# Patient Record
Sex: FEMALE | Race: WHITE | Employment: UNEMPLOYED | URBAN - NONMETROPOLITAN AREA
[De-identification: names, ages, dates, MRNs, and addresses within clinical notes are randomized per-mention and may not be internally consistent; named-entity substitution may affect disease eponyms.]

---

## 2017-01-03 ENCOUNTER — TELEPHONE (OUTPATIENT)
Dept: SURGERY | Age: 42
End: 2017-01-03

## 2017-01-04 ENCOUNTER — HOSPITAL ENCOUNTER (OUTPATIENT)
Dept: GENERAL RADIOLOGY | Age: 42
Discharge: HOME | End: 2017-01-04
Payer: COMMERCIAL

## 2017-01-04 ENCOUNTER — OFFICE VISIT (OUTPATIENT)
Dept: UROLOGY | Age: 42
End: 2017-01-04
Payer: COMMERCIAL

## 2017-01-04 VITALS
WEIGHT: 181 LBS | HEART RATE: 80 BPM | SYSTOLIC BLOOD PRESSURE: 132 MMHG | HEIGHT: 60 IN | TEMPERATURE: 98.3 F | BODY MASS INDEX: 35.53 KG/M2 | OXYGEN SATURATION: 97 % | DIASTOLIC BLOOD PRESSURE: 78 MMHG

## 2017-01-04 DIAGNOSIS — N20.0 RENAL STONE: ICD-10-CM

## 2017-01-04 DIAGNOSIS — N20.0 RENAL STONE: Primary | ICD-10-CM

## 2017-01-04 DIAGNOSIS — N20.1 LEFT URETERAL STONE: ICD-10-CM

## 2017-01-04 LAB
APPEARANCE FLUID: NORMAL
BILIRUBIN, POC: NORMAL
BLOOD URINE, POC: NORMAL
CLARITY, POC: CLEAR
COLOR, POC: YELLOW
GLUCOSE URINE, POC: NORMAL
KETONES, POC: NORMAL
LEUKOCYTE EST, POC: NORMAL
NITRITE, POC: NORMAL
PH, POC: 5.5
PROTEIN, POC: NORMAL
SPECIFIC GRAVITY, POC: 1.02
UROBILINOGEN, POC: 0.2

## 2017-01-04 PROCEDURE — 74000 XR ABDOMEN LIMITED (KUB): CPT

## 2017-01-04 PROCEDURE — 99213 OFFICE O/P EST LOW 20 MIN: CPT | Performed by: PHYSICIAN ASSISTANT

## 2017-01-04 PROCEDURE — 81002 URINALYSIS NONAUTO W/O SCOPE: CPT | Performed by: PHYSICIAN ASSISTANT

## 2017-01-04 ASSESSMENT — ENCOUNTER SYMPTOMS
HEMOPTYSIS: 0
COUGH: 0
ORTHOPNEA: 0
EYE REDNESS: 0
BLOOD IN STOOL: 0
EYE DISCHARGE: 0

## 2017-01-11 ENCOUNTER — APPOINTMENT (OUTPATIENT)
Dept: GENERAL RADIOLOGY | Age: 42
End: 2017-01-11
Payer: COMMERCIAL

## 2017-01-11 ENCOUNTER — HOSPITAL ENCOUNTER (EMERGENCY)
Age: 42
Discharge: HOME | End: 2017-01-11
Payer: COMMERCIAL

## 2017-01-11 VITALS
TEMPERATURE: 98 F | RESPIRATION RATE: 18 BRPM | HEIGHT: 60 IN | SYSTOLIC BLOOD PRESSURE: 125 MMHG | DIASTOLIC BLOOD PRESSURE: 84 MMHG | BODY MASS INDEX: 35.73 KG/M2 | OXYGEN SATURATION: 98 % | WEIGHT: 182 LBS | HEART RATE: 75 BPM

## 2017-01-11 DIAGNOSIS — R07.9 CHEST PAIN, UNSPECIFIED TYPE: Primary | ICD-10-CM

## 2017-01-11 LAB
ALBUMIN SERPL-MCNC: 4.1 G/DL (ref 3.5–5.2)
ALP BLD-CCNC: 92 U/L (ref 35–104)
ALT SERPL-CCNC: 55 U/L (ref 5–33)
ANION GAP SERPL CALCULATED.3IONS-SCNC: 13 MMOL/L (ref 7–19)
AST SERPL-CCNC: 41 U/L (ref 5–32)
BACTERIA: ABNORMAL /HPF
BASOPHILS ABSOLUTE: 0 K/UL (ref 0–0.2)
BASOPHILS RELATIVE PERCENT: 0.2 % (ref 0–1)
BILIRUB SERPL-MCNC: <0.2 MG/DL (ref 0.2–1.2)
BILIRUBIN URINE: NEGATIVE
BLOOD, URINE: ABNORMAL
BUN BLDV-MCNC: 7 MG/DL (ref 6–20)
CALCIUM SERPL-MCNC: 9 MG/DL (ref 8.6–10)
CHLORIDE BLD-SCNC: 98 MMOL/L (ref 98–111)
CLARITY: ABNORMAL
CO2: 28 MMOL/L (ref 22–29)
COLOR: YELLOW
CREAT SERPL-MCNC: 0.7 MG/DL (ref 0.5–0.9)
CRYSTALS, UA: ABNORMAL /HPF
EOSINOPHILS ABSOLUTE: 0.1 K/UL (ref 0–0.6)
EOSINOPHILS RELATIVE PERCENT: 1.2 % (ref 0–5)
EPITHELIAL CELLS, UA: ABNORMAL /HPF
GFR NON-AFRICAN AMERICAN: >60
GLOBULIN: 3.7 G/DL
GLUCOSE BLD-MCNC: 92 MG/DL (ref 74–109)
GLUCOSE URINE: NEGATIVE MG/DL
HCT VFR BLD CALC: 41.3 % (ref 37–47)
HEMOGLOBIN: 13.6 G/DL (ref 12–16)
KETONES, URINE: NEGATIVE MG/DL
LEUKOCYTE ESTERASE, URINE: NEGATIVE
LYMPHOCYTES ABSOLUTE: 3.4 K/UL (ref 1.1–4.5)
LYMPHOCYTES RELATIVE PERCENT: 28.5 % (ref 20–40)
MCH RBC QN AUTO: 30.6 PG (ref 27–31)
MCHC RBC AUTO-ENTMCNC: 32.9 G/DL (ref 33–37)
MCV RBC AUTO: 93 FL (ref 81–99)
MONOCYTES ABSOLUTE: 0.6 K/UL (ref 0–0.9)
MONOCYTES RELATIVE PERCENT: 4.7 % (ref 0–10)
NEUTROPHILS ABSOLUTE: 7.9 K/UL (ref 1.5–7.5)
NEUTROPHILS RELATIVE PERCENT: 65.4 % (ref 50–65)
NITRITE, URINE: NEGATIVE
PDW BLD-RTO: 13.3 % (ref 11.5–14.5)
PERFORMED ON: NORMAL
PERFORMED ON: NORMAL
PH UA: 5.5
PLATELET # BLD: 365 K/UL (ref 130–400)
PMV BLD AUTO: 10.3 FL (ref 7.4–10.4)
POC TROPONIN I: 0 NG/ML (ref 0–0.08)
POC TROPONIN I: 0 NG/ML (ref 0–0.08)
POTASSIUM SERPL-SCNC: 3.8 MMOL/L (ref 3.5–5)
PROTEIN UA: NEGATIVE MG/DL
RBC # BLD: 4.44 M/UL (ref 4.2–5.4)
SODIUM BLD-SCNC: 139 MMOL/L (ref 136–145)
SPECIFIC GRAVITY UA: 1.01
TOTAL PROTEIN: 7.8 G/DL (ref 6.6–8.7)
UROBILINOGEN, URINE: 0.2 E.U./DL
WBC # BLD: 12 K/UL (ref 4.8–10.8)

## 2017-01-11 PROCEDURE — 99285 EMERGENCY DEPT VISIT HI MDM: CPT

## 2017-01-11 PROCEDURE — 81001 URINALYSIS AUTO W/SCOPE: CPT

## 2017-01-11 PROCEDURE — 93005 ELECTROCARDIOGRAM TRACING: CPT

## 2017-01-11 PROCEDURE — 6370000000 HC RX 637 (ALT 250 FOR IP): Performed by: PHYSICIAN ASSISTANT

## 2017-01-11 PROCEDURE — 84484 ASSAY OF TROPONIN QUANT: CPT

## 2017-01-11 PROCEDURE — 80053 COMPREHEN METABOLIC PANEL: CPT

## 2017-01-11 PROCEDURE — 99283 EMERGENCY DEPT VISIT LOW MDM: CPT | Performed by: PHYSICIAN ASSISTANT

## 2017-01-11 PROCEDURE — 2580000003 HC RX 258: Performed by: PHYSICIAN ASSISTANT

## 2017-01-11 PROCEDURE — 85025 COMPLETE CBC W/AUTO DIFF WBC: CPT

## 2017-01-11 PROCEDURE — 71020 XR CHEST STANDARD TWO VW: CPT

## 2017-01-11 PROCEDURE — 36415 COLL VENOUS BLD VENIPUNCTURE: CPT

## 2017-01-11 RX ORDER — 0.9 % SODIUM CHLORIDE 0.9 %
1000 INTRAVENOUS SOLUTION INTRAVENOUS ONCE
Status: COMPLETED | OUTPATIENT
Start: 2017-01-11 | End: 2017-01-11

## 2017-01-11 RX ORDER — NITROGLYCERIN 0.4 MG/1
0.4 TABLET SUBLINGUAL ONCE
Status: COMPLETED | OUTPATIENT
Start: 2017-01-11 | End: 2017-01-11

## 2017-01-11 RX ORDER — ASPIRIN 325 MG
325 TABLET ORAL ONCE
Status: COMPLETED | OUTPATIENT
Start: 2017-01-11 | End: 2017-01-11

## 2017-01-11 RX ORDER — ASPIRIN 81 MG/1
81 TABLET, CHEWABLE ORAL DAILY
Qty: 30 TABLET | Refills: 0 | Status: SHIPPED | OUTPATIENT
Start: 2017-01-11 | End: 2022-03-08 | Stop reason: ALTCHOICE

## 2017-01-11 RX ADMIN — ASPIRIN 325 MG ORAL TABLET 325 MG: 325 PILL ORAL at 17:34

## 2017-01-11 RX ADMIN — NITROGLYCERIN 0.4 MG: 0.4 TABLET SUBLINGUAL at 18:05

## 2017-01-11 RX ADMIN — SODIUM CHLORIDE 1000 ML: 9 INJECTION, SOLUTION INTRAVENOUS at 17:34

## 2017-01-11 ASSESSMENT — ENCOUNTER SYMPTOMS
CHEST TIGHTNESS: 0
SHORTNESS OF BREATH: 0
ABDOMINAL DISTENTION: 0
COUGH: 0
RHINORRHEA: 0
SINUS PRESSURE: 0
CONSTIPATION: 0
APNEA: 0
WHEEZING: 0
VOMITING: 0
EYE PAIN: 0
ABDOMINAL PAIN: 0
NAUSEA: 0
DIARRHEA: 0
SORE THROAT: 0

## 2017-01-11 ASSESSMENT — PAIN SCALES - GENERAL: PAINLEVEL_OUTOF10: 8

## 2017-01-12 ENCOUNTER — OFFICE VISIT (OUTPATIENT)
Dept: SURGERY | Age: 42
End: 2017-01-12
Payer: COMMERCIAL

## 2017-01-12 VITALS — DIASTOLIC BLOOD PRESSURE: 70 MMHG | SYSTOLIC BLOOD PRESSURE: 110 MMHG | HEART RATE: 76 BPM

## 2017-01-12 DIAGNOSIS — Z98.890 STATUS POST LEFT BREAST BIOPSY: Primary | ICD-10-CM

## 2017-01-12 PROCEDURE — 99213 OFFICE O/P EST LOW 20 MIN: CPT | Performed by: SURGERY

## 2017-01-13 ENCOUNTER — OFFICE VISIT (OUTPATIENT)
Dept: PRIMARY CARE CLINIC | Age: 42
End: 2017-01-13
Payer: COMMERCIAL

## 2017-01-13 VITALS
TEMPERATURE: 98.1 F | WEIGHT: 180 LBS | HEART RATE: 78 BPM | HEIGHT: 60 IN | RESPIRATION RATE: 20 BRPM | SYSTOLIC BLOOD PRESSURE: 126 MMHG | BODY MASS INDEX: 35.34 KG/M2 | DIASTOLIC BLOOD PRESSURE: 78 MMHG | OXYGEN SATURATION: 98 %

## 2017-01-13 DIAGNOSIS — R07.89 OTHER CHEST PAIN: Primary | ICD-10-CM

## 2017-01-13 DIAGNOSIS — E66.09 NON MORBID OBESITY DUE TO EXCESS CALORIES: ICD-10-CM

## 2017-01-13 DIAGNOSIS — M94.0 COSTOCHONDRITIS: ICD-10-CM

## 2017-01-13 PROCEDURE — 99213 OFFICE O/P EST LOW 20 MIN: CPT | Performed by: FAMILY MEDICINE

## 2017-01-13 RX ORDER — METHYLPREDNISOLONE 4 MG/1
TABLET ORAL
Qty: 1 KIT | Refills: 0 | Status: SHIPPED | OUTPATIENT
Start: 2017-01-13 | End: 2017-01-19

## 2017-01-13 ASSESSMENT — ENCOUNTER SYMPTOMS
PHOTOPHOBIA: 0
NAUSEA: 0
COUGH: 0
DIARRHEA: 0
SORE THROAT: 0
VOMITING: 0
WHEEZING: 0
ABDOMINAL PAIN: 0
SHORTNESS OF BREATH: 0
COLOR CHANGE: 0
CONSTIPATION: 0
RHINORRHEA: 0
TROUBLE SWALLOWING: 0
CHEST TIGHTNESS: 0
EYE PAIN: 0

## 2017-01-17 LAB
EKG P AXIS: 40 DEGREES
EKG P AXIS: 9 DEGREES
EKG P-R INTERVAL: 114 MS
EKG P-R INTERVAL: 124 MS
EKG Q-T INTERVAL: 362 MS
EKG Q-T INTERVAL: 396 MS
EKG QRS DURATION: 82 MS
EKG QRS DURATION: 82 MS
EKG QTC CALCULATION (BAZETT): 397 MS
EKG QTC CALCULATION (BAZETT): 422 MS
EKG T AXIS: 27 DEGREES
EKG T AXIS: 29 DEGREES

## 2017-01-27 ENCOUNTER — OFFICE VISIT (OUTPATIENT)
Dept: PRIMARY CARE CLINIC | Age: 42
End: 2017-01-27
Payer: COMMERCIAL

## 2017-01-27 VITALS
BODY MASS INDEX: 35.15 KG/M2 | RESPIRATION RATE: 20 BRPM | TEMPERATURE: 98 F | WEIGHT: 180 LBS | OXYGEN SATURATION: 98 % | HEART RATE: 77 BPM | DIASTOLIC BLOOD PRESSURE: 88 MMHG | SYSTOLIC BLOOD PRESSURE: 110 MMHG

## 2017-01-27 DIAGNOSIS — M94.0 ACUTE COSTOCHONDRITIS: ICD-10-CM

## 2017-01-27 DIAGNOSIS — F41.1 GAD (GENERALIZED ANXIETY DISORDER): Primary | ICD-10-CM

## 2017-01-27 PROCEDURE — 99213 OFFICE O/P EST LOW 20 MIN: CPT | Performed by: FAMILY MEDICINE

## 2017-01-27 RX ORDER — PROPRANOLOL HYDROCHLORIDE 40 MG/1
TABLET ORAL
Qty: 90 TABLET | Refills: 0 | Status: SHIPPED | OUTPATIENT
Start: 2017-01-27 | End: 2017-02-21 | Stop reason: SDUPTHER

## 2017-01-27 RX ORDER — LANOLIN ALCOHOL/MO/W.PET/CERES
3 CREAM (GRAM) TOPICAL DAILY
Qty: 30 TABLET | Refills: 0 | Status: SHIPPED | OUTPATIENT
Start: 2017-01-27 | End: 2018-08-14

## 2017-01-27 ASSESSMENT — ENCOUNTER SYMPTOMS
SHORTNESS OF BREATH: 0
COUGH: 0
PHOTOPHOBIA: 0
SORE THROAT: 0
WHEEZING: 0
COLOR CHANGE: 0
EYE PAIN: 0
RHINORRHEA: 0
VOMITING: 0
DIARRHEA: 0
CONSTIPATION: 0
ABDOMINAL PAIN: 0
TROUBLE SWALLOWING: 0
CHEST TIGHTNESS: 0
NAUSEA: 0

## 2017-02-10 ENCOUNTER — TELEPHONE (OUTPATIENT)
Dept: SURGERY | Age: 42
End: 2017-02-10

## 2017-03-03 ENCOUNTER — OFFICE VISIT (OUTPATIENT)
Dept: PRIMARY CARE CLINIC | Age: 42
End: 2017-03-03
Payer: COMMERCIAL

## 2017-03-03 VITALS
RESPIRATION RATE: 18 BRPM | WEIGHT: 180 LBS | HEART RATE: 88 BPM | TEMPERATURE: 97 F | SYSTOLIC BLOOD PRESSURE: 110 MMHG | DIASTOLIC BLOOD PRESSURE: 62 MMHG | BODY MASS INDEX: 35.34 KG/M2 | OXYGEN SATURATION: 98 % | HEIGHT: 60 IN

## 2017-03-03 DIAGNOSIS — I10 ESSENTIAL HYPERTENSION: ICD-10-CM

## 2017-03-03 DIAGNOSIS — F41.1 GAD (GENERALIZED ANXIETY DISORDER): Primary | ICD-10-CM

## 2017-03-03 DIAGNOSIS — F51.04 PSYCHOPHYSIOLOGIC INSOMNIA: ICD-10-CM

## 2017-03-03 PROCEDURE — 99213 OFFICE O/P EST LOW 20 MIN: CPT | Performed by: FAMILY MEDICINE

## 2017-03-03 RX ORDER — AMITRIPTYLINE HYDROCHLORIDE 25 MG/1
TABLET, FILM COATED ORAL
COMMUNITY
Start: 2017-02-24 | End: 2018-11-12

## 2017-03-03 RX ORDER — PROPRANOLOL HYDROCHLORIDE 40 MG/1
40 TABLET ORAL 3 TIMES DAILY
Qty: 270 TABLET | Refills: 0 | Status: SHIPPED | OUTPATIENT
Start: 2017-03-03 | End: 2017-06-27 | Stop reason: SDUPTHER

## 2017-03-14 ENCOUNTER — OFFICE VISIT (OUTPATIENT)
Dept: PRIMARY CARE CLINIC | Age: 42
End: 2017-03-14
Payer: COMMERCIAL

## 2017-03-14 ENCOUNTER — HOSPITAL ENCOUNTER (OUTPATIENT)
Dept: GENERAL RADIOLOGY | Age: 42
Discharge: HOME | End: 2017-03-14
Payer: COMMERCIAL

## 2017-03-14 VITALS
HEIGHT: 60 IN | TEMPERATURE: 99.2 F | DIASTOLIC BLOOD PRESSURE: 84 MMHG | SYSTOLIC BLOOD PRESSURE: 134 MMHG | WEIGHT: 182 LBS | OXYGEN SATURATION: 96 % | HEART RATE: 67 BPM | BODY MASS INDEX: 35.73 KG/M2 | RESPIRATION RATE: 20 BRPM

## 2017-03-14 DIAGNOSIS — M54.5 LOW BACK PAIN, UNSPECIFIED BACK PAIN LATERALITY, UNSPECIFIED CHRONICITY, WITH SCIATICA PRESENCE UNSPECIFIED: ICD-10-CM

## 2017-03-14 DIAGNOSIS — M54.12 CERVICAL RADICULOPATHY: ICD-10-CM

## 2017-03-14 DIAGNOSIS — I10 ESSENTIAL HYPERTENSION: ICD-10-CM

## 2017-03-14 DIAGNOSIS — M54.12 CERVICAL RADICULOPATHY: Primary | ICD-10-CM

## 2017-03-14 PROCEDURE — 72040 X-RAY EXAM NECK SPINE 2-3 VW: CPT

## 2017-03-14 PROCEDURE — 99214 OFFICE O/P EST MOD 30 MIN: CPT | Performed by: FAMILY MEDICINE

## 2017-03-14 PROCEDURE — 96372 THER/PROPH/DIAG INJ SC/IM: CPT | Performed by: FAMILY MEDICINE

## 2017-03-14 RX ORDER — KETOROLAC TROMETHAMINE 30 MG/ML
30 INJECTION, SOLUTION INTRAMUSCULAR; INTRAVENOUS ONCE
Status: COMPLETED | OUTPATIENT
Start: 2017-03-14 | End: 2017-03-14

## 2017-03-14 RX ORDER — PREDNISONE 20 MG/1
60 TABLET ORAL DAILY
Qty: 21 TABLET | Refills: 0 | Status: SHIPPED | OUTPATIENT
Start: 2017-03-14 | End: 2017-03-21

## 2017-03-14 RX ADMIN — KETOROLAC TROMETHAMINE 30 MG: 30 INJECTION, SOLUTION INTRAMUSCULAR; INTRAVENOUS at 16:07

## 2017-03-14 ASSESSMENT — ENCOUNTER SYMPTOMS
BACK PAIN: 1
NAUSEA: 0
RHINORRHEA: 0
CONSTIPATION: 0
EYE PAIN: 0
ABDOMINAL PAIN: 0
TROUBLE SWALLOWING: 0
WHEEZING: 0
DIARRHEA: 0
COLOR CHANGE: 0
CHEST TIGHTNESS: 0
SORE THROAT: 0
PHOTOPHOBIA: 0
COUGH: 0
VOMITING: 0
SHORTNESS OF BREATH: 0

## 2017-03-28 ENCOUNTER — HOSPITAL ENCOUNTER (OUTPATIENT)
Dept: PHYSICAL THERAPY | Age: 42
Setting detail: THERAPIES SERIES
Discharge: HOME | End: 2017-03-28
Payer: COMMERCIAL

## 2017-03-28 PROCEDURE — 97162 PT EVAL MOD COMPLEX 30 MIN: CPT

## 2017-03-28 ASSESSMENT — PAIN SCALES - GENERAL: PAINLEVEL_OUTOF10: 6

## 2017-03-28 ASSESSMENT — PAIN DESCRIPTION - LOCATION: LOCATION: NECK;ARM;SHOULDER

## 2017-03-28 ASSESSMENT — PAIN DESCRIPTION - DESCRIPTORS: DESCRIPTORS: SHARP;TINGLING;BURNING

## 2017-03-28 ASSESSMENT — PAIN DESCRIPTION - FREQUENCY: FREQUENCY: INTERMITTENT

## 2017-03-28 ASSESSMENT — PAIN DESCRIPTION - ORIENTATION: ORIENTATION: RIGHT;LEFT

## 2017-03-28 ASSESSMENT — PAIN DESCRIPTION - PAIN TYPE: TYPE: CHRONIC PAIN

## 2017-03-28 ASSESSMENT — PAIN DESCRIPTION - ONSET: ONSET: ON-GOING

## 2017-04-10 ENCOUNTER — HOSPITAL ENCOUNTER (OUTPATIENT)
Dept: PHYSICAL THERAPY | Age: 42
Setting detail: THERAPIES SERIES
Discharge: HOME | End: 2017-04-10
Payer: COMMERCIAL

## 2017-04-10 PROCEDURE — 97110 THERAPEUTIC EXERCISES: CPT

## 2017-04-10 PROCEDURE — 97012 MECHANICAL TRACTION THERAPY: CPT

## 2017-04-10 ASSESSMENT — PAIN DESCRIPTION - LOCATION: LOCATION: NECK;ARM;SHOULDER

## 2017-04-10 ASSESSMENT — PAIN DESCRIPTION - PAIN TYPE: TYPE: CHRONIC PAIN

## 2017-04-10 ASSESSMENT — PAIN DESCRIPTION - ONSET: ONSET: ON-GOING

## 2017-04-10 ASSESSMENT — PAIN DESCRIPTION - ORIENTATION: ORIENTATION: RIGHT;LEFT

## 2017-04-10 ASSESSMENT — PAIN SCALES - GENERAL: PAINLEVEL_OUTOF10: 7

## 2017-04-13 ENCOUNTER — HOSPITAL ENCOUNTER (OUTPATIENT)
Dept: PHYSICAL THERAPY | Age: 42
Setting detail: THERAPIES SERIES
Discharge: HOME | End: 2017-04-13
Payer: COMMERCIAL

## 2017-04-13 PROCEDURE — 97110 THERAPEUTIC EXERCISES: CPT

## 2017-04-13 PROCEDURE — 97012 MECHANICAL TRACTION THERAPY: CPT

## 2017-04-13 ASSESSMENT — PAIN DESCRIPTION - PAIN TYPE: TYPE: CHRONIC PAIN

## 2017-04-13 ASSESSMENT — PAIN DESCRIPTION - LOCATION: LOCATION: ARM;NECK;SHOULDER

## 2017-04-13 ASSESSMENT — PAIN DESCRIPTION - ORIENTATION: ORIENTATION: RIGHT;LEFT

## 2017-04-13 ASSESSMENT — PAIN SCALES - GENERAL: PAINLEVEL_OUTOF10: 4

## 2017-04-18 ENCOUNTER — OFFICE VISIT (OUTPATIENT)
Dept: PRIMARY CARE CLINIC | Age: 42
End: 2017-04-18
Payer: COMMERCIAL

## 2017-04-18 VITALS
HEART RATE: 96 BPM | HEIGHT: 60 IN | DIASTOLIC BLOOD PRESSURE: 78 MMHG | TEMPERATURE: 97.8 F | RESPIRATION RATE: 18 BRPM | BODY MASS INDEX: 34.75 KG/M2 | SYSTOLIC BLOOD PRESSURE: 102 MMHG | WEIGHT: 177 LBS | OXYGEN SATURATION: 95 %

## 2017-04-18 DIAGNOSIS — M54.12 CERVICAL RADICULOPATHY: ICD-10-CM

## 2017-04-18 DIAGNOSIS — M54.16 LUMBAR RADICULOPATHY: ICD-10-CM

## 2017-04-18 PROCEDURE — 99213 OFFICE O/P EST LOW 20 MIN: CPT | Performed by: FAMILY MEDICINE

## 2017-04-18 RX ORDER — TRAMADOL HYDROCHLORIDE 50 MG/1
50 TABLET ORAL 2 TIMES DAILY PRN
Qty: 45 TABLET | Refills: 0 | Status: SHIPPED | OUTPATIENT
Start: 2017-04-18 | End: 2017-05-18

## 2017-04-18 RX ORDER — TIZANIDINE 4 MG/1
4 TABLET ORAL EVERY 8 HOURS PRN
Qty: 60 TABLET | Refills: 3 | Status: SHIPPED | OUTPATIENT
Start: 2017-04-18 | End: 2017-07-24 | Stop reason: SDUPTHER

## 2017-04-19 ASSESSMENT — ENCOUNTER SYMPTOMS
NAUSEA: 0
COUGH: 0
WHEEZING: 0
DIARRHEA: 0
CONSTIPATION: 0
VOMITING: 0
BACK PAIN: 1
ABDOMINAL PAIN: 0
SHORTNESS OF BREATH: 0
CHEST TIGHTNESS: 0

## 2017-04-20 ENCOUNTER — HOSPITAL ENCOUNTER (OUTPATIENT)
Dept: PHYSICAL THERAPY | Age: 42
Setting detail: THERAPIES SERIES
Discharge: HOME | End: 2017-04-20
Payer: COMMERCIAL

## 2017-04-20 PROCEDURE — 97110 THERAPEUTIC EXERCISES: CPT

## 2017-04-20 PROCEDURE — 97035 APP MDLTY 1+ULTRASOUND EA 15: CPT

## 2017-04-20 ASSESSMENT — PAIN DESCRIPTION - LOCATION: LOCATION: NECK;SHOULDER

## 2017-04-20 ASSESSMENT — PAIN DESCRIPTION - PAIN TYPE: TYPE: CHRONIC PAIN

## 2017-04-20 ASSESSMENT — PAIN SCALES - GENERAL: PAINLEVEL_OUTOF10: 3

## 2017-04-24 ENCOUNTER — HOSPITAL ENCOUNTER (OUTPATIENT)
Dept: PHYSICAL THERAPY | Age: 42
Setting detail: THERAPIES SERIES
Discharge: HOME | End: 2017-04-24
Payer: COMMERCIAL

## 2017-04-24 PROCEDURE — 97110 THERAPEUTIC EXERCISES: CPT

## 2017-04-24 PROCEDURE — 97012 MECHANICAL TRACTION THERAPY: CPT

## 2017-04-24 ASSESSMENT — PAIN DESCRIPTION - ORIENTATION: ORIENTATION: RIGHT;LEFT

## 2017-04-24 ASSESSMENT — PAIN SCALES - GENERAL: PAINLEVEL_OUTOF10: 2

## 2017-04-24 ASSESSMENT — PAIN DESCRIPTION - LOCATION: LOCATION: NECK;SHOULDER

## 2017-04-27 ENCOUNTER — HOSPITAL ENCOUNTER (OUTPATIENT)
Dept: MRI IMAGING | Age: 42
Discharge: HOME | End: 2017-04-27
Payer: COMMERCIAL

## 2017-04-27 DIAGNOSIS — M54.12 CERVICAL RADICULOPATHY: ICD-10-CM

## 2017-04-27 DIAGNOSIS — M54.16 LUMBAR RADICULOPATHY: ICD-10-CM

## 2017-04-27 PROCEDURE — 72141 MRI NECK SPINE W/O DYE: CPT

## 2017-04-28 ENCOUNTER — TELEPHONE (OUTPATIENT)
Dept: PRIMARY CARE CLINIC | Age: 42
End: 2017-04-28

## 2017-05-02 ENCOUNTER — HOSPITAL ENCOUNTER (OUTPATIENT)
Dept: PHYSICAL THERAPY | Age: 42
Setting detail: THERAPIES SERIES
Discharge: HOME | End: 2017-05-02
Payer: COMMERCIAL

## 2017-05-02 PROCEDURE — 97012 MECHANICAL TRACTION THERAPY: CPT

## 2017-05-02 PROCEDURE — 97110 THERAPEUTIC EXERCISES: CPT

## 2017-05-02 ASSESSMENT — PAIN DESCRIPTION - PAIN TYPE: TYPE: CHRONIC PAIN

## 2017-05-02 ASSESSMENT — PAIN DESCRIPTION - LOCATION: LOCATION: NECK

## 2017-05-02 ASSESSMENT — PAIN SCALES - GENERAL: PAINLEVEL_OUTOF10: 4

## 2017-05-02 ASSESSMENT — PAIN DESCRIPTION - FREQUENCY: FREQUENCY: CONTINUOUS

## 2017-05-02 ASSESSMENT — PAIN DESCRIPTION - ORIENTATION: ORIENTATION: LEFT;RIGHT

## 2017-05-02 ASSESSMENT — PAIN DESCRIPTION - DESCRIPTORS: DESCRIPTORS: ACHING;DULL

## 2017-05-03 ENCOUNTER — OFFICE VISIT (OUTPATIENT)
Dept: PRIMARY CARE CLINIC | Age: 42
End: 2017-05-03
Payer: COMMERCIAL

## 2017-05-03 VITALS
HEART RATE: 73 BPM | TEMPERATURE: 97.8 F | SYSTOLIC BLOOD PRESSURE: 120 MMHG | OXYGEN SATURATION: 99 % | HEIGHT: 60 IN | RESPIRATION RATE: 20 BRPM | WEIGHT: 176 LBS | DIASTOLIC BLOOD PRESSURE: 60 MMHG | BODY MASS INDEX: 34.55 KG/M2

## 2017-05-03 DIAGNOSIS — I10 ESSENTIAL HYPERTENSION: ICD-10-CM

## 2017-05-03 DIAGNOSIS — R42 DIZZINESS: ICD-10-CM

## 2017-05-03 DIAGNOSIS — E03.9 HYPOTHYROIDISM, UNSPECIFIED TYPE: Primary | ICD-10-CM

## 2017-05-03 PROCEDURE — 99214 OFFICE O/P EST MOD 30 MIN: CPT | Performed by: FAMILY MEDICINE

## 2017-05-03 ASSESSMENT — ENCOUNTER SYMPTOMS
BACK PAIN: 1
SHORTNESS OF BREATH: 0
COUGH: 0

## 2017-05-10 ENCOUNTER — HOSPITAL ENCOUNTER (OUTPATIENT)
Dept: PHYSICAL THERAPY | Age: 42
Setting detail: THERAPIES SERIES
Discharge: HOME | End: 2017-05-10
Payer: COMMERCIAL

## 2017-05-10 PROCEDURE — 97012 MECHANICAL TRACTION THERAPY: CPT

## 2017-05-10 PROCEDURE — 97110 THERAPEUTIC EXERCISES: CPT

## 2017-05-12 ENCOUNTER — OFFICE VISIT (OUTPATIENT)
Dept: PSYCHOLOGY | Age: 42
End: 2017-05-12

## 2017-05-12 DIAGNOSIS — F41.9 ANXIETY: Primary | ICD-10-CM

## 2017-05-12 DIAGNOSIS — I10 ESSENTIAL HYPERTENSION: ICD-10-CM

## 2017-05-12 DIAGNOSIS — E03.9 HYPOTHYROIDISM, UNSPECIFIED TYPE: ICD-10-CM

## 2017-05-12 LAB
ALBUMIN SERPL-MCNC: 4 G/DL (ref 3.5–5.2)
ALP BLD-CCNC: 76 U/L (ref 35–104)
ALT SERPL-CCNC: 23 U/L (ref 5–33)
ANION GAP SERPL CALCULATED.3IONS-SCNC: 10 MMOL/L (ref 7–19)
AST SERPL-CCNC: 18 U/L (ref 5–32)
BILIRUB SERPL-MCNC: <0.2 MG/DL (ref 0.2–1.2)
BUN BLDV-MCNC: 6 MG/DL (ref 6–20)
CALCIUM SERPL-MCNC: 9.1 MG/DL (ref 8.6–10)
CHLORIDE BLD-SCNC: 101 MMOL/L (ref 98–111)
CHOLESTEROL, TOTAL: 279 MG/DL (ref 160–199)
CO2: 27 MMOL/L (ref 22–29)
CREAT SERPL-MCNC: 0.6 MG/DL (ref 0.5–0.9)
GFR NON-AFRICAN AMERICAN: >60
GLOBULIN: 3.1 G/DL
GLUCOSE BLD-MCNC: 92 MG/DL (ref 74–109)
HDLC SERPL-MCNC: 37 MG/DL (ref 65–121)
LDL CHOLESTEROL CALCULATED: 180 MG/DL
POTASSIUM SERPL-SCNC: 4.2 MMOL/L (ref 3.5–5)
SODIUM BLD-SCNC: 138 MMOL/L (ref 136–145)
T4 FREE: 1.2 NG/ML (ref 0.9–1.7)
TOTAL PROTEIN: 7.1 G/DL (ref 6.6–8.7)
TRIGL SERPL-MCNC: 309 MG/DL (ref 150–199)
TSH SERPL DL<=0.05 MIU/L-ACNC: 1.22 UIU/ML (ref 0.27–4.2)

## 2017-05-12 PROCEDURE — 90791 PSYCH DIAGNOSTIC EVALUATION: CPT | Performed by: PSYCHOLOGIST

## 2017-05-15 ENCOUNTER — TELEPHONE (OUTPATIENT)
Dept: PRIMARY CARE CLINIC | Age: 42
End: 2017-05-15

## 2017-05-15 DIAGNOSIS — E78.00 ELEVATED CHOLESTEROL: Primary | ICD-10-CM

## 2017-05-15 RX ORDER — ROSUVASTATIN CALCIUM 10 MG/1
10 TABLET, COATED ORAL NIGHTLY
Qty: 30 TABLET | Refills: 3 | Status: SHIPPED | OUTPATIENT
Start: 2017-05-15 | End: 2017-08-28 | Stop reason: SDUPTHER

## 2017-05-16 ENCOUNTER — TELEPHONE (OUTPATIENT)
Dept: PRIMARY CARE CLINIC | Age: 42
End: 2017-05-16

## 2017-06-07 ENCOUNTER — OFFICE VISIT (OUTPATIENT)
Dept: PRIMARY CARE CLINIC | Age: 42
End: 2017-06-07
Payer: COMMERCIAL

## 2017-06-07 VITALS
BODY MASS INDEX: 35.14 KG/M2 | TEMPERATURE: 98 F | RESPIRATION RATE: 16 BRPM | DIASTOLIC BLOOD PRESSURE: 78 MMHG | HEART RATE: 88 BPM | HEIGHT: 60 IN | SYSTOLIC BLOOD PRESSURE: 110 MMHG | WEIGHT: 179 LBS

## 2017-06-07 DIAGNOSIS — I10 ESSENTIAL HYPERTENSION: ICD-10-CM

## 2017-06-07 DIAGNOSIS — G89.29 CHRONIC NECK PAIN: ICD-10-CM

## 2017-06-07 DIAGNOSIS — M54.12 CERVICAL RADICULOPATHY: Primary | ICD-10-CM

## 2017-06-07 DIAGNOSIS — G56.01 CARPAL TUNNEL SYNDROME, RIGHT: ICD-10-CM

## 2017-06-07 DIAGNOSIS — M54.2 CHRONIC NECK PAIN: ICD-10-CM

## 2017-06-07 PROCEDURE — 99214 OFFICE O/P EST MOD 30 MIN: CPT | Performed by: FAMILY MEDICINE

## 2017-06-09 ASSESSMENT — ENCOUNTER SYMPTOMS
CHEST TIGHTNESS: 0
RHINORRHEA: 0
DIARRHEA: 0
COUGH: 0
TROUBLE SWALLOWING: 0
WHEEZING: 0
SHORTNESS OF BREATH: 0
SORE THROAT: 0
NAUSEA: 0
COLOR CHANGE: 0
PHOTOPHOBIA: 0
BACK PAIN: 1
ABDOMINAL PAIN: 0
CONSTIPATION: 0
VOMITING: 0
EYE PAIN: 0

## 2017-06-12 ENCOUNTER — OFFICE VISIT (OUTPATIENT)
Dept: PSYCHOLOGY | Age: 42
End: 2017-06-12
Payer: COMMERCIAL

## 2017-06-12 DIAGNOSIS — F41.9 ANXIETY: Primary | ICD-10-CM

## 2017-06-12 PROCEDURE — 90837 PSYTX W PT 60 MINUTES: CPT | Performed by: PSYCHOLOGIST

## 2017-06-21 ENCOUNTER — OFFICE VISIT (OUTPATIENT)
Dept: SURGERY | Age: 42
End: 2017-06-21
Payer: COMMERCIAL

## 2017-06-21 ENCOUNTER — HOSPITAL ENCOUNTER (OUTPATIENT)
Dept: WOMENS IMAGING | Age: 42
Discharge: HOME | End: 2017-06-21
Payer: COMMERCIAL

## 2017-06-21 VITALS — DIASTOLIC BLOOD PRESSURE: 72 MMHG | SYSTOLIC BLOOD PRESSURE: 110 MMHG | HEART RATE: 76 BPM

## 2017-06-21 DIAGNOSIS — Z98.890 STATUS POST LEFT BREAST BIOPSY: ICD-10-CM

## 2017-06-21 DIAGNOSIS — Z98.890 STATUS POST LEFT BREAST BIOPSY: Primary | ICD-10-CM

## 2017-06-21 PROCEDURE — 99213 OFFICE O/P EST LOW 20 MIN: CPT | Performed by: SURGERY

## 2017-06-21 PROCEDURE — G0279 TOMOSYNTHESIS, MAMMO: HCPCS

## 2017-06-26 ENCOUNTER — OFFICE VISIT (OUTPATIENT)
Dept: PSYCHOLOGY | Age: 42
End: 2017-06-26
Payer: COMMERCIAL

## 2017-06-26 DIAGNOSIS — F41.9 ANXIETY: Primary | ICD-10-CM

## 2017-06-26 PROCEDURE — 90837 PSYTX W PT 60 MINUTES: CPT | Performed by: PSYCHOLOGIST

## 2017-06-28 RX ORDER — PROPRANOLOL HYDROCHLORIDE 40 MG/1
TABLET ORAL
Qty: 270 TABLET | Refills: 0 | Status: SHIPPED | OUTPATIENT
Start: 2017-06-28 | End: 2017-09-23 | Stop reason: SDUPTHER

## 2017-07-21 DIAGNOSIS — Z12.31 ENCOUNTER FOR SCREENING MAMMOGRAM FOR BREAST CANCER: Primary | ICD-10-CM

## 2017-07-24 DIAGNOSIS — M54.16 LUMBAR RADICULOPATHY: ICD-10-CM

## 2017-07-24 DIAGNOSIS — M54.12 CERVICAL RADICULOPATHY: ICD-10-CM

## 2017-07-24 RX ORDER — TIZANIDINE 4 MG/1
TABLET ORAL
Qty: 60 TABLET | Refills: 3 | Status: SHIPPED | OUTPATIENT
Start: 2017-07-24 | End: 2017-11-19 | Stop reason: SDUPTHER

## 2017-08-04 ENCOUNTER — OFFICE VISIT (OUTPATIENT)
Dept: PSYCHOLOGY | Age: 42
End: 2017-08-04
Payer: COMMERCIAL

## 2017-08-04 DIAGNOSIS — F41.9 ANXIETY: Primary | ICD-10-CM

## 2017-08-04 PROCEDURE — 90837 PSYTX W PT 60 MINUTES: CPT | Performed by: PSYCHOLOGIST

## 2017-08-10 ENCOUNTER — HOSPITAL ENCOUNTER (EMERGENCY)
Age: 42
Discharge: HOME | End: 2017-08-10
Payer: COMMERCIAL

## 2017-08-10 VITALS
SYSTOLIC BLOOD PRESSURE: 126 MMHG | OXYGEN SATURATION: 94 % | DIASTOLIC BLOOD PRESSURE: 83 MMHG | RESPIRATION RATE: 20 BRPM | HEIGHT: 60 IN | BODY MASS INDEX: 34.95 KG/M2 | WEIGHT: 178 LBS | HEART RATE: 82 BPM | TEMPERATURE: 97.8 F
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
## 2017-08-10 DIAGNOSIS — S41.111A LACERATION OF UPPER LIMB, RIGHT, INITIAL ENCOUNTER: ICD-10-CM

## 2017-08-10 PROCEDURE — 2500000003 HC RX 250 WO HCPCS: Performed by: PHYSICIAN ASSISTANT

## 2017-08-10 PROCEDURE — 99283 EMERGENCY DEPT VISIT LOW MDM: CPT

## 2017-08-10 PROCEDURE — 12002 RPR S/N/AX/GEN/TRNK2.6-7.5CM: CPT | Performed by: PHYSICIAN ASSISTANT

## 2017-08-10 PROCEDURE — 12002 RPR S/N/AX/GEN/TRNK2.6-7.5CM: CPT

## 2017-08-10 RX ORDER — LIDOCAINE HYDROCHLORIDE 10 MG/ML
20 INJECTION, SOLUTION INFILTRATION; PERINEURAL ONCE
Status: COMPLETED | OUTPATIENT
Start: 2017-08-10 | End: 2017-08-10

## 2017-08-10 RX ORDER — AMOXICILLIN AND CLAVULANATE POTASSIUM 875; 125 MG/1; MG/1
1 TABLET, FILM COATED ORAL 2 TIMES DAILY
Qty: 20 TABLET | Refills: 0 | Status: SHIPPED | OUTPATIENT
Start: 2017-08-10 | End: 2017-08-20

## 2017-08-10 RX ADMIN — LIDOCAINE HYDROCHLORIDE 20 ML: 10 INJECTION, SOLUTION INFILTRATION; PERINEURAL at 10:47

## 2017-08-10 ASSESSMENT — ENCOUNTER SYMPTOMS
VOMITING: 0
DIARRHEA: 0
NAUSEA: 0
ABDOMINAL PAIN: 0
CONSTIPATION: 0
COUGH: 0
BACK PAIN: 0
WHEEZING: 0
SHORTNESS OF BREATH: 0

## 2017-08-10 ASSESSMENT — PAIN DESCRIPTION - PAIN TYPE: TYPE: ACUTE PAIN

## 2017-08-10 ASSESSMENT — PAIN SCALES - GENERAL: PAINLEVEL_OUTOF10: 9

## 2017-08-10 ASSESSMENT — PAIN DESCRIPTION - LOCATION: LOCATION: ARM

## 2017-08-11 ENCOUNTER — HOSPITAL ENCOUNTER (EMERGENCY)
Age: 42
Discharge: HOME | End: 2017-08-11
Payer: COMMERCIAL

## 2017-08-11 VITALS
SYSTOLIC BLOOD PRESSURE: 122 MMHG | BODY MASS INDEX: 35.34 KG/M2 | DIASTOLIC BLOOD PRESSURE: 64 MMHG | OXYGEN SATURATION: 98 % | HEIGHT: 60 IN | TEMPERATURE: 98.1 F | HEART RATE: 70 BPM | RESPIRATION RATE: 18 BRPM | WEIGHT: 180 LBS
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
## 2017-08-11 DIAGNOSIS — Z51.89 ENCOUNTER FOR WOUND CARE: Primary | ICD-10-CM

## 2017-08-11 PROCEDURE — 99282 EMERGENCY DEPT VISIT SF MDM: CPT

## 2017-08-11 PROCEDURE — 99024 POSTOP FOLLOW-UP VISIT: CPT | Performed by: PHYSICIAN ASSISTANT

## 2017-08-12 ASSESSMENT — ENCOUNTER SYMPTOMS
NAUSEA: 0
COUGH: 0
CONSTIPATION: 0
ABDOMINAL PAIN: 0
BACK PAIN: 0
WHEEZING: 0
SHORTNESS OF BREATH: 0
DIARRHEA: 0
VOMITING: 0

## 2017-08-25 ENCOUNTER — OFFICE VISIT (OUTPATIENT)
Dept: PSYCHOLOGY | Age: 42
End: 2017-08-25
Payer: COMMERCIAL

## 2017-08-25 DIAGNOSIS — F41.9 ANXIETY: Primary | ICD-10-CM

## 2017-08-25 PROCEDURE — 90837 PSYTX W PT 60 MINUTES: CPT | Performed by: PSYCHOLOGIST

## 2017-09-05 ENCOUNTER — TELEPHONE (OUTPATIENT)
Dept: PRIMARY CARE CLINIC | Age: 42
End: 2017-09-05

## 2017-09-05 RX ORDER — ATORVASTATIN CALCIUM 20 MG/1
20 TABLET, FILM COATED ORAL DAILY
Qty: 30 TABLET | Refills: 3 | Status: SHIPPED | OUTPATIENT
Start: 2017-09-05 | End: 2018-01-03 | Stop reason: SDUPTHER

## 2017-09-07 ENCOUNTER — OFFICE VISIT (OUTPATIENT)
Dept: PRIMARY CARE CLINIC | Age: 42
End: 2017-09-07
Payer: COMMERCIAL

## 2017-09-07 VITALS
HEIGHT: 60 IN | BODY MASS INDEX: 35.26 KG/M2 | SYSTOLIC BLOOD PRESSURE: 124 MMHG | OXYGEN SATURATION: 98 % | HEART RATE: 76 BPM | TEMPERATURE: 97.9 F | WEIGHT: 179.6 LBS | DIASTOLIC BLOOD PRESSURE: 82 MMHG

## 2017-09-07 DIAGNOSIS — M54.12 CERVICAL RADICULOPATHY: ICD-10-CM

## 2017-09-07 DIAGNOSIS — G56.03 BILATERAL CARPAL TUNNEL SYNDROME: Primary | ICD-10-CM

## 2017-09-07 PROCEDURE — 99213 OFFICE O/P EST LOW 20 MIN: CPT | Performed by: FAMILY MEDICINE

## 2017-09-07 ASSESSMENT — ENCOUNTER SYMPTOMS
COUGH: 0
SHORTNESS OF BREATH: 0
COLOR CHANGE: 0

## 2017-09-19 ENCOUNTER — TELEPHONE (OUTPATIENT)
Dept: SURGERY | Age: 42
End: 2017-09-19

## 2017-09-25 RX ORDER — PROPRANOLOL HYDROCHLORIDE 40 MG/1
TABLET ORAL
Qty: 270 TABLET | Refills: 0 | Status: SHIPPED | OUTPATIENT
Start: 2017-09-25 | End: 2017-12-13 | Stop reason: SDUPTHER

## 2017-09-29 ENCOUNTER — OFFICE VISIT (OUTPATIENT)
Dept: PSYCHOLOGY | Age: 42
End: 2017-09-29

## 2017-09-29 DIAGNOSIS — F41.9 ANXIETY: Primary | ICD-10-CM

## 2017-09-29 PROCEDURE — 90837 PSYTX W PT 60 MINUTES: CPT | Performed by: PSYCHOLOGIST

## 2017-10-13 ENCOUNTER — OFFICE VISIT (OUTPATIENT)
Dept: PSYCHOLOGY | Age: 42
End: 2017-10-13

## 2017-10-13 DIAGNOSIS — F41.9 ANXIETY: Primary | ICD-10-CM

## 2017-10-13 PROCEDURE — 90837 PSYTX W PT 60 MINUTES: CPT | Performed by: PSYCHOLOGIST

## 2017-10-13 NOTE — PROGRESS NOTES
Behavioral Health Consultation  Abigail Sotelo Psy.D.  10/13/2017  8:04 AM      Time spent with Patient: 55 minutes  This is patient's seventh  [facility] appointment. Reason for Consult:  anxiety and stress  Referring Provider: No referring provider defined for this encounter. Feedback given to PCP. S:  Patient presents for appointment and reports continued symptoms of anxiety related to upcoming disability determination date. Patient reports that she has been actively using coping strategies. We discuss specific plan to help manage her anxiety the day of, including having support people who are aware of her coping skills, practicing breathing before hand, using medication as prescribed and needed, thinking positive thoughts. Patient reports that she continues to experience anxiety when she thinks about going to public places or when she is in public places (store, etc). Patient also shares recent situation that happened with her dog; she reports her dog bit her  and he had to have stitches, the same dog reportedly bit her as well several weeks ago and she had to get stitches. Patient made decision to send dog to live with her parents and reports this has increased some of her depressive symptoms, including feeling lonely, sleeping more often, thinking sad thoughts. Patient was willing to engage in discussion regarding strategies to help with this including walking and spending time with her aunt.       O:  MSE:    Appearance    cooperative  Appetite normal  Sleep disturbance Yes  Fatigue Yes  Loss of pleasure Yes  Impulsive behavior No  Speech    normal rate and normal volume  Mood    Depressed  Affect    depressed affect  Thought Content    intact  Thought Process    linear  Associations    logical connections  Insight    Fair  Judgment    Intact  Orientation    oriented to person, place, time, and general circumstances  Memory    recent and remote memory intact  Attention/Concentration tobacco.  ETOH:   reports that she drinks alcohol. Family History:   Family History   Problem Relation Age of Onset    High Blood Pressure Mother     Other Mother      early alzheimers    High Blood Pressure Father     Diabetes Paternal Grandfather     Breast Cancer Paternal Aunt     Colon Cancer Neg Hx     Colon Polyps Neg Hx          A:  Patient does report anxiety in social settings and also about upcoming disability date but she has been using coping strategies and was able to discuss specific plan to help. Patient has been working with me specifically on this issue; she will continue with [place] [id number] for ongoing mental health treatment. We are in agreement that we will set up one additional appointment after her disability date (Oct 26). Diagnosis:     Anxiety disorder Not Otherwise Specified      Diagnosis Date    Anxiety     Asthma     born with asthmatic bronchitis    Degenerative disk disease     Depression     mood disorders    Hemorrhoid 11/2015    Hypoglycemia     Hypothyroid     Left ureteral stone 5/25/2016    Renal stone 5/25/2016     Problems with primary support group and Problems related to the social environment      Plan:  Pt interventions:  Trained in strategies for increasing balanced thinking, Discussed and set plan for behavioral activation, Trained in relaxation strategies, Provided education, Discussed self-care (sleep, nutrition, rewarding activities, social support, exercise), Supportive techniques, Identified maladaptive thoughts and Problem-solving re: upcoming disability date and related anxiety      Pt Behavioral Change Plan:

## 2017-11-01 RX ORDER — LEVOTHYROXINE SODIUM 0.05 MG/1
50 TABLET ORAL DAILY
Qty: 30 TABLET | Refills: 11 | Status: SHIPPED | OUTPATIENT
Start: 2017-11-01 | End: 2017-11-02 | Stop reason: SDUPTHER

## 2017-11-02 RX ORDER — LEVOTHYROXINE SODIUM 0.05 MG/1
50 TABLET ORAL DAILY
Qty: 30 TABLET | Refills: 11 | Status: SHIPPED | OUTPATIENT
Start: 2017-11-02 | End: 2018-12-02 | Stop reason: SDUPTHER

## 2017-11-07 ENCOUNTER — OFFICE VISIT (OUTPATIENT)
Dept: PRIMARY CARE CLINIC | Age: 42
End: 2017-11-07
Payer: COMMERCIAL

## 2017-11-07 VITALS
TEMPERATURE: 98.3 F | HEART RATE: 68 BPM | WEIGHT: 179 LBS | OXYGEN SATURATION: 95 % | SYSTOLIC BLOOD PRESSURE: 122 MMHG | HEIGHT: 60 IN | DIASTOLIC BLOOD PRESSURE: 80 MMHG | BODY MASS INDEX: 35.14 KG/M2

## 2017-11-07 DIAGNOSIS — E78.5 HYPERLIPIDEMIA, UNSPECIFIED HYPERLIPIDEMIA TYPE: ICD-10-CM

## 2017-11-07 DIAGNOSIS — E03.9 HYPOTHYROIDISM, UNSPECIFIED TYPE: ICD-10-CM

## 2017-11-07 DIAGNOSIS — I10 ESSENTIAL HYPERTENSION: Primary | ICD-10-CM

## 2017-11-07 DIAGNOSIS — G43.009 MIGRAINE WITHOUT AURA AND WITHOUT STATUS MIGRAINOSUS, NOT INTRACTABLE: ICD-10-CM

## 2017-11-07 PROCEDURE — 99214 OFFICE O/P EST MOD 30 MIN: CPT | Performed by: FAMILY MEDICINE

## 2017-11-07 RX ORDER — KETOROLAC TROMETHAMINE 30 MG/ML
30 INJECTION, SOLUTION INTRAMUSCULAR; INTRAVENOUS ONCE
Status: COMPLETED | OUTPATIENT
Start: 2017-11-07 | End: 2017-11-07

## 2017-11-07 RX ADMIN — KETOROLAC TROMETHAMINE 30 MG: 30 INJECTION, SOLUTION INTRAMUSCULAR; INTRAVENOUS at 07:27

## 2017-11-07 ASSESSMENT — ENCOUNTER SYMPTOMS
SHORTNESS OF BREATH: 0
COUGH: 0

## 2017-11-07 NOTE — PROGRESS NOTES
After obtaining consent, and per orders of Dr. Vandana Brown, injection of Toradol 30 given in Left upper quad. gluteus by Hanna Monique. Patient instructed to remain in clinic for 20 minutes afterwards, and to report any adverse reaction to me immediately.

## 2017-11-07 NOTE — PATIENT INSTRUCTIONS
Patient Education        Migraine Headache: Care Instructions  Your Care Instructions  Migraines are painful, throbbing headaches that often start on one side of the head. They may cause nausea and vomiting and make you sensitive to light, sound, or smell. Without treatment, migraines can last from 4 hours to a few days. Medicines can help prevent migraines or stop them after they have started. Your doctor can help you find which ones work best for you. Follow-up care is a key part of your treatment and safety. Be sure to make and go to all appointments, and call your doctor if you are having problems. It's also a good idea to know your test results and keep a list of the medicines you take. How can you care for yourself at home? · Do not drive if you have taken a prescription pain medicine. · Rest in a quiet, dark room until your headache is gone. Close your eyes, and try to relax or go to sleep. Don't watch TV or read. · Put a cold, moist cloth or cold pack on the painful area for 10 to 20 minutes at a time. Put a thin cloth between the cold pack and your skin. · Use a warm, moist towel or a heating pad set on low to relax tight shoulder and neck muscles. · Have someone gently massage your neck and shoulders. · Take your medicines exactly as prescribed. Call your doctor if you think you are having a problem with your medicine. You will get more details on the specific medicines your doctor prescribes. · Be careful not to take pain medicine more often than the instructions allow. You could get worse or more frequent headaches when the medicine wears off. To prevent migraines  · Keep a headache diary so you can figure out what triggers your headaches. Avoiding triggers may help you prevent headaches. Record when each headache began, how long it lasted, and what the pain was like.  (Was it throbbing, aching, stabbing, or dull?) Write down any other symptoms you had with the headache, such as nausea, flashing lights or dark spots, or sensitivity to bright light or loud noise. Note if the headache occurred near your period. List anything that might have triggered the headache. Triggers may include certain foods (chocolate, cheese, wine) or odors, smoke, bright light, stress, or lack of sleep. · If your doctor has prescribed medicine for your migraines, take it as directed. You may have medicine that you take only when you get a migraine and medicine that you take all the time to help prevent migraines. ¨ If your doctor has prescribed medicine for when you get a headache, take it at the first sign of a migraine, unless your doctor has given you other instructions. ¨ If your doctor has prescribed medicine to prevent migraines, take it exactly as prescribed. Call your doctor if you think you are having a problem with your medicine. · Find healthy ways to deal with stress. Migraines are most common during or right after stressful times. Take time to relax before and after you do something that has caused a migraine in the past.  · Try to keep your muscles relaxed by keeping good posture. Check your jaw, face, neck, and shoulder muscles for tension. Try to relax them. When you sit at a desk, change positions often. And make sure to stretch for 30 seconds each hour. · Get plenty of sleep and exercise. · Eat meals on a regular schedule. Avoid foods and drinks that often trigger migraines. These include chocolate, alcohol (especially red wine and port), aspartame, monosodium glutamate (MSG), and some additives found in foods (such as hot dogs, boles, cold cuts, aged cheeses, and pickled foods). · Limit caffeine. Don't drink too much coffee, tea, or soda. But don't quit caffeine suddenly. That can also give you migraines. · Do not smoke or allow others to smoke around you. If you need help quitting, talk to your doctor about stop-smoking programs and medicines.  These can increase your chances of quitting for good.  · If you are taking birth control pills or hormone therapy, talk to your doctor about whether they are triggering your migraines. When should you call for help? Call 911 anytime you think you may need emergency care. For example, call if:  · You have signs of a stroke. These may include:  ¨ Sudden numbness, paralysis, or weakness in your face, arm, or leg, especially on only one side of your body. ¨ Sudden vision changes. ¨ Sudden trouble speaking. ¨ Sudden confusion or trouble understanding simple statements. ¨ Sudden problems with walking or balance. ¨ A sudden, severe headache that is different from past headaches. Call your doctor now or seek immediate medical care if:  · You have new or worse nausea and vomiting. · You have a new or higher fever. · Your headache gets much worse. Watch closely for changes in your health, and be sure to contact your doctor if:  · You are not getting better after 2 days (48 hours). Where can you learn more? Go to https://[employer].Peek[employer]. org and sign in to your [employer] account. Enter K175 in the ContactUs.com box to learn more about \"Migraine Headache: Care Instructions. \"     If you do not have an account, please click on the \"Sign Up Now\" link. Current as of: October 14, 2016  Content Version: 11.3  © 7555-2909 [employer]. Care instructions adapted under license by [facility] ([facility]). If you have questions about a medical condition or this instruction, always ask your healthcare professional. [place] [address] any warranty or liability for your use of this information.

## 2017-11-07 NOTE — PROGRESS NOTES
Km Bravo is a 40 y.o. female    HPI   Km Bravo results to the office for hypertension, hyperlipidemia, hypothyroidism, and acute migraine headache. Patient has a long-standing history of migraine headaches. Patient states that bright lights and loud sounds have been bothering her head. Patient states she feels like it's pulsating and unilateral. Patient complains of nausea. Patient denies any fever or chills. Patient states this feels like her typical migraines. Hypothyroidism: Recent symptoms: none. She denies fatigue and weight gain. Patient is  taking her medication consistently on an empty stomach. Currently on Synthroid 50 mcg po Daily. No results found for: [facility]  Lab Results   Component Value Date    TSH 1.22 05/12/2017    TSH 2.26 03/02/2016    TSH 5.05 (H) 09/22/2015     Treatment Adherence:   Medication compliance:  compliant all of the time  Diet compliance:  compliant most of the time  Weight trend: stable  Current exercise: no regular exercise  Barriers: none    Hypertension:  Home blood pressure monitoring: No. Patient denies chest pain and shortness of breath. Antihypertensive medication side effects: no medication side effects noted. Use of agents associated with hypertension: none. Sodium (mmol/L)   Date Value   05/12/2017 138    BUN (mg/dL)   Date Value   05/12/2017 6    Glucose (mg/dL)   Date Value   05/12/2017 92      Potassium (mmol/L)   Date Value   05/12/2017 4.2    CREATININE (mg/dL)   Date Value   05/12/2017 0.6         Hyperlipidemia:  No new myalgias or GI upset on atorvastatin (Lipitor). Lab Results   Component Value Date    CHOL 279 (H) 05/12/2017    TRIG 309 (H) 05/12/2017    HDL 37 (L) 05/12/2017    LDLCALC 180 05/12/2017    LDLDIRECT 121 09/22/2015     Lab Results   Component Value Date    ALT 23 05/12/2017    AST 18 05/12/2017          Review of Systems   Constitutional: Negative for chills and fever.    Respiratory: internal sphincterotomy    HYSTEROSCOPY  11/4/15    Dr. Veronica Bolivar POLYPECTOMY  11/4/15    Dr. Anni Davenport  14 years ago       Social History     Social History    Marital status:      Spouse name: N/A    Number of children: N/A    Years of education: N/A     Social History Main Topics    Smoking status: Former Smoker     Packs/day: 0.25     Years: 27.00     Quit date: 4/23/2009    Smokeless tobacco: Former User      Comment: Not employed    Alcohol use 0.0 oz/week      Comment: socially    Drug use: No    Sexual activity: Yes     Other Topics Concern    None     Social History Narrative    None       Physical Exam   Constitutional: She is oriented to person, place, and time. She appears well-developed and well-nourished. No distress. HENT:   Head: Normocephalic and atraumatic. Mouth/Throat: Oropharynx is clear and moist. No oropharyngeal exudate. Eyes: Conjunctivae are normal. No scleral icterus. Neck: Normal range of motion. Neck supple. No thyromegaly present. Cardiovascular: Normal rate, regular rhythm and normal heart sounds. Exam reveals no friction rub. No murmur heard. Pulmonary/Chest: Effort normal and breath sounds normal. No respiratory distress. She has no wheezes. Abdominal: Soft. She exhibits no distension and no mass. There is no tenderness. There is no rebound and no guarding. Lymphadenopathy:     She has no cervical adenopathy. Neurological: She is alert and oriented to person, place, and time. Skin: Skin is warm and dry. No rash noted. She is not diaphoretic. Psychiatric: She has a normal mood and affect. Her behavior is normal.   Nursing note and vitals reviewed. Assessment    ICD-10-CM ICD-9-CM    1. Essential hypertension I10 401.9 The current medical regimen is effective;  continue present plan and medications.  Comprehensive Metabolic Panel      Lipid panel               2. Migraine without aura and without status migrainosus, not neck and shoulders. · Take your medicines exactly as prescribed. Call your doctor if you think you are having a problem with your medicine. You will get more details on the specific medicines your doctor prescribes. · Be careful not to take pain medicine more often than the instructions allow. You could get worse or more frequent headaches when the medicine wears off. To prevent migraines  · Keep a headache diary so you can figure out what triggers your headaches. Avoiding triggers may help you prevent headaches. Record when each headache began, how long it lasted, and what the pain was like. (Was it throbbing, aching, stabbing, or dull?) Write down any other symptoms you had with the headache, such as nausea, flashing lights or dark spots, or sensitivity to bright light or loud noise. Note if the headache occurred near your period. List anything that might have triggered the headache. Triggers may include certain foods (chocolate, cheese, wine) or odors, smoke, bright light, stress, or lack of sleep. · If your doctor has prescribed medicine for your migraines, take it as directed. You may have medicine that you take only when you get a migraine and medicine that you take all the time to help prevent migraines. ¨ If your doctor has prescribed medicine for when you get a headache, take it at the first sign of a migraine, unless your doctor has given you other instructions. ¨ If your doctor has prescribed medicine to prevent migraines, take it exactly as prescribed. Call your doctor if you think you are having a problem with your medicine. · Find healthy ways to deal with stress. Migraines are most common during or right after stressful times. Take time to relax before and after you do something that has caused a migraine in the past.  · Try to keep your muscles relaxed by keeping good posture. Check your jaw, face, neck, and shoulder muscles for tension. Try to relax them.  When you sit at a desk, change \"Sign Up Now\" link. Current as of: October 14, 2016  Content Version: 11.3  © 1151-3845 [employer]. Care instructions adapted under license by [facility] ([facility]). If you have questions about a medical condition or this instruction, always ask your healthcare professional. [address] any warranty or liability for your use of this information.

## 2017-11-16 ENCOUNTER — OFFICE VISIT (OUTPATIENT)
Dept: PRIMARY CARE CLINIC | Age: 42
End: 2017-11-16

## 2017-11-16 VITALS
TEMPERATURE: 97.4 F | OXYGEN SATURATION: 97 % | BODY MASS INDEX: 34.36 KG/M2 | SYSTOLIC BLOOD PRESSURE: 128 MMHG | DIASTOLIC BLOOD PRESSURE: 84 MMHG | HEART RATE: 95 BPM | HEIGHT: 60 IN | WEIGHT: 175 LBS

## 2017-11-16 DIAGNOSIS — J04.0 LARYNGITIS: Primary | ICD-10-CM

## 2017-11-16 DIAGNOSIS — J02.9 PHARYNGITIS, UNSPECIFIED ETIOLOGY: ICD-10-CM

## 2017-11-16 DIAGNOSIS — J40 BRONCHITIS: ICD-10-CM

## 2017-11-16 PROCEDURE — 96372 THER/PROPH/DIAG INJ SC/IM: CPT | Performed by: NURSE PRACTITIONER

## 2017-11-16 PROCEDURE — 99214 OFFICE O/P EST MOD 30 MIN: CPT | Performed by: NURSE PRACTITIONER

## 2017-11-16 RX ORDER — METHYLPREDNISOLONE 4 MG/1
TABLET ORAL
Qty: 1 KIT | Refills: 0 | Status: SHIPPED | OUTPATIENT
Start: 2017-11-16 | End: 2017-11-22

## 2017-11-16 RX ORDER — AMOXICILLIN AND CLAVULANATE POTASSIUM 875; 125 MG/1; MG/1
1 TABLET, FILM COATED ORAL 2 TIMES DAILY
Qty: 20 TABLET | Refills: 0 | Status: SHIPPED | OUTPATIENT
Start: 2017-11-16 | End: 2017-11-26

## 2017-11-16 RX ORDER — ALBUTEROL SULFATE 90 UG/1
2 AEROSOL, METERED RESPIRATORY (INHALATION) EVERY 6 HOURS PRN
Qty: 1 INHALER | Refills: 3 | Status: SHIPPED | OUTPATIENT
Start: 2017-11-16 | End: 2019-02-21

## 2017-11-16 RX ORDER — METHYLPREDNISOLONE ACETATE 80 MG/ML
80 INJECTION, SUSPENSION INTRA-ARTICULAR; INTRALESIONAL; INTRAMUSCULAR; SOFT TISSUE ONCE
Status: COMPLETED | OUTPATIENT
Start: 2017-11-16 | End: 2017-11-16

## 2017-11-16 RX ADMIN — METHYLPREDNISOLONE ACETATE 80 MG: 80 INJECTION, SUSPENSION INTRA-ARTICULAR; INTRALESIONAL; INTRAMUSCULAR; SOFT TISSUE at 08:40

## 2017-11-16 ASSESSMENT — ENCOUNTER SYMPTOMS
COUGH: 1
GASTROINTESTINAL NEGATIVE: 1
SINUS PRESSURE: 1
SORE THROAT: 1
WHEEZING: 1
EYES NEGATIVE: 1

## 2017-11-16 NOTE — PROGRESS NOTES
After obtaining consent, and per orders of JUSTIN Torres, injection of Depo-medrol 80mg given in Right upper quad. gluteus by Benuel Halsted. Patient instructed to remain in clinic for 20 minutes afterwards, and to report any adverse reaction to me immediately.
sulfate HFA (PROAIR HFA) 108 (90 Base) MCG/ACT inhaler Inhale 2 puffs into the lungs every 6 hours as needed for Wheezing 1 Inhaler 3    levothyroxine (LEVOTHROID) 50 MCG tablet Take 1 tablet by mouth daily 30 tablet 11    propranolol (INDERAL) 40 MG tablet TAKE 1 TABLET BY MOUTH 3 TIMES DAILY 270 tablet 0    atorvastatin (LIPITOR) 20 MG tablet Take 1 tablet by mouth daily 30 tablet 3    tiZANidine (ZANAFLEX) 4 MG tablet TAKE 1 TABLET BY MOUTH EVERY 8 HOURS AS NEEDED (MUSCLE SPASMS) 60 tablet 3    Elastic Bandages & Supports (WRIST SPLINT) MISC Right wrist   Dx:  Carpal tunnel syndrome 1 each 0    amitriptyline (ELAVIL) 25 MG tablet       melatonin (RA MELATONIN) 3 MG TABS tablet Take 1 tablet by mouth daily 30 tablet 0    aspirin (ASPIRIN CHILDRENS) 81 MG chewable tablet Take 1 tablet by mouth daily 30 tablet 0    venlafaxine (EFFEXOR XR) 150 MG extended release capsule TAKE 1 CAPSULE BY ORAL ROUTE PER DAILY 30 capsule 5    fluticasone (FLONASE) 50 MCG/ACT nasal spray 2 sprays by Nasal route daily 1 Bottle 11     No current facility-administered medications for this visit. Allergies   Allergen Reactions    Codeine Nausea And Vomiting, Swelling and Rash       Family History   Problem Relation Age of Onset    High Blood Pressure Mother     Other Mother      early alzheimers    High Blood Pressure Father     Diabetes Paternal Grandfather     Breast Cancer Paternal Aunt     Colon Cancer Neg Hx     Colon Polyps Neg Hx          Subjective:      Review of Systems   Constitutional: Negative. HENT: Positive for congestion, ear pain, sinus pressure and sore throat. Hoarse   Eyes: Negative. Respiratory: Positive for cough and wheezing. Cardiovascular: Negative. Gastrointestinal: Negative. Endocrine: Negative. Genitourinary: Negative. Musculoskeletal: Negative. Skin: Negative. Neurological: Negative. Hematological: Negative. Psychiatric/Behavioral: Negative.

## 2017-11-19 DIAGNOSIS — M54.12 CERVICAL RADICULOPATHY: ICD-10-CM

## 2017-11-19 DIAGNOSIS — M54.16 LUMBAR RADICULOPATHY: ICD-10-CM

## 2017-11-20 RX ORDER — TIZANIDINE 4 MG/1
TABLET ORAL
Qty: 60 TABLET | Refills: 3 | Status: SHIPPED | OUTPATIENT
Start: 2017-11-20 | End: 2018-03-23 | Stop reason: SDUPTHER

## 2017-11-21 DIAGNOSIS — E03.9 HYPOTHYROIDISM, UNSPECIFIED TYPE: ICD-10-CM

## 2017-11-21 DIAGNOSIS — E78.5 HYPERLIPIDEMIA, UNSPECIFIED HYPERLIPIDEMIA TYPE: ICD-10-CM

## 2017-11-21 DIAGNOSIS — I10 ESSENTIAL HYPERTENSION: ICD-10-CM

## 2017-11-21 LAB
ALBUMIN SERPL-MCNC: 3.7 G/DL (ref 3.5–5.2)
ALP BLD-CCNC: 87 U/L (ref 35–104)
ALT SERPL-CCNC: 22 U/L (ref 5–33)
ANION GAP SERPL CALCULATED.3IONS-SCNC: 14 MMOL/L (ref 7–19)
AST SERPL-CCNC: 14 U/L (ref 5–32)
BILIRUB SERPL-MCNC: <0.2 MG/DL (ref 0.2–1.2)
BUN BLDV-MCNC: 12 MG/DL (ref 6–20)
CALCIUM SERPL-MCNC: 9 MG/DL (ref 8.6–10)
CHLORIDE BLD-SCNC: 103 MMOL/L (ref 98–111)
CHOLESTEROL, TOTAL: 160 MG/DL (ref 160–199)
CO2: 28 MMOL/L (ref 22–29)
CREAT SERPL-MCNC: 0.7 MG/DL (ref 0.5–0.9)
GFR NON-AFRICAN AMERICAN: >60
GLUCOSE BLD-MCNC: 113 MG/DL (ref 74–109)
HDLC SERPL-MCNC: 40 MG/DL (ref 65–121)
LDL CHOLESTEROL CALCULATED: 87 MG/DL
POTASSIUM SERPL-SCNC: 3.7 MMOL/L (ref 3.5–5)
SODIUM BLD-SCNC: 145 MMOL/L (ref 136–145)
T4 FREE: 1.3 NG/DL (ref 0.9–1.7)
TOTAL PROTEIN: 7.2 G/DL (ref 6.6–8.7)
TRIGL SERPL-MCNC: 167 MG/DL (ref 0–149)
TSH SERPL DL<=0.05 MIU/L-ACNC: 0.95 UIU/ML (ref 0.27–4.2)

## 2017-11-22 ENCOUNTER — TELEPHONE (OUTPATIENT)
Dept: PRIMARY CARE CLINIC | Age: 42
End: 2017-11-22

## 2017-11-22 NOTE — TELEPHONE ENCOUNTER
----- Message from JUSTIN Tovar sent at 11/22/2017  8:12 AM CST -----  Please notify patient of normal results. Lab work is overall stable continue current medications follow-up as scheduled  Comprehensive Metabolic Panel   Order: [id number]   Status:  Final result   Visible to patient:  Yes (Sheldon) Dx:  Essential hypertension; Hyperlipidemi. .. Notes Recorded by Sergey Dawson LPN on 70/34/2693 at 8:26 AM CST  Patient notified of normal labs,stable and continue current medications,follow up as scheduled  ------    Notes Recorded by JUSTIN Tovar on 11/22/2017 at 8:12 AM CST  Please notify patient of normal results. Lab work is overall stable continue current medications follow-up as scheduled     Ref Range & Units 1d ago  (11/21/17) 6mo ago  (5/12/17) 10mo ago  (1/11/17) 1yr ago  (5/17/16) 2yr ago  (11/16/15) 2yr ago  (11/5/15) 2yr ago  (11/5/15)    Sodium 136 - 145 mmol/L 145  138  139  139  141   140     Potassium 3.5 - 5.0 mmol/L 3.7  4.2  3.8  4.1  3.6   3.7     Chloride 98 - 111 mmol/L 103  101  98  100  101   101     CO2 22 - 29 mmol/L 28  27  28  24  29   28     Anion Gap 7 - 19 mmol/L 14  10  13  15  11   11     Glucose 74 - 109 mg/dL 113   92  92  109  97R   92R     BUN 6 - 20 mg/dL 12  6  7  11  6R   9R     CREATININE 0.5 - 0.9 mg/dL 0.7  0.6  0.7  0.6  0.6R, CM  0.8R  0.7R, CM     GFR Non- >60 >60  >60CM  >60CM  >60CM       Comments: This calculation may be inaccurate for patients under the age of 25 years. For ages 25 and older, a GFR >60 mL/min/1.73m2 (not corrected for weight) is   valid for stable renal function.      Calcium 8.6 - 10.0 mg/dL 9.0  9.1  9.0  9.4  8.9R   9.0R     Total Protein 6.6 - 8.7 g/dL 7.2  7.1  7.8  7.6    7.3R     Alb 3.5 - 5.2 g/dL 3.7  4.0  4.1  3.9    4.0R     Total Bilirubin 0.2 - 1.2 mg/dL <0.2  <0.2   <0.2   <0.2         Alkaline Phosphatase 35 - 104 U/L 87  76  92  81    74     ALT 5 - 33 U/L 22  23  55   23    26     AST 5 - 32 U/L 14  18 11/21/17 10:02 Last Resulted: 11/21/17 11:26              CM=Additional comments  R=Reference range differs from displayed range              TSH without Reflex   Order: [id number]   Status:  Final result   Visible to patient:  Yes (Sheldon) Dx:  Hypothyroidism, unspecified type   Notes Recorded by Bonita Plascencia LPN on 32/43/4964 at 8:26 AM CST  Patient notified of normal labs,stable and continue current medications,follow up as scheduled  ------    Notes Recorded by JUSTIN Baldwin on 11/22/2017 at 8:12 AM CST  Please notify patient of normal results. Lab work is overall stable continue current medications follow-up as scheduled     Ref Range & Units 1d ago 6mo ago 1yr ago 2yr ago    TSH 0.270 - 4.200 uIU/mL 0.948  1. 22R  2.26R  5.05R     Resulting Agency  [facility]      Specimen Collected: 11/21/17 10:02 Last Resulted: 11/21/17 11:26              R=Reference range differs from displayed range              T4, Free   Order: [id number]   Status:  Final result   Visible to patient:  Yes (Sheldon) Dx:  Hypothyroidism, unspecified type   Notes Recorded by Bonita Plascencia LPN on 05/60/6246 at 8:26 AM CST  Patient notified of normal labs,stable and continue current medications,follow up as scheduled  ------    Notes Recorded by JUSTIN Baldwin on 11/22/2017 at 8:12 AM CST  Please notify patient of normal results.   Lab work is overall stable continue current medications follow-up as scheduled     Ref Range & Units 1d ago 6mo ago 1yr ago 2yr ago    T4 Free 0.9 - 1.7 ng/dL 1.3  1.2R  1.3R  1.0R

## 2017-12-05 ENCOUNTER — OFFICE VISIT (OUTPATIENT)
Dept: PRIMARY CARE CLINIC | Age: 42
End: 2017-12-05
Payer: COMMERCIAL

## 2017-12-05 VITALS
HEIGHT: 60 IN | WEIGHT: 172 LBS | SYSTOLIC BLOOD PRESSURE: 120 MMHG | BODY MASS INDEX: 33.77 KG/M2 | TEMPERATURE: 98 F | DIASTOLIC BLOOD PRESSURE: 60 MMHG | OXYGEN SATURATION: 98 % | HEART RATE: 72 BPM

## 2017-12-05 DIAGNOSIS — H66.90 ACUTE OTITIS MEDIA, UNSPECIFIED OTITIS MEDIA TYPE: Primary | ICD-10-CM

## 2017-12-05 DIAGNOSIS — R42 VERTIGO: ICD-10-CM

## 2017-12-05 DIAGNOSIS — J20.9 ACUTE BRONCHITIS, UNSPECIFIED ORGANISM: ICD-10-CM

## 2017-12-05 PROCEDURE — 99213 OFFICE O/P EST LOW 20 MIN: CPT | Performed by: NURSE PRACTITIONER

## 2017-12-05 RX ORDER — LEVOFLOXACIN 500 MG/1
500 TABLET, FILM COATED ORAL DAILY
Qty: 10 TABLET | Refills: 0 | Status: SHIPPED | OUTPATIENT
Start: 2017-12-05 | End: 2017-12-15

## 2017-12-05 RX ORDER — MECLIZINE HYDROCHLORIDE 25 MG/1
25 TABLET ORAL 3 TIMES DAILY PRN
Qty: 30 TABLET | Refills: 0 | Status: SHIPPED | OUTPATIENT
Start: 2017-12-05 | End: 2017-12-15

## 2017-12-05 ASSESSMENT — ENCOUNTER SYMPTOMS
VOICE CHANGE: 0
NAUSEA: 0
COUGH: 1
VOMITING: 0
TROUBLE SWALLOWING: 0
SINUS PAIN: 1
EYE REDNESS: 0
CONSTIPATION: 0
DIARRHEA: 0
SORE THROAT: 0
ABDOMINAL PAIN: 0
RHINORRHEA: 0
WHEEZING: 0
CHEST TIGHTNESS: 0
BLOOD IN STOOL: 0
SHORTNESS OF BREATH: 0

## 2017-12-05 NOTE — PROGRESS NOTES
[facility]  [address] [facility]  [address] [place] [address]  Dept: [phone]  Dept Fax: [phone]  Loc: [phone]    Winsome Atwood is a 40 y.o. female who presents today for her medical conditions/complaints as noted below. Winsome Atwood is c/o of Dizziness (I am having spells of severe diziness and unable to respond to my surroundings. Yesterday I became so dizzy that I could see my  in front of me talking but could not hear him nor could I respond to him. He lifted my arm but it just was limp. I have had all my medications this morning around 8 am)      Chief Complaint   Patient presents with    Dizziness     I am having spells of severe diziness and unable to respond to my surroundings. Yesterday I became so dizzy that I could see my  in front of me talking but could not hear him nor could I respond to him. He lifted my arm but it just was limp. I have had all my medications this morning around 8 am       HPI:       HPI  Pt states that she was treated for a URI 2 weeks ago with augmentin. She states that she developed worsening vertigo on Sunday. She states that the vertigo is on and off and movement makes it worse. She also c/o ringing in her ears.      Past Medical History:   Diagnosis Date    Anxiety     Asthma     born with asthmatic bronchitis    Degenerative disk disease     Depression     mood disorders    Hemorrhoid 11/2015    Hypoglycemia     Hypothyroid     Left ureteral stone 5/25/2016    Renal stone 5/25/2016        Past Surgical History:   Procedure Laterality Date    CHOLECYSTECTOMY  1995    COLONOSCOPY  04/29/2015    Dr. Clarnce Alpers  11/4/15    Dr. Barby Maciel  11/16/15    [address]  11/16/15    Hemorrhoidectomy & closed lateral internal sphincterotomy    HYSTEROSCOPY  11/4/15    Dr. Mone Fan  11/4/15    Dr. Luz Baker  14 years ago Social History   Substance Use Topics    Smoking status: Former Smoker     Packs/day: 0.25     Years: 27.00     Quit date: 4/23/2009    Smokeless tobacco: Former User      Comment: Not employed    Alcohol use 0.0 oz/week      Comment: socially        Current Outpatient Prescriptions   Medication Sig Dispense Refill    levofloxacin (LEVAQUIN) 500 MG tablet Take 1 tablet by mouth daily for 10 days 10 tablet 0    meclizine (ANTIVERT) 25 MG tablet Take 1 tablet by mouth 3 times daily as needed for Dizziness 30 tablet 0    tiZANidine (ZANAFLEX) 4 MG tablet TAKE 1 TABLET BY MOUTH EVERY 8 HOURS AS NEEDED (MUSCLE SPASMS) 60 tablet 3    albuterol sulfate HFA (PROAIR HFA) 108 (90 Base) MCG/ACT inhaler Inhale 2 puffs into the lungs every 6 hours as needed for Wheezing 1 Inhaler 3    levothyroxine (LEVOTHROID) 50 MCG tablet Take 1 tablet by mouth daily 30 tablet 11    propranolol (INDERAL) 40 MG tablet TAKE 1 TABLET BY MOUTH 3 TIMES DAILY 270 tablet 0    atorvastatin (LIPITOR) 20 MG tablet Take 1 tablet by mouth daily 30 tablet 3    amitriptyline (ELAVIL) 25 MG tablet       melatonin (RA MELATONIN) 3 MG TABS tablet Take 1 tablet by mouth daily 30 tablet 0    aspirin (ASPIRIN CHILDRENS) 81 MG chewable tablet Take 1 tablet by mouth daily 30 tablet 0    venlafaxine (EFFEXOR XR) 150 MG extended release capsule TAKE 1 CAPSULE BY ORAL ROUTE PER DAILY 30 capsule 5    fluticasone (FLONASE) 50 MCG/ACT nasal spray 2 sprays by Nasal route daily 1 Bottle 11     No current facility-administered medications for this visit. Allergies   Allergen Reactions    Codeine Nausea And Vomiting, Swelling and Rash         Subjective:      Review of Systems   Constitutional: Negative for activity change, appetite change, fatigue, fever and unexpected weight change. HENT: Positive for sinus pain and tinnitus. Negative for congestion, ear pain, nosebleeds, rhinorrhea, sore throat, trouble swallowing and voice change.     Eyes: PM

## 2017-12-13 RX ORDER — PROPRANOLOL HYDROCHLORIDE 40 MG/1
TABLET ORAL
Qty: 270 TABLET | Refills: 3 | Status: SHIPPED | OUTPATIENT
Start: 2017-12-13 | End: 2018-05-07 | Stop reason: SDUPTHER

## 2017-12-14 ENCOUNTER — HOSPITAL ENCOUNTER (EMERGENCY)
Age: 42
Discharge: LEFT BEFORE DISCHARGE | End: 2017-12-14
Attending: EMERGENCY MEDICINE
Payer: COMMERCIAL

## 2017-12-14 VITALS
RESPIRATION RATE: 16 BRPM | DIASTOLIC BLOOD PRESSURE: 78 MMHG | HEIGHT: 60 IN | OXYGEN SATURATION: 98 % | HEART RATE: 75 BPM | SYSTOLIC BLOOD PRESSURE: 115 MMHG | BODY MASS INDEX: 33.77 KG/M2 | WEIGHT: 172 LBS | TEMPERATURE: 98.7 F

## 2017-12-14 PROCEDURE — 4500000002 HC ER NO CHARGE

## 2017-12-14 ASSESSMENT — PAIN DESCRIPTION - DESCRIPTORS: DESCRIPTORS: TIGHTNESS

## 2017-12-14 ASSESSMENT — PAIN SCALES - GENERAL: PAINLEVEL_OUTOF10: 8

## 2017-12-14 ASSESSMENT — PAIN DESCRIPTION - PAIN TYPE: TYPE: ACUTE PAIN

## 2017-12-14 ASSESSMENT — PAIN DESCRIPTION - LOCATION: LOCATION: CHEST

## 2017-12-15 NOTE — ED TRIAGE NOTES
Pt has been seen by Eleonora porter, and has been diagnosed with bronchitis and given antibiotics twice

## 2017-12-18 ENCOUNTER — OFFICE VISIT (OUTPATIENT)
Dept: PRIMARY CARE CLINIC | Age: 42
End: 2017-12-18
Payer: COMMERCIAL

## 2017-12-18 ENCOUNTER — HOSPITAL ENCOUNTER (OUTPATIENT)
Dept: GENERAL RADIOLOGY | Age: 42
Discharge: HOME | End: 2017-12-18
Payer: COMMERCIAL

## 2017-12-18 VITALS
TEMPERATURE: 98 F | HEART RATE: 77 BPM | SYSTOLIC BLOOD PRESSURE: 120 MMHG | DIASTOLIC BLOOD PRESSURE: 78 MMHG | WEIGHT: 179.6 LBS | BODY MASS INDEX: 35.26 KG/M2 | HEIGHT: 60 IN | OXYGEN SATURATION: 97 %

## 2017-12-18 DIAGNOSIS — I10 ESSENTIAL HYPERTENSION: ICD-10-CM

## 2017-12-18 DIAGNOSIS — J40 BRONCHITIS: ICD-10-CM

## 2017-12-18 DIAGNOSIS — R05.9 COUGH: ICD-10-CM

## 2017-12-18 DIAGNOSIS — J40 BRONCHITIS: Primary | ICD-10-CM

## 2017-12-18 LAB
ANION GAP SERPL CALCULATED.3IONS-SCNC: 13 MMOL/L (ref 7–19)
BUN BLDV-MCNC: 5 MG/DL (ref 6–20)
CALCIUM SERPL-MCNC: 8.8 MG/DL (ref 8.6–10)
CHLORIDE BLD-SCNC: 102 MMOL/L (ref 98–111)
CO2: 29 MMOL/L (ref 22–29)
CREAT SERPL-MCNC: 0.6 MG/DL (ref 0.5–0.9)
GFR NON-AFRICAN AMERICAN: >60
GLUCOSE BLD-MCNC: 81 MG/DL (ref 74–109)
HCT VFR BLD CALC: 40.2 % (ref 37–47)
HEMOGLOBIN: 12.8 G/DL (ref 12–16)
MCH RBC QN AUTO: 30.3 PG (ref 27–31)
MCHC RBC AUTO-ENTMCNC: 31.8 G/DL (ref 33–37)
MCV RBC AUTO: 95.3 FL (ref 81–99)
PDW BLD-RTO: 13.1 % (ref 11.5–14.5)
PLATELET # BLD: 424 K/UL (ref 130–400)
PMV BLD AUTO: 10.3 FL (ref 9.4–12.3)
POTASSIUM SERPL-SCNC: 3.9 MMOL/L (ref 3.5–5)
RBC # BLD: 4.22 M/UL (ref 4.2–5.4)
SODIUM BLD-SCNC: 144 MMOL/L (ref 136–145)
WBC # BLD: 9 K/UL (ref 4.8–10.8)

## 2017-12-18 PROCEDURE — 71020 XR CHEST STANDARD TWO VW: CPT

## 2017-12-18 PROCEDURE — 99213 OFFICE O/P EST LOW 20 MIN: CPT | Performed by: FAMILY MEDICINE

## 2017-12-18 NOTE — PROGRESS NOTES
Naina Giordano is a 40 y.o. female    Chief Complaint   Patient presents with    Other     bronchitis. HPI  Naina Giordano presents to the office for follow up cough and wheezing. Patient states this has been going on for the past two months. Patient has been treated with augmentin and levaquin without improvement. Patient has also been on steroid bursts in the past. Patient is currently using rescue inhaler in albuterol. Patient has history of smoking but none recently. Review of Systems   Constitutional: Negative for chills and fever. Respiratory: Positive for cough and wheezing. Negative for shortness of breath. Cardiovascular: Negative for chest pain and leg swelling. Prior to Admission medications    Medication Sig Start Date End Date Taking?  Authorizing Provider   albuterol-ipratropium (COMBIVENT RESPIMAT)  MCG/ACT AERS inhaler Inhale 1 puff into the lungs every 4 hours as needed for Wheezing 12/18/17  Yes Susi Dela Cruz MD   propranolol (INDERAL) 40 MG tablet TAKE 1 TABLET BY MOUTH 3 TIMES DAILY 12/13/17  Yes Susi Dela Cruz MD   tiZANidine (ZANAFLEX) 4 MG tablet TAKE 1 TABLET BY MOUTH EVERY 8 HOURS AS NEEDED (MUSCLE SPASMS) 11/20/17  Yes Leonarda Nicole MD   albuterol sulfate HFA (PROAIR HFA) 108 (90 Base) MCG/ACT inhaler Inhale 2 puffs into the lungs every 6 hours as needed for Wheezing 11/16/17  Yes JUSTIN Zambrano   levothyroxine (LEVOTHROID) 50 MCG tablet Take 1 tablet by mouth daily 11/2/17  Yes Susi Dela Cruz MD   atorvastatin (LIPITOR) 20 MG tablet Take 1 tablet by mouth daily 9/5/17  Yes Susi Dela Cruz MD   amitriptyline (ELAVIL) 25 MG tablet  2/24/17  Yes Historical Provider, MD   melatonin (RA MELATONIN) 3 MG TABS tablet Take 1 tablet by mouth daily 1/27/17  Yes Leonarda Nicole MD   aspirin (ASPIRIN CHILDRENS) 81 MG chewable tablet Take 1 tablet by mouth daily 1/11/17  Yes ESTHELA Michael   venlafaxine (EFFEXOR XR) 150 MG extended oropharyngeal exudate. Eyes: Conjunctivae are normal. No scleral icterus. Neck: Normal range of motion. Neck supple. Cardiovascular: Normal rate, regular rhythm and normal heart sounds. Exam reveals no friction rub. No murmur heard. Pulmonary/Chest: Effort normal. No respiratory distress. She has wheezes. She has no rales. Lymphadenopathy:     She has no cervical adenopathy. Neurological: She is alert and oriented to person, place, and time. Skin: Skin is warm and dry. No rash noted. She is not diaphoretic. Psychiatric: She has a normal mood and affect. Her behavior is normal.   Nursing note and vitals reviewed. Assessment    ICD-10-CM ICD-9-CM    1. Bronchitis J40 490 albuterol-ipratropium (COMBIVENT RESPIMAT)  MCG/ACT AERS inhaler every 4 hours. XR CHEST STANDARD (2 VW) for further evaluation due to persistent symptoms. WIll consider repeat prednisone taper and doxycycline 100 mg po BID. 2. Cough R05 786.2 albuterol-ipratropium (COMBIVENT RESPIMAT)  MCG/ACT AERS inhaler      XR CHEST STANDARD (2 VW)   3. Essential hypertension I10 401.9 The current medical regimen is effective;  continue present plan and medications. Plan      Orders Placed This Encounter   Medications    albuterol-ipratropium (COMBIVENT RESPIMAT)  MCG/ACT AERS inhaler     Sig: Inhale 1 puff into the lungs every 4 hours as needed for Wheezing     Dispense:  1 Inhaler     Refill:  3       Orders Placed This Encounter   Procedures    XR CHEST STANDARD (2 VW)    CBC    Basic Metabolic Panel        No Follow-up on file. There are no Patient Instructions on file for this visit.

## 2017-12-19 ENCOUNTER — TELEPHONE (OUTPATIENT)
Dept: PRIMARY CARE CLINIC | Age: 42
End: 2017-12-19

## 2017-12-19 DIAGNOSIS — J40 BRONCHITIS: Primary | ICD-10-CM

## 2017-12-19 RX ORDER — PREDNISONE 10 MG/1
TABLET ORAL
Qty: 42 TABLET | Refills: 0 | Status: SHIPPED | OUTPATIENT
Start: 2017-12-19 | End: 2018-02-05

## 2017-12-19 RX ORDER — DOXYCYCLINE 100 MG/1
100 CAPSULE ORAL 2 TIMES DAILY
Qty: 20 CAPSULE | Refills: 0 | Status: SHIPPED | OUTPATIENT
Start: 2017-12-19 | End: 2018-05-07

## 2017-12-19 ASSESSMENT — ENCOUNTER SYMPTOMS
WHEEZING: 1
COUGH: 1
SHORTNESS OF BREATH: 0

## 2017-12-19 NOTE — TELEPHONE ENCOUNTER
[facility] at the [facility] called and left a message giving the patient their lab results.

## 2017-12-27 ENCOUNTER — HOSPITAL ENCOUNTER (OUTPATIENT)
Dept: WOMENS IMAGING | Age: 42
Discharge: HOME | End: 2017-12-27
Payer: COMMERCIAL

## 2017-12-27 DIAGNOSIS — Z12.31 ENCOUNTER FOR SCREENING MAMMOGRAM FOR BREAST CANCER: ICD-10-CM

## 2017-12-27 PROCEDURE — 77063 BREAST TOMOSYNTHESIS BI: CPT

## 2017-12-28 ENCOUNTER — HOSPITAL ENCOUNTER (EMERGENCY)
Age: 42
Discharge: HOME | End: 2017-12-28
Payer: COMMERCIAL

## 2017-12-28 VITALS
DIASTOLIC BLOOD PRESSURE: 68 MMHG | OXYGEN SATURATION: 94 % | SYSTOLIC BLOOD PRESSURE: 128 MMHG | RESPIRATION RATE: 18 BRPM | TEMPERATURE: 97.6 F | HEART RATE: 66 BPM | WEIGHT: 178 LBS | BODY MASS INDEX: 34.95 KG/M2 | HEIGHT: 60 IN

## 2017-12-28 DIAGNOSIS — G89.29 ACUTE EXACERBATION OF CHRONIC LOW BACK PAIN: Primary | ICD-10-CM

## 2017-12-28 DIAGNOSIS — M54.50 ACUTE EXACERBATION OF CHRONIC LOW BACK PAIN: Primary | ICD-10-CM

## 2017-12-28 PROCEDURE — 99282 EMERGENCY DEPT VISIT SF MDM: CPT

## 2017-12-28 PROCEDURE — 6360000002 HC RX W HCPCS: Performed by: PHYSICIAN ASSISTANT

## 2017-12-28 PROCEDURE — 96372 THER/PROPH/DIAG INJ SC/IM: CPT

## 2017-12-28 PROCEDURE — 99282 EMERGENCY DEPT VISIT SF MDM: CPT | Performed by: PHYSICIAN ASSISTANT

## 2017-12-28 RX ORDER — ORPHENADRINE CITRATE 30 MG/ML
60 INJECTION INTRAMUSCULAR; INTRAVENOUS ONCE
Status: COMPLETED | OUTPATIENT
Start: 2017-12-28 | End: 2017-12-28

## 2017-12-28 RX ORDER — MORPHINE SULFATE 10 MG/ML
2 INJECTION, SOLUTION INTRAMUSCULAR; INTRAVENOUS ONCE
Status: COMPLETED | OUTPATIENT
Start: 2017-12-28 | End: 2017-12-28

## 2017-12-28 RX ORDER — METHYLPREDNISOLONE SODIUM SUCCINATE 125 MG/2ML
125 INJECTION, POWDER, LYOPHILIZED, FOR SOLUTION INTRAMUSCULAR; INTRAVENOUS ONCE
Status: COMPLETED | OUTPATIENT
Start: 2017-12-28 | End: 2017-12-28

## 2017-12-28 RX ORDER — NAPROXEN 500 MG/1
500 TABLET ORAL 2 TIMES DAILY
Qty: 20 TABLET | Refills: 0 | Status: SHIPPED | OUTPATIENT
Start: 2017-12-28 | End: 2018-06-27 | Stop reason: SDUPTHER

## 2017-12-28 RX ADMIN — MORPHINE SULFATE 2 MG: 10 INJECTION INTRAVENOUS at 10:09

## 2017-12-28 RX ADMIN — METHYLPREDNISOLONE SODIUM SUCCINATE 125 MG: 125 INJECTION, POWDER, FOR SOLUTION INTRAMUSCULAR; INTRAVENOUS at 10:10

## 2017-12-28 RX ADMIN — ORPHENADRINE CITRATE 60 MG: 30 INJECTION INTRAMUSCULAR; INTRAVENOUS at 10:09

## 2017-12-28 ASSESSMENT — PAIN DESCRIPTION - PAIN TYPE
TYPE: ACUTE PAIN;CHRONIC PAIN
TYPE: CHRONIC PAIN

## 2017-12-28 ASSESSMENT — PAIN DESCRIPTION - LOCATION
LOCATION: BACK
LOCATION: BACK

## 2017-12-28 ASSESSMENT — PAIN DESCRIPTION - ORIENTATION
ORIENTATION: LOWER
ORIENTATION: LOWER

## 2017-12-28 ASSESSMENT — PAIN SCALES - GENERAL
PAINLEVEL_OUTOF10: 6
PAINLEVEL_OUTOF10: 10
PAINLEVEL_OUTOF10: 10

## 2017-12-28 NOTE — ED PROVIDER NOTES
12/28/2017    Chief Complaint:   Chief Complaint   Patient presents with    Back Pain     co of low back pain with radiation down both legs for going on 2 days       HPI: Maribel Nieves is a 40 y.o. female who presents to the emergency department  complaining of back pain. This pain has been present for two days. Onset was Over the Christmas holiday when she was lifting her grandchildren. Pain does  radiate down bilateral leg. The patient denies any bowel and/or bladder dysfunction, including incontinence and retention. The patient denies saddle anesthesia. This pain has not woken the patient at night. The patient denies tingling, numbness, and weakness. There have been no distal neurologic symptoms. Denies fever/chills. Denies recent trauma.          PMH:   Past Medical History:   Diagnosis Date    Anxiety     Asthma     born with asthmatic bronchitis    Degenerative disk disease     Depression     mood disorders    Hemorrhoid 11/2015    Hypoglycemia     Hypothyroid     Left ureteral stone 5/25/2016    Renal stone 5/25/2016       PSH:   Past Surgical History:   Procedure Laterality Date    CHOLECYSTECTOMY  1995    COLONOSCOPY  04/29/2015    Dr. Geovanny Hooks  11/4/15    Dr. Luzma Villatoro  11/16/15    [address]  11/16/15    Hemorrhoidectomy & closed lateral internal sphincterotomy    HYSTEROSCOPY  11/4/15    Dr. Benedict Ruth POLYPECTOMY  11/4/15    Dr. Grande Serve  14 years ago       Meds:   Discharge Medication List as of 12/28/2017 10:15 AM      CONTINUE these medications which have NOT CHANGED    Details   doxycycline monohydrate (MONODOX) 100 MG capsule Take 1 capsule by mouth 2 times daily, Disp-20 capsule, R-0Normal      predniSONE (DELTASONE) 10 MG tablet Take 6 for 2 days, 5 for 2 days, 4 for 2 days, 3 for 2 days, 2 for 2 days, 1 for 2 days then stop., Disp-42 tablet, R-0Normal      albuterol-ipratropium (COMBIVENT or recent trauma. There are no abnormal neurologic findings. Given pain medication. She is a history of bulging disc in her lumbar spine. Denies any recent falls. Patient's back pain is most likely musculoskeletal in nature, and there is minimal concern for spinal fracture, infection, malignancy, epidural abscess, cauda equina syndrome, or other serious spinal cord pathology, as the patient does not have significant risk factors as listed in the HPI. I will advise the patient to follow up with their primary care team for continued pain management as needed. Laboratory Studies: Labs Reviewed - No data to display     Vitals:    12/28/17 0945   BP: 128/68   Pulse: 66   Resp: 18   Temp: 97.6 °F (36.4 °C)   TempSrc: Oral   SpO2: 94%   Weight: 178 lb (80.7 kg)   Height: 5' (1.524 m)           Final Diagnostic Impression:    1. Acute exacerbation of chronic low back pain          ED Disposition/Plan:  DISPOSITION Decision To Discharge 12/28/2017 10:50:54 AM      Pt was told that if symptoms worsen or new symptoms develop they are to return to the emergency department. Patient to follow up. Patient  was educated on diagnosis and treatment plan. All of patient's questions were answered, and  the patient understands the discharge plan. I do not feel the patient has a life-threatening condition at this time. Patient is to be discharged. Woodrow Johnson       PATIENT REFERRED TO:  Susi Galvan MD  [address] [place] [address] Dr Jon Dempsey [address] [facility] [address] [facility]    Schedule an appointment as soon as possible for a visit       Gaby Mcfarland DO  [address]  [place] [phone]    Schedule an appointment as soon as possible for a visit         DISCHARGE MEDICATIONS:  Discharge Medication List as of 12/28/2017 10:15 AM      START taking these medications    Details   naproxen (NAPROSYN) 500 MG tablet Take 1 tablet by mouth 2 times daily, Disp-20 tablet, R-0Print                 ESTHELA Piña [address] [place], [place]  12/28/17 1704

## 2017-12-28 NOTE — ED NOTES
PT c/o lower back pain. States no injury, has a hx of a bulging disc and sciatica.       Talat Beck RN  12/28/17 1124

## 2018-01-03 RX ORDER — ATORVASTATIN CALCIUM 20 MG/1
20 TABLET, FILM COATED ORAL DAILY
Qty: 30 TABLET | Refills: 3 | Status: SHIPPED | OUTPATIENT
Start: 2018-01-03 | End: 2018-04-25 | Stop reason: SDUPTHER

## 2018-01-04 ENCOUNTER — OFFICE VISIT (OUTPATIENT)
Dept: SURGERY | Age: 43
End: 2018-01-04
Payer: MEDICAID

## 2018-01-04 VITALS
WEIGHT: 189 LBS | BODY MASS INDEX: 37.11 KG/M2 | SYSTOLIC BLOOD PRESSURE: 110 MMHG | HEART RATE: 80 BPM | DIASTOLIC BLOOD PRESSURE: 70 MMHG | HEIGHT: 60 IN

## 2018-01-04 DIAGNOSIS — Z12.39 SCREENING BREAST EXAMINATION: Primary | ICD-10-CM

## 2018-01-04 PROCEDURE — 99212 OFFICE O/P EST SF 10 MIN: CPT | Performed by: PHYSICIAN ASSISTANT

## 2018-01-04 NOTE — PROGRESS NOTES
HPI:  Yasmin Katz is in for follow-up breast check. She had a benign biopsy a year ago. She has not noticed any changes in her breasts. SCREENING BILATERAL DIGITAL MAMMOGRAM 12/27/2017 7:43 AM   CLINICAL HISTORY: Screening.  Status post benign left breast biopsy   COMPARISON STUDIES: 6/21/2017 and 12/9/2016 and 12/29/2016. FINDINGS:    Digital CC and MLO views of bilateral breasts were obtained. There are scattered fibroglandular densities (approximately 25-50%   glandular tissue). Localization clip identified in the left breast   previous benign biopsy. No suspicious masses or calcifications are identified. There are no   developing densities or areas of architectural distortion. This study was interpreted with CAD. 3-D mammographic imaging,   tomosynthesis, performed. IMPRESSION AND RECOMMENDATION:    No mammographic evidence of malignancy. Recommendation is for the   patient to return for routine mammography in one year or sooner, if   clinically indicated. BIRADS Category 2 - Benign findings         BREAST EXAM:  On examination, she has fibrocystic changes throughout both breasts, no dominant masses, no skin or nipple changes and no axillary adenopathy. I see nothing suspicious for breast cancer. ASSESSMENT:  Normal breast exam and mammogram    PLAN:  I will plan to see her back in 1 year for physical exam and bilateral mammograms. She will contact me if anything significant changes.

## 2018-01-12 ENCOUNTER — TELEPHONE (OUTPATIENT)
Dept: UROLOGY | Age: 43
End: 2018-01-12

## 2018-01-12 NOTE — TELEPHONE ENCOUNTER
Patient has a follow-up appointment this afternoon with a KUB. This is for a yearly appointment. Due to the worsening in clinic whether and staph shortages would like patient to reschedule for next week as we will be closing clinic early. I called patient's number there was no answer but I left a message and explained the situation and asked her to call back in our May number.

## 2018-01-26 ENCOUNTER — HOSPITAL ENCOUNTER (OUTPATIENT)
Dept: GENERAL RADIOLOGY | Age: 43
Discharge: HOME | End: 2018-01-26
Payer: MEDICAID

## 2018-01-26 ENCOUNTER — OFFICE VISIT (OUTPATIENT)
Dept: UROLOGY | Age: 43
End: 2018-01-26
Payer: MEDICAID

## 2018-01-26 VITALS
HEART RATE: 90 BPM | DIASTOLIC BLOOD PRESSURE: 76 MMHG | TEMPERATURE: 97.2 F | WEIGHT: 184 LBS | HEIGHT: 60 IN | SYSTOLIC BLOOD PRESSURE: 132 MMHG | BODY MASS INDEX: 36.12 KG/M2

## 2018-01-26 DIAGNOSIS — N20.1 LEFT URETERAL STONE: Primary | ICD-10-CM

## 2018-01-26 DIAGNOSIS — N32.81 URGENCY-FREQUENCY SYNDROME: ICD-10-CM

## 2018-01-26 DIAGNOSIS — N20.0 RENAL STONE: ICD-10-CM

## 2018-01-26 LAB
APPEARANCE FLUID: NORMAL
BILIRUBIN, POC: NORMAL
BLOOD URINE, POC: NORMAL
CLARITY, POC: CLEAR
COLOR, POC: YELLOW
GLUCOSE URINE, POC: YELLOW
KETONES, POC: NORMAL
LEUKOCYTE EST, POC: NORMAL
NITRITE, POC: NORMAL
PH, POC: 5.5
PROTEIN, POC: NORMAL
SPECIFIC GRAVITY, POC: 1.02
UROBILINOGEN, POC: 0.2

## 2018-01-26 PROCEDURE — 1036F TOBACCO NON-USER: CPT | Performed by: PHYSICIAN ASSISTANT

## 2018-01-26 PROCEDURE — 81003 URINALYSIS AUTO W/O SCOPE: CPT | Performed by: PHYSICIAN ASSISTANT

## 2018-01-26 PROCEDURE — 51798 US URINE CAPACITY MEASURE: CPT | Performed by: PHYSICIAN ASSISTANT

## 2018-01-26 PROCEDURE — G8427 DOCREV CUR MEDS BY ELIG CLIN: HCPCS | Performed by: PHYSICIAN ASSISTANT

## 2018-01-26 PROCEDURE — G8484 FLU IMMUNIZE NO ADMIN: HCPCS | Performed by: PHYSICIAN ASSISTANT

## 2018-01-26 PROCEDURE — 99214 OFFICE O/P EST MOD 30 MIN: CPT | Performed by: PHYSICIAN ASSISTANT

## 2018-01-26 PROCEDURE — G8417 CALC BMI ABV UP PARAM F/U: HCPCS | Performed by: PHYSICIAN ASSISTANT

## 2018-01-26 PROCEDURE — 74018 RADEX ABDOMEN 1 VIEW: CPT

## 2018-01-26 RX ORDER — OXYBUTYNIN CHLORIDE 10 MG/1
10 TABLET, EXTENDED RELEASE ORAL DAILY
Qty: 30 TABLET | Refills: 2 | Status: SHIPPED | OUTPATIENT
Start: 2018-01-26 | End: 2018-04-19 | Stop reason: SDUPTHER

## 2018-01-26 ASSESSMENT — ENCOUNTER SYMPTOMS
EYE DISCHARGE: 0
WHEEZING: 0
NAUSEA: 0
EYE REDNESS: 0
HEARTBURN: 0
SHORTNESS OF BREATH: 0

## 2018-01-26 NOTE — PROGRESS NOTES
nerve deficit. She exhibits normal muscle tone. Coordination normal.   Skin: She is not diaphoretic. No pallor. DATA:  U/A:    Lab Results   Component Value Date    NITRITE neg 01/26/2018    COLORU yellow 01/26/2018    COLORU YELLOW 01/11/2017    PROTEINU neg 01/26/2018    PROTEINU Negative 01/11/2017    PHUR 5.5 01/26/2018    PHUR 5.5 01/11/2017    WBCUA 20 05/17/2016    RBCUA 9 05/17/2016    MUCUS 1+ 11/17/2013    BACTERIA trace 01/11/2017    CLARITYU clear 01/26/2018    CLARITYU CLOUDY 01/11/2017    SPECGRAV 1.020 01/26/2018    SPECGRAV 1.012 01/11/2017    LEUKOCYTESUR neg 01/26/2018    LEUKOCYTESUR Negative 01/11/2017    UROBILINOGEN 0.2 01/11/2017    BILIRUBINUR neg 01/26/2018    BLOODU neg 01/26/2018    BLOODU TRACE 01/11/2017    GLUCOSEU yellow 01/26/2018    GLUCOSEU Negative 01/11/2017           1. Left ureteral stone  She passed this spontaneously no stone episodes since KUB negative. - POCT Urinalysis no Micro  - FL MEASUREMENT,POST-VOID RESIDUAL VOLUME BY US,NON-IMAGING    2. Urgency-frequency syndrome  Overpassed urinalysis is got worse she has never treated this her postvoid residual was 22 mL and her urine is clear. Patient will try oxybutynin 10 mg and follow-up in 6 weeks. - FL MEASUREMENT,POST-VOID RESIDUAL VOLUME BY US,NON-IMAGING      Orders Placed This Encounter   Procedures    POCT Urinalysis no Micro    FL MEASUREMENT,POST-VOID RESIDUAL VOLUME BY US,NON-IMAGING     22 ml     Orders Placed This Encounter   Medications    oxybutynin (DITROPAN XL) 10 MG extended release tablet     Sig: Take 1 tablet by mouth daily     Dispense:  30 tablet     Refill:  2       Plan:  Patient will follow up in 6 weeks.

## 2018-02-05 ENCOUNTER — OFFICE VISIT (OUTPATIENT)
Dept: OBGYN | Age: 43
End: 2018-02-05
Payer: MEDICAID

## 2018-02-05 VITALS
BODY MASS INDEX: 35.73 KG/M2 | SYSTOLIC BLOOD PRESSURE: 100 MMHG | DIASTOLIC BLOOD PRESSURE: 58 MMHG | WEIGHT: 182 LBS | HEIGHT: 60 IN

## 2018-02-05 DIAGNOSIS — Z01.419 WELL WOMAN EXAM WITH ROUTINE GYNECOLOGICAL EXAM: Primary | ICD-10-CM

## 2018-02-05 DIAGNOSIS — Z12.4 SCREENING FOR CERVICAL CANCER: ICD-10-CM

## 2018-02-05 PROCEDURE — 99396 PREV VISIT EST AGE 40-64: CPT | Performed by: OBSTETRICS & GYNECOLOGY

## 2018-02-05 ASSESSMENT — ENCOUNTER SYMPTOMS
COUGH: 0
SINUS PRESSURE: 0
SHORTNESS OF BREATH: 0
EYE PAIN: 0
TROUBLE SWALLOWING: 0
WHEEZING: 0
SORE THROAT: 0
BACK PAIN: 1
NAUSEA: 0
ABDOMINAL PAIN: 0
CONSTIPATION: 0
DIARRHEA: 0
VOMITING: 0

## 2018-02-21 ENCOUNTER — OFFICE VISIT (OUTPATIENT)
Dept: PRIMARY CARE CLINIC | Age: 43
End: 2018-02-21
Payer: MEDICAID

## 2018-02-21 VITALS
HEIGHT: 60 IN | HEART RATE: 60 BPM | SYSTOLIC BLOOD PRESSURE: 119 MMHG | TEMPERATURE: 97.7 F | WEIGHT: 177.6 LBS | RESPIRATION RATE: 17 BRPM | DIASTOLIC BLOOD PRESSURE: 68 MMHG | OXYGEN SATURATION: 99 % | BODY MASS INDEX: 34.87 KG/M2

## 2018-02-21 DIAGNOSIS — R12 HEARTBURN: Primary | ICD-10-CM

## 2018-02-21 DIAGNOSIS — H92.01 RIGHT EAR PAIN: ICD-10-CM

## 2018-02-21 PROCEDURE — 1036F TOBACCO NON-USER: CPT | Performed by: FAMILY MEDICINE

## 2018-02-21 PROCEDURE — G8427 DOCREV CUR MEDS BY ELIG CLIN: HCPCS | Performed by: FAMILY MEDICINE

## 2018-02-21 PROCEDURE — 99213 OFFICE O/P EST LOW 20 MIN: CPT | Performed by: FAMILY MEDICINE

## 2018-02-21 PROCEDURE — G8484 FLU IMMUNIZE NO ADMIN: HCPCS | Performed by: FAMILY MEDICINE

## 2018-02-21 PROCEDURE — G8417 CALC BMI ABV UP PARAM F/U: HCPCS | Performed by: FAMILY MEDICINE

## 2018-02-21 RX ORDER — OMEPRAZOLE 40 MG/1
40 CAPSULE, DELAYED RELEASE ORAL DAILY
Qty: 30 CAPSULE | Refills: 3 | Status: SHIPPED | OUTPATIENT
Start: 2018-02-21 | End: 2018-06-14 | Stop reason: SDUPTHER

## 2018-02-21 ASSESSMENT — ENCOUNTER SYMPTOMS
SHORTNESS OF BREATH: 0
COUGH: 0

## 2018-02-21 NOTE — PROGRESS NOTES
every 6 hours as needed for Wheezing 11/16/17  Yes Felicia Councilman, APRN   levothyroxine (LEVOTHROID) 50 MCG tablet Take 1 tablet by mouth daily 11/2/17  Yes Jana Dominguez MD   amitriptyline (ELAVIL) 25 MG tablet  2/24/17  Yes Historical Provider, MD   melatonin (RA MELATONIN) 3 MG TABS tablet Take 1 tablet by mouth daily 1/27/17  Yes Keyona Tipton MD   aspirin (ASPIRIN CHILDRENS) 81 MG chewable tablet Take 1 tablet by mouth daily 1/11/17  Yes ESTHELA Michael   venlafaxine (EFFEXOR XR) 150 MG extended release capsule TAKE 1 CAPSULE BY ORAL ROUTE PER DAILY 11/3/16  Yes Jana Dominguez MD   fluticasone [facility]) 50 MCG/ACT nasal spray 2 sprays by Nasal route daily 7/19/16  Yes Jana Dominguez MD       Past Medical History:   Diagnosis Date    Anxiety     Asthma     born with asthmatic bronchitis    Degenerative disk disease     Depression     mood disorders    Hemorrhoid 11/2015    Hyperlipidemia     Hypoglycemia     Hypothyroid     Left ureteral stone 5/25/2016    OAB (overactive bladder)     Renal stone 5/25/2016       Past Surgical History:   Procedure Laterality Date   [address]    COLONOSCOPY  04/29/2015    Dr. Quintero Patella  11/4/15    Dr. Stephan Whitaker  11/16/15    [address]  11/16/15    Hemorrhoidectomy & closed lateral internal sphincterotomy    HYSTEROSCOPY  11/4/15    Dr. Alves Lie POLYPECTOMY  11/4/15    Dr. Erik Azar  14 years ago       Social History     Social History    Marital status:      Spouse name: N/A    Number of children: N/A    Years of education: N/A     Social History Main Topics    Smoking status: Former Smoker     Packs/day: 0.25     Years: 27.00     Quit date: 4/23/2009    Smokeless tobacco: Former User      Comment: Not employed    Alcohol use 0.0 oz/week      Comment: rare    Drug use: No    Sexual activity: Yes     Other Topics Concern    None     Social History Narrative    None       Physical Exam   Constitutional: She is oriented to person, place, and time. She appears well-developed and well-nourished. No distress. HENT:   Head: Normocephalic and atraumatic. Right Ear: Hearing, tympanic membrane, external ear and ear canal normal.   Left Ear: Hearing, tympanic membrane, external ear and ear canal normal.   Mouth/Throat: Oropharynx is clear and moist. No oropharyngeal exudate. Eyes: Conjunctivae are normal. No scleral icterus. Neck: Normal range of motion. Neck supple. Cardiovascular: Normal rate, regular rhythm and normal heart sounds. Exam reveals no friction rub. No murmur heard. Pulmonary/Chest: Effort normal and breath sounds normal. No respiratory distress. She has no wheezes. She has no rales. Abdominal: There is no tenderness. There is no rebound and no guarding. Lymphadenopathy:     She has no cervical adenopathy. Neurological: She is alert and oriented to person, place, and time. Skin: Skin is warm and dry. No rash noted. She is not diaphoretic. Psychiatric: She has a normal mood and affect. Her behavior is normal.   Nursing note and vitals reviewed. Assessment    ICD-10-CM ICD-9-CM    1. Heartburn R12 787.1 omeprazole (PRILOSEC) 40 MG delayed release capsule 1 po daily   2. Right ear pain H92.01 388.70 No evidence of infection. Will monitor. Plan      Orders Placed This Encounter   Medications    omeprazole (PRILOSEC) 40 MG delayed release capsule     Sig: Take 1 capsule by mouth daily     Dispense:  30 capsule     Refill:  3       No orders of the defined types were placed in this encounter. Return if symptoms worsen or fail to improve. Patient Instructions       Patient Education        Indigestion (Dyspepsia or Heartburn): Care Instructions  Your Care Instructions  Sometimes it can be hard to pinpoint the cause of indigestion.  (It is also called dyspepsia or heartburn.) Most cases of an upset

## 2018-03-23 DIAGNOSIS — M54.16 LUMBAR RADICULOPATHY: ICD-10-CM

## 2018-03-23 DIAGNOSIS — M54.12 CERVICAL RADICULOPATHY: ICD-10-CM

## 2018-03-24 RX ORDER — TIZANIDINE 4 MG/1
TABLET ORAL
Qty: 60 TABLET | Refills: 3 | Status: SHIPPED | OUTPATIENT
Start: 2018-03-24 | End: 2018-06-11 | Stop reason: SDUPTHER

## 2018-04-25 RX ORDER — ATORVASTATIN CALCIUM 20 MG/1
20 TABLET, FILM COATED ORAL DAILY
Qty: 30 TABLET | Refills: 11 | Status: SHIPPED | OUTPATIENT
Start: 2018-04-25 | End: 2019-04-07 | Stop reason: SDUPTHER

## 2018-05-07 ENCOUNTER — OFFICE VISIT (OUTPATIENT)
Dept: PRIMARY CARE CLINIC | Age: 43
End: 2018-05-07
Payer: MEDICAID

## 2018-05-07 VITALS
HEART RATE: 80 BPM | TEMPERATURE: 98.3 F | SYSTOLIC BLOOD PRESSURE: 122 MMHG | BODY MASS INDEX: 34.83 KG/M2 | OXYGEN SATURATION: 98 % | HEIGHT: 60 IN | WEIGHT: 177.4 LBS | DIASTOLIC BLOOD PRESSURE: 70 MMHG

## 2018-05-07 DIAGNOSIS — E78.5 HYPERLIPIDEMIA, UNSPECIFIED HYPERLIPIDEMIA TYPE: ICD-10-CM

## 2018-05-07 DIAGNOSIS — M54.12 CERVICAL RADICULOPATHY: ICD-10-CM

## 2018-05-07 DIAGNOSIS — M54.16 LUMBAR RADICULOPATHY: ICD-10-CM

## 2018-05-07 DIAGNOSIS — I10 ESSENTIAL HYPERTENSION: Primary | ICD-10-CM

## 2018-05-07 DIAGNOSIS — E03.9 HYPOTHYROIDISM, UNSPECIFIED TYPE: ICD-10-CM

## 2018-05-07 PROCEDURE — 99214 OFFICE O/P EST MOD 30 MIN: CPT | Performed by: FAMILY MEDICINE

## 2018-05-07 PROCEDURE — G8427 DOCREV CUR MEDS BY ELIG CLIN: HCPCS | Performed by: FAMILY MEDICINE

## 2018-05-07 PROCEDURE — 1036F TOBACCO NON-USER: CPT | Performed by: FAMILY MEDICINE

## 2018-05-07 PROCEDURE — G8417 CALC BMI ABV UP PARAM F/U: HCPCS | Performed by: FAMILY MEDICINE

## 2018-05-07 RX ORDER — FLUTICASONE PROPIONATE 50 MCG
2 SPRAY, SUSPENSION (ML) NASAL DAILY
Qty: 1 BOTTLE | Refills: 11 | Status: SHIPPED | OUTPATIENT
Start: 2018-05-07 | End: 2019-04-22 | Stop reason: SDUPTHER

## 2018-05-07 RX ORDER — PROPRANOLOL HYDROCHLORIDE 40 MG/1
TABLET ORAL
Qty: 270 TABLET | Refills: 3 | Status: SHIPPED | OUTPATIENT
Start: 2018-05-07 | End: 2019-04-16 | Stop reason: SDUPTHER

## 2018-05-07 ASSESSMENT — ENCOUNTER SYMPTOMS
COLOR CHANGE: 0
COUGH: 0
SHORTNESS OF BREATH: 0

## 2018-05-07 ASSESSMENT — PATIENT HEALTH QUESTIONNAIRE - PHQ9
1. LITTLE INTEREST OR PLEASURE IN DOING THINGS: 1
SUM OF ALL RESPONSES TO PHQ9 QUESTIONS 1 & 2: 2
2. FEELING DOWN, DEPRESSED OR HOPELESS: 1
SUM OF ALL RESPONSES TO PHQ QUESTIONS 1-9: 2

## 2018-06-11 ENCOUNTER — OFFICE VISIT (OUTPATIENT)
Dept: PRIMARY CARE CLINIC | Age: 43
End: 2018-06-11
Payer: MEDICAID

## 2018-06-11 VITALS
SYSTOLIC BLOOD PRESSURE: 110 MMHG | DIASTOLIC BLOOD PRESSURE: 80 MMHG | HEART RATE: 70 BPM | RESPIRATION RATE: 18 BRPM | TEMPERATURE: 98 F | OXYGEN SATURATION: 98 % | HEIGHT: 60 IN | WEIGHT: 177 LBS | BODY MASS INDEX: 34.75 KG/M2

## 2018-06-11 DIAGNOSIS — R59.1 LYMPHADENOPATHY: Primary | ICD-10-CM

## 2018-06-11 DIAGNOSIS — M54.16 LUMBAR RADICULOPATHY: ICD-10-CM

## 2018-06-11 DIAGNOSIS — M54.12 CERVICAL RADICULOPATHY: ICD-10-CM

## 2018-06-11 PROCEDURE — 99213 OFFICE O/P EST LOW 20 MIN: CPT | Performed by: FAMILY MEDICINE

## 2018-06-11 PROCEDURE — G8417 CALC BMI ABV UP PARAM F/U: HCPCS | Performed by: FAMILY MEDICINE

## 2018-06-11 PROCEDURE — G8427 DOCREV CUR MEDS BY ELIG CLIN: HCPCS | Performed by: FAMILY MEDICINE

## 2018-06-11 PROCEDURE — 1036F TOBACCO NON-USER: CPT | Performed by: FAMILY MEDICINE

## 2018-06-11 RX ORDER — IBUPROFEN 600 MG/1
600 TABLET ORAL 3 TIMES DAILY
Qty: 21 TABLET | Refills: 0 | Status: SHIPPED | OUTPATIENT
Start: 2018-06-11 | End: 2018-08-14 | Stop reason: ALTCHOICE

## 2018-06-11 RX ORDER — TIZANIDINE 4 MG/1
TABLET ORAL
Qty: 60 TABLET | Refills: 3 | Status: SHIPPED | OUTPATIENT
Start: 2018-06-11 | End: 2018-09-09 | Stop reason: SDUPTHER

## 2018-06-11 RX ORDER — AMOXICILLIN AND CLAVULANATE POTASSIUM 875; 125 MG/1; MG/1
1 TABLET, FILM COATED ORAL 2 TIMES DAILY
Qty: 20 TABLET | Refills: 0 | Status: SHIPPED | OUTPATIENT
Start: 2018-06-11 | End: 2018-06-21

## 2018-06-11 ASSESSMENT — ENCOUNTER SYMPTOMS
WHEEZING: 0
COUGH: 0
ABDOMINAL PAIN: 0
VOMITING: 0
CHEST TIGHTNESS: 0
DIARRHEA: 0
CONSTIPATION: 0
SHORTNESS OF BREATH: 0
NAUSEA: 0

## 2018-06-14 DIAGNOSIS — R12 HEARTBURN: ICD-10-CM

## 2018-06-14 RX ORDER — OMEPRAZOLE 40 MG/1
40 CAPSULE, DELAYED RELEASE ORAL DAILY
Qty: 30 CAPSULE | Refills: 3 | Status: SHIPPED | OUTPATIENT
Start: 2018-06-14 | End: 2018-10-08 | Stop reason: SDUPTHER

## 2018-06-27 ENCOUNTER — OFFICE VISIT (OUTPATIENT)
Dept: PRIMARY CARE CLINIC | Age: 43
End: 2018-06-27
Payer: MEDICAID

## 2018-06-27 VITALS
DIASTOLIC BLOOD PRESSURE: 72 MMHG | OXYGEN SATURATION: 97 % | SYSTOLIC BLOOD PRESSURE: 110 MMHG | WEIGHT: 179 LBS | HEART RATE: 70 BPM | TEMPERATURE: 98 F | BODY MASS INDEX: 35.14 KG/M2 | HEIGHT: 60 IN | RESPIRATION RATE: 18 BRPM

## 2018-06-27 DIAGNOSIS — E78.5 HYPERLIPIDEMIA, UNSPECIFIED HYPERLIPIDEMIA TYPE: ICD-10-CM

## 2018-06-27 DIAGNOSIS — E03.9 HYPOTHYROIDISM, UNSPECIFIED TYPE: ICD-10-CM

## 2018-06-27 DIAGNOSIS — L08.9 INFECTED SEBACEOUS CYST: Primary | ICD-10-CM

## 2018-06-27 DIAGNOSIS — L72.3 INFECTED SEBACEOUS CYST: Primary | ICD-10-CM

## 2018-06-27 LAB
ALBUMIN SERPL-MCNC: 3.7 G/DL (ref 3.5–5.2)
ALP BLD-CCNC: 83 U/L (ref 35–104)
ALT SERPL-CCNC: 24 U/L (ref 5–33)
ANION GAP SERPL CALCULATED.3IONS-SCNC: 12 MMOL/L (ref 7–19)
AST SERPL-CCNC: 22 U/L (ref 5–32)
BILIRUB SERPL-MCNC: <0.2 MG/DL (ref 0.2–1.2)
BUN BLDV-MCNC: 10 MG/DL (ref 6–20)
CALCIUM SERPL-MCNC: 8.9 MG/DL (ref 8.6–10)
CHLORIDE BLD-SCNC: 103 MMOL/L (ref 98–111)
CO2: 26 MMOL/L (ref 22–29)
CREAT SERPL-MCNC: 0.6 MG/DL (ref 0.5–0.9)
GFR NON-AFRICAN AMERICAN: >60
GLUCOSE BLD-MCNC: 101 MG/DL (ref 74–109)
POTASSIUM SERPL-SCNC: 4.3 MMOL/L (ref 3.5–5)
SODIUM BLD-SCNC: 141 MMOL/L (ref 136–145)
T4 FREE: 1.2 NG/DL (ref 0.9–1.7)
TOTAL PROTEIN: 6.9 G/DL (ref 6.6–8.7)
TSH SERPL DL<=0.05 MIU/L-ACNC: 1.78 UIU/ML (ref 0.27–4.2)

## 2018-06-27 PROCEDURE — 1036F TOBACCO NON-USER: CPT | Performed by: FAMILY MEDICINE

## 2018-06-27 PROCEDURE — G8427 DOCREV CUR MEDS BY ELIG CLIN: HCPCS | Performed by: FAMILY MEDICINE

## 2018-06-27 PROCEDURE — G8417 CALC BMI ABV UP PARAM F/U: HCPCS | Performed by: FAMILY MEDICINE

## 2018-06-27 PROCEDURE — 99213 OFFICE O/P EST LOW 20 MIN: CPT | Performed by: FAMILY MEDICINE

## 2018-06-27 RX ORDER — NAPROXEN 500 MG/1
500 TABLET ORAL 2 TIMES DAILY
Qty: 20 TABLET | Refills: 0 | Status: SHIPPED | OUTPATIENT
Start: 2018-06-27 | End: 2018-08-14 | Stop reason: SDUPTHER

## 2018-06-27 ASSESSMENT — ENCOUNTER SYMPTOMS: COLOR CHANGE: 0

## 2018-07-03 ENCOUNTER — TELEPHONE (OUTPATIENT)
Dept: PRIMARY CARE CLINIC | Age: 43
End: 2018-07-03

## 2018-07-03 DIAGNOSIS — L72.3 SEBACEOUS CYST: Primary | ICD-10-CM

## 2018-08-14 ENCOUNTER — OFFICE VISIT (OUTPATIENT)
Dept: PRIMARY CARE CLINIC | Age: 43
End: 2018-08-14
Payer: MEDICAID

## 2018-08-14 VITALS
WEIGHT: 178 LBS | DIASTOLIC BLOOD PRESSURE: 80 MMHG | OXYGEN SATURATION: 98 % | SYSTOLIC BLOOD PRESSURE: 122 MMHG | HEART RATE: 68 BPM | BODY MASS INDEX: 34.95 KG/M2 | TEMPERATURE: 97.9 F | HEIGHT: 60 IN

## 2018-08-14 DIAGNOSIS — B35.1 ONYCHOMYCOSIS: ICD-10-CM

## 2018-08-14 DIAGNOSIS — E03.9 HYPOTHYROIDISM, UNSPECIFIED TYPE: ICD-10-CM

## 2018-08-14 DIAGNOSIS — D22.9 CHANGE IN MOLE: Primary | ICD-10-CM

## 2018-08-14 DIAGNOSIS — L60.8 PINCER NAIL DEFORMITY: ICD-10-CM

## 2018-08-14 DIAGNOSIS — E78.5 HYPERLIPIDEMIA, UNSPECIFIED HYPERLIPIDEMIA TYPE: ICD-10-CM

## 2018-08-14 LAB
ALBUMIN SERPL-MCNC: 3.7 G/DL (ref 3.5–5.2)
ALP BLD-CCNC: 89 U/L (ref 35–104)
ALT SERPL-CCNC: 29 U/L (ref 5–33)
ANION GAP SERPL CALCULATED.3IONS-SCNC: 14 MMOL/L (ref 7–19)
AST SERPL-CCNC: 19 U/L (ref 5–32)
BILIRUB SERPL-MCNC: 0.4 MG/DL (ref 0.2–1.2)
BUN BLDV-MCNC: 7 MG/DL (ref 6–20)
CALCIUM SERPL-MCNC: 9.1 MG/DL (ref 8.6–10)
CHLORIDE BLD-SCNC: 103 MMOL/L (ref 98–111)
CO2: 25 MMOL/L (ref 22–29)
CREAT SERPL-MCNC: 0.6 MG/DL (ref 0.5–0.9)
GFR NON-AFRICAN AMERICAN: >60
GLUCOSE BLD-MCNC: 86 MG/DL (ref 74–109)
POTASSIUM SERPL-SCNC: 4 MMOL/L (ref 3.5–5)
SODIUM BLD-SCNC: 142 MMOL/L (ref 136–145)
TOTAL PROTEIN: 7.1 G/DL (ref 6.6–8.7)

## 2018-08-14 PROCEDURE — G8427 DOCREV CUR MEDS BY ELIG CLIN: HCPCS | Performed by: FAMILY MEDICINE

## 2018-08-14 PROCEDURE — 99214 OFFICE O/P EST MOD 30 MIN: CPT | Performed by: FAMILY MEDICINE

## 2018-08-14 PROCEDURE — 1036F TOBACCO NON-USER: CPT | Performed by: FAMILY MEDICINE

## 2018-08-14 PROCEDURE — G8417 CALC BMI ABV UP PARAM F/U: HCPCS | Performed by: FAMILY MEDICINE

## 2018-08-14 RX ORDER — IBUPROFEN 600 MG/1
600 TABLET ORAL 3 TIMES DAILY
Qty: 21 TABLET | Refills: 0 | Status: CANCELLED | OUTPATIENT
Start: 2018-08-14 | End: 2018-08-21

## 2018-08-14 RX ORDER — TERBINAFINE HYDROCHLORIDE 250 MG/1
250 TABLET ORAL DAILY
Qty: 30 TABLET | Refills: 1 | Status: SHIPPED | OUTPATIENT
Start: 2018-08-14 | End: 2018-10-08 | Stop reason: SDUPTHER

## 2018-08-14 RX ORDER — NAPROXEN 500 MG/1
500 TABLET ORAL 2 TIMES DAILY WITH MEALS
Qty: 60 TABLET | Refills: 1 | Status: SHIPPED | OUTPATIENT
Start: 2018-08-14 | End: 2018-10-08 | Stop reason: SDUPTHER

## 2018-08-14 ASSESSMENT — ENCOUNTER SYMPTOMS
COUGH: 0
SHORTNESS OF BREATH: 0
COLOR CHANGE: 1

## 2018-09-09 DIAGNOSIS — M54.12 CERVICAL RADICULOPATHY: ICD-10-CM

## 2018-09-09 DIAGNOSIS — M54.16 LUMBAR RADICULOPATHY: ICD-10-CM

## 2018-09-12 RX ORDER — TIZANIDINE 4 MG/1
TABLET ORAL
Qty: 60 TABLET | Refills: 3 | Status: SHIPPED | OUTPATIENT
Start: 2018-09-12 | End: 2018-12-08 | Stop reason: SDUPTHER

## 2018-10-08 DIAGNOSIS — B35.1 ONYCHOMYCOSIS: ICD-10-CM

## 2018-10-08 RX ORDER — NAPROXEN 500 MG/1
TABLET ORAL
Qty: 60 TABLET | Refills: 1 | Status: SHIPPED | OUTPATIENT
Start: 2018-10-08 | End: 2018-12-07 | Stop reason: SDUPTHER

## 2018-10-08 RX ORDER — TERBINAFINE HYDROCHLORIDE 250 MG/1
TABLET ORAL
Qty: 30 TABLET | Refills: 1 | Status: SHIPPED | OUTPATIENT
Start: 2018-10-08 | End: 2018-12-07 | Stop reason: SDUPTHER

## 2018-11-12 ENCOUNTER — OFFICE VISIT (OUTPATIENT)
Dept: PRIMARY CARE CLINIC | Age: 43
End: 2018-11-12
Payer: MEDICAID

## 2018-11-12 VITALS
WEIGHT: 177.6 LBS | OXYGEN SATURATION: 98 % | HEIGHT: 60 IN | DIASTOLIC BLOOD PRESSURE: 70 MMHG | HEART RATE: 76 BPM | TEMPERATURE: 98.4 F | SYSTOLIC BLOOD PRESSURE: 122 MMHG | BODY MASS INDEX: 34.87 KG/M2

## 2018-11-12 DIAGNOSIS — E03.9 HYPOTHYROIDISM, UNSPECIFIED TYPE: ICD-10-CM

## 2018-11-12 DIAGNOSIS — J30.2 SEASONAL ALLERGIC RHINITIS, UNSPECIFIED TRIGGER: ICD-10-CM

## 2018-11-12 DIAGNOSIS — I10 ESSENTIAL HYPERTENSION: Primary | ICD-10-CM

## 2018-11-12 DIAGNOSIS — M54.16 LUMBAR RADICULOPATHY: ICD-10-CM

## 2018-11-12 LAB
T4 FREE: 1.4 NG/DL (ref 0.9–1.7)
TSH SERPL DL<=0.05 MIU/L-ACNC: 1.06 UIU/ML (ref 0.27–4.2)

## 2018-11-12 PROCEDURE — G8484 FLU IMMUNIZE NO ADMIN: HCPCS | Performed by: FAMILY MEDICINE

## 2018-11-12 PROCEDURE — 1036F TOBACCO NON-USER: CPT | Performed by: FAMILY MEDICINE

## 2018-11-12 PROCEDURE — G8427 DOCREV CUR MEDS BY ELIG CLIN: HCPCS | Performed by: FAMILY MEDICINE

## 2018-11-12 PROCEDURE — 99214 OFFICE O/P EST MOD 30 MIN: CPT | Performed by: FAMILY MEDICINE

## 2018-11-12 PROCEDURE — G8417 CALC BMI ABV UP PARAM F/U: HCPCS | Performed by: FAMILY MEDICINE

## 2018-11-12 RX ORDER — CETIRIZINE HYDROCHLORIDE 10 MG/1
10 TABLET ORAL DAILY
Qty: 30 TABLET | Refills: 5 | Status: SHIPPED | OUTPATIENT
Start: 2018-11-12 | End: 2018-12-31 | Stop reason: CLARIF

## 2018-11-12 RX ORDER — PREDNISONE 10 MG/1
TABLET ORAL
Qty: 42 TABLET | Refills: 0 | Status: SHIPPED | OUTPATIENT
Start: 2018-11-12 | End: 2019-02-21

## 2018-11-14 ENCOUNTER — TELEPHONE (OUTPATIENT)
Dept: PRIMARY CARE CLINIC | Age: 43
End: 2018-11-14

## 2018-11-14 ENCOUNTER — TELEPHONE (OUTPATIENT)
Dept: SURGERY | Age: 43
End: 2018-11-14

## 2018-11-14 NOTE — TELEPHONE ENCOUNTER
This Replaced by [facility] called and gave the patients their lab results. Patient stated understanding.

## 2018-11-25 ASSESSMENT — ENCOUNTER SYMPTOMS
COUGH: 0
BACK PAIN: 1
BOWEL INCONTINENCE: 0
SHORTNESS OF BREATH: 0

## 2018-12-08 DIAGNOSIS — M54.12 CERVICAL RADICULOPATHY: ICD-10-CM

## 2018-12-08 DIAGNOSIS — M54.16 LUMBAR RADICULOPATHY: ICD-10-CM

## 2018-12-09 RX ORDER — TIZANIDINE 4 MG/1
TABLET ORAL
Qty: 60 TABLET | Refills: 3 | Status: SHIPPED | OUTPATIENT
Start: 2018-12-09 | End: 2019-02-20 | Stop reason: SDUPTHER

## 2018-12-11 ENCOUNTER — TELEPHONE (OUTPATIENT)
Dept: PRIMARY CARE CLINIC | Age: 43
End: 2018-12-11

## 2018-12-12 NOTE — TELEPHONE ENCOUNTER
She is supposed to take for 12 weeks only, and Dr. Marsha Del Real said it will take up to a year for her toenails to get better.

## 2018-12-28 ENCOUNTER — HOSPITAL ENCOUNTER (OUTPATIENT)
Dept: WOMENS IMAGING | Age: 43
Discharge: HOME | End: 2018-12-28
Payer: MEDICAID

## 2018-12-28 DIAGNOSIS — Z12.39 SCREENING BREAST EXAMINATION: ICD-10-CM

## 2018-12-28 PROCEDURE — 77063 BREAST TOMOSYNTHESIS BI: CPT

## 2018-12-31 ENCOUNTER — OFFICE VISIT (OUTPATIENT)
Dept: PRIMARY CARE CLINIC | Age: 43
End: 2018-12-31
Payer: MEDICAID

## 2018-12-31 VITALS
HEART RATE: 82 BPM | TEMPERATURE: 98.1 F | WEIGHT: 180.2 LBS | BODY MASS INDEX: 35.38 KG/M2 | DIASTOLIC BLOOD PRESSURE: 80 MMHG | OXYGEN SATURATION: 98 % | HEIGHT: 60 IN | SYSTOLIC BLOOD PRESSURE: 122 MMHG

## 2018-12-31 DIAGNOSIS — F41.9 ANXIETY: ICD-10-CM

## 2018-12-31 DIAGNOSIS — I10 ESSENTIAL HYPERTENSION: Primary | ICD-10-CM

## 2018-12-31 DIAGNOSIS — J30.89 NON-SEASONAL ALLERGIC RHINITIS, UNSPECIFIED TRIGGER: ICD-10-CM

## 2018-12-31 DIAGNOSIS — M54.16 LUMBAR RADICULOPATHY: ICD-10-CM

## 2018-12-31 DIAGNOSIS — J01.00 ACUTE NON-RECURRENT MAXILLARY SINUSITIS: ICD-10-CM

## 2018-12-31 PROCEDURE — 1036F TOBACCO NON-USER: CPT | Performed by: FAMILY MEDICINE

## 2018-12-31 PROCEDURE — G8417 CALC BMI ABV UP PARAM F/U: HCPCS | Performed by: FAMILY MEDICINE

## 2018-12-31 PROCEDURE — G8484 FLU IMMUNIZE NO ADMIN: HCPCS | Performed by: FAMILY MEDICINE

## 2018-12-31 PROCEDURE — G8427 DOCREV CUR MEDS BY ELIG CLIN: HCPCS | Performed by: FAMILY MEDICINE

## 2018-12-31 PROCEDURE — 99213 OFFICE O/P EST LOW 20 MIN: CPT | Performed by: FAMILY MEDICINE

## 2018-12-31 RX ORDER — LORATADINE 10 MG/1
10 TABLET ORAL DAILY
Qty: 30 TABLET | Refills: 5 | Status: SHIPPED | OUTPATIENT
Start: 2018-12-31 | End: 2019-07-02 | Stop reason: SDUPTHER

## 2018-12-31 RX ORDER — AMOXICILLIN AND CLAVULANATE POTASSIUM 875; 125 MG/1; MG/1
1 TABLET, FILM COATED ORAL 2 TIMES DAILY
Qty: 20 TABLET | Refills: 0 | Status: SHIPPED | OUTPATIENT
Start: 2018-12-31 | End: 2019-01-10

## 2018-12-31 NOTE — PROGRESS NOTES
 Sexual activity: Yes     Other Topics Concern    None     Social History Narrative    None     /80   Pulse 82   Temp 98.1 °F (36.7 °C)   Ht 5' (1.524 m)   Wt 180 lb 3.2 oz (81.7 kg)   SpO2 98%   BMI 35.19 kg/m²     Physical Exam   Constitutional: She is oriented to person, place, and time. She appears well-developed and well-nourished. No distress. HENT:   Head: Normocephalic and atraumatic. Nose: Right sinus exhibits maxillary sinus tenderness. Right sinus exhibits no frontal sinus tenderness. Left sinus exhibits maxillary sinus tenderness. Left sinus exhibits no frontal sinus tenderness. Mouth/Throat: Oropharynx is clear and moist. No oropharyngeal exudate. Eyes: Conjunctivae are normal. No scleral icterus. Neck: Normal range of motion. Neck supple. No thyromegaly present. Cardiovascular: Normal rate, regular rhythm and normal heart sounds. Exam reveals no friction rub. No murmur heard. Pulmonary/Chest: Effort normal and breath sounds normal. No respiratory distress. She has no wheezes. Abdominal: Soft. She exhibits no distension and no mass. There is no tenderness. There is no rebound and no guarding. Lymphadenopathy:     She has no cervical adenopathy. Neurological: She is alert and oriented to person, place, and time. Skin: Skin is warm and dry. No rash noted. She is not diaphoretic. Psychiatric: She has a normal mood and affect. Nursing note and vitals reviewed. Assessment    ICD-10-CM    1. Essential hypertension I10 The current medical regimen is effective;  continue present plan and medications. 2. Anxiety F41.9 Well controlled. 3. Non-seasonal allergic rhinitis, unspecified trigger J30.89 loratadine (CLARITIN) 10 MG tablet 1 po daily   4. Lumbar radiculopathy M54.16 Improving. 5. Acute non-recurrent maxillary sinusitis J01.00 amoxicillin-clavulanate (AUGMENTIN) 875-125 MG per tablet 1 po BID x 10 days.             Plan      Orders Placed This

## 2019-01-03 RX ORDER — OMEPRAZOLE 40 MG/1
40 CAPSULE, DELAYED RELEASE ORAL DAILY
COMMUNITY

## 2019-01-03 RX ORDER — ATORVASTATIN CALCIUM 20 MG/1
20 TABLET, FILM COATED ORAL DAILY
COMMUNITY

## 2019-01-03 RX ORDER — AMITRIPTYLINE HYDROCHLORIDE 25 MG/1
25 TABLET, FILM COATED ORAL NIGHTLY
COMMUNITY

## 2019-01-03 RX ORDER — OXYBUTYNIN CHLORIDE 10 MG/1
10 TABLET, EXTENDED RELEASE ORAL DAILY
COMMUNITY

## 2019-01-03 RX ORDER — LEVOTHYROXINE SODIUM 0.05 MG/1
50 TABLET ORAL DAILY
COMMUNITY

## 2019-01-03 RX ORDER — NAPROXEN 500 MG/1
500 TABLET ORAL 2 TIMES DAILY WITH MEALS
COMMUNITY

## 2019-01-03 RX ORDER — PROPRANOLOL HYDROCHLORIDE 40 MG/1
40 TABLET ORAL 3 TIMES DAILY
COMMUNITY

## 2019-01-03 RX ORDER — ALBUTEROL SULFATE 90 UG/1
2 AEROSOL, METERED RESPIRATORY (INHALATION) EVERY 4 HOURS PRN
COMMUNITY

## 2019-01-03 RX ORDER — BUSPIRONE HYDROCHLORIDE 5 MG/1
5 TABLET ORAL 3 TIMES DAILY
COMMUNITY

## 2019-01-03 RX ORDER — TIZANIDINE 4 MG/1
4 TABLET ORAL NIGHTLY PRN
COMMUNITY

## 2019-01-03 NOTE — PROGRESS NOTES
[facility] - PODIATRY    Today's Date: 01/07/19    Patient Name: Mi Capps  MRN: [id number]  CSN: [id number]  PCP: Riaz Cunningham MD  Referring Provider: Riaz Cunningham*    SUBJECTIVE     Chief Complaint   Patient presents with   • Establish Care     Patient is here to establish care. Patient c/o painful great right toenail. Described as a shooting pain into the foot and ankle. Pain scale: 5/10. PCP: Dr. Riaz Cunningham last visit 12/31/2018     HPI: Mi Capps, a 43 y.o.female, comes to clinic as a(n) new patient complaining of ingrown toenail. Patient has h/o anxiety, HTN, psychophysiologic insomnia, hemorrhoids. Patient is non-diabetic. Admits pain at 5/10 level and described as aching, throbbing and sharp. States that for the past several weeks her right great toenail has been ingrown and painful. Denies significant redness or drainage. Denies pus. States that it is most painful when wearing shoes or pressed. Says she has had the problem for many years and would like to have the problem taken care of permanently. Relates previous treatment(s) including self cutting of nail. Denies any constitutional symptoms. No other pedal complaints at this time.    Past Medical History:   Diagnosis Date   • Anxiety    • Hemorrhoids    • Hypertension    • Non morbid obesity due to excess calories    • Psychophysiologic insomnia      History reviewed. No pertinent surgical history.  History reviewed. No pertinent family history.  Social History     Socioeconomic History   • Marital status: Unknown     Spouse name: Not on file   • Number of children: Not on file   • Years of education: Not on file   • Highest education level: Not on file   Social Needs   • Financial resource strain: Not on file   • Food insecurity - worry: Not on file   • Food insecurity - inability: Not on file   • Transportation needs - medical: Not on file   • Transportation needs - non-medical: Not on file    Occupational History   • Not on file   Tobacco Use   • Smoking status: Former Smoker   • Smokeless tobacco: Never Used   Substance and Sexual Activity   • Alcohol use: Defer   • Drug use: Defer   • Sexual activity: Defer   Other Topics Concern   • Not on file   Social History Narrative   • Not on file     Allergies   Allergen Reactions   • Codeine Nausea And Vomiting, Rash and Swelling     Current Outpatient Medications   Medication Sig Dispense Refill   • ASPIRIN 81 PO Take  by mouth.     • busPIRone (BUSPAR) 5 MG tablet Take 5 mg by mouth 3 (Three) Times a Day.     • ipratropium-albuterol (COMBIVENT RESPIMAT)  MCG/ACT inhaler Inhale 1 puff 4 (Four) Times a Day As Needed for Wheezing.     • levothyroxine (SYNTHROID, LEVOTHROID) 50 MCG tablet Take 50 mcg by mouth Daily.     • naproxen (NAPROSYN) 500 MG tablet Take 500 mg by mouth 2 (Two) Times a Day With Meals.     • omeprazole (priLOSEC) 40 MG capsule Take 40 mg by mouth Daily.     • propranolol (INDERAL) 40 MG tablet Take 40 mg by mouth 3 (Three) Times a Day.     • tiZANidine (ZANAFLEX) 4 MG tablet Take 4 mg by mouth At Night As Needed for Muscle Spasms.     • albuterol sulfate  (90 Base) MCG/ACT inhaler Inhale 2 puffs Every 4 (Four) Hours As Needed for Wheezing.     • amitriptyline (ELAVIL) 25 MG tablet Take 25 mg by mouth Every Night.     • atorvastatin (LIPITOR) 20 MG tablet Take 20 mg by mouth Daily.     • oxybutynin XL (DITROPAN-XL) 10 MG 24 hr tablet Take 10 mg by mouth Daily.       No current facility-administered medications for this visit.      Review of Systems   Constitutional: Negative for chills and fever.   HENT: Negative for congestion.    Respiratory: Negative for shortness of breath.    Cardiovascular: Negative for chest pain and leg swelling.   Gastrointestinal: Negative for constipation, diarrhea, nausea and vomiting.   Musculoskeletal:        Foot pain   Skin:        IGTN   Neurological: Negative for numbness.       OBJECTIVE      Vitals:    01/07/19 1135   BP: 132/70   Pulse: 59   SpO2: 97%       PHYSICAL EXAM  GEN:   Accompanied by none.     General Exam  Appearance: appears stated age and healthy   Orientation: alert and oriented to person, place, and time   Affect: appropriate   Gait: unimpaired   Assistance: independent   Shoe Gear: casual shoes      Foot/Ankle Exam  Inspection and Palpation  Ecchymosis - Right: none Left: none  Tenderness - Right: (Hallux nail medial border) Left: none   Swelling - Right: none   Left: none    Arch - Right: normal Left: normal  Hammertoes - Right: absent Left: absent  Claw Toes - Right: absent Left: absent  Mallet Toes - Right: absent Left: absent  Hallux Valgus - Right: no Left: no  Hallux Limitus - Right: no Left: no  Skin - Right: skin intact  Left: skin intact   Nails -  Right: ingrown toenail (hallux medial border) Left: normal     Vascular  Dorsalis Pedis - Right: 2+ Left: 2+  Posterior Tibial - Right: 2+ Left: 2+  Skin Temperature -  Right: warm  Left: warm    CFT - Right: 3 Left: 3  Pedal Hair Growth - Right: present Left: present  Varicosities -  Right: no varicosities  Left: no varicosities      Neurologic  Saphenous Nerve Sensation  - Right: normal Left: normal  Tibial Nerve Sensation - Right: normal Left: normal  Superficial Peroneal Nerve Sensation - Right: normal Left: normal  Deep Peroneal Nerve Sensation - Right: normal Left: normal  Sural Nerve Sensation - Right: normal Left: normal  Protective Sensation using [place]andra Monofilament - Right: 10 Left: 10    Muscle Strength  Dorsiflexion - Right: 5 Left: 5  Plantar Flexion - Right: 5 Left: 5  Eversion - Right: 5 Left: 5  Inversion - Right: 5 Left: 5  Great Toe Extension - Right: 5 Left: 5  Great Toe Flexion - Right: 5 Left: 5    Range of Motion  Normal right ankle ROM  Normal left ankle ROM            RADIOLOGY/NUCLEAR:  No results found.    LABORATORY/CULTURE RESULTS:      PATHOLOGY RESULTS:       ASSESSMENT/PLAN     Mi claritza  seen today for establish care.    Diagnoses and all orders for this visit:    Ingrown toenail  -     Nail Removal    Foot pain, right      Comprehensive lower extremity examination and evaluation was performed.  Discussed findings and treatment plan including risks, benefits, and treatment options with patient in detail. Patient agreed with treatment plan.  Given options of slant back or PNA. Opts for PNA today.  After written consent obtained, total/partial nail avulsion(s) performed as documented in procedure note. Post-procedure instructions given.    An After Visit Summary was printed and given to the patient at discharge, including (if requested) any available informative/educational handouts regarding diagnosis, treatment, or medications. All questions were answered to patient/family satisfaction. Should symptoms fail to improve or worsen they agree to call or return to clinic or to go to the Emergency Department. Discussed the importance of following up with any needed screening tests/labs/specialist appointments and any requested follow-up recommended by me today. Importance of maintaining follow-up discussed and patient accepts that missed appointments can delay diagnosis and potentially lead to worsening of conditions.  Return in about 2 weeks (around 1/21/2019)., or sooner if acute issues arise.      Nail Removal  Date/Time: 1/7/2019 12:19 PM  Performed by: Brady Mgcregor DPM  Authorized by: Brady Mcgregor DPM   Location: right foot  Location details: right big toe  Anesthesia: digital block    Anesthesia:  Local Anesthetic: bupivacaine 0.5% without epinephrine  Anesthetic total: 5 mL    Sedation:  Patient sedated: no    Preparation: skin prepped with Betadine  Amount removed: partial  Side: medial  Wedge excision of skin of nail fold: no  Nail bed sutured: no  Nail matrix removed: partial  Removed nail replaced and anchored: no  Dressing: antibiotic ointment and dressing applied  Patient  tolerance: Patient tolerated the procedure well with no immediate complications  Comments: Flushed with alcohol. Applied silvadene.          This document has been electronically signed by Brady Mcgregor DPM on January 7, 2019 11:47 AM

## 2019-01-04 ENCOUNTER — OFFICE VISIT (OUTPATIENT)
Dept: SURGERY | Age: 44
End: 2019-01-04
Payer: MEDICAID

## 2019-01-04 ENCOUNTER — TELEPHONE (OUTPATIENT)
Dept: PODIATRY | Facility: CLINIC | Age: 44
End: 2019-01-04

## 2019-01-04 VITALS
WEIGHT: 179 LBS | BODY MASS INDEX: 35.14 KG/M2 | HEIGHT: 60 IN | SYSTOLIC BLOOD PRESSURE: 110 MMHG | DIASTOLIC BLOOD PRESSURE: 70 MMHG | HEART RATE: 70 BPM

## 2019-01-04 DIAGNOSIS — Z12.39 SCREENING BREAST EXAMINATION: Primary | ICD-10-CM

## 2019-01-04 PROCEDURE — 1036F TOBACCO NON-USER: CPT | Performed by: PHYSICIAN ASSISTANT

## 2019-01-04 PROCEDURE — G8427 DOCREV CUR MEDS BY ELIG CLIN: HCPCS | Performed by: PHYSICIAN ASSISTANT

## 2019-01-04 PROCEDURE — G8484 FLU IMMUNIZE NO ADMIN: HCPCS | Performed by: PHYSICIAN ASSISTANT

## 2019-01-04 PROCEDURE — 99212 OFFICE O/P EST SF 10 MIN: CPT | Performed by: PHYSICIAN ASSISTANT

## 2019-01-04 PROCEDURE — G8417 CALC BMI ABV UP PARAM F/U: HCPCS | Performed by: PHYSICIAN ASSISTANT

## 2019-01-04 NOTE — TELEPHONE ENCOUNTER
Called and reminded patient of appointment with Dr. Cisco Mcgregor on January 7, 2019 at 11:30 am. Patient gave verbal confirmation that she would be here for her appointment.

## 2019-01-07 ENCOUNTER — TELEPHONE (OUTPATIENT)
Dept: PODIATRY | Facility: CLINIC | Age: 44
End: 2019-01-07

## 2019-01-07 ENCOUNTER — OFFICE VISIT (OUTPATIENT)
Dept: PODIATRY | Facility: CLINIC | Age: 44
End: 2019-01-07

## 2019-01-07 VITALS
WEIGHT: 179 LBS | BODY MASS INDEX: 35.14 KG/M2 | HEIGHT: 60 IN | DIASTOLIC BLOOD PRESSURE: 70 MMHG | OXYGEN SATURATION: 97 % | SYSTOLIC BLOOD PRESSURE: 132 MMHG | HEART RATE: 59 BPM

## 2019-01-07 DIAGNOSIS — L60.0 INGROWN TOENAIL: Primary | ICD-10-CM

## 2019-01-07 DIAGNOSIS — M79.671 FOOT PAIN, RIGHT: ICD-10-CM

## 2019-01-07 PROCEDURE — 99203 OFFICE O/P NEW LOW 30 MIN: CPT | Performed by: PODIATRIST

## 2019-01-07 PROCEDURE — 11750 EXCISION NAIL&NAIL MATRIX: CPT | Performed by: PODIATRIST

## 2019-01-07 RX ORDER — BUPIVACAINE HYDROCHLORIDE 5 MG/ML
5 INJECTION, SOLUTION PERINEURAL ONCE
Status: COMPLETED | OUTPATIENT
Start: 2019-01-07 | End: 2019-01-07

## 2019-01-07 RX ADMIN — BUPIVACAINE HYDROCHLORIDE 5 ML: 5 INJECTION, SOLUTION PERINEURAL at 14:07

## 2019-01-07 NOTE — PATIENT INSTRUCTIONS
Fingernail or Toenail Removal, Adult, Care After  This sheet gives you information about how to care for yourself after your procedure. Your health care provider may also give you more specific instructions. If you have problems or questions, contact your health care provider.  What can I expect after the procedure?  After the procedure, it is common to have:  · Pain.  · Redness.  · Swelling.  · Soreness.    Follow these instructions at home:  · If you have a splint:  ? Do not put pressure on any part of the splint until it is fully hardened. This may take several hours.  ? Wear the splint as told by your health care provider. Remove it only as told by your health care provider.  ? Loosen the splint if your fingers or toes tingle, become numb, or turn cold and blue.  ? Keep the splint clean.  ? If the splint is not waterproof:  § Do not let it get wet.  § Cover it with a watertight covering when you take a bath or a shower.  Wound care    · Follow instructions from your health care provider about how to take care of your wound. Make sure you:  ? Wash your hands with soap and water before you change your bandage (dressing). If soap and water are not available, use hand .  ? Change your dressing as told by your health care provider.  ? Keep your dressing dry until your health care provider says it can be removed.  ? Leave stitches (sutures), skin glue, or adhesive strips in place. These skin closures may need to stay in place for 2 weeks or longer. If adhesive strip edges start to loosen and curl up, you may trim the loose edges. Do not remove adhesive strips completely unless your health care provider tells you to do that.  · Check your wound every day for signs of infection. Check for:  ? More redness, swelling, or pain.  ? More fluid or blood.  ? Warmth.  ? Pus or a bad smell.  Managing pain, stiffness, and swelling  · Move your fingers or toes often to avoid stiffness and to lessen swelling.  · Raise  (elevate) the injured area above the level of your heart while you are sitting or lying down. You may need to keep your finger or toe raised or supported on a pillow for 24 hours or as told by your health care provider.  · Soak your hand or foot in warm, soapy water for 10-20 minutes, 3 times a day or as told by your health care provider.  Medicine  · Take over-the-counter and prescription medicines only as told by your health care provider.  · If you were prescribed an antibiotic medicine, use it as told by your health care provider. Do not stop using the antibiotic even if your condition improves.  General instructions  · If you were given a shoe to wear, wear it as told by your health care provider.  · Keep all follow-up visits as told by your health care provider. This is important.  Contact a health care provider if:  · You have more redness, swelling, or pain around your wound.  · You have more fluid or blood coming from your wound.  · Your wound feels warm to the touch.  · You have pus or a bad smell coming from your wound.  · You have a fever.  · Your finger or toe looks blue or black.  This information is not intended to replace advice given to you by your health care provider. Make sure you discuss any questions you have with your health care provider.  Document Released: 01/08/2016 Document Revised: 08/16/2017 Document Reviewed: 06/26/2017  [employer] Interactive Patient Education © 2018 [employer]

## 2019-01-07 NOTE — TELEPHONE ENCOUNTER
Notified patient of scheduled follow-up appointment. Patient is scheduled for January 23, 2019 at 1:00 pm. Patient gave verbal understanding of appointment date and time. Reminder letter will be mailed as well.

## 2019-01-23 ENCOUNTER — OFFICE VISIT (OUTPATIENT)
Dept: PODIATRY | Facility: CLINIC | Age: 44
End: 2019-01-23

## 2019-01-23 VITALS
WEIGHT: 179 LBS | BODY MASS INDEX: 35.14 KG/M2 | OXYGEN SATURATION: 97 % | HEIGHT: 60 IN | DIASTOLIC BLOOD PRESSURE: 72 MMHG | HEART RATE: 82 BPM | SYSTOLIC BLOOD PRESSURE: 130 MMHG

## 2019-01-23 DIAGNOSIS — L60.0 INGROWN TOENAIL: Primary | ICD-10-CM

## 2019-01-23 DIAGNOSIS — M79.671 FOOT PAIN, RIGHT: ICD-10-CM

## 2019-01-23 PROCEDURE — 99213 OFFICE O/P EST LOW 20 MIN: CPT | Performed by: PODIATRIST

## 2019-01-23 NOTE — PROGRESS NOTES
[facility] - PODIATRY    Today's Date: 01/23/19    Patient Name: Mi Capps  MRN: [id number]  CSN: [id number]  PCP: Riaz Cunningham MD  Referring Provider: No ref. provider found    SUBJECTIVE     Chief Complaint   Patient presents with   • Follow-up     Patient is here for nail avulsion f/u. Denies drainage or pain at present time. PCP: Dr. Riaz Cunningham last visit 12/31/2018     HPI: Mi Capps, a 43 y.o.female, comes to clinic as a(n) established patient for follow-up treatment of right IGTN. Patient has h/o anxiety, HTN, psychophysiologic insomnia, hemorrhoids. Patient is non-diabetic. Denies pain. States that she has been following post-procedure instruction with foot soaks and bandaging. Area is healing well. No drainage noted. Relates previous treatment(s) including self cutting of nail. Denies any constitutional symptoms. No other pedal complaints at this time.    Past Medical History:   Diagnosis Date   • Anxiety    • Hemorrhoids    • Hypertension    • Non morbid obesity due to excess calories    • Psychophysiologic insomnia      History reviewed. No pertinent surgical history.  History reviewed. No pertinent family history.  Social History     Socioeconomic History   • Marital status: Unknown     Spouse name: Not on file   • Number of children: Not on file   • Years of education: Not on file   • Highest education level: Not on file   Social Needs   • Financial resource strain: Not on file   • Food insecurity - worry: Not on file   • Food insecurity - inability: Not on file   • Transportation needs - medical: Not on file   • Transportation needs - non-medical: Not on file   Occupational History   • Not on file   Tobacco Use   • Smoking status: Former Smoker   • Smokeless tobacco: Never Used   Substance and Sexual Activity   • Alcohol use: Defer   • Drug use: Defer   • Sexual activity: Defer   Other Topics Concern   • Not on file   Social History Narrative   • Not  on file     Allergies   Allergen Reactions   • Codeine Nausea And Vomiting, Rash and Swelling     Current Outpatient Medications   Medication Sig Dispense Refill   • albuterol sulfate  (90 Base) MCG/ACT inhaler Inhale 2 puffs Every 4 (Four) Hours As Needed for Wheezing.     • amitriptyline (ELAVIL) 25 MG tablet Take 25 mg by mouth Every Night.     • ASPIRIN 81 PO Take  by mouth.     • atorvastatin (LIPITOR) 20 MG tablet Take 20 mg by mouth Daily.     • busPIRone (BUSPAR) 5 MG tablet Take 5 mg by mouth 3 (Three) Times a Day.     • ipratropium-albuterol (COMBIVENT RESPIMAT)  MCG/ACT inhaler Inhale 1 puff 4 (Four) Times a Day As Needed for Wheezing.     • levothyroxine (SYNTHROID, LEVOTHROID) 50 MCG tablet Take 50 mcg by mouth Daily.     • naproxen (NAPROSYN) 500 MG tablet Take 500 mg by mouth 2 (Two) Times a Day With Meals.     • omeprazole (priLOSEC) 40 MG capsule Take 40 mg by mouth Daily.     • oxybutynin XL (DITROPAN-XL) 10 MG 24 hr tablet Take 10 mg by mouth Daily.     • propranolol (INDERAL) 40 MG tablet Take 40 mg by mouth 3 (Three) Times a Day.     • tiZANidine (ZANAFLEX) 4 MG tablet Take 4 mg by mouth At Night As Needed for Muscle Spasms.       No current facility-administered medications for this visit.      Review of Systems   Constitutional: Negative for chills and fever.   HENT: Negative for congestion.    Respiratory: Negative for shortness of breath.    Cardiovascular: Negative for chest pain and leg swelling.   Gastrointestinal: Negative for constipation, diarrhea, nausea and vomiting.   Musculoskeletal:        Foot pain   Skin:        IGTN   Neurological: Negative for numbness.       OBJECTIVE     Vitals:    01/23/19 1309   BP: 130/72   Pulse: 82   SpO2: 97%       PHYSICAL EXAM  GEN:   Accompanied by none.     General Exam  Appearance: appears stated age and healthy   Orientation: alert and oriented to person, place, and time   Affect: appropriate   Gait: unimpaired   Assistance:  independent   Shoe Gear: casual shoes      Foot/Ankle Exam  Inspection and Palpation  Ecchymosis - Right: none Left: none  Tenderness - Right: none   Left: none   Swelling - Right: none   Left: none    Arch - Right: normal Left: normal  Hammertoes - Right: absent Left: absent  Claw Toes - Right: absent Left: absent  Mallet Toes - Right: absent Left: absent  Hallux Valgus - Right: no Left: no  Hallux Limitus - Right: no Left: no  Skin - Right: skin intact  Left: skin intact   Nails -  Right: ingrown toenail (hallux medial border - avulsed) Left: normal     Vascular  Dorsalis Pedis - Right: 2+ Left: 2+  Posterior Tibial - Right: 2+ Left: 2+  Skin Temperature -  Right: warm  Left: warm    CFT - Right: 3 Left: 3  Pedal Hair Growth - Right: present Left: present  Varicosities -  Right: no varicosities  Left: no varicosities      Neurologic  Saphenous Nerve Sensation  - Right: normal Left: normal  Tibial Nerve Sensation - Right: normal Left: normal  Superficial Peroneal Nerve Sensation - Right: normal Left: normal  Deep Peroneal Nerve Sensation - Right: normal Left: normal  Sural Nerve Sensation - Right: normal Left: normal  Protective Sensation using [place]-Sandra Monofilament - Right: 10 Left: 10    Muscle Strength  Dorsiflexion - Right: 5 Left: 5  Plantar Flexion - Right: 5 Left: 5  Eversion - Right: 5 Left: 5  Inversion - Right: 5 Left: 5  Great Toe Extension - Right: 5 Left: 5  Great Toe Flexion - Right: 5 Left: 5    Range of Motion  Normal right ankle ROM  Normal left ankle ROM            RADIOLOGY/NUCLEAR:  No results found.    LABORATORY/CULTURE RESULTS:      PATHOLOGY RESULTS:       ASSESSMENT/PLAN     Mi was seen today for follow-up.    Diagnoses and all orders for this visit:    Ingrown toenail    Foot pain, right      Comprehensive lower extremity examination and evaluation was performed.  Discussed findings and treatment plan including risks, benefits, and treatment options with patient in detail.  Patient agreed with treatment plan.  Slight debridement of avulsion site. Continue light bandage until area is completely healed.   An After Visit Summary was printed and given to the patient at discharge, including (if requested) any available informative/educational handouts regarding diagnosis, treatment, or medications. All questions were answered to patient/family satisfaction. Should symptoms fail to improve or worsen they agree to call or return to clinic or to go to the Emergency Department. Discussed the importance of following up with any needed screening tests/labs/specialist appointments and any requested follow-up recommended by me today. Importance of maintaining follow-up discussed and patient accepts that missed appointments can delay diagnosis and potentially lead to worsening of conditions.  Return if symptoms worsen or fail to improve., or sooner if acute issues arise.      Procedures    This document has been electronically signed by Brady Mcgregor DPM on January 23, 2019 1:21 PM

## 2019-02-03 RX ORDER — NAPROXEN 500 MG/1
TABLET ORAL
Qty: 60 TABLET | Refills: 1 | Status: SHIPPED | OUTPATIENT
Start: 2019-02-03 | End: 2019-04-01 | Stop reason: SDUPTHER

## 2019-02-20 DIAGNOSIS — M54.16 LUMBAR RADICULOPATHY: ICD-10-CM

## 2019-02-20 DIAGNOSIS — M54.12 CERVICAL RADICULOPATHY: ICD-10-CM

## 2019-02-20 RX ORDER — TIZANIDINE 4 MG/1
TABLET ORAL
Qty: 60 TABLET | Refills: 3 | Status: SHIPPED | OUTPATIENT
Start: 2019-02-20 | End: 2019-05-03 | Stop reason: SDUPTHER

## 2019-02-21 ENCOUNTER — OFFICE VISIT (OUTPATIENT)
Dept: OBGYN | Age: 44
End: 2019-02-21
Payer: MEDICAID

## 2019-02-21 VITALS
BODY MASS INDEX: 33.77 KG/M2 | HEIGHT: 60 IN | SYSTOLIC BLOOD PRESSURE: 98 MMHG | DIASTOLIC BLOOD PRESSURE: 64 MMHG | WEIGHT: 172 LBS

## 2019-02-21 DIAGNOSIS — Z12.4 CERVICAL CANCER SCREENING: ICD-10-CM

## 2019-02-21 DIAGNOSIS — Z01.419 WELL WOMAN EXAM WITH ROUTINE GYNECOLOGICAL EXAM: Primary | ICD-10-CM

## 2019-02-21 PROCEDURE — G8484 FLU IMMUNIZE NO ADMIN: HCPCS | Performed by: OBSTETRICS & GYNECOLOGY

## 2019-02-21 PROCEDURE — 99396 PREV VISIT EST AGE 40-64: CPT | Performed by: OBSTETRICS & GYNECOLOGY

## 2019-02-21 ASSESSMENT — ENCOUNTER SYMPTOMS
RESPIRATORY NEGATIVE: 1
EYES NEGATIVE: 1
GASTROINTESTINAL NEGATIVE: 1
ALLERGIC/IMMUNOLOGIC NEGATIVE: 1

## 2019-02-22 DIAGNOSIS — Z01.419 WELL WOMAN EXAM WITH ROUTINE GYNECOLOGICAL EXAM: Primary | ICD-10-CM

## 2019-02-22 DIAGNOSIS — Z12.4 CERVICAL CANCER SCREENING: ICD-10-CM

## 2019-02-27 LAB
HPV TYPE 16: NOT DETECTED
HPV TYPE 18: NOT DETECTED
INTERPRETATION: NORMAL
OTHER HIGH RISK HPV: NOT DETECTED
SOURCE: NORMAL

## 2019-03-18 DIAGNOSIS — R05.9 COUGH: ICD-10-CM

## 2019-03-18 DIAGNOSIS — J40 BRONCHITIS: ICD-10-CM

## 2019-03-18 RX ORDER — IPRATROPIUM/ALBUTEROL SULFATE 20-100 MCG
1 MIST INHALER (GRAM) INHALATION EVERY 4 HOURS PRN
Qty: 1 INHALER | Refills: 2 | Status: SHIPPED | OUTPATIENT
Start: 2019-03-18 | End: 2019-06-12 | Stop reason: SDUPTHER

## 2019-04-01 RX ORDER — NAPROXEN 500 MG/1
TABLET ORAL
Qty: 60 TABLET | Refills: 1 | Status: SHIPPED | OUTPATIENT
Start: 2019-04-01 | End: 2019-05-31 | Stop reason: SDUPTHER

## 2019-04-07 RX ORDER — ATORVASTATIN CALCIUM 20 MG/1
20 TABLET, FILM COATED ORAL DAILY
Qty: 30 TABLET | Refills: 11 | Status: SHIPPED | OUTPATIENT
Start: 2019-04-07 | End: 2020-04-14 | Stop reason: SDUPTHER

## 2019-04-16 DIAGNOSIS — I10 ESSENTIAL HYPERTENSION: ICD-10-CM

## 2019-04-16 RX ORDER — PROPRANOLOL HYDROCHLORIDE 40 MG/1
TABLET ORAL
Qty: 270 TABLET | Refills: 3 | Status: SHIPPED | OUTPATIENT
Start: 2019-04-16 | End: 2020-04-14 | Stop reason: SDUPTHER

## 2019-04-22 RX ORDER — FLUTICASONE PROPIONATE 50 MCG
SPRAY, SUSPENSION (ML) NASAL
Qty: 1 BOTTLE | Refills: 11 | Status: SHIPPED | OUTPATIENT
Start: 2019-04-22 | End: 2020-05-05 | Stop reason: SDUPTHER

## 2019-05-03 DIAGNOSIS — M54.16 LUMBAR RADICULOPATHY: ICD-10-CM

## 2019-05-03 DIAGNOSIS — M54.12 CERVICAL RADICULOPATHY: ICD-10-CM

## 2019-05-03 RX ORDER — TIZANIDINE 4 MG/1
TABLET ORAL
Qty: 60 TABLET | Refills: 3 | Status: SHIPPED | OUTPATIENT
Start: 2019-05-03 | End: 2019-07-18 | Stop reason: SDUPTHER

## 2019-05-31 RX ORDER — NAPROXEN 500 MG/1
TABLET ORAL
Qty: 60 TABLET | Refills: 1 | Status: SHIPPED | OUTPATIENT
Start: 2019-05-31 | End: 2019-08-12 | Stop reason: SDUPTHER

## 2019-06-12 DIAGNOSIS — R05.9 COUGH: ICD-10-CM

## 2019-06-12 DIAGNOSIS — J40 BRONCHITIS: ICD-10-CM

## 2019-06-12 RX ORDER — IPRATROPIUM/ALBUTEROL SULFATE 20-100 MCG
1 MIST INHALER (GRAM) INHALATION EVERY 4 HOURS PRN
Qty: 1 INHALER | Refills: 2 | Status: SHIPPED | OUTPATIENT
Start: 2019-06-12 | End: 2019-10-08 | Stop reason: SDUPTHER

## 2019-06-27 ENCOUNTER — OFFICE VISIT (OUTPATIENT)
Dept: PRIMARY CARE CLINIC | Age: 44
End: 2019-06-27
Payer: MEDICAID

## 2019-06-27 ENCOUNTER — HOSPITAL ENCOUNTER (OUTPATIENT)
Dept: GENERAL RADIOLOGY | Age: 44
Discharge: HOME | End: 2019-06-27
Payer: MEDICAID

## 2019-06-27 VITALS
HEIGHT: 60 IN | DIASTOLIC BLOOD PRESSURE: 86 MMHG | TEMPERATURE: 97.7 F | OXYGEN SATURATION: 99 % | WEIGHT: 179.8 LBS | RESPIRATION RATE: 16 BRPM | SYSTOLIC BLOOD PRESSURE: 124 MMHG | HEART RATE: 65 BPM | BODY MASS INDEX: 35.3 KG/M2

## 2019-06-27 DIAGNOSIS — S93.601A SPRAIN OF RIGHT FOOT, INITIAL ENCOUNTER: Primary | ICD-10-CM

## 2019-06-27 DIAGNOSIS — S93.602A FOOT SPRAIN, LEFT, INITIAL ENCOUNTER: ICD-10-CM

## 2019-06-27 PROCEDURE — G8427 DOCREV CUR MEDS BY ELIG CLIN: HCPCS | Performed by: NURSE PRACTITIONER

## 2019-06-27 PROCEDURE — 1036F TOBACCO NON-USER: CPT | Performed by: NURSE PRACTITIONER

## 2019-06-27 PROCEDURE — 73620 X-RAY EXAM OF FOOT: CPT

## 2019-06-27 PROCEDURE — 99213 OFFICE O/P EST LOW 20 MIN: CPT | Performed by: NURSE PRACTITIONER

## 2019-06-27 PROCEDURE — G8417 CALC BMI ABV UP PARAM F/U: HCPCS | Performed by: NURSE PRACTITIONER

## 2019-06-27 ASSESSMENT — ENCOUNTER SYMPTOMS: RESPIRATORY NEGATIVE: 1

## 2019-06-27 NOTE — PATIENT INSTRUCTIONS
Patient Education        Foot Sprain: Care Instructions  Your Care Instructions    A foot sprain occurs when you stretch or tear the ligaments around your foot. Ligaments are the tough tissues that connect one bone to another. A sprain can happen when you run, fall, or hit your toe against something. Sprains often happen when you jump or change direction quickly. This may occur when you play basketball, soccer, or other sports. Most foot sprains will get better with treatment at home. Follow-up care is a key part of your treatment and safety. Be sure to make and go to all appointments, and call your doctor if you are having problems. It's also a good idea to know your test results and keep a list of the medicines you take. How can you care for yourself at home? · Walk or put weight on your sprained foot as long as it does not hurt. · If your doctor gave you a splint or immobilizer, wear it as directed. If you were given crutches, use them as directed. · For the first 2 days after your injury, avoid hot showers, hot tubs, or hot packs. They may increase swelling. · Put ice or a cold pack on your foot for 10 to 20 minutes at a time to stop swelling. Try this every 1 to 2 hours for 3 days (when you are awake) or until the swelling goes down. Put a thin cloth between the ice pack and your skin. Keep your splint dry. · After 2 or 3 days, if your swelling is gone, put a heating pad (set on low) or a warm cloth on your foot. Some doctors suggest that you go back and forth between hot and cold treatments. · Prop up your foot on a pillow when you ice it or anytime you sit or lie down. Try to keep it above the level of your heart. This will help reduce swelling. · Take pain medicines exactly as directed. ? If the doctor gave you a prescription medicine for pain, take it as prescribed. ? If you are not taking a prescription pain medicine, ask your doctor if you can take an over-the-counter medicine.   · Do any exercises that your doctor or physical therapist suggests. · Return to your usual exercise gradually as you feel better. When should you call for help? Call your doctor now or seek immediate medical care if:    · You have increased or severe pain.     · Your toes are cool or pale or change color.     · Your wrap or splint feels too tight.     · You have signs of a blood clot, such as:  ? Pain in your calf, back of the knee, thigh, or groin. ? Redness and swelling in your leg or groin.     · You have tingling, weakness, or numbness in your leg or foot.    Watch closely for changes in your health, and be sure to contact your doctor if:    · You cannot put any weight on your foot.     · You get a fever.     · You do not get better as expected. Where can you learn more? Go to https://[email or web address]. org and sign in to your [employer] account. Enter W410 in the [employer] box to learn more about \"Foot Sprain: Care Instructions. \"     If you do not have an account, please click on the \"Sign Up Now\" link. Current as of: September 20, 2018  Content Version: 12.0  © 9931-2412 [employer]. Care instructions adapted under license by [facility] ([facility]). If you have questions about a medical condition or this instruction, always ask your healthcare professional. [address] any warranty or liability for your use of this information.

## 2019-06-27 NOTE — PROGRESS NOTES
Dispense Refill    COMBIVENT RESPIMAT  MCG/ACT AERS inhaler INHALE 1 PUFF INTO THE LUNGS EVERY 4 HOURS AS NEEDED FOR WHEEZING 1 Inhaler 2    naproxen (NAPROSYN) 500 MG tablet TAKE 1 TABLET BY MOUTH TWICE A DAY WITH MEALS 60 tablet 1    propranolol (INDERAL) 40 MG tablet TAKE 1 TABLET BY MOUTH THREE TIMES A  tablet 3    atorvastatin (LIPITOR) 20 MG tablet TAKE 1 TABLET BY MOUTH DAILY 30 tablet 11    loratadine (CLARITIN) 10 MG tablet Take 1 tablet by mouth daily 30 tablet 5    levothyroxine (SYNTHROID) 50 MCG tablet TAKE 1 TABLET BY MOUTH DAILY 30 tablet 11    omeprazole (PRILOSEC) 40 MG delayed release capsule TAKE 1 CAPSULE BY MOUTH DAILY 30 capsule 11    aspirin (ASPIRIN CHILDRENS) 81 MG chewable tablet Take 1 tablet by mouth daily 30 tablet 0    fluticasone (FLONASE) 50 MCG/ACT nasal spray SPRAY 2 SPRAYS INTO EACH NOSTRIL EVERY DAY 1 Bottle 11     No current facility-administered medications for this visit. Allergies   Allergen Reactions    Codeine Nausea And Vomiting, Swelling and Rash       Family History   Problem Relation Age of Onset    High Blood Pressure Mother     Other Mother         early alzheimers    High Blood Pressure Father     Diabetes Paternal Grandfather     Breast Cancer Paternal Aunt     Colon Cancer Neg Hx     Colon Polyps Neg Hx                Review of Systems   Respiratory: Negative. Cardiovascular: Negative. Musculoskeletal: Positive for arthralgias (right foot). Skin: Negative for rash and wound. Objective:     Physical Exam   Constitutional: She is oriented to person, place, and time. She appears well-developed and well-nourished. HENT:   Head: Normocephalic and atraumatic. Eyes: Pupils are equal, round, and reactive to light. Neck: Normal range of motion. Cardiovascular: Normal rate, regular rhythm and normal heart sounds. Pulmonary/Chest: Effort normal and breath sounds normal. No respiratory distress. She has no wheezes. Musculoskeletal:        Lumbar back: She exhibits normal range of motion. Feet:    Neurological: She is alert and oriented to person, place, and time. Skin: Skin is warm and dry. No rash noted. Nursing note and vitals reviewed. /86   Pulse 65   Temp 97.7 °F (36.5 °C) (Temporal)   Resp 16   Ht 5' (1.524 m)   Wt 179 lb 12.8 oz (81.6 kg)   SpO2 99%   BMI 35.11 kg/m²     Assessment:      Diagnosis Orders   1. Sprain of right foot, initial encounter  XR FOOT RIGHT (2 VIEWS)       No results found for this visit on 06/27/19. Plan:     [place] foot rule: pain over midfoot, unable to bear weight without pain- xray foot. Okay to use ibuprofen for pain apply ice. Return if symptoms worsen or fail to improve, for As scheduled. Orders Placed This Encounter   Procedures    XR FOOT RIGHT (2 VIEWS)     Standing Status:   Future     Number of Occurrences:   1     Standing Expiration Date:   6/27/2020     Order Specific Question:   Reason for exam:     Answer:   left foot injury, pain, swelling       No orders of the defined types were placed in this encounter. Patient offered educational materials - see patient instructions for any instruction needed. Discussed use, benefit, and side effects of prescribed medications. All patient questions answered. Instructed to continue current medications, diet and exercise. Patient agreed with treatment plan. Follow up as directed. Patient was advised to go to the ED if condition ever becomes emergent. EMR Dragon/transcription disclaimer: Some of this encounter note is an electronic transcription/translation of spoken language to printed text. The electronic translation of spoken language may permit erroneous, or at times, nonsensical words or phrases to be inadvertently transcribed.  Although I have reviewed the note for such errors, some may still exist.      Electronically signed by JUSTIN Santos on 6/27/2019 at 4:44 PM

## 2019-06-28 ENCOUNTER — TELEPHONE (OUTPATIENT)
Dept: PRIMARY CARE CLINIC | Age: 44
End: 2019-06-28

## 2019-06-28 NOTE — TELEPHONE ENCOUNTER
----- Message from JUSTIN Pickering sent at 6/27/2019  3:11 PM CDT -----  Please call patient with results. No fracture noted.

## 2019-07-02 ENCOUNTER — OFFICE VISIT (OUTPATIENT)
Dept: PRIMARY CARE CLINIC | Age: 44
End: 2019-07-02
Payer: MEDICAID

## 2019-07-02 VITALS
TEMPERATURE: 98.1 F | OXYGEN SATURATION: 98 % | BODY MASS INDEX: 34.87 KG/M2 | DIASTOLIC BLOOD PRESSURE: 72 MMHG | HEIGHT: 60 IN | HEART RATE: 72 BPM | WEIGHT: 177.6 LBS | SYSTOLIC BLOOD PRESSURE: 118 MMHG

## 2019-07-02 DIAGNOSIS — E03.9 HYPOTHYROIDISM, UNSPECIFIED TYPE: ICD-10-CM

## 2019-07-02 DIAGNOSIS — J30.89 NON-SEASONAL ALLERGIC RHINITIS, UNSPECIFIED TRIGGER: ICD-10-CM

## 2019-07-02 DIAGNOSIS — R40.0 DAYTIME SLEEPINESS: ICD-10-CM

## 2019-07-02 DIAGNOSIS — I10 ESSENTIAL HYPERTENSION: ICD-10-CM

## 2019-07-02 DIAGNOSIS — I10 ESSENTIAL HYPERTENSION: Primary | ICD-10-CM

## 2019-07-02 DIAGNOSIS — E78.5 HYPERLIPIDEMIA, UNSPECIFIED HYPERLIPIDEMIA TYPE: ICD-10-CM

## 2019-07-02 DIAGNOSIS — R06.83 SNORING: ICD-10-CM

## 2019-07-02 LAB
ALBUMIN SERPL-MCNC: 3.9 G/DL (ref 3.5–5.2)
ALP BLD-CCNC: 78 U/L (ref 35–104)
ALT SERPL-CCNC: 36 U/L (ref 5–33)
ANION GAP SERPL CALCULATED.3IONS-SCNC: 13 MMOL/L (ref 7–19)
AST SERPL-CCNC: 22 U/L (ref 5–32)
BILIRUB SERPL-MCNC: <0.2 MG/DL (ref 0.2–1.2)
BUN BLDV-MCNC: 12 MG/DL (ref 6–20)
CALCIUM SERPL-MCNC: 9.1 MG/DL (ref 8.6–10)
CHLORIDE BLD-SCNC: 103 MMOL/L (ref 98–111)
CHOLESTEROL, TOTAL: 165 MG/DL (ref 160–199)
CO2: 26 MMOL/L (ref 22–29)
CREAT SERPL-MCNC: 0.6 MG/DL (ref 0.5–0.9)
GFR NON-AFRICAN AMERICAN: >60
GLUCOSE BLD-MCNC: 103 MG/DL (ref 74–109)
HDLC SERPL-MCNC: 36 MG/DL (ref 65–121)
LDL CHOLESTEROL CALCULATED: 91 MG/DL
POTASSIUM SERPL-SCNC: 4.3 MMOL/L (ref 3.5–5)
SODIUM BLD-SCNC: 142 MMOL/L (ref 136–145)
T4 FREE: 1.4 NG/DL (ref 0.9–1.7)
TOTAL PROTEIN: 6.9 G/DL (ref 6.6–8.7)
TRIGL SERPL-MCNC: 191 MG/DL (ref 0–149)
TSH SERPL DL<=0.05 MIU/L-ACNC: 2.71 UIU/ML (ref 0.27–4.2)

## 2019-07-02 PROCEDURE — G8427 DOCREV CUR MEDS BY ELIG CLIN: HCPCS | Performed by: FAMILY MEDICINE

## 2019-07-02 PROCEDURE — 99214 OFFICE O/P EST MOD 30 MIN: CPT | Performed by: FAMILY MEDICINE

## 2019-07-02 PROCEDURE — 1036F TOBACCO NON-USER: CPT | Performed by: FAMILY MEDICINE

## 2019-07-02 PROCEDURE — G8417 CALC BMI ABV UP PARAM F/U: HCPCS | Performed by: FAMILY MEDICINE

## 2019-07-02 RX ORDER — LORATADINE 10 MG/1
TABLET ORAL
Qty: 30 TABLET | Refills: 5 | Status: SHIPPED | OUTPATIENT
Start: 2019-07-02 | End: 2020-01-21 | Stop reason: SDUPTHER

## 2019-07-02 ASSESSMENT — PATIENT HEALTH QUESTIONNAIRE - PHQ9
SUM OF ALL RESPONSES TO PHQ QUESTIONS 1-9: 0
SUM OF ALL RESPONSES TO PHQ QUESTIONS 1-9: 0
SUM OF ALL RESPONSES TO PHQ9 QUESTIONS 1 & 2: 0
2. FEELING DOWN, DEPRESSED OR HOPELESS: 0
1. LITTLE INTEREST OR PLEASURE IN DOING THINGS: 0

## 2019-07-02 ASSESSMENT — ENCOUNTER SYMPTOMS
COLOR CHANGE: 0
SHORTNESS OF BREATH: 0
COUGH: 0

## 2019-07-02 NOTE — PROGRESS NOTES
Ashlee Cotter is a 38 y.o. female    Chief Complaint   Patient presents with    Follow-up     6 months       HPI     Patient complains of fatigue. She has been waking up in the middle of night gasping for air. She does have problems with daytime fatigue. Treatment Adherence:   Medication compliance:  compliant all of the time  Diet compliance:  compliant most of the time  Weight trend: stable  Current exercise: Plays with grandkids  Barriers: none    Hypertension:  Home blood pressure monitoring: No. Patient denies chest pain and shortness of breath. Antihypertensive medication side effects: no medication side effects noted. Use of agents associated with hypertension: none. Hyperlipidemia:  No new myalgias or GI upset on atorvastatin (Lipitor). Lab Results   Component Value Date    CHOL 160 11/21/2017    CHOL 279 (H) 05/12/2017    CHOL 202 (H) 09/22/2015     Lab Results   Component Value Date    TRIG 167 (H) 11/21/2017    TRIG 309 (H) 05/12/2017    TRIG 409 (H) 09/22/2015     Lab Results   Component Value Date    HDL 40 (L) 11/21/2017    HDL 37 (L) 05/12/2017    HDL 26 (L) 09/22/2015     Lab Results   Component Value Date    LDLCALC 87 11/21/2017    LDLCALC 180 05/12/2017     No results found for: LABVLDL, VLDL  No results found for: [facility]  Lab Results   Component Value Date     08/14/2018    K 4.0 08/14/2018     08/14/2018    CO2 25 08/14/2018    BUN 7 08/14/2018    CREATININE 0.6 08/14/2018    GLUCOSE 86 08/14/2018    CALCIUM 9.1 08/14/2018    PROT 7.1 08/14/2018    LABALBU 3.7 08/14/2018    BILITOT 0.4 08/14/2018    ALKPHOS 89 08/14/2018    AST 19 08/14/2018    ALT 29 08/14/2018    LABGLOM >60 08/14/2018    GLOB 3.1 05/12/2017           Review of Systems   Constitutional: Negative for chills and fever. Respiratory: Negative for cough and shortness of breath. Cardiovascular: Negative for chest pain and leg swelling.    Skin: Negative for color distress. She has no wheezes. Abdominal: Soft. She exhibits no distension and no mass. There is no tenderness. There is no rebound and no guarding. Lymphadenopathy:     She has no cervical adenopathy. Neurological: She is alert and oriented to person, place, and time. Skin: Skin is warm and dry. No rash noted. She is not diaphoretic. Psychiatric: She has a normal mood and affect. Nursing note and vitals reviewed. Assessment    ICD-10-CM    1. Essential hypertension I10 The current medical regimen is effective;  continue present plan and medications. Comprehensive Metabolic Panel     Lipid Panel   2. Hyperlipidemia, unspecified hyperlipidemia type E78.5 Comprehensive Metabolic Panel     Lipid Panel   3. Hypothyroidism, unspecified type E03.9 Will continue synthroid 50 mcg po daily. TSH without Reflex     T4, Free   4. Daytime sleepiness R40.0 [facility]   5. Snoring R06.83 [address]    No orders of the defined types were placed in this encounter. Orders Placed This Encounter   Procedures    Comprehensive Metabolic Panel    Lipid Panel    TSH without Reflex    T4, Free    [facility]        No follow-ups on file. There are no Patient Instructions on file for this visit.

## 2019-07-18 DIAGNOSIS — M54.12 CERVICAL RADICULOPATHY: ICD-10-CM

## 2019-07-18 DIAGNOSIS — M54.16 LUMBAR RADICULOPATHY: ICD-10-CM

## 2019-07-18 RX ORDER — TIZANIDINE 4 MG/1
TABLET ORAL
Qty: 60 TABLET | Refills: 3 | Status: SHIPPED | OUTPATIENT
Start: 2019-07-18 | End: 2019-08-17

## 2019-07-24 ENCOUNTER — TELEPHONE (OUTPATIENT)
Dept: PRIMARY CARE CLINIC | Age: 44
End: 2019-07-24

## 2019-08-12 RX ORDER — NAPROXEN 500 MG/1
TABLET ORAL
Qty: 60 TABLET | Refills: 1 | Status: SHIPPED | OUTPATIENT
Start: 2019-08-12 | End: 2019-10-06 | Stop reason: SDUPTHER

## 2019-08-12 NOTE — TELEPHONE ENCOUNTER
[address] faxed a request a refill on her medication.       Last office visit : 7/2/2019   Next office visit : 10/8/2019     Requested Prescriptions     Signed Prescriptions Disp Refills    naproxen (NAPROSYN) 500 MG tablet 60 tablet 1     Sig: TAKE 1 TABLET BY MOUTH TWICE A DAY WITH MEALS     Authorizing Provider: Elly Lynch     Ordering User: Km Lott MA

## 2019-10-07 RX ORDER — NAPROXEN 500 MG/1
TABLET ORAL
Qty: 60 TABLET | Refills: 1 | Status: SHIPPED | OUTPATIENT
Start: 2019-10-07 | End: 2019-12-06 | Stop reason: SDUPTHER

## 2019-10-08 ENCOUNTER — HOSPITAL ENCOUNTER (OUTPATIENT)
Dept: SLEEP CENTER | Age: 44
Discharge: HOME | End: 2019-10-10
Payer: MEDICAID

## 2019-10-08 ENCOUNTER — OFFICE VISIT (OUTPATIENT)
Dept: PRIMARY CARE CLINIC | Age: 44
End: 2019-10-08
Payer: MEDICAID

## 2019-10-08 VITALS
SYSTOLIC BLOOD PRESSURE: 124 MMHG | BODY MASS INDEX: 34.08 KG/M2 | DIASTOLIC BLOOD PRESSURE: 86 MMHG | HEIGHT: 60 IN | TEMPERATURE: 98.7 F | WEIGHT: 173.6 LBS | OXYGEN SATURATION: 98 % | HEART RATE: 71 BPM

## 2019-10-08 DIAGNOSIS — J40 BRONCHITIS: ICD-10-CM

## 2019-10-08 DIAGNOSIS — E78.5 HYPERLIPIDEMIA, UNSPECIFIED HYPERLIPIDEMIA TYPE: ICD-10-CM

## 2019-10-08 DIAGNOSIS — E03.9 HYPOTHYROIDISM, UNSPECIFIED TYPE: ICD-10-CM

## 2019-10-08 DIAGNOSIS — I10 ESSENTIAL HYPERTENSION: Primary | ICD-10-CM

## 2019-10-08 DIAGNOSIS — J30.89 NON-SEASONAL ALLERGIC RHINITIS, UNSPECIFIED TRIGGER: ICD-10-CM

## 2019-10-08 PROCEDURE — 95810 POLYSOM 6/> YRS 4/> PARAM: CPT

## 2019-10-08 PROCEDURE — G8427 DOCREV CUR MEDS BY ELIG CLIN: HCPCS | Performed by: NURSE PRACTITIONER

## 2019-10-08 PROCEDURE — 1036F TOBACCO NON-USER: CPT | Performed by: NURSE PRACTITIONER

## 2019-10-08 PROCEDURE — G8484 FLU IMMUNIZE NO ADMIN: HCPCS | Performed by: NURSE PRACTITIONER

## 2019-10-08 PROCEDURE — 99214 OFFICE O/P EST MOD 30 MIN: CPT | Performed by: NURSE PRACTITIONER

## 2019-10-08 PROCEDURE — 95810 POLYSOM 6/> YRS 4/> PARAM: CPT | Performed by: PSYCHIATRY & NEUROLOGY

## 2019-10-08 PROCEDURE — G8417 CALC BMI ABV UP PARAM F/U: HCPCS | Performed by: NURSE PRACTITIONER

## 2019-10-08 ASSESSMENT — ENCOUNTER SYMPTOMS
ABDOMINAL PAIN: 0
WHEEZING: 0
DIARRHEA: 0
NAUSEA: 0
BACK PAIN: 0
SHORTNESS OF BREATH: 0
EYE PAIN: 0
VOMITING: 0
APNEA: 1
COUGH: 0

## 2019-10-17 ENCOUNTER — HOSPITAL ENCOUNTER (OUTPATIENT)
Dept: GENERAL RADIOLOGY | Age: 44
Discharge: HOME | End: 2019-10-17
Payer: MEDICAID

## 2019-10-17 ENCOUNTER — OFFICE VISIT (OUTPATIENT)
Dept: PRIMARY CARE CLINIC | Age: 44
End: 2019-10-17
Payer: MEDICAID

## 2019-10-17 ENCOUNTER — TELEPHONE (OUTPATIENT)
Dept: PRIMARY CARE CLINIC | Age: 44
End: 2019-10-17

## 2019-10-17 VITALS
TEMPERATURE: 98.5 F | SYSTOLIC BLOOD PRESSURE: 132 MMHG | RESPIRATION RATE: 20 BRPM | HEIGHT: 60 IN | HEART RATE: 67 BPM | OXYGEN SATURATION: 98 % | DIASTOLIC BLOOD PRESSURE: 94 MMHG | WEIGHT: 179 LBS | BODY MASS INDEX: 35.14 KG/M2
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
## 2019-10-17 PROCEDURE — 70140 X-RAY EXAM OF FACIAL BONES: CPT

## 2019-10-17 PROCEDURE — G8484 FLU IMMUNIZE NO ADMIN: HCPCS | Performed by: NURSE PRACTITIONER

## 2019-10-17 PROCEDURE — G8427 DOCREV CUR MEDS BY ELIG CLIN: HCPCS | Performed by: NURSE PRACTITIONER

## 2019-10-17 PROCEDURE — 1036F TOBACCO NON-USER: CPT | Performed by: NURSE PRACTITIONER

## 2019-10-17 PROCEDURE — G8417 CALC BMI ABV UP PARAM F/U: HCPCS | Performed by: NURSE PRACTITIONER

## 2019-10-17 PROCEDURE — 99213 OFFICE O/P EST LOW 20 MIN: CPT | Performed by: NURSE PRACTITIONER

## 2019-10-17 ASSESSMENT — ENCOUNTER SYMPTOMS
RESPIRATORY NEGATIVE: 1
EYE PAIN: 1
GASTROINTESTINAL NEGATIVE: 1

## 2019-10-21 ENCOUNTER — HOSPITAL ENCOUNTER (EMERGENCY)
Age: 44
Discharge: HOME | End: 2019-10-21
Payer: MEDICAID

## 2019-10-21 ENCOUNTER — APPOINTMENT (OUTPATIENT)
Dept: CT IMAGING | Age: 44
End: 2019-10-21
Payer: MEDICAID

## 2019-10-21 VITALS
TEMPERATURE: 98.4 F | OXYGEN SATURATION: 98 % | HEART RATE: 56 BPM | RESPIRATION RATE: 15 BRPM | WEIGHT: 179 LBS | BODY MASS INDEX: 34.96 KG/M2 | DIASTOLIC BLOOD PRESSURE: 68 MMHG | SYSTOLIC BLOOD PRESSURE: 108 MMHG

## 2019-10-21 DIAGNOSIS — R42 VERTIGO: Primary | ICD-10-CM

## 2019-10-21 DIAGNOSIS — G44.82 HEADACHE ASSOCIATED WITH SEXUAL ACTIVITY: ICD-10-CM

## 2019-10-21 LAB
ALBUMIN SERPL-MCNC: 3.9 G/DL (ref 3.5–5.2)
ALP BLD-CCNC: 88 U/L (ref 35–104)
ALT SERPL-CCNC: 22 U/L (ref 5–33)
ANION GAP SERPL CALCULATED.3IONS-SCNC: 9 MMOL/L (ref 7–19)
AST SERPL-CCNC: 16 U/L (ref 5–32)
BASOPHILS ABSOLUTE: 0 K/UL (ref 0–0.2)
BASOPHILS RELATIVE PERCENT: 0.3 % (ref 0–1)
BILIRUB SERPL-MCNC: <0.2 MG/DL (ref 0.2–1.2)
BILIRUBIN URINE: NEGATIVE
BLOOD, URINE: NEGATIVE
BUN BLDV-MCNC: 9 MG/DL (ref 6–20)
CALCIUM SERPL-MCNC: 9.7 MG/DL (ref 8.6–10)
CHLORIDE BLD-SCNC: 102 MMOL/L (ref 98–111)
CLARITY: CLEAR
CO2: 31 MMOL/L (ref 22–29)
COLOR: YELLOW
CREAT SERPL-MCNC: 0.7 MG/DL (ref 0.5–0.9)
EOSINOPHILS ABSOLUTE: 0.1 K/UL (ref 0–0.6)
EOSINOPHILS RELATIVE PERCENT: 0.8 % (ref 0–5)
GFR NON-AFRICAN AMERICAN: >60
GLUCOSE BLD-MCNC: 102 MG/DL (ref 74–109)
GLUCOSE URINE: NEGATIVE MG/DL
HCG QUALITATIVE: NEGATIVE
HCT VFR BLD CALC: 41.7 % (ref 37–47)
HEMOGLOBIN: 13.6 G/DL (ref 12–16)
IMMATURE GRANULOCYTES #: 0.1 K/UL
KETONES, URINE: NEGATIVE MG/DL
LEUKOCYTE ESTERASE, URINE: NEGATIVE
LYMPHOCYTES ABSOLUTE: 3.1 K/UL (ref 1.1–4.5)
LYMPHOCYTES RELATIVE PERCENT: 25.2 % (ref 20–40)
MCH RBC QN AUTO: 29.8 PG (ref 27–31)
MCHC RBC AUTO-ENTMCNC: 32.6 G/DL (ref 33–37)
MCV RBC AUTO: 91.2 FL (ref 81–99)
MONOCYTES ABSOLUTE: 0.6 K/UL (ref 0–0.9)
MONOCYTES RELATIVE PERCENT: 4.9 % (ref 0–10)
NEUTROPHILS ABSOLUTE: 8.4 K/UL (ref 1.5–7.5)
NEUTROPHILS RELATIVE PERCENT: 68.4 % (ref 50–65)
NITRITE, URINE: NEGATIVE
PDW BLD-RTO: 12.5 % (ref 11.5–14.5)
PH UA: 6 (ref 5–8)
PLATELET # BLD: 337 K/UL (ref 130–400)
PMV BLD AUTO: 10.6 FL (ref 9.4–12.3)
POTASSIUM REFLEX MAGNESIUM: 3.6 MMOL/L (ref 3.5–5)
PROTEIN UA: NEGATIVE MG/DL
RBC # BLD: 4.57 M/UL (ref 4.2–5.4)
SODIUM BLD-SCNC: 142 MMOL/L (ref 136–145)
SPECIFIC GRAVITY UA: 1.02 (ref 1–1.03)
TOTAL PROTEIN: 7.3 G/DL (ref 6.6–8.7)
TROPONIN: <0.01 NG/ML (ref 0–0.03)
URINE REFLEX TO CULTURE: NORMAL
UROBILINOGEN, URINE: 1 E.U./DL
WBC # BLD: 12.3 K/UL (ref 4.8–10.8)

## 2019-10-21 PROCEDURE — 36415 COLL VENOUS BLD VENIPUNCTURE: CPT

## 2019-10-21 PROCEDURE — 93005 ELECTROCARDIOGRAM TRACING: CPT | Performed by: NURSE PRACTITIONER

## 2019-10-21 PROCEDURE — 81003 URINALYSIS AUTO W/O SCOPE: CPT

## 2019-10-21 PROCEDURE — 96372 THER/PROPH/DIAG INJ SC/IM: CPT

## 2019-10-21 PROCEDURE — 96375 TX/PRO/DX INJ NEW DRUG ADDON: CPT

## 2019-10-21 PROCEDURE — 96374 THER/PROPH/DIAG INJ IV PUSH: CPT

## 2019-10-21 PROCEDURE — 85025 COMPLETE CBC W/AUTO DIFF WBC: CPT

## 2019-10-21 PROCEDURE — 2580000003 HC RX 258: Performed by: NURSE PRACTITIONER

## 2019-10-21 PROCEDURE — 6360000002 HC RX W HCPCS: Performed by: NURSE PRACTITIONER

## 2019-10-21 PROCEDURE — 84703 CHORIONIC GONADOTROPIN ASSAY: CPT

## 2019-10-21 PROCEDURE — 80053 COMPREHEN METABOLIC PANEL: CPT

## 2019-10-21 PROCEDURE — 70450 CT HEAD/BRAIN W/O DYE: CPT

## 2019-10-21 PROCEDURE — 84484 ASSAY OF TROPONIN QUANT: CPT

## 2019-10-21 PROCEDURE — 99284 EMERGENCY DEPT VISIT MOD MDM: CPT

## 2019-10-21 RX ORDER — DIPHENHYDRAMINE HYDROCHLORIDE 50 MG/ML
12.5 INJECTION INTRAMUSCULAR; INTRAVENOUS ONCE
Status: COMPLETED | OUTPATIENT
Start: 2019-10-21 | End: 2019-10-21

## 2019-10-21 RX ORDER — ONDANSETRON 4 MG/1
4 TABLET, ORALLY DISINTEGRATING ORAL 3 TIMES DAILY PRN
Qty: 21 TABLET | Refills: 0 | Status: SHIPPED | OUTPATIENT
Start: 2019-10-21 | End: 2020-02-11 | Stop reason: ALTCHOICE

## 2019-10-21 RX ORDER — 0.9 % SODIUM CHLORIDE 0.9 %
1000 INTRAVENOUS SOLUTION INTRAVENOUS ONCE
Status: COMPLETED | OUTPATIENT
Start: 2019-10-21 | End: 2019-10-21

## 2019-10-21 RX ORDER — HYDROXYZINE PAMOATE 25 MG/1
25 CAPSULE ORAL 3 TIMES DAILY PRN
Qty: 30 CAPSULE | Refills: 0 | Status: SHIPPED | OUTPATIENT
Start: 2019-10-21 | End: 2019-11-04

## 2019-10-21 RX ORDER — ONDANSETRON 2 MG/ML
4 INJECTION INTRAMUSCULAR; INTRAVENOUS ONCE
Status: COMPLETED | OUTPATIENT
Start: 2019-10-21 | End: 2019-10-21

## 2019-10-21 RX ORDER — METOCLOPRAMIDE HYDROCHLORIDE 5 MG/ML
10 INJECTION INTRAMUSCULAR; INTRAVENOUS ONCE
Status: COMPLETED | OUTPATIENT
Start: 2019-10-21 | End: 2019-10-21

## 2019-10-21 RX ORDER — HYDROXYZINE HYDROCHLORIDE 25 MG/ML
25 INJECTION, SOLUTION INTRAMUSCULAR ONCE
Status: COMPLETED | OUTPATIENT
Start: 2019-10-21 | End: 2019-10-21

## 2019-10-21 RX ADMIN — HYDROXYZINE HYDROCHLORIDE 25 MG: 25 INJECTION, SOLUTION INTRAMUSCULAR at 18:17

## 2019-10-21 RX ADMIN — HYDROMORPHONE HYDROCHLORIDE 1 MG: 1 INJECTION, SOLUTION INTRAMUSCULAR; INTRAVENOUS; SUBCUTANEOUS at 18:52

## 2019-10-21 RX ADMIN — METOCLOPRAMIDE 10 MG: 5 INJECTION, SOLUTION INTRAMUSCULAR; INTRAVENOUS at 18:52

## 2019-10-21 RX ADMIN — SODIUM CHLORIDE 1000 ML: 9 INJECTION, SOLUTION INTRAVENOUS at 18:17

## 2019-10-21 RX ADMIN — DIPHENHYDRAMINE HYDROCHLORIDE 12.5 MG: 50 INJECTION, SOLUTION INTRAMUSCULAR; INTRAVENOUS at 18:52

## 2019-10-21 RX ADMIN — ONDANSETRON 4 MG: 2 INJECTION INTRAMUSCULAR; INTRAVENOUS at 18:17

## 2019-10-21 ASSESSMENT — ENCOUNTER SYMPTOMS
STRIDOR: 0
ABDOMINAL DISTENTION: 0
BLOOD IN STOOL: 0
EYE PAIN: 0
SINUS PAIN: 0
ALLERGIC/IMMUNOLOGIC NEGATIVE: 1
PHOTOPHOBIA: 0
CHEST TIGHTNESS: 0
WHEEZING: 0
SHORTNESS OF BREATH: 0
VOMITING: 0
NAUSEA: 1
EYE ITCHING: 0
SORE THROAT: 0
EYE REDNESS: 0
COLOR CHANGE: 0
DIARRHEA: 0
ABDOMINAL PAIN: 0
EYE DISCHARGE: 0
BACK PAIN: 0
CONSTIPATION: 0
TROUBLE SWALLOWING: 0

## 2019-10-22 LAB
EKG P AXIS: 19 DEGREES
EKG P-R INTERVAL: 112 MS
EKG Q-T INTERVAL: 432 MS
EKG QRS DURATION: 88 MS
EKG QTC CALCULATION (BAZETT): 424 MS
EKG T AXIS: 37 DEGREES

## 2019-10-22 PROCEDURE — 93010 ELECTROCARDIOGRAM REPORT: CPT | Performed by: INTERNAL MEDICINE

## 2019-11-10 DIAGNOSIS — G47.33 SLEEP APNEA, OBSTRUCTIVE: Primary | ICD-10-CM

## 2019-11-19 ENCOUNTER — HOSPITAL ENCOUNTER (OUTPATIENT)
Dept: SLEEP CENTER | Age: 44
Discharge: HOME | End: 2019-11-21
Payer: MEDICAID

## 2019-11-19 DIAGNOSIS — R12 HEARTBURN: ICD-10-CM

## 2019-11-19 PROCEDURE — 95811 POLYSOM 6/>YRS CPAP 4/> PARM: CPT

## 2019-11-19 PROCEDURE — 95811 POLYSOM 6/>YRS CPAP 4/> PARM: CPT | Performed by: PSYCHIATRY & NEUROLOGY

## 2019-11-19 RX ORDER — OMEPRAZOLE 40 MG/1
40 CAPSULE, DELAYED RELEASE ORAL DAILY
Qty: 30 CAPSULE | Refills: 5 | Status: SHIPPED | OUTPATIENT
Start: 2019-11-19 | End: 2020-05-10

## 2019-12-06 RX ORDER — NAPROXEN 500 MG/1
TABLET ORAL
Qty: 60 TABLET | Refills: 1 | Status: SHIPPED | OUTPATIENT
Start: 2019-12-06 | End: 2020-02-06

## 2019-12-07 DIAGNOSIS — G47.33 SLEEP APNEA, OBSTRUCTIVE: Primary | ICD-10-CM

## 2020-01-08 ENCOUNTER — HOSPITAL ENCOUNTER (OUTPATIENT)
Dept: WOMENS IMAGING | Age: 45
Discharge: HOME | End: 2020-01-08
Payer: MEDICAID

## 2020-01-08 PROCEDURE — 77063 BREAST TOMOSYNTHESIS BI: CPT

## 2020-01-10 ENCOUNTER — OFFICE VISIT (OUTPATIENT)
Dept: SURGERY | Age: 45
End: 2020-01-10
Payer: MEDICAID

## 2020-01-10 VITALS
BODY MASS INDEX: 33.96 KG/M2 | SYSTOLIC BLOOD PRESSURE: 114 MMHG | WEIGHT: 173 LBS | DIASTOLIC BLOOD PRESSURE: 70 MMHG | HEART RATE: 60 BPM | HEIGHT: 60 IN

## 2020-01-10 PROCEDURE — G8484 FLU IMMUNIZE NO ADMIN: HCPCS | Performed by: PHYSICIAN ASSISTANT

## 2020-01-10 PROCEDURE — 99213 OFFICE O/P EST LOW 20 MIN: CPT | Performed by: PHYSICIAN ASSISTANT

## 2020-01-10 PROCEDURE — G8417 CALC BMI ABV UP PARAM F/U: HCPCS | Performed by: PHYSICIAN ASSISTANT

## 2020-01-10 PROCEDURE — G8427 DOCREV CUR MEDS BY ELIG CLIN: HCPCS | Performed by: PHYSICIAN ASSISTANT

## 2020-01-10 PROCEDURE — 1036F TOBACCO NON-USER: CPT | Performed by: PHYSICIAN ASSISTANT

## 2020-01-11 NOTE — PROGRESS NOTES
HPI:  Dwight Hines is in for yearly follow-up breast check. She has not noticed any changes in her breasts. EXAMINATION: [facility] DIGITAL SCREEN W OR WO CAD BILATERAL 1/8/2020 8:44 AM   HISTORY: Annual screening exam. No current breast complaints. Previous   benign stereotactic left breast biopsy. FINDINGS:   Bilateral digital CC and MLO views obtained. Brandy Ya is made to   exams dated 12/28/2018, 12/27/2017, 12/9/2016. 3-D tomosynthesis views   also obtained.  Computer-aided detection technology was utilized for   assessment of this examination. There are scattered areas of fibroglandular density (Category B). A   biopsy marking clip is seen in the left breast. No dominant mass,   architectural distortion, or suspicious appearing microcalcifications   within either breast.       Impression   1.  No mammographic evidence of malignancy within either breast.   Annual screening mammogram is recommended. 2.  BI-RADS category 1- Negative. BREAST EXAM:  On examination, she has fibrocystic changes throughout both breasts, no dominant masses, no skin or nipple changes and no axillary adenopathy. I see nothing suspicious for breast cancer. ASSESSMENT:  Benign fibrocystic changes              PLAN:  I will plan to see her back in 1 year for physical exam and bilateral mammograms. She will contact me if anything significant changes. 15 minutes spent,  which includes face to face with patient, record review, evaluation, planning, and education.

## 2020-01-21 RX ORDER — LORATADINE 10 MG/1
TABLET ORAL
Qty: 30 TABLET | Refills: 5 | Status: SHIPPED | OUTPATIENT
Start: 2020-01-21 | End: 2020-06-07

## 2020-01-21 RX ORDER — LEVOTHYROXINE SODIUM 0.05 MG/1
50 TABLET ORAL DAILY
Qty: 30 TABLET | Refills: 11 | Status: SHIPPED | OUTPATIENT
Start: 2020-01-21 | End: 2020-12-23

## 2020-02-06 RX ORDER — NAPROXEN 500 MG/1
TABLET ORAL
Qty: 60 TABLET | Refills: 1 | Status: SHIPPED | OUTPATIENT
Start: 2020-02-06 | End: 2020-04-05

## 2020-02-07 DIAGNOSIS — I10 ESSENTIAL HYPERTENSION: ICD-10-CM

## 2020-02-07 DIAGNOSIS — E03.9 HYPOTHYROIDISM, UNSPECIFIED TYPE: ICD-10-CM

## 2020-02-07 DIAGNOSIS — E78.5 HYPERLIPIDEMIA, UNSPECIFIED HYPERLIPIDEMIA TYPE: ICD-10-CM

## 2020-02-07 LAB
ALBUMIN SERPL-MCNC: 4.2 G/DL (ref 3.5–5.2)
ALP BLD-CCNC: 96 U/L (ref 35–104)
ALT SERPL-CCNC: 25 U/L (ref 5–33)
ANION GAP SERPL CALCULATED.3IONS-SCNC: 15 MMOL/L (ref 7–19)
AST SERPL-CCNC: 21 U/L (ref 5–32)
BILIRUB SERPL-MCNC: 0.4 MG/DL (ref 0.2–1.2)
BUN BLDV-MCNC: 8 MG/DL (ref 6–20)
CALCIUM SERPL-MCNC: 9.4 MG/DL (ref 8.6–10)
CHLORIDE BLD-SCNC: 99 MMOL/L (ref 98–111)
CHOLESTEROL, FASTING: 159 MG/DL (ref 160–199)
CO2: 29 MMOL/L (ref 22–29)
CREAT SERPL-MCNC: 0.6 MG/DL (ref 0.5–0.9)
GFR NON-AFRICAN AMERICAN: >60
GLUCOSE BLD-MCNC: 99 MG/DL (ref 74–109)
HCT VFR BLD CALC: 42.4 % (ref 37–47)
HDLC SERPL-MCNC: 40 MG/DL (ref 65–121)
HEMOGLOBIN: 13.5 G/DL (ref 12–16)
LDL CHOLESTEROL CALCULATED: 89 MG/DL
MCH RBC QN AUTO: 29.3 PG (ref 27–31)
MCHC RBC AUTO-ENTMCNC: 31.8 G/DL (ref 33–37)
MCV RBC AUTO: 92.2 FL (ref 81–99)
PDW BLD-RTO: 12.9 % (ref 11.5–14.5)
PLATELET # BLD: 341 K/UL (ref 130–400)
PMV BLD AUTO: 10.9 FL (ref 9.4–12.3)
POTASSIUM SERPL-SCNC: 3.9 MMOL/L (ref 3.5–5)
RBC # BLD: 4.6 M/UL (ref 4.2–5.4)
SODIUM BLD-SCNC: 143 MMOL/L (ref 136–145)
TOTAL PROTEIN: 7.6 G/DL (ref 6.6–8.7)
TRIGLYCERIDE, FASTING: 152 MG/DL (ref 0–149)
TSH REFLEX FT4: 3.23 UIU/ML (ref 0.35–5.5)
WBC # BLD: 10 K/UL (ref 4.8–10.8)

## 2020-02-11 ENCOUNTER — HOSPITAL ENCOUNTER (OUTPATIENT)
Dept: GENERAL RADIOLOGY | Age: 45
Discharge: HOME | End: 2020-02-11
Payer: MEDICAID

## 2020-02-11 ENCOUNTER — OFFICE VISIT (OUTPATIENT)
Dept: PRIMARY CARE CLINIC | Age: 45
End: 2020-02-11
Payer: MEDICAID

## 2020-02-11 VITALS
HEART RATE: 69 BPM | OXYGEN SATURATION: 98 % | BODY MASS INDEX: 33.61 KG/M2 | WEIGHT: 171.2 LBS | DIASTOLIC BLOOD PRESSURE: 80 MMHG | HEIGHT: 60 IN | SYSTOLIC BLOOD PRESSURE: 122 MMHG | TEMPERATURE: 97.2 F

## 2020-02-11 PROBLEM — E03.9 HYPOTHYROIDISM: Status: ACTIVE | Noted: 2020-02-11

## 2020-02-11 PROBLEM — E78.5 HYPERLIPIDEMIA: Status: ACTIVE | Noted: 2020-02-11

## 2020-02-11 PROCEDURE — G8417 CALC BMI ABV UP PARAM F/U: HCPCS | Performed by: NURSE PRACTITIONER

## 2020-02-11 PROCEDURE — G8484 FLU IMMUNIZE NO ADMIN: HCPCS | Performed by: NURSE PRACTITIONER

## 2020-02-11 PROCEDURE — 1036F TOBACCO NON-USER: CPT | Performed by: NURSE PRACTITIONER

## 2020-02-11 PROCEDURE — 73502 X-RAY EXAM HIP UNI 2-3 VIEWS: CPT

## 2020-02-11 PROCEDURE — G8427 DOCREV CUR MEDS BY ELIG CLIN: HCPCS | Performed by: NURSE PRACTITIONER

## 2020-02-11 PROCEDURE — 99214 OFFICE O/P EST MOD 30 MIN: CPT | Performed by: NURSE PRACTITIONER

## 2020-02-11 RX ORDER — METHOCARBAMOL 500 MG/1
500 TABLET, FILM COATED ORAL 2 TIMES DAILY
Qty: 60 TABLET | Refills: 0 | Status: SHIPPED | OUTPATIENT
Start: 2020-02-11 | End: 2020-03-08

## 2020-02-11 ASSESSMENT — ENCOUNTER SYMPTOMS
VOMITING: 0
EYE PAIN: 0
SHORTNESS OF BREATH: 0
NAUSEA: 0
SINUS PRESSURE: 0
ABDOMINAL PAIN: 0
WHEEZING: 0
DIARRHEA: 0
SINUS PAIN: 0
COUGH: 0
BACK PAIN: 0

## 2020-02-11 ASSESSMENT — PATIENT HEALTH QUESTIONNAIRE - PHQ9
SUM OF ALL RESPONSES TO PHQ QUESTIONS 1-9: 2
2. FEELING DOWN, DEPRESSED OR HOPELESS: 1
SUM OF ALL RESPONSES TO PHQ QUESTIONS 1-9: 2
SUM OF ALL RESPONSES TO PHQ9 QUESTIONS 1 & 2: 2
1. LITTLE INTEREST OR PLEASURE IN DOING THINGS: 1

## 2020-02-11 NOTE — PROGRESS NOTES
[address] [address]     Phone:  [phone]  Fax:  [phone]      Dwight Hines is a 38 y.o. female who presents today for her medical conditions/complaints as noted below. Dwight Hines is c/o of Hypertension (dong good); Hypothyroidism; and Hip Pain (it pops alot and with pain 2 months )      Chief Complaint   Patient presents with    Hypertension     dong good    Hypothyroidism    Hip Pain     it pops alot and with pain 2 months        HPI:     HPI    Dwight Hines presents today for HTN, hypothyroidism, hyperlipidemia, and left hip pain x 2 months. Left hip pain, no injury x 2 months. Has DDD in lower back. She thinks this may be causing this. She did see Dr. Cezar Henderson with PM, but insurance would no longer pay so she no longer sees PM for back pain. She states it feels like it \"pops out\". This happens to hurt her more. She has not had treatment for this. She is taking naproxen. She states this is not effective for her pain. She has used Zanaflex before and this was not helpful either. Hypothyroidism: Recent symptoms: none. She denies any new symptoms. Patient is  taking her medication consistently on an empty stomach. No results found for: [facility]  Lab Results   Component Value Date    TSH 2.710 07/02/2019    TSH 1.060 11/12/2018    TSH 1.780 06/27/2018       Treatment Adherence:   Medication compliance:  compliant most of the time  Diet compliance:  noncompliant: Does not eat a special diet. Weight trend: stable  Current exercise: no regular exercise  Barriers: none    Hypertension:  Home blood pressure monitoring: No. Patient denies chest pain, shortness of breath, headache and lightheadedness. Antihypertensive medication side effects: no medication side effects noted. Use of agents associated with hypertension: none.                                         Sodium (mmol/L)   Date Value   02/07/2020 143    BUN (mg/dL)   Date Value levothyroxine (SYNTHROID) 50 MCG tablet Take 1 tablet by mouth daily 30 tablet 11    albuterol-ipratropium (COMBIVENT RESPIMAT)  MCG/ACT AERS inhaler INHALE 1 PUFF INTO THE LUNGS EVERY 4 HOURS AS NEEDED FOR WHEEZING 1 Inhaler 5    omeprazole (PRILOSEC) 40 MG delayed release capsule Take 1 capsule by mouth daily 30 capsule 5    propranolol (INDERAL) 40 MG tablet TAKE 1 TABLET BY MOUTH THREE TIMES A  tablet 3    atorvastatin (LIPITOR) 20 MG tablet TAKE 1 TABLET BY MOUTH DAILY 30 tablet 11    aspirin (ASPIRIN CHILDRENS) 81 MG chewable tablet Take 1 tablet by mouth daily 30 tablet 0    fluticasone (FLONASE) 50 MCG/ACT nasal spray SPRAY 2 SPRAYS INTO EACH NOSTRIL EVERY DAY 1 Bottle 11     No current facility-administered medications for this visit. Allergies   Allergen Reactions    Codeine Nausea And Vomiting, Swelling and Rash       Family History   Problem Relation Age of Onset    High Blood Pressure Mother     Other Mother         early alzheimers    High Blood Pressure Father     Diabetes Paternal Grandfather     Breast Cancer Paternal Aunt     Colon Cancer Neg Hx     Colon Polyps Neg Hx                Review of Systems   Constitutional: Negative for appetite change, fatigue and fever. HENT: Negative for congestion, sinus pressure and sinus pain. Eyes: Negative for pain and visual disturbance. Respiratory: Negative for cough, shortness of breath and wheezing. Cardiovascular: Negative for chest pain and leg swelling. Gastrointestinal: Negative for abdominal pain, diarrhea, nausea and vomiting. Endocrine: Negative for cold intolerance and heat intolerance. Genitourinary: Negative for dysuria, frequency and urgency. Musculoskeletal: Positive for arthralgias. Negative for back pain. Skin: Negative for rash and wound. Neurological: Negative for dizziness, weakness and headaches. Hematological: Negative for adenopathy.    Psychiatric/Behavioral: Negative for Behavior normal.         /80   Pulse 69   Temp 97.2 °F (36.2 °C) (Temporal)   Ht 5' (1.524 m)   Wt 171 lb 3.2 oz (77.7 kg)   SpO2 98%   BMI 33.44 kg/m²     Assessment:      Diagnosis Orders   1. Essential hypertension     2. Hypothyroidism, unspecified type     3. Hyperlipidemia, unspecified hyperlipidemia type     4. Left hip pain  XR HIP 2-3 VW W PELVIS LEFT   5. Lumbar disc disease  XR HIP 2-3 VW W PELVIS LEFT       No results found for this visit on 02/11/20. Plan: Will order x-ray of hip and pelvis. Robaxin for muscular pain to back and hip. Can refer back to pain management if she would like. Continue all other current medications. Vital signs stable. Return in about 6 months (around 8/11/2020), or if symptoms worsen or fail to improve, for Physical.    Orders Placed This Encounter   Procedures    XR HIP 2-3 VW W PELVIS LEFT     Standing Status:   Future     Standing Expiration Date:   2/11/2021     Order Specific Question:   Reason for exam:     Answer:   left hip pain       No orders of the defined types were placed in this encounter. Patient offered educational materials - see patient instructions for any instruction needed. Discussed use, benefit, and side effects of prescribed medications. All patient questions answered. Instructed to continue current medications, diet and exercise. Patient agreed with treatment plan. Follow up as directed. Patient was advised to go to the ED if condition ever becomes emergent. EMR Dragon/transcription disclaimer: Some of this encounter note is an electronic transcription/translation of spoken language to printed text. The electronic translation of spoken language may permit erroneous, or at times, nonsensical words or phrases to be inadvertently transcribed.  Although I have reviewed the note for such errors, some may still exist.      Electronically signed by JUSTIN Cuello on 2/11/2020 at 8:31 AM

## 2020-02-17 ENCOUNTER — TELEPHONE (OUTPATIENT)
Dept: PRIMARY CARE CLINIC | Age: 45
End: 2020-02-17

## 2020-02-17 NOTE — TELEPHONE ENCOUNTER
----- Message from JUSTIN Phelan sent at 2/14/2020  4:26 PM CST -----  Please notify patient of normal result.

## 2020-02-24 ENCOUNTER — TELEPHONE (OUTPATIENT)
Dept: NEUROLOGY | Age: 45
End: 2020-02-24

## 2020-02-24 NOTE — TELEPHONE ENCOUNTER
Called and left pt a VM to get her know that I have her scheduled an appointment for a follow up after being on her DME sleep machine. NO answer. Left a VM regarding the appointment time/date/location/phone #.

## 2020-02-24 NOTE — TELEPHONE ENCOUNTER
Sasha Morelos requests that  return their call. The best time to reach her is Anytime. Pt called needing to sched appt for FU DME. Pt got DME setup on 02/20/20. Please contact pt to sched. Thank you.

## 2020-02-24 NOTE — TELEPHONE ENCOUNTER
Patient called back and I voiced the New Patient appointment time and date. Patient voiced understanding.

## 2020-04-14 RX ORDER — PROPRANOLOL HYDROCHLORIDE 40 MG/1
TABLET ORAL
Qty: 270 TABLET | Refills: 3 | Status: SHIPPED | OUTPATIENT
Start: 2020-04-14 | End: 2021-03-05

## 2020-04-14 RX ORDER — ATORVASTATIN CALCIUM 20 MG/1
20 TABLET, FILM COATED ORAL DAILY
Qty: 30 TABLET | Refills: 11 | Status: SHIPPED | OUTPATIENT
Start: 2020-04-14 | End: 2021-03-21

## 2020-04-30 ENCOUNTER — TELEMEDICINE (OUTPATIENT)
Dept: NEUROLOGY | Age: 45
End: 2020-04-30
Payer: MEDICAID

## 2020-04-30 PROBLEM — G47.33 OBSTRUCTIVE SLEEP APNEA: Status: ACTIVE | Noted: 2020-04-30

## 2020-04-30 PROCEDURE — 99214 OFFICE O/P EST MOD 30 MIN: CPT | Performed by: PHYSICIAN ASSISTANT

## 2020-04-30 PROCEDURE — G8427 DOCREV CUR MEDS BY ELIG CLIN: HCPCS | Performed by: PHYSICIAN ASSISTANT

## 2020-04-30 NOTE — PATIENT INSTRUCTIONS
(continuous positive airway pressure)  device uses an air-tight attachment to the nose, typically a mask, connected to a tube and a blower which generates the pressure. Devices that fit comfortably into the nasal opening, rather than over the nose, are also available. CPAP should be used any time the person sleeps (day or night). The CPAP device is usually used for the first time in the sleep lab, where a technician can adjust the pressure and select the best equipment to keep the airway open. Alternatively, an auto device with a self-adjusting pressure feature, provided with proper education and training, can get treatment started without another sleep test. While the treatment may seem uncomfortable, noisy, or bulky at first, most people accept the treatment after experiencing better sleep. However, difficulty with mask comfort and nasal congestion prevent up to 50 percent of people from using the treatment on a regular basis. Continued follow up with a healthcare provider helps to ensure that the treatment is effective and comfortable. Information from the CPAP machine is often used by physicians, therapists, and insurers to track the success of treatment. CPAP can be delivered with different features to improve comfort and solve problems that may come up during treatment. Changes in treatment may be needed if symptoms do not improve or if the persons condition changes, such as a gain or loss of weight. Adjust sleep position -- Adjusting sleep position (to stay off the back) may help improve sleep quality in people who have FATIMAH when sleeping on the back. However, this is difficult to maintain throughout the night and is rarely an adequate solution. Weight loss -- Weight loss may be helpful for obese or overweight patients. Weight loss may be accomplished with dietary changes, exercise, and/or surgical treatment.  However, it can be difficult to maintain weight loss; the five-year success of non-surgical weight loss is only 5 percent, meaning that 95 percent of people regain lost weight. Avoid alcohol and other sedatives -- Alcohol can worsen sleepiness, potentially increasing the risk of accidents or injury. People with FATIMAH are often counseled to drink little to no alcohol, even during the daytime. Similarly, people who take anti-anxiety medications or sedatives to sleep should speak with their healthcare provider about the safety of these medications. People with FATIMAH must notify all healthcare providers, including surgeons, about their condition and the potential risks of being sedated. People with FATIMAH who are given anesthesia and/or pain medications require special management and close monitoring to reduce the risk of a blocked airway. Dental devices -- A dental device, called an oral appliance or mandibular advancement device, can reposition the jaw (mandible), bringing the tongue and soft palate forward as well. This may relieve obstruction in some people. This treatment is excellent for reducing snoring, although the effect on FATIMAH is sometimes more limited. As a result, dental devices are best used for mild cases of FATIMAH when relief of snoring is the main goal. Failure to tolerate and accept CPAP is another indication for dental devices. While dental devices are not as effective as CPAP for FATIMAH, some patients prefer a dental device to CPAP. Side effects of dental devices are generally minor but may include changes to the bite with prolonged use. Surgical treatment -- Surgery is an alternative therapy for patients who cannot tolerate or do not improve with nonsurgical treatments such as CPAP or oral devices. Surgery can also be used in combination with other nonsurgical treatments. Surgical procedures reshape structures in the upper airways or surgically reposition bone or soft tissue. Uvulopalatopharyngoplasty (UPPP) removes the uvula and excessive tissue in the throat, including the tonsils, if present. long-term follow-up should be established. Annual visits are reasonable, with more frequent visits in between if new issues arise. The purpose of long-term follow-up is to assess usage and monitor for recurrent FATIMAH, new side effects, air leakage, and fluctuations in body weight. WHERE TO GET MORE INFORMATION -- Your healthcare provider is the best source of information for questions and concerns related to your medical problem. Organizations  American [employer]nea Association  Provides information about sleep apnea to the public, publishes a newsletter, and serves as an advocate for people with the disorder. Evelyn, [address], [address] [place] [address]   [email or web address]   [email or web address]   Tel: [phone]   Fax: [facility] that works to [employer] and safety by promoting public understanding of sleep and sleep disorders. Supports sleep-related education, research, and advocacy; produces and distributes educational materials to the public and healthcare professionals; and offers postdoctoral fellowships and grants for sleep researchers. [address]   [email or web address]   [email or web address]   Tel: [phone]   Fax: [phone]    Important information:  Medicare/private insurance CPAP/BiPAP/APAP requirements:  Medicare/private insurance has specific requirements for PAP compliance that must be met during the first 90 days of use to continue coverage for CPAP/BiPAP/APAP  from day 91 and beyond. The policy requires that patients use a PAP device 4 hours per 24 hour period, at least 70% of the time over a 30 day period. This data must be downloaded as a report direct from the PAP devices. This is called a compliance download. Your PAP supplier will assist you in this matter.      Reminder:  Please bring a copy of the compliance download to your

## 2020-04-30 NOTE — PROGRESS NOTES
marked  Extremities: []? Pain  []? Swelling  [x]? Denies all unless marked  Musculoskeletal: []? Myalgias  []? Joint Pain  []? Arthritis []? Muscle Cramps []? Muscle Twitches  [x]? Denies all unless marked  Sleep: []? Insomnia[]? Snoring []? Restless Legs  [x]? Sleep Apnea  []? Daytime Sleepiness  [x]? Denies all unless marked  Neurological:[]? Visual Disturbance/Memory Loss []? Loss of Balance []? Slurred Speech/Weakness []? Seizures  []? Vertigo/Dizziness [x]? Denies all unless marked    The MA has completed the ROS with the patient. I have reviewed it in its' entirety with the patient and agree with the documentation.       Past Medical History:   Diagnosis Date    Anxiety     Asthma     born with asthmatic bronchitis    CPAP (continuous positive airway pressure) dependence     6cm to 16cm    Degenerative disk disease     Depression     mood disorders    Hemorrhoid 11/2015    Hyperlipidemia     Hypoglycemia     Hypothyroid     Left ureteral stone 5/25/2016    OAB (overactive bladder)     Obstructive sleep apnea     AHI: 37.9 per PSG, 10/2019    Obstructive sleep apnea 4/30/2020    Renal stone 5/25/2016       Past Surgical History:   Procedure Laterality Date   [facility]    COLONOSCOPY  04/29/2015    Dr. Salomón Ledezma  11/4/15    Dr. Delvin Hu  11/16/15    [address]  11/16/15    Hemorrhoidectomy & closed lateral internal sphincterotomy    HYSTEROSCOPY  11/4/15    Dr. Jemma Leon POLYPECTOMY  11/4/15    Dr. Josefa James  14 years ago       Family History   Problem Relation Age of Onset    High Blood Pressure Mother     Other Mother         early alzheimers    High Blood Pressure Father     Diabetes Paternal Grandfather     Breast Cancer Paternal Aunt     Colon Cancer Neg Hx     Colon Polyps Neg Hx        Social History     Socioeconomic History    Marital status:      Spouse name: Not on file    Number of children: Not on file    Years of education: Not on file    Highest education level: Not on file   Occupational History    Not on file   Social Needs    Financial resource strain: Not on file    Food insecurity     Worry: Not on file     Inability: Not on file    Transportation needs     Medical: Not on file     Non-medical: Not on file   Tobacco Use    Smoking status: Former Smoker     Packs/day: 0.25     Years: 27.00     Pack years: 6.75     Last attempt to quit: 2009     Years since quittin.0    Smokeless tobacco: Former User    Tobacco comment: Not employed   Substance and Sexual Activity    Alcohol use:  Yes     Alcohol/week: 0.0 standard drinks     Comment: rare    Drug use: No    Sexual activity: Yes   Lifestyle    Physical activity     Days per week: Not on file     Minutes per session: Not on file    Stress: Not on file   Relationships    Social connections     Talks on phone: Not on file     Gets together: Not on file     Attends Alevism service: Not on file     Active member of club or organization: Not on file     Attends meetings of clubs or organizations: Not on file     Relationship status: Not on file    Intimate partner violence     Fear of current or ex partner: Not on file     Emotionally abused: Not on file     Physically abused: Not on file     Forced sexual activity: Not on file   Other Topics Concern    Not on file   Social History Narrative    Not on file       Current Outpatient Medications   Medication Sig Dispense Refill    propranolol (INDERAL) 40 MG tablet TAKE 1 TABLET BY MOUTH THREE TIMES A  tablet 3    atorvastatin (LIPITOR) 20 MG tablet Take 1 tablet by mouth daily 30 tablet 11    methocarbamol (ROBAXIN) 500 MG tablet TAKE 1 TABLET BY MOUTH TWICE A DAY 60 tablet 3    naproxen (NAPROSYN) 500 MG tablet TAKE 1 TABLET BY MOUTH TWICE A DAY WITH MEALS 60 tablet 1    loratadine (CLARITIN) 10 MG tablet TAKE 1 TABLET BY MOUTH EVERY DAY 30 tablet 5    levothyroxine (SYNTHROID) 50 MCG tablet Take 1 tablet by mouth daily 30 tablet 11    albuterol-ipratropium (COMBIVENT RESPIMAT)  MCG/ACT AERS inhaler INHALE 1 PUFF INTO THE LUNGS EVERY 4 HOURS AS NEEDED FOR WHEEZING 1 Inhaler 5    omeprazole (PRILOSEC) 40 MG delayed release capsule Take 1 capsule by mouth daily 30 capsule 5    fluticasone (FLONASE) 50 MCG/ACT nasal spray SPRAY 2 SPRAYS INTO EACH NOSTRIL EVERY DAY 1 Bottle 11    aspirin (ASPIRIN CHILDRENS) 81 MG chewable tablet Take 1 tablet by mouth daily 30 tablet 0     No current facility-administered medications for this visit. Allergies   Allergen Reactions    Codeine Nausea And Vomiting, Swelling and Rash         PHYSICAL EXAMINATION:    Constitutional -   General appearance: Alert in no acute distress   EYES -   Conjunctiva and lids appears normal  ENT-    Hearing intact, no scars, masses, or lesions over external nose on visible skin  Pulmonary-   Breathing appears normal, good expansion, normal effort without use of accessory muscles  Musculoskeletal -   No gross bony deformities  Gait as below, see gait exam in the neurologic exam  Skin -   No rash, erythema, or pallor noted on visible skin  Psychiatric -   Mood, affect, and behavior appear normal    Memory as below see mental status examination in the neurologic exam    NEUROLOGICAL EXAM    Mental status   [x]Awake, alert, oriented   [x]Affect attention and concentration appear appropriate  [x]Recent and remote memory appears unremarkable  [x]Speech normal without dysarthria or aphasia, comprehension and repetition intact.    COMMENTS:    Cranial Nerves [x] PER, EOMI, no nystagmus, conjugate eye movements, no ptosis  [x]Face symmetric  [x]Tongue midline   [x]Shoulder shrug normal bilaterally  COMMENTS:   Motor   [x]Antigravity x 4 extremities  [x]Normal visible bulk and tone  [x]No tremor present  COMMENTS:       Coordination [x]ARTEM normal bilaterally  [x]Extension to nose normal bilaterally  COMMENTS:

## 2020-04-30 NOTE — LETTER
[address], [address]  [facility], [facility]  Phone [phone]               Re:  Rachel Garcia    21  :  1975  Address: [address] [place] [address]      Diagnoses:  Obstructive sleep apnea (G47.33)      To Whom It May Concern:       Current therapy:  Auto CPAP with a pressure range of 6cm to 16cm    Change to:   Auto CPAP with a pressure range of 6cm to 18cm        Ordering Provider:  Joseph Caballero PA-C  NPI:  [id number]          Signature:       Date: 2020      Electronically Signed by Joseph Caballero PA-C  on 2020 at 9:31 AM

## 2020-05-05 RX ORDER — FLUTICASONE PROPIONATE 50 MCG
SPRAY, SUSPENSION (ML) NASAL
Qty: 1 BOTTLE | Refills: 11 | Status: SHIPPED | OUTPATIENT
Start: 2020-05-05 | End: 2021-04-05

## 2020-05-31 RX ORDER — NAPROXEN 500 MG/1
TABLET ORAL
Qty: 60 TABLET | Refills: 1 | Status: SHIPPED | OUTPATIENT
Start: 2020-05-31 | End: 2020-07-26

## 2020-06-07 RX ORDER — LORATADINE 10 MG/1
TABLET ORAL
Qty: 30 TABLET | Refills: 5 | Status: SHIPPED | OUTPATIENT
Start: 2020-06-07 | End: 2020-09-18

## 2020-07-26 RX ORDER — METHOCARBAMOL 500 MG/1
TABLET, FILM COATED ORAL
Qty: 60 TABLET | Refills: 3 | Status: SHIPPED | OUTPATIENT
Start: 2020-07-26 | End: 2020-10-30

## 2020-07-26 RX ORDER — NAPROXEN 500 MG/1
TABLET ORAL
Qty: 60 TABLET | Refills: 1 | Status: SHIPPED | OUTPATIENT
Start: 2020-07-26 | End: 2020-09-18 | Stop reason: SDUPTHER

## 2020-09-18 ENCOUNTER — OFFICE VISIT (OUTPATIENT)
Dept: PRIMARY CARE CLINIC | Age: 45
End: 2020-09-18
Payer: MEDICAID

## 2020-09-18 VITALS
SYSTOLIC BLOOD PRESSURE: 108 MMHG | TEMPERATURE: 98.2 F | DIASTOLIC BLOOD PRESSURE: 72 MMHG | BODY MASS INDEX: 34.55 KG/M2 | HEIGHT: 60 IN | WEIGHT: 176 LBS | OXYGEN SATURATION: 98 % | HEART RATE: 71 BPM | RESPIRATION RATE: 20 BRPM

## 2020-09-18 PROCEDURE — 96372 THER/PROPH/DIAG INJ SC/IM: CPT | Performed by: NURSE PRACTITIONER

## 2020-09-18 PROCEDURE — 99396 PREV VISIT EST AGE 40-64: CPT | Performed by: NURSE PRACTITIONER

## 2020-09-18 RX ORDER — METHYLPREDNISOLONE ACETATE 40 MG/ML
40 INJECTION, SUSPENSION INTRA-ARTICULAR; INTRALESIONAL; INTRAMUSCULAR; SOFT TISSUE ONCE
Status: COMPLETED | OUTPATIENT
Start: 2020-09-18 | End: 2020-09-18

## 2020-09-18 RX ORDER — LEVOCETIRIZINE DIHYDROCHLORIDE 5 MG/1
5 TABLET, FILM COATED ORAL NIGHTLY
Qty: 30 TABLET | Refills: 5 | Status: SHIPPED | OUTPATIENT
Start: 2020-09-18 | End: 2021-03-21

## 2020-09-18 RX ORDER — NAPROXEN 500 MG/1
500 TABLET ORAL 2 TIMES DAILY WITH MEALS
Qty: 60 TABLET | Refills: 5 | Status: SHIPPED | OUTPATIENT
Start: 2020-09-18 | End: 2021-03-07

## 2020-09-18 RX ORDER — GUAIFENESIN 600 MG/1
600 TABLET, EXTENDED RELEASE ORAL 2 TIMES DAILY
Qty: 30 TABLET | Refills: 0 | Status: SHIPPED | OUTPATIENT
Start: 2020-09-18 | End: 2020-10-03

## 2020-09-18 RX ADMIN — METHYLPREDNISOLONE ACETATE 40 MG: 40 INJECTION, SUSPENSION INTRA-ARTICULAR; INTRALESIONAL; INTRAMUSCULAR; SOFT TISSUE at 08:29

## 2020-09-18 ASSESSMENT — ENCOUNTER SYMPTOMS
NAUSEA: 0
DIARRHEA: 0
BACK PAIN: 1
ABDOMINAL PAIN: 0
WHEEZING: 0
COUGH: 0
VOMITING: 0
SHORTNESS OF BREATH: 0
SINUS PAIN: 0
SINUS PRESSURE: 0
EYE PAIN: 0

## 2020-09-18 NOTE — PROGRESS NOTES
After obtaining consent, and per orders of Heide Varela  injection of Depo Medrol  was given in the RVG by Paz Aguilera LPN  Pt tolerated well and had no reactions.

## 2020-09-18 NOTE — PROGRESS NOTES
[address] [address]     Phone:  [phone]  Fax:  [phone]      Do Singh is a 41 y.o. female who presents today for her medical conditions/complaints as noted below. Do Singh is c/o of Annual Exam and Hypertension      Chief Complaint   Patient presents with    Annual Exam    Hypertension       HPI:     HPI    Do Singh presents today for an annual exam.  She has a history of chronic back and neck pain, hypertension, hypothyroidism, and hyperlipidemia. She states these conditions are well controlled on current medications. She denies any chest pain, shortness of breath, leg swelling. She takes naproxen and Robaxin only as needed. She denies any new myalgias. Her pain is chronic. She also has obstructive sleep apnea. She tries to wear her CPAP most of the night. In the last month, she states she feels thick sputum in throat, mostly at night. She also states she has ringing in her ears and fullness bilaterally. She has asthma, seasonal allergies, and FATIMAH. She denies any fevers, increased cough. She has not had any ill exposure. She is currently taking Claritin and Flonase every day. She is looking for a dentist to take her insurance. She does not get yearly eye exams.     Past Medical History:   Diagnosis Date    Anxiety     Asthma     born with asthmatic bronchitis    CPAP (continuous positive airway pressure) dependence     6cm to 16cm    Degenerative disk disease     Depression     mood disorders    Hemorrhoid 11/2015    Hyperlipidemia     Hypoglycemia     Hypothyroid     Left ureteral stone 5/25/2016    OAB (overactive bladder)     Obstructive sleep apnea     AHI: 37.9 per PSG, 10/2019    Obstructive sleep apnea 4/30/2020    Renal stone 5/25/2016        Past Surgical History:   Procedure Laterality Date   [address]    COLONOSCOPY  04/29/2015    Dr. Rodríguez Brewer  11/4/15     Anil SURGERY  11/16/15    HEMORRHOID SURGERY  11/16/15    Hemorrhoidectomy & closed lateral internal sphincterotomy    HYSTEROSCOPY  11/4/15    Dr. Jie Fernandes POLYPECTOMY  11/4/15    Dr. Rico Benitez  14 years ago       Social History     Tobacco Use    Smoking status: Former Smoker     Packs/day: 0.25     Years: 27.00     Pack years: 6.75     Last attempt to quit: 2009     Years since quittin.4    Smokeless tobacco: Former User    Tobacco comment: Not employed   Substance Use Topics    Alcohol use:  Yes     Alcohol/week: 0.0 standard drinks     Comment: rare        Current Outpatient Medications   Medication Sig Dispense Refill    naproxen (NAPROSYN) 500 MG tablet Take 1 tablet by mouth 2 times daily (with meals) 60 tablet 5    levocetirizine (XYZAL) 5 MG tablet Take 1 tablet by mouth nightly 30 tablet 5    guaiFENesin (MUCINEX) 600 MG extended release tablet Take 1 tablet by mouth 2 times daily for 15 days 30 tablet 0    methocarbamol (ROBAXIN) 500 MG tablet TAKE 1 TABLET BY MOUTH TWICE A DAY 60 tablet 3    COMBIVENT RESPIMAT  MCG/ACT AERS inhaler INHALE 1 PUFF EVERY 4HRS AS NEEDED FOR WHEEZING 1 Inhaler 11    omeprazole (PRILOSEC) 40 MG delayed release capsule TAKE 1 CAPSULE BY MOUTH EVERY DAY 30 capsule 5    fluticasone (FLONASE) 50 MCG/ACT nasal spray SPRAY 2 SPRAYS INTO EACH NOSTRIL EVERY DAY 1 Bottle 11    propranolol (INDERAL) 40 MG tablet TAKE 1 TABLET BY MOUTH THREE TIMES A  tablet 3    atorvastatin (LIPITOR) 20 MG tablet Take 1 tablet by mouth daily 30 tablet 11    levothyroxine (SYNTHROID) 50 MCG tablet Take 1 tablet by mouth daily 30 tablet 11    aspirin (ASPIRIN CHILDRENS) 81 MG chewable tablet Take 1 tablet by mouth daily 30 tablet 0     Current Facility-Administered Medications   Medication Dose Route Frequency Provider Last Rate Last Dose    methylPREDNISolone acetate (DEPO-MEDROL) injection 40 mg  40 mg Intramuscular Once El Rutherfordpf, APRN           Allergies   Allergen Reactions    Codeine Nausea And Vomiting, Swelling and Rash       Family History   Problem Relation Age of Onset    High Blood Pressure Mother     Other Mother         early alzheimers    High Blood Pressure Father     Diabetes Paternal Grandfather     Breast Cancer Paternal Aunt     Colon Cancer Neg Hx     Colon Polyps Neg Hx                Review of Systems   Constitutional: Negative for appetite change, fatigue and fever. HENT: Positive for congestion and ear pain. Negative for sinus pressure and sinus pain. Eyes: Negative for pain and visual disturbance. Respiratory: Negative for cough, shortness of breath and wheezing. Cardiovascular: Negative for chest pain and leg swelling. Gastrointestinal: Negative for abdominal pain, diarrhea, nausea and vomiting. Endocrine: Negative for cold intolerance and heat intolerance. Genitourinary: Negative for dysuria, frequency and urgency. Musculoskeletal: Positive for arthralgias and back pain. Skin: Negative for rash and wound. Neurological: Negative for dizziness, weakness and headaches. Hematological: Negative for adenopathy. Psychiatric/Behavioral: Negative for dysphoric mood and sleep disturbance. The patient is not nervous/anxious. Objective:     Physical Exam  Vitals signs and nursing note reviewed. Constitutional:       General: She is not in acute distress. Appearance: Normal appearance. She is well-developed. She is obese. She is not ill-appearing. HENT:      Head: Normocephalic and atraumatic. Right Ear: External ear normal. A middle ear effusion is present. Left Ear: External ear normal. A middle ear effusion is present. Nose: Nose normal.      Mouth/Throat:      Dentition: Normal dentition. Eyes:      General:         Right eye: No discharge. Left eye: No discharge.       Conjunctiva/sclera: Conjunctivae normal.      Pupils: Pupils are equal, round, and reactive to light. Neck:      Musculoskeletal: Normal range of motion and neck supple. Cardiovascular:      Rate and Rhythm: Normal rate and regular rhythm. Pulses: Normal pulses. Heart sounds: Normal heart sounds. No murmur. Pulmonary:      Effort: Pulmonary effort is normal. No respiratory distress. Breath sounds: Normal breath sounds. No stridor. No wheezing, rhonchi or rales. Abdominal:      General: Bowel sounds are normal. There is no distension. Palpations: Abdomen is soft. Tenderness: There is no abdominal tenderness. Musculoskeletal: Normal range of motion. Lumbar back: She exhibits normal range of motion. Right lower leg: No edema. Left lower leg: No edema. Lymphadenopathy:      Cervical: No cervical adenopathy. Skin:     General: Skin is warm and dry. Capillary Refill: Capillary refill takes less than 2 seconds. Findings: No rash. Neurological:      General: No focal deficit present. Mental Status: She is alert and oriented to person, place, and time. Mental status is at baseline. Psychiatric:         Mood and Affect: Mood normal.         Behavior: Behavior normal.         /72   Pulse 71   Temp 98.2 °F (36.8 °C) (Temporal)   Resp 20   Ht 5' (1.524 m)   Wt 176 lb (79.8 kg)   SpO2 98%   BMI 34.37 kg/m²     Assessment:      Diagnosis Orders   1. Annual physical exam     2. Essential hypertension  CBC Auto Differential    Comprehensive Metabolic Panel   3. Acquired hypothyroidism  TSH WITH REFLEX TO FT4   4. Mixed hyperlipidemia  CBC Auto Differential    Comprehensive Metabolic Panel    Lipid, Fasting   5. Seasonal allergies  levocetirizine (XYZAL) 5 MG tablet    methylPREDNISolone acetate (DEPO-MEDROL) injection 40 mg   6. Congestion of upper airway  guaiFENesin (MUCINEX) 600 MG extended release tablet    methylPREDNISolone acetate (DEPO-MEDROL) injection 40 mg   7.  Chronic neck pain  naproxen (NAPROSYN) erroneous, or at times, nonsensical words or phrases to be inadvertently transcribed.  Although I have reviewed the note for such errors, some may still exist.      Electronically signed by JUSTIN Roberson on 9/18/2020 at 8:03 AM

## 2020-10-19 RX ORDER — OMEPRAZOLE 40 MG/1
CAPSULE, DELAYED RELEASE ORAL
Qty: 30 CAPSULE | Refills: 5 | Status: SHIPPED | OUTPATIENT
Start: 2020-10-19 | End: 2021-04-05

## 2020-10-29 ENCOUNTER — OFFICE VISIT (OUTPATIENT)
Dept: NEUROLOGY | Age: 45
End: 2020-10-29
Payer: MEDICAID

## 2020-10-29 VITALS — SYSTOLIC BLOOD PRESSURE: 134 MMHG | OXYGEN SATURATION: 96 % | DIASTOLIC BLOOD PRESSURE: 68 MMHG | HEART RATE: 72 BPM

## 2020-10-29 PROCEDURE — G8427 DOCREV CUR MEDS BY ELIG CLIN: HCPCS | Performed by: PHYSICIAN ASSISTANT

## 2020-10-29 PROCEDURE — G8484 FLU IMMUNIZE NO ADMIN: HCPCS | Performed by: PHYSICIAN ASSISTANT

## 2020-10-29 PROCEDURE — 99213 OFFICE O/P EST LOW 20 MIN: CPT | Performed by: PHYSICIAN ASSISTANT

## 2020-10-29 PROCEDURE — 1036F TOBACCO NON-USER: CPT | Performed by: PHYSICIAN ASSISTANT

## 2020-10-29 PROCEDURE — G8417 CALC BMI ABV UP PARAM F/U: HCPCS | Performed by: PHYSICIAN ASSISTANT

## 2020-10-29 NOTE — PROGRESS NOTES
[facility]  [address] [place] [address] Drive, [address]  Joseph Gomez  Phone [phone]  Fax [phone]       [facility] Sleep Follow Up Encounter      Information:   Patient Name: Anibal Castellon  :   1975  Age:   41 y.o. MRN:   [id number]  Account #:  [de-identified]  Today:                10/29/20    Provider:  Mary Grace Babin PA-C    Chief Complaint   Patient presents with    6 Month Follow-Up     Doing well         Subjective:   Anibal Castellon is a 41 y.o. female  with a history of severe FATIMAH. She was referred for a PSG due to snoring, witnessed apneas, and excessive daytime somnolence. The PSG, 10/8/2019 revealed an AHI of 37.9. Anibal Castellon is prescribed auto CPAP therapy  with a pressure range of 6cm to 16cm. The compliance report indicates that she is averaging 2-3 hours of PAP use per day. She reports that consistent PAP use has alleviated the previous FATIMAH symptoms. She feels much improved in regards to the daytime somnolence. She no longer gets sleep during the day. She has spring allergies and does get congested at times and doesn't use it when she has allergies. She is using a FFM.         Location or symptom:  FATIMAH  Onset:  PSG: 10/2019   Timing:  q hs  Severity:  Severe  Associated:  Snoring, witnessed apneas, and excessive daytime somnolence  Alleviated:  CPAP      Objective:     Past Medical History:   Diagnosis Date    Anxiety     Asthma     born with asthmatic bronchitis    CPAP (continuous positive airway pressure) dependence     6cm to 16cm    Degenerative disk disease     Depression     mood disorders    Hemorrhoid 2015    Hyperlipidemia     Hypoglycemia     Hypothyroid     Left ureteral stone 2016    OAB (overactive bladder)     Obstructive sleep apnea     AHI: 37.9 per PSG, 10/2019    Obstructive sleep apnea 2020    Renal stone 2016       Past Surgical History:   Procedure Laterality Date    CHOLECYSTECTOMY      COLONOSCOPY  2015 Dr. Collins Aguero  11/4/15    Dr. Welch Inch  11/16/15    [address]  11/16/15    Hemorrhoidectomy & closed lateral internal sphincterotomy    HYSTEROSCOPY  11/4/15    Dr. Genna Eldridge POLYPECTOMY  11/4/15    Dr. Adelfo Parry  14 years ago       Recent Hospitalizations  ·     Significant Injuries  ·     Family History   Problem Relation Age of Onset    High Blood Pressure Mother     Other Mother         early alzheimers    High Blood Pressure Father     Diabetes Paternal Grandfather     Breast Cancer Paternal Aunt     Colon Cancer Neg Hx     Colon Polyps Neg Hx        Social History  Social History     Tobacco Use   Smoking Status Former Smoker    Packs/day: 0.25    Years: 27.00    Pack years: 6.75    Last attempt to quit: 2009    Years since quittin.5   Smokeless Tobacco Former User   Tobacco Comment    Not employed     Social History     Substance and Sexual Activity   Alcohol Use Yes    Alcohol/week: 0.0 standard drinks    Comment: rare     Social History     Substance and Sexual Activity   Drug Use No         Current Outpatient Medications   Medication Sig Dispense Refill    omeprazole (PRILOSEC) 40 MG delayed release capsule TAKE 1 CAPSULE BY MOUTH EVERY DAY 30 capsule 5    naproxen (NAPROSYN) 500 MG tablet Take 1 tablet by mouth 2 times daily (with meals) 60 tablet 5    levocetirizine (XYZAL) 5 MG tablet Take 1 tablet by mouth nightly 30 tablet 5    methocarbamol (ROBAXIN) 500 MG tablet TAKE 1 TABLET BY MOUTH TWICE A DAY 60 tablet 3    COMBIVENT RESPIMAT  MCG/ACT AERS inhaler INHALE 1 PUFF EVERY 4HRS AS NEEDED FOR WHEEZING 1 Inhaler 11    fluticasone (FLONASE) 50 MCG/ACT nasal spray SPRAY 2 SPRAYS INTO EACH NOSTRIL EVERY DAY 1 Bottle 11    propranolol (INDERAL) 40 MG tablet TAKE 1 TABLET BY MOUTH THREE TIMES A  tablet 3    atorvastatin (LIPITOR) 20 MG tablet Take 1 tablet by mouth daily 30 tablet 11    levothyroxine (SYNTHROID) 50 MCG tablet Take 1 tablet by mouth daily 30 tablet 11    aspirin (ASPIRIN CHILDRENS) 81 MG chewable tablet Take 1 tablet by mouth daily 30 tablet 0     No current facility-administered medications for this visit. Allergies:  Codeine    REVIEW OF SYSTEMS     Constitutional: []? Fever []? Sweats []? Chills []? Recent Injury   [x]? Denies all unless marked  HENT:[]? Headache  []? Head Injury  []? Sore Throat  []? Ear Pain  []? Dizziness []? Hearing Loss   [x]? Denies all unless marked  Spine:  []? Neck pain  []? Back pain  []? Sciaticia  [x]? Denies all unless marked  Cardiovascular:[]? Chest Pain []? Palpitations []? Heart Disease  [x]? Denies all unless marked  Pulmonary: []? Shortness of Breath []? Cough   [x]? Denies all unless marked  Gastrointestinal:  []? Abdominal Pain  []? Blood in Stool  []? Diarrhea []? Constipation []? Nausea  []? Vomiting  [x]? Denies all unless marked  Genitourinary:  []? Dysuria []? Frequency  []? Incontinence []? Urgency   [x]? Denies all unless marked  Musculoskeletal: []? Arthralgia  []? Myalgias []? Muscle cramps  []? Muscle twitches   [x]? Denies all unless marked   Extremities:   []? Pain   []? Swelling   [x]? Denies all unless marked  Skin:[]? Rash  []? Color Change  [x]? Denies all unless marked  Neurological:[]? Visual Disturbance []? Double Vision []? Slurred Speech []? Trouble swallowing  []? Vertigo []? Tingling []? Numbness []? Weakness []? Loss of Balance   []? Loss of Consciousness []? Memory Loss []? Seizures  [x]? Denies all unless marked  Psychiatric/Behavioral:[]? Depression []? Anxiety  [x]? Denies all unless marked  Sleep: []? Insomnia []? Sleep Disturbance []? Snoring []? Restless Legs []? Daytime Sleepiness [x]? Sleep Apnea  []? Denies all unless marked    The MA has completed the ROS with the patient. I have reviewed it in its' entirety with the patient and agree with the documentation.      PHYSICAL EXAM  /68   Pulse 72   SpO2 96% []  :  Resolving     []  :  Resolved     []  :  Inadequately controlled     []  :  Worsening     []  :  Additional workup planned    She met compliance at the 4/30/2020 office visit. Plan:     No orders of the defined types were placed in this encounter. 1.   Patient advised of the etiology,  pathophysiology, diagnosis, treatment options, and risks of untreated FATIMAH. Risks may include, but are not limited to  hypertension, coronary artery disease, diabetes, stroke, weight gain, impaired cognition, daytime somnolence, and motor vehicle accidents. Advised to abstain from driving or operating heavy machinery when drowsy and the use of respiratory suppressants. Discussed diagnostic studies and potential treatment plan. Will evaluate for clinical benefit and and compliance during a 30 day period within the preceding 90 days. 2.  The following educational material has been included in this visit after visit summary for your review: FATIMAH/PAP guidelines-Discussed with the patient and all questions fully answered. 3.  Follow up with [facility] re: possible inaccurate compliance report  4. Continue CPAP usage and increase consistency and duration of use  5. Follow up in 6 months            Note:  A total of >50% (>8 minutes) of 15 minutes was spent discussing the pathophysiology and treatment and/or coordination of care of the above diagnoses.

## 2020-10-29 NOTE — PATIENT INSTRUCTIONS
Patient education: Sleep apnea in adults       INTRODUCTION -- Normally during sleep, air moves through the throat and in and out of the lungs at a regular rhythm. In a person with sleep apnea, air movement is periodically diminished or stopped. There are two types of sleep apnea: obstructive sleep apnea and central sleep apnea. In obstructive sleep apnea, breathing is abnormal because of narrowing or closure of the throat. In central sleep apnea, breathing is abnormal because of a change in the breathing control and rhythm. Sleep apnea is a serious condition that can affect a person's ability to safely perform normal daily activities and can affect long term health. Approximately 25 percent of adults are at risk for sleep apnea of some degree. Men are more commonly affected than women. Other risk factors include middle and older age, being overweight or obese, and having a small mouth and throat. This topic review focuses on the most common type of sleep apnea in adults, obstructive sleep apnea (FATIMAH). HOW SLEEP APNEA OCCURS -- The throat is surrounded by muscles that control the airway for speaking, swallowing, and breathing. During sleep, these muscles are less active, and this causes the throat to narrow. In most people, this narrowing does not affect breathing. In others, it can cause snoring, sometimes with reduced or completely blocked airflow. A completely blocked airway without airflow is called an obstructive apnea. Partial obstruction with diminished airflow is called a hypopnea. A person may have apnea and hypopnea during sleep. Insufficient breathing due to apnea or hypopnea causes oxygen levels to fall and carbon dioxide to rise. Because the airway is blocked, breathing faster or harder does not help to improve oxygen levels until the airway is reopened. Typically, the obstruction requires the person to awaken to activate the upper airway muscles.  Once the airway is opened, the person then takes several deep breaths to catch up on breathing. As the person awakens, he or she may move briefly, snort or snore, and take a deep breath. Less frequently, a person may awaken completely with a sensation of gasping, smothering, or choking. If the person falls back to sleep quickly, he or she will not remember the event. Many people with sleep apnea are unaware of their abnormal breathing in sleep, and all patients underestimate how often their sleep is interrupted. Awakening from sleep causes sleep to be unrefreshing and causes fatigue and daytime sleepiness. Anatomic causes of obstructive sleep apnea --  Most patients have FATIMAH because of a small upper airway. As the bones of the face and skull develop, some people develop a small lower face, a small mouth, and a tongue that seems too large for the mouth. These features are genetically determined, which explains why FATIMAH tends to cluster in families. Obesity is another major factor. Tonsil enlargement can be an important cause, especially in children. SLEEP APNEA SYMPTOMS -- The main symptoms of FATIMAH are loud snoring, fatigue, and daytime sleepiness. However, some people have no symptoms. For example, if the person does not have a bed partner, he or she may not be aware of the snoring. Fatigue and sleepiness have many causes and are often attributed to overwork and increasing age. As a result, a person may be slow to recognize that they have a problem. A bed partner or spouse often prompts the patient to seek medical care. Other symptoms may include one or more of the following:  ?Restless sleep  ? Awakening with choking, gasping, or smothering  ? Morning headaches, dry mouth, or sore throat  ? Waking frequently to urinate  ? Awakening unrested, groggy  ? Low energy, difficulty concentrating, memory impairment    Risk factors -- Certain factors increase the risk of sleep apnea.   ?Increasing age - FATIMAH occurs at all ages, but it is more common in middle and older age adults. ?Male sex - FATIMAH is two times more common in men, especially in middle age. ?Obesity - The more obese a person is, the more likely he or she is to have FATIMAH. ? Sedation from medication or alcohol - This interferes with the ability to awaken from sleep and can lengthen periods of apnea (no breathing), with potentially dangerous consequences. ? Abnormality of the airway. SLEEP APNEA CONSEQUENCES -- Complications of sleep apnea can include daytime sleepiness and difficulty concentrating. The consequence of this is an increased risk of accidents and errors in daily activities. Studies have shown that people with severe FATIMAH are more than twice as likely to be involved in a motor vehicle accident as people without these conditions. People with FATIMAH are encouraged to discuss options for driving, working, and performing other high-risk tasks with a healthcare provider. In addition, people with untreated FATIMAH may have an increased risk of cardiovascular problems such as high blood pressure, heart attack, abnormal heart rhythms, or stroke. This risk may be due to changes in the heart rate and blood pressure that occur during sleep. SLEEP APNEA DIAGNOSIS -- The diagnosis of FATIMAH is best made by a knowledgeable sleep medicine specialist who has an understanding of the individual's health issues. The diagnosis is usually based upon the person's medical history, physical examination, and testing, including:  ? A complaint of snoring and ineffective sleep  ? Neck size (greater than 16 inches in men or 14 inches in women) is associated with an increased risk of sleep apnea  ? A small upper airway: difficulty seeing the throat because of a tongue that is large for the mouth  ? High blood pressure, especially if it is resistant to treatment  ? If a bed partner has observed the patient during episodes of stopped breathing (apnea), choking, or gasping during sleep, there is a strong possibility of sleep (continuous positive airway pressure)  device uses an air-tight attachment to the nose, typically a mask, connected to a tube and a blower which generates the pressure. Devices that fit comfortably into the nasal opening, rather than over the nose, are also available. CPAP should be used any time the person sleeps (day or night). The CPAP device is usually used for the first time in the sleep lab, where a technician can adjust the pressure and select the best equipment to keep the airway open. Alternatively, an auto device with a self-adjusting pressure feature, provided with proper education and training, can get treatment started without another sleep test. While the treatment may seem uncomfortable, noisy, or bulky at first, most people accept the treatment after experiencing better sleep. However, difficulty with mask comfort and nasal congestion prevent up to 50 percent of people from using the treatment on a regular basis. Continued follow up with a healthcare provider helps to ensure that the treatment is effective and comfortable. Information from the CPAP machine is often used by physicians, therapists, and insurers to track the success of treatment. CPAP can be delivered with different features to improve comfort and solve problems that may come up during treatment. Changes in treatment may be needed if symptoms do not improve or if the persons condition changes, such as a gain or loss of weight. Adjust sleep position -- Adjusting sleep position (to stay off the back) may help improve sleep quality in people who have FATIMAH when sleeping on the back. However, this is difficult to maintain throughout the night and is rarely an adequate solution. Weight loss -- Weight loss may be helpful for obese or overweight patients. Weight loss may be accomplished with dietary changes, exercise, and/or surgical treatment.  However, it can be difficult to maintain weight loss; the five-year success of non-surgical Other procedures, such as maxillomandibular advancement (MMA), address both the upper and lower pharyngeal airway more globally. UPPP alone has limited success rates (less than 50 percent) and people can relapse (when FATIMAH symptoms return after surgery). As a result, this surgery is only recommended in a minority of people and should be considered with caution. MMA may have a higher success rate, particularly in people with abnormal jaw (maxilla and mandible) anatomy, but it is the most complicated procedure. A newer surgical approach, nerve stimulation to protrude the tongue, has promising success rates in very selected people. Tracheostomy creates a permanent opening in the neck. It is reserved for people with severe disease in whom less drastic measures have failed or are inappropriate. Although it is always successful in eliminating obstructive sleep apnea, tracheostomy requires significant lifestyle changes and carries some serious risks (eg, infection, bleeding, blockage). All surgical treatments require discussions about the goals of treatment, the expected outcomes, and potential complications. Hypoglossal nerve stimulator- \"Inspire\" device    PAP treatment failure:  Possible causes of treatment failure include nonadherence or suboptimal adherence, weight gain, an inappropriate level of prescribed positive pressure, or an additional disorder causing sleepiness (eg, narcolepsy) that may require alterations in the therapeutic regimen. A review of medications should also be undertaken since many drugs may lead to sleepiness. Inadequate sleep time may also negate the expected effects from treatment of FATIMAH. Also, pt's can have persistent hypersomnolence associated with sleep apnea even in the presence of adequate therapy and at those times Provigil or Nuvigil or other stimulants may be indicated.     Once the patient's positive airway pressure therapy has been optimized and symptoms resolved, a regimen of long-term follow-up should be established. Annual visits are reasonable, with more frequent visits in between if new issues arise. The purpose of long-term follow-up is to assess usage and monitor for recurrent FATIMAH, new side effects, air leakage, and fluctuations in body weight. WHERE TO GET MORE INFORMATION -- Your healthcare provider is the best source of information for questions and concerns related to your medical problem. Organizations  American Sleep Apnea Association  Provides information about sleep apnea to the public, publishes a newsletter, and serves as an advocate for people with the disorder. Evelyn, [address] [facility], [address] [place] [address]   [email or web address]   [email or web address].. org   Tel: [phone]   Fax: [facility] organization that works to [employer] and safety by promoting public understanding of sleep and sleep disorders. Supports sleep-related education, research, and advocacy; produces and distributes educational materials to the public and healthcare professionals; and offers postdoctoral fellowships and grants for sleep researchers. Ginny Malhotra [address]   [email or web address]   [email or web address] org   Tel: [phone]   Fax: [phone]    Important information:  Medicare/private insurance CPAP/BiPAP/APAP requirements:  Medicare/private insurance has specific requirements for PAP compliance that must be met during the first 90 days of use to continue coverage for CPAP/BiPAP/APAP  from day 91 and beyond. The policy requires that patients use a PAP device 4 hours per 24 hour period, at least 70% of the time over a 30 day period. This data must be downloaded as a report direct from the PAP devices. This is called a compliance download. Your PAP supplier will assist you in this matter.      Reminder:  Please bring a copy of the compliance download to your next office visit or have your supplier fax it to our office prior to your office visit. Note:  Where applicable, we will utilize PAP device efficiency reports, additional testing, and face-to-face  clinical evaluation subsequent to any treatment, changes in treatment, and continued treatment. Caution:  Please abstain from driving or engaging in other activities which may be hazardous in the presence of diminished alertness or daytime drowsiness. And avoid the use of sedatives or alcohol, which can worsen sleep apnea and daytime drowsiness. Mask suggestions:  -     Resmed Airfit N20 (Nasal) or F20 (Full face mask). They conform to your face, thus decreasing the potential for mask leakage. You might like the AirTouch F20(full face mask). It has a \"memory foam\" like cushion. The AirFit F30 is a smaller style full face mask designed to sit low on and cover less of your face for fewer facial marks. AirFit N30i has a top of the head tube with a nasal mask. AirFit P10 reported to be the most comfortable nasal pillow mask. Resmed Mirage FX reported to be the most comfortable nasal mask. Resmed Mirage Clarke reported to be the most comfortable hybrid mask. AirTouch N20-memory foam nasal mask. Respironics: You might also like to try a nasal mask called a Dreamwear nasal mask or the Dreamwear nasal pillow. Another suggestion is the [facility], it is a minimal contact full face mask. The De Jesus Bill incredible under the nose design makes it the only full face mask that won't cause red marks on the bridge of your nose when compared to other full face masks. The Dreamwear full face mask has a  soft feel, unique in-frame air-flow, and innovative air tube connection at the top of the head for the ultimate in sleep comfort. Comfort Gel Blue. Dreamwear gel pillows.     Ramon & Donna: Belinda Craft nasal pillow mask and Simplus FFM    The use of a memory foam CPAP pillow supports the head and neck throughout the night.

## 2020-10-30 RX ORDER — METHOCARBAMOL 500 MG/1
TABLET, FILM COATED ORAL
Qty: 60 TABLET | Refills: 3 | Status: SHIPPED | OUTPATIENT
Start: 2020-10-30 | End: 2021-03-09

## 2020-11-15 RX ORDER — LORATADINE 10 MG/1
TABLET ORAL
Qty: 30 TABLET | Refills: 5 | Status: SHIPPED | OUTPATIENT
Start: 2020-11-15 | End: 2020-11-16

## 2020-11-16 ENCOUNTER — TELEMEDICINE (OUTPATIENT)
Dept: PRIMARY CARE CLINIC | Age: 45
End: 2020-11-16
Payer: MEDICAID

## 2020-11-16 PROCEDURE — 1036F TOBACCO NON-USER: CPT | Performed by: NURSE PRACTITIONER

## 2020-11-16 PROCEDURE — G8427 DOCREV CUR MEDS BY ELIG CLIN: HCPCS | Performed by: NURSE PRACTITIONER

## 2020-11-16 PROCEDURE — G8417 CALC BMI ABV UP PARAM F/U: HCPCS | Performed by: NURSE PRACTITIONER

## 2020-11-16 PROCEDURE — 99213 OFFICE O/P EST LOW 20 MIN: CPT | Performed by: NURSE PRACTITIONER

## 2020-11-16 PROCEDURE — G8484 FLU IMMUNIZE NO ADMIN: HCPCS | Performed by: NURSE PRACTITIONER

## 2020-11-16 RX ORDER — AMOXICILLIN AND CLAVULANATE POTASSIUM 875; 125 MG/1; MG/1
1 TABLET, FILM COATED ORAL 2 TIMES DAILY
Qty: 20 TABLET | Refills: 0 | Status: SHIPPED | OUTPATIENT
Start: 2020-11-16 | End: 2020-11-26

## 2020-11-16 ASSESSMENT — ENCOUNTER SYMPTOMS
NAUSEA: 0
SORE THROAT: 1
COUGH: 0
SHORTNESS OF BREATH: 0
COLOR CHANGE: 0
VOMITING: 0
PHOTOPHOBIA: 0
RHINORRHEA: 0
VOICE CHANGE: 0
DIARRHEA: 0
BACK PAIN: 0

## 2020-11-16 NOTE — PROGRESS NOTES
[address] [address]             Phone:  [phone]  Fax:  [phone]       2020    TELEHEALTH EVALUATION -- Audio/Visual (During MXBRK-12 public health emergency)    HPI:  Chief Complaint   Patient presents with    Otalgia         Cinthia Meyer (:  1975) has requested an audio/video evaluation for the following concern(s):    Patient presents today for evaluation of sore throat and right ear pain. She states tonsils feel swollen and difficulty swallowing. She denies fever, cough, SOB, loss of taste or smell, no diarrhea. She has not taken any medications for her symptoms. Review of Systems   Constitutional: Negative for chills, fatigue and fever. HENT: Positive for ear pain and sore throat. Negative for hearing loss, rhinorrhea and voice change. Eyes: Negative for photophobia and visual disturbance. Respiratory: Negative for cough and shortness of breath. Cardiovascular: Negative for chest pain and palpitations. Gastrointestinal: Negative for diarrhea, nausea and vomiting. Endocrine: Negative. Negative for cold intolerance and heat intolerance. Genitourinary: Negative for difficulty urinating and flank pain. Musculoskeletal: Negative for back pain and neck pain. Skin: Negative for color change and rash. Allergic/Immunologic: Negative for environmental allergies and food allergies. Neurological: Positive for headaches. Negative for dizziness and speech difficulty. Hematological: Does not bruise/bleed easily. Psychiatric/Behavioral: Negative for sleep disturbance and suicidal ideas. Prior to Visit Medications    Medication Sig Taking?  Authorizing Provider   amoxicillin-clavulanate (AUGMENTIN) 875-125 MG per tablet Take 1 tablet by mouth 2 times daily for 10 days Yes JUSTIN Hernandez   loratadine (CLARITIN) 10 MG tablet TAKE 1 TABLET BY MOUTH EVERY DAY  JUSTIN Ott   methocarbamol (ROBAXIN) 500 MG tablet TAKE 1 TABLET BY MOUTH TWICE A DAY  JUSTIN Ott   omeprazole (PRILOSEC) 40 MG delayed release capsule TAKE 1 CAPSULE BY MOUTH EVERY DAY  JUSTIN Ott   naproxen (NAPROSYN) 500 MG tablet Take 1 tablet by mouth 2 times daily (with meals)  JUSTIN Ott   levocetirizine (XYZAL) 5 MG tablet Take 1 tablet by mouth nightly  JUSTIN Ott   COMBIVENT RESPIMAT  MCG/ACT AERS inhaler INHALE 1 PUFF EVERY 4HRS AS NEEDED FOR WHEEZING  JUSTIN Ott   fluticasone (FLONASE) 50 MCG/ACT nasal spray SPRAY 2 SPRAYS INTO EACH NOSTRIL EVERY DAY  JUSTIN Ott   propranolol (INDERAL) 40 MG tablet TAKE 1 TABLET BY MOUTH THREE TIMES A DAY  JUSTIN Ott   atorvastatin (LIPITOR) 20 MG tablet Take 1 tablet by mouth daily  JUSTIN Ott   levothyroxine (SYNTHROID) 50 MCG tablet Take 1 tablet by mouth daily  JUSTIN Ott   aspirin (ASPIRIN CHILDRENS) 81 MG chewable tablet Take 1 tablet by mouth daily  ESTHELA Miramontes       Social History     Tobacco Use    Smoking status: Former Smoker     Packs/day: 0.25     Years: 27.00     Pack years: 6.75     Last attempt to quit: 2009     Years since quittin.5    Smokeless tobacco: Former User    Tobacco comment: Not employed   Substance Use Topics    Alcohol use:  Yes     Alcohol/week: 0.0 standard drinks     Comment: rare    Drug use: No        Allergies   Allergen Reactions    Codeine Nausea And Vomiting, Swelling and Rash   ,   Past Medical History:   Diagnosis Date    Anxiety     Asthma     born with asthmatic bronchitis    CPAP (continuous positive airway pressure) dependence     6cm to 16cm    Degenerative disk disease     Depression     mood disorders    Hemorrhoid 2015    Hyperlipidemia     Hypoglycemia     Hypothyroid     Left ureteral stone 2016    OAB (overactive bladder)     Obstructive sleep apnea     AHI: 37.9 per PSG, 10/2019    Obstructive sleep apnea 2020    Renal stone 2016   ,   Past Surgical History:   Procedure Laterality Date    CHOLECYSTECTOMY  1995    COLONOSCOPY  2015    Dr. Lilian Arriola  11/4/15    Dr. Scotty Michele  11/16/15    Ellen Liza [address]  11/16/15    Hemorrhoidectomy & closed lateral internal sphincterotomy    HYSTEROSCOPY  11/4/15    Dr. Lisa Gabriel POLYPECTOMY  11/4/15    Dr. Allison Lopez  14 years ago   ,   Social History     Tobacco Use    Smoking status: Former Smoker     Packs/day: 0.25     Years: 27.00     Pack years: 6.75     Last attempt to quit: 2009     Years since quittin.5    Smokeless tobacco: Former User    Tobacco comment: Not employed   Substance Use Topics    Alcohol use:  Yes     Alcohol/week: 0.0 standard drinks     Comment: rare    Drug use: No   ,   Family History   Problem Relation Age of Onset    High Blood Pressure Mother     Other Mother         early alzheimers    High Blood Pressure Father     Diabetes Paternal Grandfather     Breast Cancer Paternal Aunt     Colon Cancer Neg Hx     Colon Polyps Neg Hx    ,   Immunization History   Administered Date(s) Administered    Tdap (Boostrix, Adacel) 2016   ,   Health Maintenance   Topic Date Due    Flu vaccine (1) 2020    Lipid screen  2021    TSH testing  2021    Potassium monitoring  2021    Creatinine monitoring  2021    Cervical cancer screen  2024    DTaP/Tdap/Td vaccine (2 - Td) 2026    HIV screen  Addressed    Hepatitis A vaccine  Aged Out    Hepatitis B vaccine  Aged Out    Hib vaccine  Aged Out    Meningococcal (ACWY) vaccine  Aged Out    Pneumococcal 0-64 years Vaccine  Aged Out       PHYSICAL EXAMINATION:  [ INSTRUCTIONS:  \"[x]\" Indicates a positive item  \"[]\" Indicates a negative item  -- DELETE ALL ITEMS NOT EXAMINED]  [x] Alert  [x] Oriented to person/place/time    [x] No apparent distress  [] Toxic appearing    [] Face flushed appearing [x] Sclera clear  [] Lips are cyanotic      [x] Breathing appears normal  [] Appears tachypneic      [] Rash on visible skin    [x] Cranial Nerves II-XII grossly intact    [x] Motor grossly intact in visible upper extremities    [x] Motor grossly intact in visible lower extremities    [x] Normal Mood  [] Anxious appearing    [] Depressed appearing  [] Confused appearing      [] Poor short term memory  [] Poor long term memory    [] OTHER:      Due to this being a TeleHealth encounter, evaluation of the following organ systems is limited: Vitals/Constitutional/EENT/Resp/CV/GI//MS/Neuro/Skin/Heme-Lymph-Imm. ASSESSMENT/PLAN:  1. Pharyngitis, unspecified etiology  - Discussed covid- like symptoms with patient today; given her symptoms I have less concern this is covid-19 but should her symptoms worsen or change she will need to let us know and we can have her tested. - amoxicillin-clavulanate (AUGMENTIN) 875-125 MG per tablet; Take 1 tablet by mouth 2 times daily for 10 days  Dispense: 20 tablet; Refill: 0    2. Otalgia of right ear    - amoxicillin-clavulanate (AUGMENTIN) 875-125 MG per tablet; Take 1 tablet by mouth 2 times daily for 10 days  Dispense: 20 tablet; Refill: 0      Return if symptoms worsen or fail to improve. An  electronic signature was used to authenticate this note. --JUSTIN Nina on 11/16/2020 at 12:10 PM        Pursuant to the emergency declaration under the [facility], [facility] waiver authority and the [employer] Act, this Virtual  Visit was conducted, with patient's consent, to reduce the patient's risk of exposure to COVID-19 and provide continuity of care for an established patient. Services were provided through a video synchronous discussion virtually to substitute for in-person clinic visit.

## 2020-12-04 ENCOUNTER — OFFICE VISIT (OUTPATIENT)
Dept: PRIMARY CARE CLINIC | Age: 45
End: 2020-12-04
Payer: MEDICAID

## 2020-12-04 PROCEDURE — 99211 OFF/OP EST MAY X REQ PHY/QHP: CPT | Performed by: NURSE PRACTITIONER

## 2020-12-04 PROCEDURE — G8417 CALC BMI ABV UP PARAM F/U: HCPCS | Performed by: NURSE PRACTITIONER

## 2020-12-04 PROCEDURE — G8428 CUR MEDS NOT DOCUMENT: HCPCS | Performed by: NURSE PRACTITIONER

## 2020-12-04 RX ORDER — CIPROFLOXACIN AND DEXAMETHASONE 3; 1 MG/ML; MG/ML
4 SUSPENSION/ DROPS AURICULAR (OTIC) 2 TIMES DAILY
Qty: 1 BOTTLE | Refills: 0 | Status: SHIPPED | OUTPATIENT
Start: 2020-12-04 | End: 2020-12-11

## 2020-12-04 NOTE — PROGRESS NOTES
[facility]   [address] [place] [address]     Phone:  [phone]  Fax:  [phone]      Ioana Salmeron is a 41 y.o. female who presents today for her medical conditions/complaints as noted below. Ioana Salmeron is c/o of Ear Fullness (checked patients ears for inpactioin)      Chief Complaint   Patient presents with    Ear Fullness     checked patients ears for inpactioin       HPI:     HPI    Ioana Salmeron presents today for right ear pain. This started a few days ago. She has not had treatment. She denies fevers or other symptoms. Past Medical History:   Diagnosis Date    Anxiety     Asthma     born with asthmatic bronchitis    CPAP (continuous positive airway pressure) dependence     6cm to 16cm    Degenerative disk disease     Depression     mood disorders    Hemorrhoid 2015    Hyperlipidemia     Hypoglycemia     Hypothyroid     Left ureteral stone 2016    OAB (overactive bladder)     Obstructive sleep apnea     AHI: 37.9 per PSG, 10/2019    Obstructive sleep apnea 2020    Renal stone 2016        Past Surgical History:   Procedure Laterality Date   [address]    COLONOSCOPY  2015    Dr. Juan Grace  11/4/15    Dr. Yumiko Sanchez  11/16/15    [address]  11/16/15    Hemorrhoidectomy & closed lateral internal sphincterotomy    HYSTEROSCOPY  11/4/15    Dr. Saleh Show POLYPECTOMY  11/4/15    Dr. Moriah Lock  14 years ago       Social History     Tobacco Use    Smoking status: Former Smoker     Packs/day: 0.25     Years: 27.00     Pack years: 6.75     Last attempt to quit: 2009     Years since quittin.6    Smokeless tobacco: Former User    Tobacco comment: Not employed   Substance Use Topics    Alcohol use:  Yes     Alcohol/week: 0.0 standard drinks     Comment: rare        Current Outpatient Medications   Medication Sig Dispense Refill    ciprofloxacin-dexamethasone (CIPRODEX) 0.3-0.1 % otic suspension Place 4 drops into the right ear 2 times daily for 7 days 1 Bottle 0    methocarbamol (ROBAXIN) 500 MG tablet TAKE 1 TABLET BY MOUTH TWICE A DAY 60 tablet 3    omeprazole (PRILOSEC) 40 MG delayed release capsule TAKE 1 CAPSULE BY MOUTH EVERY DAY 30 capsule 5    naproxen (NAPROSYN) 500 MG tablet Take 1 tablet by mouth 2 times daily (with meals) 60 tablet 5    levocetirizine (XYZAL) 5 MG tablet Take 1 tablet by mouth nightly 30 tablet 5    COMBIVENT RESPIMAT  MCG/ACT AERS inhaler INHALE 1 PUFF EVERY 4HRS AS NEEDED FOR WHEEZING 1 Inhaler 11    fluticasone (FLONASE) 50 MCG/ACT nasal spray SPRAY 2 SPRAYS INTO EACH NOSTRIL EVERY DAY 1 Bottle 11    propranolol (INDERAL) 40 MG tablet TAKE 1 TABLET BY MOUTH THREE TIMES A  tablet 3    atorvastatin (LIPITOR) 20 MG tablet Take 1 tablet by mouth daily 30 tablet 11    levothyroxine (SYNTHROID) 50 MCG tablet Take 1 tablet by mouth daily 30 tablet 11    aspirin (ASPIRIN CHILDRENS) 81 MG chewable tablet Take 1 tablet by mouth daily 30 tablet 0     No current facility-administered medications for this visit. Allergies   Allergen Reactions    Codeine Nausea And Vomiting, Swelling and Rash       Family History   Problem Relation Age of Onset    High Blood Pressure Mother     Other Mother         early alzheimers    High Blood Pressure Father     Diabetes Paternal Grandfather     Breast Cancer Paternal Aunt     Colon Cancer Neg Hx     Colon Polyps Neg Hx                Review of Systems   HENT: Positive for ear pain. Objective:     Physical Exam  HENT:      Right Ear: Tenderness present. Ears: There were no vitals taken for this visit. Assessment:      Diagnosis Orders   1. Acute diffuse otitis externa of right ear  ciprofloxacin-dexamethasone (CIPRODEX) 0.3-0.1 % otic suspension       No results found for this visit on 12/04/20.     Plan:     Ciprodex for right ear pain and otitis externa. Return if symptoms worsen or fail to improve. No orders of the defined types were placed in this encounter. Orders Placed This Encounter   Medications    ciprofloxacin-dexamethasone (CIPRODEX) 0.3-0.1 % otic suspension     Sig: Place 4 drops into the right ear 2 times daily for 7 days     Dispense:  1 Bottle     Refill:  0        Patient offered educational materials - see patient instructions for any instruction needed. Discussed use, benefit, and side effects of prescribed medications. All patient questions answered. Instructed to continue current medications, diet and exercise. Patient agreed with treatment plan. Follow up as directed. Patient was advised to go to the ED if condition ever becomes emergent. EMR Dragon/transcription disclaimer: Some of this encounter note is an electronic transcription/translation of spoken language to printed text. The electronic translation of spoken language may permit erroneous, or at times, nonsensical words or phrases to be inadvertently transcribed.  Although I have reviewed the note for such errors, some may still exist.      Electronically signed by JUSTIN Luciano on 12/4/2020 at 10:39 AM

## 2020-12-07 RX ORDER — OFLOXACIN 3 MG/ML
1 SOLUTION/ DROPS OPHTHALMIC 4 TIMES DAILY
Qty: 1 BOTTLE | Refills: 1 | Status: SHIPPED | OUTPATIENT
Start: 2020-12-07 | End: 2020-12-17

## 2020-12-18 ENCOUNTER — VIRTUAL VISIT (OUTPATIENT)
Dept: PRIMARY CARE CLINIC | Age: 45
End: 2020-12-18
Payer: MEDICAID

## 2020-12-18 PROCEDURE — 99213 OFFICE O/P EST LOW 20 MIN: CPT | Performed by: STUDENT IN AN ORGANIZED HEALTH CARE EDUCATION/TRAINING PROGRAM

## 2020-12-18 ASSESSMENT — ENCOUNTER SYMPTOMS
ABDOMINAL PAIN: 0
DIARRHEA: 0
SHORTNESS OF BREATH: 0
COUGH: 0
EYE DISCHARGE: 0
SORE THROAT: 0
EYE PAIN: 0
NAUSEA: 0
RHINORRHEA: 0

## 2020-12-18 NOTE — PROGRESS NOTES
2020    TELEHEALTH EVALUATION -- Audio/Visual (During NKZCR-60 public health emergency)    HPI:    Rayo Stewart (:  1975) has requested an audio/video evaluation for the following concern(s):    Pt following up on otitis externa. Pt initially had problems getting rx but has completed treatment and says sx resolved. No other concerns today. Pt record reviewed. H/o hypothyroidism, labs due 2021. Pt is aware to get labs done prior to next scheduled appt. Review of Systems   Constitutional: Negative for chills, fever and unexpected weight change. HENT: Negative for congestion, ear discharge, ear pain, rhinorrhea and sore throat. Eyes: Negative for pain and discharge. Respiratory: Negative for cough and shortness of breath. Cardiovascular: Negative for chest pain. Gastrointestinal: Negative for abdominal pain, diarrhea and nausea. Genitourinary: Negative for dysuria and hematuria. Musculoskeletal: Negative for arthralgias and joint swelling. Skin: Negative for rash. Neurological: Negative for weakness, numbness and headaches. Psychiatric/Behavioral: Negative for dysphoric mood. The patient is not nervous/anxious. Prior to Visit Medications    Medication Sig Taking?  Authorizing Provider   methocarbamol (ROBAXIN) 500 MG tablet TAKE 1 TABLET BY MOUTH TWICE A DAY  JUSTIN Ott   omeprazole (PRILOSEC) 40 MG delayed release capsule TAKE 1 CAPSULE BY MOUTH EVERY DAY  JUSTIN Ott   naproxen (NAPROSYN) 500 MG tablet Take 1 tablet by mouth 2 times daily (with meals)  JUSTIN Ott   levocetirizine (XYZAL) 5 MG tablet Take 1 tablet by mouth nightly  JUSTIN Ott   COMBIVENT RESPIMAT  MCG/ACT AERS inhaler INHALE 1 PUFF EVERY 4HRS AS NEEDED FOR WHEEZING  JUSTIN Ott   fluticasone (FLONASE) 50 MCG/ACT nasal spray SPRAY 2 SPRAYS INTO EACH NOSTRIL EVERY DAY  JUSTIN Clark propranolol (INDERAL) 40 MG tablet TAKE 1 TABLET BY MOUTH THREE TIMES A DAY  JUSTIN Ott   atorvastatin (LIPITOR) 20 MG tablet Take 1 tablet by mouth daily  JUSTIN Ott   levothyroxine (SYNTHROID) 50 MCG tablet Take 1 tablet by mouth daily  JUSTIN Ott   aspirin (ASPIRIN CHILDRENS) 81 MG chewable tablet Take 1 tablet by mouth daily  ESTHELA Dinh       Social History     Tobacco Use    Smoking status: Former Smoker     Packs/day: 0.25     Years: 27.00     Pack years: 6.75     Quit date: 2009     Years since quittin.6    Smokeless tobacco: Former User    Tobacco comment: Not employed   Substance Use Topics    Alcohol use:  Yes     Alcohol/week: 0.0 standard drinks     Comment: rare    Drug use: No        Allergies   Allergen Reactions    Codeine Nausea And Vomiting, Swelling and Rash   ,   Past Medical History:   Diagnosis Date    Anxiety     Asthma     born with asthmatic bronchitis    CPAP (continuous positive airway pressure) dependence     6cm to 16cm    Degenerative disk disease     Depression     mood disorders    Hemorrhoid 2015    Hyperlipidemia     Hypoglycemia     Hypothyroid     Left ureteral stone 2016    OAB (overactive bladder)     Obstructive sleep apnea     AHI: 37.9 per PSG, 10/2019    Obstructive sleep apnea 2020    Renal stone 2016   ,   Past Surgical History:   Procedure Laterality Date   [address]    COLONOSCOPY  2015    Dr. Layla Clemons  11/4/15    Dr. Letty Casas  11/16/15    [address]  11/16/15    Hemorrhoidectomy & closed lateral internal sphincterotomy    HYSTEROSCOPY  11/4/15    Dr. Elisabeth Neves POLYPECTOMY  11/4/15    Dr. Marcio Sol  14 years ago   ,   Social History     Tobacco Use    Smoking status: Former Smoker     Packs/day: 0.25     Years: 27.00     Pack years: 6.75     Quit date: 2009 Years since quittin.6    Smokeless tobacco: Former User    Tobacco comment: Not employed   Substance Use Topics    Alcohol use: Yes     Alcohol/week: 0.0 standard drinks     Comment: rare    Drug use: No   ,   Family History   Problem Relation Age of Onset    High Blood Pressure Mother     Other Mother         early alzheimers    High Blood Pressure Father     Diabetes Paternal Grandfather     Breast Cancer Paternal Aunt     Colon Cancer Neg Hx     Colon Polyps Neg Hx    ,   Immunization History   Administered Date(s) Administered    Tdap (Boostrix, Adacel) 2016   ,   Health Maintenance   Topic Date Due    Flu vaccine (1) 2020    Lipid screen  2021    TSH testing  2021    Potassium monitoring  2021    Creatinine monitoring  2021    Cervical cancer screen  2024    DTaP/Tdap/Td vaccine (2 - Td) 2026    HIV screen  Addressed    Hepatitis A vaccine  Aged Out    Hepatitis B vaccine  Aged Out    Hib vaccine  Aged Out    Meningococcal (ACWY) vaccine  Aged Out    Pneumococcal 0-64 years Vaccine  Aged Out       PHYSICAL EXAMINATION:  [ INSTRUCTIONS:  \"[x]\" Indicates a positive item  \"[]\" Indicates a negative item  -- DELETE ALL ITEMS NOT EXAMINED]  Vital Signs: (As obtained by patient/caregiver or practitioner observation)    Blood pressure-  Heart rate-    Respiratory rate-    Temperature-  Pulse oximetry-     Constitutional: [x] Appears well-developed and well-nourished [x] No apparent distress      [] Abnormal-   Mental status  [x] Alert and awake  [] Oriented to person/place/time []Able to follow commands      Eyes:  EOM    [x]  Normal  [] Abnormal-  Sclera  [x]  Normal  [] Abnormal -         Discharge [x]  None visible  [] Abnormal -    HENT:   [x] Normocephalic, atraumatic.   [] Abnormal   [x] Mouth/Throat: Mucous membranes are moist.     External Ears [x] Normal  [] Abnormal-     Neck: [x] No visualized mass Pulmonary/Chest: [x] Respiratory effort normal.  [x] No visualized signs of difficulty breathing or respiratory distress        [] Abnormal-      Musculoskeletal:   [x] Normal gait with no signs of ataxia         [x] Normal range of motion of neck        [] Abnormal-       Neurological:        [x] No Facial Asymmetry (Cranial nerve 7 motor function) (limited exam to video visit)          [x] No gaze palsy        [] Abnormal-         Skin:        [x] No significant exanthematous lesions or discoloration noted on facial skin         [] Abnormal-            Psychiatric:       [x] Normal Affect [x] No Hallucinations        [] Abnormal-     Other pertinent observable physical exam findings-     ASSESSMENT/PLAN:  1. Acute diffuse otitis externa of right ear  -Resolved    2. Acquired hypothyroidism  -Continue levothyroxine at current dose, recheck labs 02/2021 and f/u appt shortly thereafter. Ioana Salmeron is a 41 y.o. female being evaluated by a Virtual Visit (video visit) encounter to address concerns as mentioned above. A caregiver was present when appropriate. Due to this being a TeleHealth encounter (During DNCNP-63 public health emergency), evaluation of the following organ systems was limited: Vitals/Constitutional/EENT/Resp/CV/GI//MS/Neuro/Skin/Heme-Lymph-Imm. Pursuant to the emergency declaration under the [address] [place] [address] authority and the [employer] Act, this Virtual Visit was conducted with patient's (and/or legal guardian's) consent, to reduce the patient's risk of exposure to COVID-19 and provide necessary medical care. The patient (and/or legal guardian) has also been advised to contact this office for worsening conditions or problems, and seek emergency medical treatment and/or call 911 if deemed necessary.      Patient identification was verified at the start of the visit: Yes Total time spent on this encounter: Not billed by time    Services were provided through a video synchronous discussion virtually to substitute for in-person clinic visit. Patient and provider were located at their individual homes. --Guy Scott MD on 12/18/2020 at 9:03 AM    An electronic signature was used to authenticate this note.

## 2021-01-18 ENCOUNTER — OFFICE VISIT (OUTPATIENT)
Dept: SURGERY | Age: 46
End: 2021-01-18
Payer: MEDICAID

## 2021-01-18 ENCOUNTER — HOSPITAL ENCOUNTER (OUTPATIENT)
Dept: WOMENS IMAGING | Age: 46
Discharge: HOME | End: 2021-01-18
Payer: MEDICAID

## 2021-01-18 VITALS
HEART RATE: 70 BPM | BODY MASS INDEX: 34.55 KG/M2 | DIASTOLIC BLOOD PRESSURE: 82 MMHG | SYSTOLIC BLOOD PRESSURE: 132 MMHG | TEMPERATURE: 98.5 F | WEIGHT: 176 LBS | HEIGHT: 60 IN

## 2021-01-18 DIAGNOSIS — Z12.31 VISIT FOR SCREENING MAMMOGRAM: Primary | ICD-10-CM

## 2021-01-18 DIAGNOSIS — Z12.31 VISIT FOR SCREENING MAMMOGRAM: ICD-10-CM

## 2021-01-18 PROCEDURE — 99213 OFFICE O/P EST LOW 20 MIN: CPT | Performed by: PHYSICIAN ASSISTANT

## 2021-01-18 PROCEDURE — 1036F TOBACCO NON-USER: CPT | Performed by: PHYSICIAN ASSISTANT

## 2021-01-18 PROCEDURE — G8417 CALC BMI ABV UP PARAM F/U: HCPCS | Performed by: PHYSICIAN ASSISTANT

## 2021-01-18 PROCEDURE — G8484 FLU IMMUNIZE NO ADMIN: HCPCS | Performed by: PHYSICIAN ASSISTANT

## 2021-01-18 PROCEDURE — G8427 DOCREV CUR MEDS BY ELIG CLIN: HCPCS | Performed by: PHYSICIAN ASSISTANT

## 2021-01-18 PROCEDURE — 77063 BREAST TOMOSYNTHESIS BI: CPT

## 2021-03-05 DIAGNOSIS — I10 ESSENTIAL HYPERTENSION: ICD-10-CM

## 2021-03-05 RX ORDER — PROPRANOLOL HYDROCHLORIDE 40 MG/1
TABLET ORAL
Qty: 270 TABLET | Refills: 3 | Status: SHIPPED | OUTPATIENT
Start: 2021-03-05

## 2021-03-09 DIAGNOSIS — M25.552 LEFT HIP PAIN: ICD-10-CM

## 2021-03-09 DIAGNOSIS — M51.9 LUMBAR DISC DISEASE: ICD-10-CM

## 2021-03-09 RX ORDER — METHOCARBAMOL 500 MG/1
TABLET, FILM COATED ORAL
Qty: 60 TABLET | Refills: 3 | Status: SHIPPED | OUTPATIENT
Start: 2021-03-09 | End: 2021-07-11

## 2021-03-19 ENCOUNTER — OFFICE VISIT (OUTPATIENT)
Dept: PRIMARY CARE CLINIC | Age: 46
End: 2021-03-19
Payer: MEDICAID

## 2021-03-19 VITALS
HEART RATE: 73 BPM | WEIGHT: 179 LBS | OXYGEN SATURATION: 98 % | DIASTOLIC BLOOD PRESSURE: 76 MMHG | BODY MASS INDEX: 35.14 KG/M2 | SYSTOLIC BLOOD PRESSURE: 126 MMHG | TEMPERATURE: 97.2 F | RESPIRATION RATE: 20 BRPM | HEIGHT: 60 IN

## 2021-03-19 DIAGNOSIS — E03.9 ACQUIRED HYPOTHYROIDISM: ICD-10-CM

## 2021-03-19 DIAGNOSIS — H65.194 OTHER RECURRENT ACUTE NONSUPPURATIVE OTITIS MEDIA OF RIGHT EAR: ICD-10-CM

## 2021-03-19 DIAGNOSIS — E78.2 MIXED HYPERLIPIDEMIA: ICD-10-CM

## 2021-03-19 DIAGNOSIS — I10 ESSENTIAL HYPERTENSION: ICD-10-CM

## 2021-03-19 DIAGNOSIS — I10 ESSENTIAL HYPERTENSION: Primary | ICD-10-CM

## 2021-03-19 DIAGNOSIS — E66.9 OBESITY (BMI 30.0-34.9): ICD-10-CM

## 2021-03-19 LAB
ANION GAP SERPL CALCULATED.3IONS-SCNC: 9 MMOL/L (ref 7–19)
BUN BLDV-MCNC: 9 MG/DL (ref 6–20)
CALCIUM SERPL-MCNC: 9.4 MG/DL (ref 8.6–10)
CHLORIDE BLD-SCNC: 100 MMOL/L (ref 98–111)
CO2: 33 MMOL/L (ref 22–29)
CREAT SERPL-MCNC: 0.7 MG/DL (ref 0.5–0.9)
GFR AFRICAN AMERICAN: >59
GFR NON-AFRICAN AMERICAN: >60
GLUCOSE BLD-MCNC: 89 MG/DL (ref 74–109)
POTASSIUM SERPL-SCNC: 3.2 MMOL/L (ref 3.5–5)
SODIUM BLD-SCNC: 142 MMOL/L (ref 136–145)
TSH REFLEX FT4: 2.73 UIU/ML (ref 0.35–5.5)

## 2021-03-19 PROCEDURE — G8417 CALC BMI ABV UP PARAM F/U: HCPCS | Performed by: STUDENT IN AN ORGANIZED HEALTH CARE EDUCATION/TRAINING PROGRAM

## 2021-03-19 PROCEDURE — G8427 DOCREV CUR MEDS BY ELIG CLIN: HCPCS | Performed by: STUDENT IN AN ORGANIZED HEALTH CARE EDUCATION/TRAINING PROGRAM

## 2021-03-19 PROCEDURE — 99214 OFFICE O/P EST MOD 30 MIN: CPT | Performed by: STUDENT IN AN ORGANIZED HEALTH CARE EDUCATION/TRAINING PROGRAM

## 2021-03-19 PROCEDURE — 1036F TOBACCO NON-USER: CPT | Performed by: STUDENT IN AN ORGANIZED HEALTH CARE EDUCATION/TRAINING PROGRAM

## 2021-03-19 PROCEDURE — G8484 FLU IMMUNIZE NO ADMIN: HCPCS | Performed by: STUDENT IN AN ORGANIZED HEALTH CARE EDUCATION/TRAINING PROGRAM

## 2021-03-19 ASSESSMENT — PATIENT HEALTH QUESTIONNAIRE - PHQ9
2. FEELING DOWN, DEPRESSED OR HOPELESS: 0
SUM OF ALL RESPONSES TO PHQ9 QUESTIONS 1 & 2: 0
DEPRESSION UNABLE TO ASSESS: FUNCTIONAL CAPACITY MOTIVATION LIMITS ACCURACY
SUM OF ALL RESPONSES TO PHQ QUESTIONS 1-9: 0
SUM OF ALL RESPONSES TO PHQ QUESTIONS 1-9: 0

## 2021-03-19 NOTE — PROGRESS NOTES
[facility]  [address] [address]  [address] [address]  Dept: [phone]  Dept Fax: [phone]  Loc: [phone]      Subjective:     Chief Complaint   Patient presents with    Hypertension    Hyperlipidemia    Thyroid Problem       HPI:  Sisi Taylor is a 40 y.o. female presenting for    Hypothyroidism   Due for labs. On levothyroxine 50mcg. No change in sx    HLD   On lipitor 20mg. Labs from 2020 reviewed. HTN  · Is on propanolol for anxiety however not on any other agents. · Denies HA, visual changes, cp, palpitations, or swelling    Ear infections  · Notes 4 in last year, all in R ear. She describes last episode several weeks ago she used leftover abx drops which resolved sx. No discharge or pain at this time. In this period notes BL tinnitus, no vertigo or hearing difficulty. Has h/o listening to loud music, no other injuries.  No problems w/ ear infections in past.    ROS:   Review of Systems  Reviewed with patient and noted to be negative except that listed in HPI    PMHx:  Past Medical History:   Diagnosis Date    Anxiety     Asthma     born with asthmatic bronchitis    CPAP (continuous positive airway pressure) dependence     6cm to 16cm    Degenerative disk disease     Depression     mood disorders    Hemorrhoid 11/2015    Hyperlipidemia     Hypoglycemia     Hypothyroid     Left ureteral stone 5/25/2016    OAB (overactive bladder)     Obstructive sleep apnea     AHI: 37.9 per PSG, 10/2019    Obstructive sleep apnea 4/30/2020    Renal stone 5/25/2016     Patient Active Problem List   Diagnosis    External hemorrhoid    Internal hemorrhoids    Non morbid obesity due to excess calories    Essential hypertension    Psychophysiologic insomnia    Anxiety    Hypothyroidism    Hyperlipidemia    CPAP (continuous positive airway pressure) dependence    Obstructive sleep apnea       PSHx:  Past Surgical History:   Procedure Laterality Date    BREAST BIOPSY Left 2018    benign calcification    CHOLECYSTECTOMY  1995    COLONOSCOPY  2015    Dr. Kylie Tilley  11/4/15    Dr. Jessica Jacob  11/16/15    [address]  11/16/15    Hemorrhoidectomy & closed lateral internal sphincterotomy    HYSTEROSCOPY  11/4/15    Dr. Roro Claudio POLYPECTOMY  11/4/15    Dr. Dorothea Toth  14 years ago       PFHx:  Family History   Problem Relation Age of Onset    High Blood Pressure Mother     Other Mother         early alzheimers    High Blood Pressure Father     Diabetes Paternal Grandfather     Breast Cancer Paternal Aunt 29    Colon Cancer Neg Hx     Colon Polyps Neg Hx        SocialHx:  Social History     Tobacco Use    Smoking status: Former Smoker     Packs/day: 0.25     Years: 27.00     Pack years: 6.75     Quit date: 2009     Years since quittin.9    Smokeless tobacco: Former User    Tobacco comment: Not employed   Substance Use Topics    Alcohol use: Yes     Alcohol/week: 0.0 standard drinks     Comment: rare       Allergies:   Allergies   Allergen Reactions    Codeine Nausea And Vomiting, Swelling and Rash       Medications:  Current Outpatient Medications   Medication Sig Dispense Refill    methocarbamol (ROBAXIN) 500 MG tablet TAKE 1 TABLET BY MOUTH TWICE A DAY 60 tablet 3    naproxen (NAPROSYN) 500 MG tablet TAKE 1 TABLET BY MOUTH TWICE A DAY WITH MEALS 60 tablet 5    propranolol (INDERAL) 40 MG tablet TAKE 1 TABLET BY MOUTH THREE TIMES A  tablet 3    levothyroxine (SYNTHROID) 50 MCG tablet TAKE 1 TABLET BY MOUTH EVERY DAY 30 tablet 11    omeprazole (PRILOSEC) 40 MG delayed release capsule TAKE 1 CAPSULE BY MOUTH EVERY DAY 30 capsule 5    levocetirizine (XYZAL) 5 MG tablet Take 1 tablet by mouth nightly 30 tablet 5    COMBIVENT RESPIMAT  MCG/ACT AERS inhaler INHALE 1 PUFF EVERY 4HRS AS NEEDED FOR WHEEZING 1 Inhaler 11    fluticasone (FLONASE) 50 MCG/ACT nasal spray SPRAY 2 SPRAYS INTO EACH NOSTRIL EVERY DAY 1 Bottle 11    atorvastatin (LIPITOR) 20 MG tablet Take 1 tablet by mouth daily 30 tablet 11    aspirin (ASPIRIN CHILDRENS) 81 MG chewable tablet Take 1 tablet by mouth daily 30 tablet 0     No current facility-administered medications for this visit. Objective:   PE:  /76   Pulse 73   Temp 97.2 °F (36.2 °C) (Temporal)   Resp 20   Ht 5' (1.524 m)   Wt 179 lb (81.2 kg)   SpO2 98%   BMI 34.96 kg/m²   Physical Exam  Constitutional:       General: She is not in acute distress. Appearance: Normal appearance. HENT:      Head: Normocephalic. Comments: Cerumen blocking clear visualization L TM. Do not see any abnormal findings of R TM     Nose: Nose normal.      Mouth/Throat:      Mouth: Mucous membranes are moist.      Pharynx: Oropharynx is clear. No oropharyngeal exudate or posterior oropharyngeal erythema. Eyes:      General: No scleral icterus. Extraocular Movements: Extraocular movements intact. Conjunctiva/sclera: Conjunctivae normal.   Neck:      Musculoskeletal: Normal range of motion. Cardiovascular:      Rate and Rhythm: Normal rate and regular rhythm. Pulses: Normal pulses. Heart sounds: No murmur. Pulmonary:      Effort: Pulmonary effort is normal.      Breath sounds: Normal breath sounds. Abdominal:      General: There is no distension. Palpations: Abdomen is soft. There is no mass. Tenderness: There is no abdominal tenderness. Musculoskeletal: Normal range of motion. Skin:     General: Skin is warm and dry. Capillary Refill: Capillary refill takes less than 2 seconds. Neurological:      General: No focal deficit present. Mental Status: She is alert and oriented to person, place, and time. Psychiatric:         Mood and Affect: Mood normal.         Behavior: Behavior normal.         Thought Content:  Thought content normal.            Assessment & Taj Briceño was seen today for hypertension, hyperlipidemia and thyroid problem. Diagnoses and all orders for this visit:    Essential hypertension  -     Basic Metabolic Panel; Future    Mixed hyperlipidemia  -Continue lipitor 20mg    Obesity (BMI 30.0-34. 9)    Acquired hypothyroidism  -Continue levothyroxine 50mcg  -     TSH WITH REFLEX TO FT4; Future    Other recurrent acute nonsuppurative otitis media of right ear  -Discussed tinnitus may be early feature of hearing loss. Recommend ENT eval for repeat ear infections  -     Hunter Lyons, Otolaryngology, [place]      Return in about 1 year (around 3/19/2022). All questions were answered. Medications, including possible adverse effects, and instructions were reviewed and  understanding was confirmed. Follow-up recommendations, including when to contact or return to office (ie; if symptoms worsen or fail to improve), were discussed and acknowledged.     Electronically signed by Hardy Moritz, MD on 3/19/21 at 7:59 AM CDT

## 2021-04-01 ENCOUNTER — OFFICE VISIT (OUTPATIENT)
Dept: ENT CLINIC | Age: 46
End: 2021-04-01
Payer: MEDICAID

## 2021-04-01 ENCOUNTER — PROCEDURE VISIT (OUTPATIENT)
Dept: ENT CLINIC | Age: 46
End: 2021-04-01
Payer: MEDICAID

## 2021-04-01 VITALS
HEIGHT: 60 IN | DIASTOLIC BLOOD PRESSURE: 78 MMHG | BODY MASS INDEX: 34.75 KG/M2 | SYSTOLIC BLOOD PRESSURE: 128 MMHG | WEIGHT: 177 LBS

## 2021-04-01 DIAGNOSIS — R42 DIZZINESS: ICD-10-CM

## 2021-04-01 DIAGNOSIS — H92.01 OTALGIA OF RIGHT EAR: Primary | ICD-10-CM

## 2021-04-01 DIAGNOSIS — H93.13 TINNITUS OF BOTH EARS: Primary | ICD-10-CM

## 2021-04-01 DIAGNOSIS — H93.13 TINNITUS AURIUM, BILATERAL: ICD-10-CM

## 2021-04-01 DIAGNOSIS — H93.8X1 SENSATION OF FULLNESS IN RIGHT EAR: ICD-10-CM

## 2021-04-01 PROCEDURE — 1036F TOBACCO NON-USER: CPT | Performed by: PHYSICIAN ASSISTANT

## 2021-04-01 PROCEDURE — G8417 CALC BMI ABV UP PARAM F/U: HCPCS | Performed by: PHYSICIAN ASSISTANT

## 2021-04-01 PROCEDURE — 92567 TYMPANOMETRY: CPT | Performed by: AUDIOLOGIST

## 2021-04-01 PROCEDURE — 99202 OFFICE O/P NEW SF 15 MIN: CPT | Performed by: PHYSICIAN ASSISTANT

## 2021-04-01 PROCEDURE — G8427 DOCREV CUR MEDS BY ELIG CLIN: HCPCS | Performed by: PHYSICIAN ASSISTANT

## 2021-04-01 PROCEDURE — 92557 COMPREHENSIVE HEARING TEST: CPT | Performed by: AUDIOLOGIST

## 2021-04-01 ASSESSMENT — ENCOUNTER SYMPTOMS
SORE THROAT: 0
EYE PAIN: 0
TROUBLE SWALLOWING: 0
SINUS PRESSURE: 0
FACIAL SWELLING: 0
SINUS PAIN: 0
EYE DISCHARGE: 0
VOICE CHANGE: 0
RHINORRHEA: 0

## 2021-04-01 NOTE — PROGRESS NOTES
[facility] OTOLARYNGOLOGY/ENT  Clayton Perkins is a pleasant 69-year-old  female that was referred by Dr. Sreekanth Menard due to complaints of right ear pain. She reports that she has had multiple infections from her ear that have been treated with antibiotic therapy. She also admits to worsening tinnitus that has been present for at least 9 months. She describes the tinnitus as a high pitch sound from both ears. She reports that she believes this is worse on the right compared to the left. She also reports of noticing some loss of hearing due to turning the TV up more than usual.  Clayton Perkins admits to shooting guns frequently and is right-handed. She also reports that she wears headphones and listens to her music loudly. Allergies: Codeine      Current Outpatient Medications   Medication Sig Dispense Refill    atorvastatin (LIPITOR) 20 MG tablet TAKE 1 TABLET BY MOUTH EVERY DAY 30 tablet 11    levocetirizine (XYZAL) 5 MG tablet TAKE 1 TABLET BY MOUTH EVERY DAY AT NIGHT 30 tablet 5    methocarbamol (ROBAXIN) 500 MG tablet TAKE 1 TABLET BY MOUTH TWICE A DAY 60 tablet 3    naproxen (NAPROSYN) 500 MG tablet TAKE 1 TABLET BY MOUTH TWICE A DAY WITH MEALS 60 tablet 5    propranolol (INDERAL) 40 MG tablet TAKE 1 TABLET BY MOUTH THREE TIMES A  tablet 3    levothyroxine (SYNTHROID) 50 MCG tablet TAKE 1 TABLET BY MOUTH EVERY DAY 30 tablet 11    omeprazole (PRILOSEC) 40 MG delayed release capsule TAKE 1 CAPSULE BY MOUTH EVERY DAY 30 capsule 5    COMBIVENT RESPIMAT  MCG/ACT AERS inhaler INHALE 1 PUFF EVERY 4HRS AS NEEDED FOR WHEEZING 1 Inhaler 11    fluticasone (FLONASE) 50 MCG/ACT nasal spray SPRAY 2 SPRAYS INTO EACH NOSTRIL EVERY DAY 1 Bottle 11    aspirin (ASPIRIN CHILDRENS) 81 MG chewable tablet Take 1 tablet by mouth daily 30 tablet 0     No current facility-administered medications for this visit.         Past Surgical History:   Procedure Laterality Date    BREAST BIOPSY Left 2018    benign calcification    CHOLECYSTECTOMY  1995    COLONOSCOPY  2015    Dr. Ball Ripa  11/4/15    Dr. Tarah Melendrez  11/16/15    [address]  11/16/15    Hemorrhoidectomy & closed lateral internal sphincterotomy    HYSTEROSCOPY  11/4/15    Dr. Jesse Galdamez POLYPECTOMY  11/4/15    Dr. Samaria Reagan  14 years ago       Past Medical History:   Diagnosis Date    Anxiety     Asthma     born with asthmatic bronchitis    CPAP (continuous positive airway pressure) dependence     6cm to 16cm    Degenerative disk disease     Depression     mood disorders    Hemorrhoid 2015    Hyperlipidemia     Hypoglycemia     Hypothyroid     Left ureteral stone 2016    OAB (overactive bladder)     Obstructive sleep apnea     AHI: 37.9 per PSG, 10/2019    Obstructive sleep apnea 2020    Renal stone 2016       Family History   Problem Relation Age of Onset    High Blood Pressure Mother     Other Mother         early alzheimers    High Blood Pressure Father     Diabetes Paternal Grandfather     Breast Cancer Paternal Aunt 29    Colon Cancer Neg Hx     Colon Polyps Neg Hx        Social History     Tobacco Use    Smoking status: Former Smoker     Packs/day: 0.25     Years: 27.00     Pack years: 6.75     Quit date: 2009     Years since quittin.9    Smokeless tobacco: Former User    Tobacco comment: Not employed   Substance Use Topics    Alcohol use: Yes     Alcohol/week: 0.0 standard drinks     Comment: rare           REVIEW OF SYSTEMS:  all other systems reviewed and are negative  Review of Systems   Constitutional: Negative for chills and fever. HENT: Positive for ear pain, hearing loss and tinnitus. Negative for congestion, dental problem, ear discharge, facial swelling, nosebleeds, postnasal drip, rhinorrhea, sinus pressure, sinus pain, sneezing, sore throat, trouble swallowing and voice change.     Eyes: Negative for pain and discharge. Neurological: Negative for dizziness and headaches. Comments:     PHYSICAL EXAM:    /78   Ht 5' (1.524 m)   Wt 177 lb (80.3 kg)   BMI 34.57 kg/m²   Body mass index is 34.57 kg/m². General Appearance: well developed  and well nourished  Head/ Face: normocephalic and atraumatic  Vocal Quality: good/ normal  Ears: Right Ear: External: external ears normal Otoscopy Ear Canal: canal clear Otoscopy TM: TM's normal and TM's mobile Left Ear: External: external ears normal Otoscopy Ear Canal: canal clear Otoscopy TM: TM's normal and TM's mobile  Hearing: grossly intact  Nose: nares normal and septum midline  Neck: supple and adenopathy none palpable  Thyroid: normal and nodules No    Assessment & Plan:    Problem List Items Addressed This Visit     Otalgia of right ear     Aural fullness to the right ear with tinnitus-suspicious for hearing loss with normal physical exam  Plan: I will have the patient to proceed with audiology studies. I will also give her a trial of Lipo flavonoid for the tinnitus. Audiology studies were reviewed after completion. She was noted with normal hearing bilaterally with no deficit. Given her normal audiology studies as well as her symptoms, this seems suspicious for possible vestibular migraines. I advised the patient if she does not improve to call for further evaluation. Tinnitus aurium, bilateral - Primary          No orders of the defined types were placed in this encounter. No orders of the defined types were placed in this encounter. Electronically signed by Deacon Cochran PA-C on 4/1/21 at 11:33 AM CDT          Please note that this chart was generated using dragon dictation software. Although every effort was made to ensure the accuracy of this automated transcription, some errors in transcription may have occurred.

## 2021-04-01 NOTE — PROGRESS NOTES
History   Efraín Rodriguez is a 40 y.o. female who presented to the clinic this date with complaints of right aural fullness and bilateral tinnitus. She also reported history of vertigo and migraines. She reported episodes of vertigo 3-4 times a month. During episodes she has headaches and noted aural fullness and tinnitus are worse, She also noted decreased hearing in both ears during episodes. Summary   Tympanometry consistent with normal TM mobility bilaterally. Pure tone testing indicates normal hearing bilaterally with a slight right asymmetry at 1 and 2 kHz. Word recognition scores were excellent bilaterally. 2kHz bone notch in both ears suggests possible ossicular fixation. Patient was counseled on causes of and management strategies for tinnitus. Results   Otoscopy:    Right: Clear EAC/Normal TM   Left: Clear EAC/Normal TM    Audiometry:    Right: Hearing WNL     Left: Hearing WNL           Tympanometry:     Right: Type A   Left: Type A    Plan   Results of today's testing were discussed with Ms. Pickard and the following recommendations were made:    1. Follow up with ENT as scheduled. 2. Return to clinic for same day check of hearing if vertigo returns. 3. Monitor hearing yearly, sooner with changes. 4. Hearing protection as warranted.       Audiogram and Acoustic Immittance

## 2021-04-01 NOTE — ASSESSMENT & PLAN NOTE
Aural fullness to the right ear with tinnitus-suspicious for hearing loss with normal physical exam  Plan: I will have the patient to proceed with audiology studies. I will also give her a trial of Lipo flavonoid for the tinnitus. Audiology studies were reviewed after completion. She was noted with normal hearing bilaterally with no deficit. Given her normal audiology studies as well as her symptoms, this seems suspicious for possible vestibular migraines. I advised the patient if she does not improve to call for further evaluation.

## 2021-04-29 ENCOUNTER — TELEPHONE (OUTPATIENT)
Dept: NEUROLOGY | Age: 46
End: 2021-04-29

## 2021-04-29 NOTE — TELEPHONE ENCOUNTER
Patient wanted to cancel appointment via phytel, called to confirm this and see if she would like to reschedule.  She will call back at a later time to reschedule

## 2021-06-24 ENCOUNTER — OFFICE VISIT (OUTPATIENT)
Dept: PRIMARY CARE CLINIC | Age: 46
End: 2021-06-24
Payer: MEDICAID

## 2021-06-24 VITALS
RESPIRATION RATE: 18 BRPM | DIASTOLIC BLOOD PRESSURE: 68 MMHG | SYSTOLIC BLOOD PRESSURE: 118 MMHG | HEART RATE: 78 BPM | HEIGHT: 60 IN | OXYGEN SATURATION: 98 % | WEIGHT: 173 LBS | BODY MASS INDEX: 33.96 KG/M2 | TEMPERATURE: 97.6 F

## 2021-06-24 DIAGNOSIS — E03.9 ACQUIRED HYPOTHYROIDISM: ICD-10-CM

## 2021-06-24 DIAGNOSIS — G47.33 OBSTRUCTIVE SLEEP APNEA: ICD-10-CM

## 2021-06-24 DIAGNOSIS — I10 ESSENTIAL HYPERTENSION: ICD-10-CM

## 2021-06-24 DIAGNOSIS — H93.13 TINNITUS AURIUM, BILATERAL: Primary | ICD-10-CM

## 2021-06-24 DIAGNOSIS — E78.2 MIXED HYPERLIPIDEMIA: ICD-10-CM

## 2021-06-24 DIAGNOSIS — F41.9 ANXIETY: ICD-10-CM

## 2021-06-24 PROCEDURE — 99214 OFFICE O/P EST MOD 30 MIN: CPT | Performed by: STUDENT IN AN ORGANIZED HEALTH CARE EDUCATION/TRAINING PROGRAM

## 2021-06-24 PROCEDURE — 1036F TOBACCO NON-USER: CPT | Performed by: STUDENT IN AN ORGANIZED HEALTH CARE EDUCATION/TRAINING PROGRAM

## 2021-06-24 PROCEDURE — G8427 DOCREV CUR MEDS BY ELIG CLIN: HCPCS | Performed by: STUDENT IN AN ORGANIZED HEALTH CARE EDUCATION/TRAINING PROGRAM

## 2021-06-24 PROCEDURE — G8417 CALC BMI ABV UP PARAM F/U: HCPCS | Performed by: STUDENT IN AN ORGANIZED HEALTH CARE EDUCATION/TRAINING PROGRAM

## 2021-06-24 SDOH — ECONOMIC STABILITY: FOOD INSECURITY: WITHIN THE PAST 12 MONTHS, THE FOOD YOU BOUGHT JUST DIDN'T LAST AND YOU DIDN'T HAVE MONEY TO GET MORE.: NEVER TRUE

## 2021-06-24 SDOH — ECONOMIC STABILITY: FOOD INSECURITY: WITHIN THE PAST 12 MONTHS, YOU WORRIED THAT YOUR FOOD WOULD RUN OUT BEFORE YOU GOT MONEY TO BUY MORE.: NEVER TRUE

## 2021-06-24 ASSESSMENT — PATIENT HEALTH QUESTIONNAIRE - PHQ9
1. LITTLE INTEREST OR PLEASURE IN DOING THINGS: 0
2. FEELING DOWN, DEPRESSED OR HOPELESS: 0
SUM OF ALL RESPONSES TO PHQ QUESTIONS 1-9: 0
SUM OF ALL RESPONSES TO PHQ9 QUESTIONS 1 & 2: 0
SUM OF ALL RESPONSES TO PHQ QUESTIONS 1-9: 0
SUM OF ALL RESPONSES TO PHQ QUESTIONS 1-9: 0

## 2021-06-24 ASSESSMENT — SOCIAL DETERMINANTS OF HEALTH (SDOH): HOW HARD IS IT FOR YOU TO PAY FOR THE VERY BASICS LIKE FOOD, HOUSING, MEDICAL CARE, AND HEATING?: NOT HARD AT ALL

## 2021-06-24 NOTE — PROGRESS NOTES
[address] [facility]  [address] [address] [address]  [address] [address]  Dept: [phone]  Dept Fax: [phone]  Loc: [phone]      Subjective:     Chief Complaint   Patient presents with    3 Month Follow-Up       HPI:  Kenneth Wells is a 40 y.o. female presenting for    Tinnitus/ear concerns   Presented to me 3 mo w/ h/o recurrent ear infections, hearing difficulty, along w/ tinnitus. She was evaluated by ENT w/ normal audiology studies. Vestibular migraines were suggested as etiology. Started on lipo-flavonoid to return to ENT prn. She notes improvement in sx since starting lipo-flavonoid. Hypothyroidism  · Labs checked 03/2021 wnl. On levothyroxine 50mcg. No change in sx noted     HLD  · Pt is taking lipitor 20mg. Labs from 2020 reviewed.     HTN  · Is on propanolol for anxiety however not on any other agents.  BP has been normotensive in-office and at home  · Denies HA, visual changes, cp, palpitations, or swelling    ROS:   Review of Systems  Reviewed with patient and noted to be negative except that listed in HPI    PMHx:  Past Medical History:   Diagnosis Date    Anxiety     Asthma     born with asthmatic bronchitis    CPAP (continuous positive airway pressure) dependence     6cm to 16cm    Degenerative disk disease     Depression     mood disorders    Hemorrhoid 11/2015    Hyperlipidemia     Hypoglycemia     Hypothyroid     Left ureteral stone 5/25/2016    OAB (overactive bladder)     Obstructive sleep apnea     AHI: 37.9 per PSG, 10/2019    Obstructive sleep apnea 4/30/2020    Renal stone 5/25/2016     Patient Active Problem List   Diagnosis    External hemorrhoid    Internal hemorrhoids    Non morbid obesity due to excess calories    Psychophysiologic insomnia    Anxiety    Hypothyroidism    Hyperlipidemia    CPAP (continuous positive airway pressure) dependence    Obstructive sleep apnea    Otalgia of right ear    Tinnitus aurium, bilateral       PSHx:  Past Surgical History:   Procedure Laterality Date    BREAST BIOPSY Left 2018    benign calcification    CHOLECYSTECTOMY  1995    COLONOSCOPY  2015    Dr. Elgin Greene  11/4/15    Dr. Yoandy Robbins  11/16/15    [address]  11/16/15    Hemorrhoidectomy & closed lateral internal sphincterotomy    HYSTEROSCOPY  11/4/15    Dr. Alma Rosa Snyder POLYPECTOMY  11/4/15    Dr. Abrahan Garcia  14 years ago       PFHx:  Family History   Problem Relation Age of Onset    High Blood Pressure Mother     Other Mother         early alzheimers    High Blood Pressure Father     Diabetes Paternal Grandfather     Breast Cancer Paternal Aunt 29    Colon Cancer Neg Hx     Colon Polyps Neg Hx        SocialHx:  Social History     Tobacco Use    Smoking status: Former Smoker     Packs/day: 0.25     Years: 27.00     Pack years: 6.75     Quit date: 2009     Years since quittin.1    Smokeless tobacco: Former User    Tobacco comment: Not employed   Substance Use Topics    Alcohol use: Yes     Alcohol/week: 0.0 standard drinks     Comment: rare       Allergies:   Allergies   Allergen Reactions    Codeine Nausea And Vomiting, Swelling and Rash       Medications:  Current Outpatient Medications   Medication Sig Dispense Refill    COMBIVENT RESPIMAT  MCG/ACT AERS inhaler INHALE 1 PUFF EVERY 4 HOURS AS NEEDED FOR WHEEZING 1 Inhaler 0    fluticasone (FLONASE) 50 MCG/ACT nasal spray SPRAY 2 SPRAYS INTO EACH NOSTRIL EVERY DAY 1 Bottle 11    omeprazole (PRILOSEC) 40 MG delayed release capsule TAKE 1 CAPSULE BY MOUTH EVERY DAY 30 capsule 5    atorvastatin (LIPITOR) 20 MG tablet TAKE 1 TABLET BY MOUTH EVERY DAY 30 tablet 11    levocetirizine (XYZAL) 5 MG tablet TAKE 1 TABLET BY MOUTH EVERY DAY AT NIGHT 30 tablet 5    methocarbamol (ROBAXIN) 500 MG tablet TAKE 1 TABLET BY MOUTH TWICE A DAY 60 tablet 3    naproxen (NAPROSYN) 500 MG tablet TAKE 1 TABLET BY MOUTH TWICE A DAY WITH MEALS 60 tablet 5    propranolol (INDERAL) 40 MG tablet TAKE 1 TABLET BY MOUTH THREE TIMES A  tablet 3    levothyroxine (SYNTHROID) 50 MCG tablet TAKE 1 TABLET BY MOUTH EVERY DAY 30 tablet 11    aspirin (ASPIRIN CHILDRENS) 81 MG chewable tablet Take 1 tablet by mouth daily 30 tablet 0     No current facility-administered medications for this visit. Objective:   PE:  /68   Pulse 78   Temp 97.6 °F (36.4 °C) (Temporal)   Resp 18   Ht 5' (1.524 m)   Wt 173 lb (78.5 kg)   SpO2 98%   BMI 33.79 kg/m²   Physical Exam  Constitutional:       General: She is not in acute distress. Appearance: Normal appearance. HENT:      Head: Normocephalic. Nose: Nose normal.      Mouth/Throat:      Mouth: Mucous membranes are moist.      Pharynx: Oropharynx is clear. No oropharyngeal exudate or posterior oropharyngeal erythema. Eyes:      General: No scleral icterus. Extraocular Movements: Extraocular movements intact. Conjunctiva/sclera: Conjunctivae normal.   Cardiovascular:      Rate and Rhythm: Normal rate and regular rhythm. Pulses: Normal pulses. Heart sounds: No murmur heard. Pulmonary:      Effort: Pulmonary effort is normal.      Breath sounds: Normal breath sounds. Abdominal:      General: There is no distension. Palpations: Abdomen is soft. There is no mass. Tenderness: There is no abdominal tenderness. Musculoskeletal:         General: Normal range of motion. Cervical back: Normal range of motion. Skin:     General: Skin is warm and dry. Capillary Refill: Capillary refill takes less than 2 seconds. Neurological:      General: No focal deficit present. Mental Status: She is alert and oriented to person, place, and time. Psychiatric:         Mood and Affect: Mood normal.         Behavior: Behavior normal.         Thought Content:  Thought content normal.            Assessment & Plan Hilaria Pedroza was seen today for 3 month follow-up. Diagnoses and all orders for this visit:    Tinnitus aurium, bilateral  -Probable vestibular migraines. Continue lipo-flavonoid. F/u w/ ENT if worsens. Mixed hyperlipidemia  -Continue lipitor 20mg    Anxiety  -Continue propanolol    Acquired hypothyroidism  -Continue levothyroxine. Labs due 03/2022     Obstructive sleep apnea  -Continue cpap per sleep medicine    Essential hypertension  -I question this diagnosis and will remove it from problem list. BP has been normotensive for some time and not on any antihypertensives. Return in about 9 months (around 3/24/2022). All questions were answered. Medications, including possible adverse effects, and instructions were reviewed and  understanding was confirmed. Follow-up recommendations, including when to contact or return to office (ie; if symptoms worsen or fail to improve), were discussed and acknowledged.     Electronically signed by Zaid Morrison MD on 6/24/21 at 7:59 AM CDT

## 2021-06-28 DIAGNOSIS — J40 BRONCHITIS: ICD-10-CM

## 2021-06-28 RX ORDER — IPRATROPIUM/ALBUTEROL SULFATE 20-100 MCG
MIST INHALER (GRAM) INHALATION
Qty: 1 INHALER | Refills: 2 | Status: SHIPPED | OUTPATIENT
Start: 2021-06-28 | End: 2021-10-03

## 2021-09-07 DIAGNOSIS — M54.2 CHRONIC NECK PAIN: ICD-10-CM

## 2021-09-07 DIAGNOSIS — G89.29 CHRONIC NECK PAIN: ICD-10-CM

## 2021-09-07 DIAGNOSIS — J30.2 SEASONAL ALLERGIES: ICD-10-CM

## 2021-09-07 RX ORDER — LEVOCETIRIZINE DIHYDROCHLORIDE 5 MG/1
TABLET, FILM COATED ORAL
Qty: 30 TABLET | Refills: 5 | Status: SHIPPED | OUTPATIENT
Start: 2021-09-07 | End: 2022-03-06

## 2021-09-07 RX ORDER — NAPROXEN 500 MG/1
TABLET ORAL
Qty: 60 TABLET | Refills: 5 | Status: SHIPPED | OUTPATIENT
Start: 2021-09-07 | End: 2021-10-11

## 2021-09-15 ENCOUNTER — VIRTUAL VISIT (OUTPATIENT)
Dept: PRIMARY CARE CLINIC | Age: 46
End: 2021-09-15
Payer: MEDICAID

## 2021-09-15 DIAGNOSIS — R50.9 FEVER, UNSPECIFIED FEVER CAUSE: Primary | ICD-10-CM

## 2021-09-15 PROCEDURE — G8427 DOCREV CUR MEDS BY ELIG CLIN: HCPCS | Performed by: STUDENT IN AN ORGANIZED HEALTH CARE EDUCATION/TRAINING PROGRAM

## 2021-09-15 PROCEDURE — 99213 OFFICE O/P EST LOW 20 MIN: CPT | Performed by: STUDENT IN AN ORGANIZED HEALTH CARE EDUCATION/TRAINING PROGRAM

## 2021-09-15 NOTE — PROGRESS NOTES
9/15/2021    TELEHEALTH EVALUATION -- Audio/Visual (During [id number] public health emergency)    HPI:    Kyara Mariee (:  1975) has requested an audio/video evaluation for the following concern(s):    Fever  -Got tired/fatigued 3 days ago, checked temperature which was elevated. Highest 100.5 on Monday. Since then she has not had a fever.  -No chills, body aches. No n/v/d or abdominal pain. No dysuria  - No rashes, recent insect bites.  -No cough  -No runny or stuffy nose  -No sore throat  -No sob   -Mild R ear fullness, no hearing changes or actual pain  -No loss or smell/taste  -No covid exposure  -No recent immunizations  -She otherwise feels fine    Review of Systems  Reviewed with patient and noted to be negative except that listed in HPI    Prior to Visit Medications    Medication Sig Taking?  Authorizing Provider   naproxen (NAPROSYN) 500 MG tablet TAKE 1 TABLET BY MOUTH TWICE A DAY WITH MEALS Yes Ernesto Toussaint MD   levocetirizine (XYZAL) 5 MG tablet TAKE 1 TABLET BY MOUTH EVERY DAY AT NIGHT Yes Ernesto Toussaint MD   methocarbamol (ROBAXIN) 500 MG tablet TAKE 1 TABLET BY MOUTH TWICE A DAY Yes Ernesto Toussaint MD   COMBIVENT RESPIMAT  MCG/ACT AERS inhaler INHALE 1 PUFF EVERY 4 HOURS AS NEEDED FOR WHEEZING Yes JUSTIN Ott   fluticasone (FLONASE) 50 MCG/ACT nasal spray SPRAY 2 SPRAYS INTO EACH NOSTRIL EVERY DAY Yes Ernesto Toussaint MD   omeprazole (PRILOSEC) 40 MG delayed release capsule TAKE 1 CAPSULE BY MOUTH EVERY DAY Yes Ernesto Toussaint MD   atorvastatin (LIPITOR) 20 MG tablet TAKE 1 TABLET BY MOUTH EVERY DAY Yes Ernesto Toussaint MD   propranolol (INDERAL) 40 MG tablet TAKE 1 TABLET BY MOUTH THREE TIMES A DAY Yes JUSTIN Ott   levothyroxine (SYNTHROID) 50 MCG tablet TAKE 1 TABLET BY MOUTH EVERY DAY Yes Ernesto Toussaint MD   aspirin (ASPIRIN CHILDRENS) 81 MG chewable tablet Take 1 tablet by mouth daily Yes ESTHELA Scherer       Social History     Tobacco Use    Smoking status: Former Smoker Packs/day: 0.25     Years: 27.00     Pack years: 6.75     Quit date: 2009     Years since quittin.4    Smokeless tobacco: Former User    Tobacco comment: Not employed   Vaping Use    Vaping Use: Never used   Substance Use Topics    Alcohol use: Yes     Alcohol/week: 0.0 standard drinks     Comment: rare    Drug use: No        Allergies   Allergen Reactions    Codeine Nausea And Vomiting, Swelling and Rash   ,   Past Medical History:   Diagnosis Date    Anxiety     Asthma     born with asthmatic bronchitis    CPAP (continuous positive airway pressure) dependence     6cm to 16cm    Degenerative disk disease     Depression     mood disorders    Hemorrhoid 2015    Hyperlipidemia     Hypoglycemia     Hypothyroid     Left ureteral stone 2016    OAB (overactive bladder)     Obstructive sleep apnea     AHI: 37.9 per PSG, 10/2019    Obstructive sleep apnea 2020    Renal stone 2016   ,   Past Surgical History:   Procedure Laterality Date    BREAST BIOPSY Left 2018    benign calcification    CHOLECYSTECTOMY  1995    COLONOSCOPY  2015    Dr. Aracelis Coleman  11/4/15    Dr. Raffaele Quintanilla  11/16/15    [address]  11/16/15    Hemorrhoidectomy & closed lateral internal sphincterotomy    HYSTEROSCOPY  11/4/15    Dr. Nalini Cabrera POLYPECTOMY  11/4/15    Dr. Jeannine Paez  14 years ago   ,   Social History     Tobacco Use    Smoking status: Former Smoker     Packs/day: 0.25     Years: 27.00     Pack years: 6.75     Quit date: 2009     Years since quittin.4    Smokeless tobacco: Former User    Tobacco comment: Not employed   Vaping Use    Vaping Use: Never used   Substance Use Topics    Alcohol use:  Yes     Alcohol/week: 0.0 standard drinks     Comment: rare    Drug use: No   ,   Family History   Problem Relation Age of Onset    High Blood Pressure Mother     Other Mother         early alzheimers    High Blood Pressure Father     Diabetes Paternal Grandfather     Breast Cancer Paternal Aunt 29    Colon Cancer Neg Hx     Colon Polyps Neg Hx    ,   Immunization History   Administered Date(s) Administered    Tdap (Boostrix, Adacel) 11/03/2016   ,   Health Maintenance   Topic Date Due    COVID-19 Vaccine (1) Never done    Colon cancer screen colonoscopy  Never done    Flu vaccine (1) Never done    Lipid screen  02/07/2022    TSH testing  03/19/2022    Cervical cancer screen  02/22/2024    DTaP/Tdap/Td vaccine (2 - Td or Tdap) 11/03/2026    HIV screen  Addressed    Hepatitis A vaccine  Aged Out    Hepatitis B vaccine  Aged Out    Hib vaccine  Aged Out    Meningococcal (ACWY) vaccine  Aged Out    Pneumococcal 0-64 years Vaccine  Aged Out    Hepatitis C screen  Discontinued       PHYSICAL EXAMINATION:  [ INSTRUCTIONS:  \"[x]\" Indicates a positive item  \"[]\" Indicates a negative item  -- DELETE ALL ITEMS NOT EXAMINED]  Vital Signs: (As obtained by patient/caregiver or practitioner observation)    Blood pressure-  Heart rate-    Respiratory rate-    Temperature-  Pulse oximetry-     Constitutional: [x] Appears well-developed and well-nourished [x] No apparent distress      [] Abnormal-   Mental status  [x] Alert and awake  [] Oriented to person/place/time []Able to follow commands      Eyes:  EOM    [x]  Normal  [] Abnormal-  Sclera  [x]  Normal  [] Abnormal -         Discharge [x]  None visible  [] Abnormal -    HENT:   [x] Normocephalic, atraumatic.   [] Abnormal   [x] Mouth/Throat: Mucous membranes are moist.     External Ears [x] Normal  [] Abnormal-     Neck: [x] No visualized mass     Pulmonary/Chest: [x] Respiratory effort normal.  [x] No visualized signs of difficulty breathing or respiratory distress        [] Abnormal-      Musculoskeletal:   [x] Normal gait with no signs of ataxia         [x] Normal range of motion of neck        [] Abnormal-       Neurological:        [x] No Facial Asymmetry (Cranial nerve 7 motor function) (limited exam to video visit)          [x] No gaze palsy        [] Abnormal-         Skin:        [x] No significant exanthematous lesions or discoloration noted on facial skin         [] Abnormal-            Psychiatric:       [x] Normal Affect [x] No Hallucinations        [] Abnormal-     Other pertinent observable physical exam findings-       ASSESSMENT/PLAN:  1. Fever, unspecified fever cause  -Unclear cause at this time. I do not see any clear correlating symptoms. No upper respiratory symptoms or signs of any other infections currently. Recommend pt monitor her sx closely for now. Should any sx change/appear to follow up with me. Marvin Cee, was evaluated through a synchronous (real-time) audio-video encounter. The patient (or guardian if applicable) is aware that this is a billable service. Verbal consent to proceed has been obtained within the past 12 months. The visit was conducted pursuant to the emergency declaration under the [address] [place] [address], [address] [place] [address] authority and the [email or web address] and [employer] Act. Patient identification was verified, and a caregiver was present when appropriate. The patient was located in a state where the provider was credentialed to provide care. Total time spent on this encounter: Not billed by time    --Sergio Lux MD on 9/15/2021 at 8:27 AM    An electronic signature was used to authenticate this note.

## 2021-09-22 ENCOUNTER — OFFICE VISIT (OUTPATIENT)
Dept: PRIMARY CARE CLINIC | Age: 46
End: 2021-09-22
Payer: MEDICAID

## 2021-09-22 VITALS
BODY MASS INDEX: 33.26 KG/M2 | TEMPERATURE: 98.2 F | OXYGEN SATURATION: 99 % | HEIGHT: 60 IN | HEART RATE: 67 BPM | SYSTOLIC BLOOD PRESSURE: 120 MMHG | DIASTOLIC BLOOD PRESSURE: 78 MMHG | WEIGHT: 169.4 LBS

## 2021-09-22 DIAGNOSIS — J01.90 ACUTE RHINOSINUSITIS: Primary | ICD-10-CM

## 2021-09-22 LAB — SARS-COV-2, PCR: DETECTED

## 2021-09-22 PROCEDURE — 1036F TOBACCO NON-USER: CPT | Performed by: STUDENT IN AN ORGANIZED HEALTH CARE EDUCATION/TRAINING PROGRAM

## 2021-09-22 PROCEDURE — 99213 OFFICE O/P EST LOW 20 MIN: CPT | Performed by: STUDENT IN AN ORGANIZED HEALTH CARE EDUCATION/TRAINING PROGRAM

## 2021-09-22 PROCEDURE — G8417 CALC BMI ABV UP PARAM F/U: HCPCS | Performed by: STUDENT IN AN ORGANIZED HEALTH CARE EDUCATION/TRAINING PROGRAM

## 2021-09-22 PROCEDURE — G8427 DOCREV CUR MEDS BY ELIG CLIN: HCPCS | Performed by: STUDENT IN AN ORGANIZED HEALTH CARE EDUCATION/TRAINING PROGRAM

## 2021-09-22 RX ORDER — AMOXICILLIN AND CLAVULANATE POTASSIUM 875; 125 MG/1; MG/1
1 TABLET, FILM COATED ORAL 2 TIMES DAILY
Qty: 14 TABLET | Refills: 0 | Status: SHIPPED | OUTPATIENT
Start: 2021-09-22 | End: 2021-09-29

## 2021-09-22 RX ORDER — FLUTICASONE PROPIONATE 50 MCG
SPRAY, SUSPENSION (ML) NASAL
Qty: 2 EACH | Refills: 5 | Status: SHIPPED | OUTPATIENT
Start: 2021-09-22 | End: 2022-08-23

## 2021-09-22 RX ORDER — METHYLPREDNISOLONE 4 MG/1
TABLET ORAL
Qty: 1 KIT | Refills: 0 | Status: SHIPPED | OUTPATIENT
Start: 2021-09-22 | End: 2021-09-28

## 2021-09-22 RX ORDER — ONDANSETRON 4 MG/1
4 TABLET, ORALLY DISINTEGRATING ORAL EVERY 8 HOURS PRN
Qty: 21 TABLET | Refills: 0 | Status: SHIPPED | OUTPATIENT
Start: 2021-09-22

## 2021-09-22 NOTE — PROGRESS NOTES
[address] [facility]  [address] [address]  [address] [address]  Dept: [phone]  Dept Fax: [phone]  Loc: [phone]      Subjective:     Chief Complaint   Patient presents with   Pascual Araiza     pt reports R ear feels stopped up; pt thinks there might be fluid behind ear.  Fever     low-grade, never got over 100.5; lasted about 3 days    Other     no sense of taste or smell    Nausea     pt reports nausea started this morning       HPI:  Rishabh Bee is a 40 y.o. female presenting for    Sinus sx  Symptoms started 3-4 days ago  Sinus congestion more on right side. Stuffy and runny nose  R ear feels full/pressure, no pain or discharge  Nauseated this morning  Sense of taste/smell decreased  No cough, sob. Temp 100.5 1 week ago before sx started, none since  Cousins diagnosed w/ covid 1 week ago but no contact in a little over a week.  No other sick contacts  H/o seasonal allergies treated w/ flonase and xyzal    ROS:   Review of Systems  Reviewed with patient and noted to be negative except that listed in HPI    PMHx:  Past Medical History:   Diagnosis Date    Anxiety     Asthma     born with asthmatic bronchitis    CPAP (continuous positive airway pressure) dependence     6cm to 16cm    Degenerative disk disease     Depression     mood disorders    Hemorrhoid 11/2015    Hyperlipidemia     Hypoglycemia     Hypothyroid     Left ureteral stone 5/25/2016    OAB (overactive bladder)     Obstructive sleep apnea     AHI: 37.9 per PSG, 10/2019    Obstructive sleep apnea 4/30/2020    Renal stone 5/25/2016     Patient Active Problem List   Diagnosis    External hemorrhoid    Internal hemorrhoids    Non morbid obesity due to excess calories    Psychophysiologic insomnia    Anxiety    Hypothyroidism    Hyperlipidemia    CPAP (continuous positive airway pressure) dependence    Obstructive sleep apnea    Otalgia of right ear    Tinnitus aurium, bilateral       PSHx:  Past Surgical History:   Procedure Laterality Date    BREAST BIOPSY Left 2018    benign calcification    CHOLECYSTECTOMY      COLONOSCOPY  2015    Dr. Lisbeth Ospina  11/4/15    Dr. Ellie Cochran  11/16/15    Ellen [address]  11/16/15    Hemorrhoidectomy & closed lateral internal sphincterotomy    HYSTEROSCOPY  11/4/15    Dr. Jurgen Betancourt POLYPECTOMY  11/4/15    Dr. Aruna Valenzuela  14 years ago       PFHx:  Family History   Problem Relation Age of Onset    High Blood Pressure Mother     Other Mother         early alzheimers    High Blood Pressure Father     Diabetes Paternal Grandfather     Breast Cancer Paternal Aunt 29    Colon Cancer Neg Hx     Colon Polyps Neg Hx        SocialHx:  Social History     Tobacco Use    Smoking status: Former Smoker     Packs/day: 0.25     Years: 27.00     Pack years: 6.75     Quit date: 2009     Years since quittin.4    Smokeless tobacco: Former User    Tobacco comment: Not employed   Substance Use Topics    Alcohol use: Yes     Alcohol/week: 0.0 standard drinks     Comment: rare       Allergies: Allergies   Allergen Reactions    Codeine Nausea And Vomiting, Swelling and Rash       Medications:  Current Outpatient Medications   Medication Sig Dispense Refill    ondansetron (ZOFRAN ODT) 4 MG disintegrating tablet Place 1 tablet under the tongue every 8 hours as needed for Nausea or Vomiting 21 tablet 0    amoxicillin-clavulanate (AUGMENTIN) 875-125 MG per tablet Take 1 tablet by mouth 2 times daily for 7 days 14 tablet 0    methylPREDNISolone (MEDROL DOSEPACK) 4 MG tablet Take by mouth.  1 kit 0    naproxen (NAPROSYN) 500 MG tablet TAKE 1 TABLET BY MOUTH TWICE A DAY WITH MEALS 60 tablet 5    levocetirizine (XYZAL) 5 MG tablet TAKE 1 TABLET BY MOUTH EVERY DAY AT NIGHT 30 tablet 5    methocarbamol (ROBAXIN) 500 MG tablet TAKE 1 TABLET BY MOUTH TWICE A DAY 60 tablet 3    COMBIVENT RESPIMAT  MCG/ACT AERS inhaler INHALE 1 PUFF EVERY 4 HOURS AS NEEDED FOR WHEEZING 1 Inhaler 2    omeprazole (PRILOSEC) 40 MG delayed release capsule TAKE 1 CAPSULE BY MOUTH EVERY DAY 30 capsule 5    atorvastatin (LIPITOR) 20 MG tablet TAKE 1 TABLET BY MOUTH EVERY DAY 30 tablet 11    propranolol (INDERAL) 40 MG tablet TAKE 1 TABLET BY MOUTH THREE TIMES A  tablet 3    levothyroxine (SYNTHROID) 50 MCG tablet TAKE 1 TABLET BY MOUTH EVERY DAY 30 tablet 11    aspirin (ASPIRIN CHILDRENS) 81 MG chewable tablet Take 1 tablet by mouth daily 30 tablet 0    fluticasone (FLONASE) 50 MCG/ACT nasal spray SPRAY 2 SPRAYS INTO EACH NOSTRIL EVERY DAY 2 each 5     No current facility-administered medications for this visit. Objective:   PE:  /78   Pulse 67   Temp 98.2 °F (36.8 °C) (Temporal)   Ht 5' (1.524 m)   Wt 169 lb 6.4 oz (76.8 kg)   SpO2 99%   BMI 33.08 kg/m²   Physical Exam  Constitutional:       General: She is not in acute distress. Appearance: Normal appearance. HENT:      Head: Normocephalic. Comments: R frontal and maxillary TTP     Right Ear: Tympanic membrane, ear canal and external ear normal.      Left Ear: Tympanic membrane, ear canal and external ear normal.      Nose: Rhinorrhea present. Mouth/Throat:      Mouth: Mucous membranes are moist.      Pharynx: Oropharynx is clear. No oropharyngeal exudate or posterior oropharyngeal erythema. Eyes:      General: No scleral icterus. Extraocular Movements: Extraocular movements intact. Conjunctiva/sclera: Conjunctivae normal.   Cardiovascular:      Rate and Rhythm: Normal rate and regular rhythm. Pulses: Normal pulses. Heart sounds: No murmur heard. Pulmonary:      Effort: Pulmonary effort is normal.      Breath sounds: Normal breath sounds. Comments: CTAB  Abdominal:      General: There is no distension. Palpations: Abdomen is soft. There is no mass. Tenderness:  There is

## 2021-10-03 DIAGNOSIS — J40 BRONCHITIS: ICD-10-CM

## 2021-10-03 RX ORDER — IPRATROPIUM/ALBUTEROL SULFATE 20-100 MCG
MIST INHALER (GRAM) INHALATION
Qty: 1 EACH | Refills: 5 | Status: SHIPPED | OUTPATIENT
Start: 2021-10-03

## 2021-10-06 DIAGNOSIS — R12 HEARTBURN: ICD-10-CM

## 2021-10-06 RX ORDER — OMEPRAZOLE 40 MG/1
CAPSULE, DELAYED RELEASE ORAL
Qty: 30 CAPSULE | Refills: 5 | Status: SHIPPED | OUTPATIENT
Start: 2021-10-06 | End: 2022-01-12 | Stop reason: SDUPTHER

## 2021-10-11 ENCOUNTER — OFFICE VISIT (OUTPATIENT)
Dept: PRIMARY CARE CLINIC | Age: 46
End: 2021-10-11
Payer: MEDICAID

## 2021-10-11 ENCOUNTER — HOSPITAL ENCOUNTER (OUTPATIENT)
Dept: GENERAL RADIOLOGY | Age: 46
Discharge: HOME | End: 2021-10-11
Payer: MEDICAID

## 2021-10-11 VITALS
BODY MASS INDEX: 33.81 KG/M2 | WEIGHT: 172.2 LBS | HEIGHT: 60 IN | TEMPERATURE: 97.9 F | SYSTOLIC BLOOD PRESSURE: 120 MMHG | HEART RATE: 77 BPM | OXYGEN SATURATION: 97 % | DIASTOLIC BLOOD PRESSURE: 78 MMHG

## 2021-10-11 DIAGNOSIS — G89.29 CHRONIC RIGHT-SIDED LOW BACK PAIN WITHOUT SCIATICA: ICD-10-CM

## 2021-10-11 DIAGNOSIS — M25.551 RIGHT HIP PAIN: ICD-10-CM

## 2021-10-11 DIAGNOSIS — U07.1 COVID-19: Primary | ICD-10-CM

## 2021-10-11 DIAGNOSIS — M54.50 CHRONIC RIGHT-SIDED LOW BACK PAIN WITHOUT SCIATICA: ICD-10-CM

## 2021-10-11 PROCEDURE — 72220 X-RAY EXAM SACRUM TAILBONE: CPT

## 2021-10-11 PROCEDURE — 73521 X-RAY EXAM HIPS BI 2 VIEWS: CPT

## 2021-10-11 PROCEDURE — G8427 DOCREV CUR MEDS BY ELIG CLIN: HCPCS | Performed by: STUDENT IN AN ORGANIZED HEALTH CARE EDUCATION/TRAINING PROGRAM

## 2021-10-11 PROCEDURE — G8417 CALC BMI ABV UP PARAM F/U: HCPCS | Performed by: STUDENT IN AN ORGANIZED HEALTH CARE EDUCATION/TRAINING PROGRAM

## 2021-10-11 PROCEDURE — 99213 OFFICE O/P EST LOW 20 MIN: CPT | Performed by: STUDENT IN AN ORGANIZED HEALTH CARE EDUCATION/TRAINING PROGRAM

## 2021-10-11 PROCEDURE — 1036F TOBACCO NON-USER: CPT | Performed by: STUDENT IN AN ORGANIZED HEALTH CARE EDUCATION/TRAINING PROGRAM

## 2021-10-11 PROCEDURE — G8484 FLU IMMUNIZE NO ADMIN: HCPCS | Performed by: STUDENT IN AN ORGANIZED HEALTH CARE EDUCATION/TRAINING PROGRAM

## 2021-10-11 RX ORDER — CELECOXIB 200 MG/1
200 CAPSULE ORAL DAILY
Qty: 60 CAPSULE | Refills: 3 | Status: SHIPPED | OUTPATIENT
Start: 2021-10-11 | End: 2022-04-21

## 2021-10-11 NOTE — PROGRESS NOTES
[address] [facility]  [address] [address]  [address] [address]  Dept: [phone]  Dept Fax: [phone]  Loc: [phone]      Subjective:     Chief Complaint   Patient presents with   Deluna Otalgia     1 week follow up; patient reports her right ear is still bothering her; pt said she is going to call ENT     Other     1 week follow up; patient reports taste and small are fine now and her nausea has went away    Pain     patient reports her lower back and right hip have been bothering her; she's already had PT with her back, but her hip pain has tsarted within the past month and it comes and goes. HPI:  Aileen Gomez is a 40 y.o. female presenting for    Sinusitis  -Treated at last visit for this. Pt tested positive for covid after visit. Quarantine period is over. Mostly symptoms have resolved except for R ear fullness. Has had chronic R ear issues. She wants to go back and see ENT    R hip pain  -Started 3-4 mo ago  -Hurts posteriorly as well as on side and in groin. Describes radiation forward from back of hip.   -Describes dull, achy feeling  -Hurts more at end of day/going to sleep  -Heat application helps. Takes naproxen and robaxin for her lower back sx which have been ongoing.  Has been treated w/ PT for this  -No injury/fall  -Trouble walking, feels like it catches  -Hurts on prolonged standing  -Concerned about being related to lower back      ROS:   Review of Systems  Reviewed with patient and noted to be negative except that listed in HPI    PMHx:  Past Medical History:   Diagnosis Date    Anxiety     Asthma     born with asthmatic bronchitis    CPAP (continuous positive airway pressure) dependence     6cm to 16cm    Degenerative disk disease     Depression     mood disorders    Hemorrhoid 11/2015    Hyperlipidemia     Hypoglycemia     Hypothyroid     Left ureteral stone 5/25/2016    OAB (overactive bladder)     Obstructive sleep apnea AHI: 37.9 per PSG, 10/2019    Obstructive sleep apnea 2020    Renal stone 2016     Patient Active Problem List   Diagnosis    External hemorrhoid    Internal hemorrhoids    Non morbid obesity due to excess calories    Psychophysiologic insomnia    Anxiety    Hypothyroidism    Hyperlipidemia    CPAP (continuous positive airway pressure) dependence    Obstructive sleep apnea    Otalgia of right ear    Tinnitus aurium, bilateral       PSHx:  Past Surgical History:   Procedure Laterality Date    BREAST BIOPSY Left 2018    benign calcification    CHOLECYSTECTOMY      COLONOSCOPY  2015    Dr. Yuniel Swenson  11/4/15    Dr. Elena Figueroa  11/16/15    [address]  11/16/15    Hemorrhoidectomy & closed lateral internal sphincterotomy    HYSTEROSCOPY  11/4/15    Dr. Acuña Risk POLYPECTOMY  11/4/15    Dr. Terrie Valentin  14 years ago       PFHx:  Family History   Problem Relation Age of Onset    High Blood Pressure Mother     Other Mother         early alzheimers    High Blood Pressure Father     Diabetes Paternal Grandfather     Breast Cancer Paternal Aunt 58 [facility]    Colon Cancer Neg Hx     Colon Polyps Neg Hx        SocialHx:  Social History     Tobacco Use    Smoking status: Former Smoker     Packs/day: 0.25     Years: 27.00     Pack years: 6.75     Quit date: 2009     Years since quittin.4    Smokeless tobacco: Former User    Tobacco comment: Not employed   Substance Use Topics    Alcohol use: Yes     Alcohol/week: 0.0 standard drinks     Comment: rare       Allergies:   Allergies   Allergen Reactions    Codeine Nausea And Vomiting, Swelling and Rash       Medications:  Current Outpatient Medications   Medication Sig Dispense Refill    celecoxib (CELEBREX) 200 MG capsule Take 1 capsule by mouth daily 60 capsule 3    omeprazole (PRILOSEC) 40 MG delayed release capsule TAKE 1 CAPSULE BY MOUTH EVERY DAY 30 capsule 5    COMBIVENT RESPIMAT  MCG/ACT AERS inhaler INHALE 1 PUFF EVERY 4 HOURS AS NEEDED FOR WHEEZING 1 each 5    fluticasone (FLONASE) 50 MCG/ACT nasal spray SPRAY 2 SPRAYS INTO EACH NOSTRIL EVERY DAY 2 each 5    ondansetron (ZOFRAN ODT) 4 MG disintegrating tablet Place 1 tablet under the tongue every 8 hours as needed for Nausea or Vomiting 21 tablet 0    levocetirizine (XYZAL) 5 MG tablet TAKE 1 TABLET BY MOUTH EVERY DAY AT NIGHT 30 tablet 5    methocarbamol (ROBAXIN) 500 MG tablet TAKE 1 TABLET BY MOUTH TWICE A DAY 60 tablet 3    atorvastatin (LIPITOR) 20 MG tablet TAKE 1 TABLET BY MOUTH EVERY DAY 30 tablet 11    propranolol (INDERAL) 40 MG tablet TAKE 1 TABLET BY MOUTH THREE TIMES A  tablet 3    levothyroxine (SYNTHROID) 50 MCG tablet TAKE 1 TABLET BY MOUTH EVERY DAY 30 tablet 11    aspirin (ASPIRIN CHILDRENS) 81 MG chewable tablet Take 1 tablet by mouth daily 30 tablet 0     No current facility-administered medications for this visit. Objective:   PE:  /78   Pulse 77   Temp 97.9 °F (36.6 °C) (Temporal)   Ht 5' (1.524 m)   Wt 172 lb 3.2 oz (78.1 kg)   SpO2 97%   BMI 33.63 kg/m²   Physical Exam  Constitutional:       General: She is not in acute distress. Appearance: Normal appearance. HENT:      Head: Normocephalic. Nose: Nose normal.      Mouth/Throat:      Mouth: Mucous membranes are moist.      Pharynx: Oropharynx is clear. No oropharyngeal exudate or posterior oropharyngeal erythema. Eyes:      General: No scleral icterus. Extraocular Movements: Extraocular movements intact. Conjunctiva/sclera: Conjunctivae normal.   Cardiovascular:      Rate and Rhythm: Normal rate and regular rhythm. Pulses: Normal pulses. Heart sounds: No murmur heard. Pulmonary:      Effort: Pulmonary effort is normal.      Breath sounds: Normal breath sounds. Abdominal:      General: There is no distension. Palpations: Abdomen is soft. There is no mass. Tenderness: There is no abdominal tenderness. Musculoskeletal:         General: Normal range of motion. Cervical back: Normal range of motion. Comments: +FADIR. Neg TUAN. R hip flexion 90 degrees vs 120 on L side. Area of primary discomfort lower lumbar/sacral on R side. No posterior or lateral hip TTP. Normal gait. Normal sensation and strength   Skin:     General: Skin is warm and dry. Capillary Refill: Capillary refill takes less than 2 seconds. Neurological:      General: No focal deficit present. Mental Status: She is alert and oriented to person, place, and time. Psychiatric:         Mood and Affect: Mood normal.         Behavior: Behavior normal.         Thought Content: Thought content normal.            Assessment & Plan   Pal Bradford was seen today for otalgia, other and pain. Diagnoses and all orders for this visit:    COVID-19  -Primary sx resolved. Pt to see ENT for chronic R ear symptoms    Right hip pain  -Suspect SI joint dysfunction. Check hip and sacrum xray for pathology. Consider PMR referral.   -     celecoxib (CELEBREX) 200 MG capsule; Take 1 capsule by mouth daily  -     XR HIP BILATERAL W AP PELVIS (2 VIEWS); Future    Chronic right-sided low back pain without sciatica  -     XR SACRUM COCCYX (MIN 2 VIEWS); Future      Return in about 1 week (around 10/18/2021). All questions were answered. Medications, including possible adverse effects, and instructions were reviewed and  understanding was confirmed. Follow-up recommendations, including when to contact or return to office (ie; if symptoms worsen or fail to improve), were discussed and acknowledged.     Electronically signed by Ignacio Bartlett MD on 10/11/21 at 8:33 AM CDT

## 2021-10-13 ENCOUNTER — OFFICE VISIT (OUTPATIENT)
Dept: ENT CLINIC | Age: 46
End: 2021-10-13
Payer: MEDICAID

## 2021-10-13 DIAGNOSIS — H60.311 ACUTE DIFFUSE OTITIS EXTERNA OF RIGHT EAR: Primary | ICD-10-CM

## 2021-10-13 PROCEDURE — 4130F TOPICAL PREP RX AOE: CPT | Performed by: PHYSICIAN ASSISTANT

## 2021-10-13 PROCEDURE — 1036F TOBACCO NON-USER: CPT | Performed by: PHYSICIAN ASSISTANT

## 2021-10-13 PROCEDURE — 99213 OFFICE O/P EST LOW 20 MIN: CPT | Performed by: PHYSICIAN ASSISTANT

## 2021-10-13 PROCEDURE — G8427 DOCREV CUR MEDS BY ELIG CLIN: HCPCS | Performed by: PHYSICIAN ASSISTANT

## 2021-10-13 PROCEDURE — G8417 CALC BMI ABV UP PARAM F/U: HCPCS | Performed by: PHYSICIAN ASSISTANT

## 2021-10-13 PROCEDURE — G8484 FLU IMMUNIZE NO ADMIN: HCPCS | Performed by: PHYSICIAN ASSISTANT

## 2021-10-13 RX ORDER — OFLOXACIN 3 MG/ML
3 SOLUTION/ DROPS OPHTHALMIC 3 TIMES DAILY
Qty: 10 ML | Refills: 0 | Status: SHIPPED | OUTPATIENT
Start: 2021-10-13 | End: 2021-10-23

## 2021-10-13 NOTE — PROGRESS NOTES
Author Shruthi is a pleasant 72-year-old  female that was referred by Dr. Sreekanth Durham due to problems with recurrent right ear pain. She was previously treated back in April for eustachian tube dysfunction. She now reports of sporadic pain to the right ear that is worsened when anything is stuck in her ear canal.  She denies any drainage from the canal.      Physical examination with the microscope revealed the patient to have a normal-appearing TM to the right side with normal mobility. She was noted to have stenosis of the ear canal with sensitivity that is felt to be consistent with otitis externa of the right ear. The left side demonstrated normal canal and TM. She was also noted with normal mobility to the left side. Neck exam demonstrated no lymphadenopathy or thyromegaly. Impression: Right-sided otalgia secondary to early otitis externa    Plan: I will place the patient on ofloxacin eardrops and follow-up in 2 weeks for reevaluation. She was reminded to call if she has any questions or problems.         Electronically signed by Wilma Horner PA-C on 10/13/21 at 6:20 PM CDT

## 2021-10-19 ENCOUNTER — OFFICE VISIT (OUTPATIENT)
Dept: PRIMARY CARE CLINIC | Age: 46
End: 2021-10-19
Payer: MEDICAID

## 2021-10-19 VITALS
RESPIRATION RATE: 18 BRPM | SYSTOLIC BLOOD PRESSURE: 126 MMHG | DIASTOLIC BLOOD PRESSURE: 84 MMHG | HEIGHT: 60 IN | OXYGEN SATURATION: 98 % | WEIGHT: 177 LBS | HEART RATE: 61 BPM | BODY MASS INDEX: 34.75 KG/M2 | TEMPERATURE: 98.3 F

## 2021-10-19 DIAGNOSIS — M76.891 HIP FLEXOR TENDINITIS, RIGHT: Primary | ICD-10-CM

## 2021-10-19 DIAGNOSIS — H60.311 ACUTE DIFFUSE OTITIS EXTERNA OF RIGHT EAR: ICD-10-CM

## 2021-10-19 PROCEDURE — 4130F TOPICAL PREP RX AOE: CPT | Performed by: STUDENT IN AN ORGANIZED HEALTH CARE EDUCATION/TRAINING PROGRAM

## 2021-10-19 PROCEDURE — G8427 DOCREV CUR MEDS BY ELIG CLIN: HCPCS | Performed by: STUDENT IN AN ORGANIZED HEALTH CARE EDUCATION/TRAINING PROGRAM

## 2021-10-19 PROCEDURE — 1036F TOBACCO NON-USER: CPT | Performed by: STUDENT IN AN ORGANIZED HEALTH CARE EDUCATION/TRAINING PROGRAM

## 2021-10-19 PROCEDURE — G8417 CALC BMI ABV UP PARAM F/U: HCPCS | Performed by: STUDENT IN AN ORGANIZED HEALTH CARE EDUCATION/TRAINING PROGRAM

## 2021-10-19 PROCEDURE — 99213 OFFICE O/P EST LOW 20 MIN: CPT | Performed by: STUDENT IN AN ORGANIZED HEALTH CARE EDUCATION/TRAINING PROGRAM

## 2021-10-19 PROCEDURE — G8484 FLU IMMUNIZE NO ADMIN: HCPCS | Performed by: STUDENT IN AN ORGANIZED HEALTH CARE EDUCATION/TRAINING PROGRAM

## 2021-10-19 NOTE — PROGRESS NOTES
[address] [facility]  [id number] [place] [address] [address] [address]  Dept: [phone]  Dept Fax: [phone]  Loc: [phone]      Subjective:     Chief Complaint   Patient presents with    Results     1 week follow up to discuss xrays of hip        HPI:  Velma Tello is a 40 y.o. female presenting for    R hip pain  -Sx unchanged since last visit. Xray imaging of hip and SI joint unremarkable. Hurts mostly at night when lying down. Has taken multiple muscle relaxers in past w/o good results. R ear pain  -Treated last week by ENT for OE. Has started to feel better since abx drops. Follows up with them next week.        ROS:   Review of Systems  Reviewed with patient and noted to be negative except that listed in HPI    PMHx:  Past Medical History:   Diagnosis Date    Anxiety     Asthma     born with asthmatic bronchitis    CPAP (continuous positive airway pressure) dependence     6cm to 16cm    Degenerative disk disease     Depression     mood disorders    Hemorrhoid 11/2015    Hyperlipidemia     Hypoglycemia     Hypothyroid     Left ureteral stone 5/25/2016    OAB (overactive bladder)     Obstructive sleep apnea     AHI: 37.9 per PSG, 10/2019    Obstructive sleep apnea 4/30/2020    Renal stone 5/25/2016     Patient Active Problem List   Diagnosis    External hemorrhoid    Internal hemorrhoids    Non morbid obesity due to excess calories    Psychophysiologic insomnia    Anxiety    Hypothyroidism    Hyperlipidemia    CPAP (continuous positive airway pressure) dependence    Obstructive sleep apnea    Otalgia of right ear    Tinnitus aurium, bilateral       PSHx:  Past Surgical History:   Procedure Laterality Date    BREAST BIOPSY Left 2018    benign calcification    CHOLECYSTECTOMY  1995    COLONOSCOPY  04/29/2015    Dr. Damaris Terry  11/4/15    Dr. Layla Cosme  11/16/15    Ellen Ugarte  11/16/15 Hemorrhoidectomy & closed lateral internal sphincterotomy    HYSTEROSCOPY  11/4/15    Dr. Winsome Sosa POLYPECTOMY  11/4/15    Dr. Maira Espinal  14 years ago       PFHx:  Family History   Problem Relation Age of Onset    High Blood Pressure Mother     Other Mother         early alzheimers    High Blood Pressure Father     Diabetes Paternal Grandfather     Breast Cancer Paternal Aunt 29    Colon Cancer Neg Hx     Colon Polyps Neg Hx        SocialHx:  Social History     Tobacco Use    Smoking status: Former Smoker     Packs/day: 0.25     Years: 27.00     Pack years: 6.75     Quit date: 2009     Years since quittin.4    Smokeless tobacco: Former User    Tobacco comment: Not employed   Substance Use Topics    Alcohol use: Yes     Alcohol/week: 0.0 standard drinks     Comment: rare       Allergies:   Allergies   Allergen Reactions    Codeine Nausea And Vomiting, Swelling and Rash       Medications:  Current Outpatient Medications   Medication Sig Dispense Refill    ofloxacin (OCUFLOX) 0.3 % solution Place 3 drops in ear(s) three times daily for 10 days Apply to the right ear 10 mL 0    celecoxib (CELEBREX) 200 MG capsule Take 1 capsule by mouth daily 60 capsule 3    omeprazole (PRILOSEC) 40 MG delayed release capsule TAKE 1 CAPSULE BY MOUTH EVERY DAY 30 capsule 5    COMBIVENT RESPIMAT  MCG/ACT AERS inhaler INHALE 1 PUFF EVERY 4 HOURS AS NEEDED FOR WHEEZING 1 each 5    fluticasone (FLONASE) 50 MCG/ACT nasal spray SPRAY 2 SPRAYS INTO EACH NOSTRIL EVERY DAY 2 each 5    ondansetron (ZOFRAN ODT) 4 MG disintegrating tablet Place 1 tablet under the tongue every 8 hours as needed for Nausea or Vomiting 21 tablet 0    levocetirizine (XYZAL) 5 MG tablet TAKE 1 TABLET BY MOUTH EVERY DAY AT NIGHT 30 tablet 5    methocarbamol (ROBAXIN) 500 MG tablet TAKE 1 TABLET BY MOUTH TWICE A DAY 60 tablet 3    atorvastatin (LIPITOR) 20 MG tablet TAKE 1 TABLET BY MOUTH EVERY DAY 30 tablet 11  propranolol (INDERAL) 40 MG tablet TAKE 1 TABLET BY MOUTH THREE TIMES A  tablet 3    levothyroxine (SYNTHROID) 50 MCG tablet TAKE 1 TABLET BY MOUTH EVERY DAY 30 tablet 11    aspirin (ASPIRIN CHILDRENS) 81 MG chewable tablet Take 1 tablet by mouth daily 30 tablet 0     No current facility-administered medications for this visit. Objective:   PE:  /84   Pulse 61   Temp 98.3 °F (36.8 °C) (Temporal)   Resp 18   Ht 5' (1.524 m)   Wt 177 lb (80.3 kg)   SpO2 98%   BMI 34.57 kg/m²   Physical Exam  Constitutional:       General: She is not in acute distress. Appearance: Normal appearance. HENT:      Head: Normocephalic. Nose: Nose normal.      Mouth/Throat:      Mouth: Mucous membranes are moist.      Pharynx: Oropharynx is clear. No oropharyngeal exudate or posterior oropharyngeal erythema. Eyes:      General: No scleral icterus. Extraocular Movements: Extraocular movements intact. Conjunctiva/sclera: Conjunctivae normal.   Cardiovascular:      Rate and Rhythm: Normal rate and regular rhythm. Pulses: Normal pulses. Heart sounds: No murmur heard. Pulmonary:      Effort: Pulmonary effort is normal.      Breath sounds: Normal breath sounds. Abdominal:      General: There is no distension. Palpations: Abdomen is soft. There is no mass. Tenderness: There is no abdominal tenderness. Musculoskeletal:         General: Normal range of motion. Cervical back: Normal range of motion. Comments: Hip pain reproduced w/ R hip flexion. No GT TTP    Skin:     General: Skin is warm and dry. Capillary Refill: Capillary refill takes less than 2 seconds. Neurological:      General: No focal deficit present. Mental Status: She is alert and oriented to person, place, and time. Psychiatric:         Mood and Affect: Mood normal.         Behavior: Behavior normal.         Thought Content:  Thought content normal.            Assessment & Plan [facility] was seen today for results. Diagnoses and all orders for this visit:    Hip flexor tendinitis, right  -     [facility]. Discussed potential alternate muscle relaxer treatment, pt declines at this time. Pt to keep in contact with me if not hearing from PT for appt within next week. Can refer to [place] if needed. F/u w/ me after completing PT. Acute diffuse otitis externa of right ear  -Improving. Continue treatment per ENT      All questions were answered. Medications, including possible adverse effects, and instructions were reviewed and  understanding was confirmed. Follow-up recommendations, including when to contact or return to office (ie; if symptoms worsen or fail to improve), were discussed and acknowledged.     Electronically signed by Benjie Mendes MD on 10/19/21 at 8:29 AM CDT

## 2021-10-27 ENCOUNTER — OFFICE VISIT (OUTPATIENT)
Dept: ENT CLINIC | Age: 46
End: 2021-10-27
Payer: MEDICAID

## 2021-10-27 VITALS — HEIGHT: 60 IN | WEIGHT: 177 LBS | BODY MASS INDEX: 34.75 KG/M2 | TEMPERATURE: 97.8 F

## 2021-10-27 DIAGNOSIS — H60.311 ACUTE DIFFUSE OTITIS EXTERNA OF RIGHT EAR: Primary | ICD-10-CM

## 2021-10-27 PROCEDURE — G8417 CALC BMI ABV UP PARAM F/U: HCPCS | Performed by: PHYSICIAN ASSISTANT

## 2021-10-27 PROCEDURE — 4130F TOPICAL PREP RX AOE: CPT | Performed by: PHYSICIAN ASSISTANT

## 2021-10-27 PROCEDURE — G8484 FLU IMMUNIZE NO ADMIN: HCPCS | Performed by: PHYSICIAN ASSISTANT

## 2021-10-27 PROCEDURE — 1036F TOBACCO NON-USER: CPT | Performed by: PHYSICIAN ASSISTANT

## 2021-10-27 PROCEDURE — G8427 DOCREV CUR MEDS BY ELIG CLIN: HCPCS | Performed by: PHYSICIAN ASSISTANT

## 2021-10-27 PROCEDURE — 99213 OFFICE O/P EST LOW 20 MIN: CPT | Performed by: PHYSICIAN ASSISTANT

## 2021-10-27 NOTE — PROGRESS NOTES
Sherrie Ann is a pleasant 60-year-old  female that presents for a 2-week follow-up after treatment for otitis externa of the right ear. She reports the ear feels better with no drainage or pain. Physical examination with the microscope demonstrated a normal TM and canal bilaterally with no abnormalities. Neck exam demonstrated no lymphadenopathy. Impression: Resolved otitis externa to the right ear    Plan: Patient was advised to follow-up with me as needed. She was reminded to call if she has any questions or problems.       Electronically signed by Brooke Dickson PA-C on 10/27/21 at 9:11 AM DOYLET

## 2021-11-30 ENCOUNTER — HOSPITAL ENCOUNTER (OUTPATIENT)
Dept: PHYSICAL THERAPY | Age: 46
Setting detail: THERAPIES SERIES
Discharge: HOME | End: 2021-11-30
Payer: MEDICAID

## 2021-11-30 PROCEDURE — 97162 PT EVAL MOD COMPLEX 30 MIN: CPT

## 2021-11-30 ASSESSMENT — PAIN DESCRIPTION - LOCATION: LOCATION: BACK;SACRUM;HIP

## 2021-11-30 ASSESSMENT — PAIN DESCRIPTION - ONSET: ONSET: GRADUAL

## 2021-11-30 ASSESSMENT — PAIN DESCRIPTION - PROGRESSION: CLINICAL_PROGRESSION: GRADUALLY WORSENING

## 2021-11-30 ASSESSMENT — PAIN DESCRIPTION - PAIN TYPE: TYPE: CHRONIC PAIN

## 2021-11-30 ASSESSMENT — PAIN DESCRIPTION - ORIENTATION: ORIENTATION: RIGHT;LOWER

## 2021-11-30 NOTE — PROGRESS NOTES
Physical Therapy  Initial Assessment  Date: 2021  Patient Name: Irena Stevens  MRN: [id number]  : 1975     Treatment Diagnosis: R hip pain    Restrictions       Subjective   General  Chart Reviewed: Yes  Patient assessed for rehabilitation services?: Yes  Response To Previous Treatment: Not applicable  Family / Caregiver Present: No  Referring Practitioner: Eben Kennedy MD  Referral Date : 10/19/21  Diagnosis: R hip pain  Follows Commands: Within Functional Limits  PT Visit Information  PT Insurance Information: [facility]  Total # of Visits Approved:  (Anticipate 8-12)  Total # of Visits to Date: 1  Plan of Care/Certification Expiration Date: 22  Progress Note Due Date: 21  Subjective  Subjective: Presents with complaint of low back and and hip pain. Has previously attended PT for low back pain but states that it never really went away. Low back pain has been chronic, but R hip pain has been more recent, starting over the past few months. Denies any specific injury to area. Onset has been gradual.  \"When I have the most pain is when I'm laying on my side trying to get comfortable to sleep. When I'm up moving around it's noticeable but nothing major. \"  Pain also made worse by prolonged sitting, especially with weight shifted to R side. Pain relieved by movement in general.  Pain Screening  Patient Currently in Pain: Yes  Pain Assessment  Pain Assessment: 0-10  Pain Level:  (\"Moderate\" pain but does not quantify)  Pain Type: Chronic pain  Pain Location: Back; Sacrum; Hip  Pain Orientation: Right; Lower  Pain Descriptors: Aching; Dull; Elfrieda Min;  Other (Comment) (\"Pinching\")  Pain Onset: Gradual  Clinical Progression: Gradually worsening  Vital Signs  Patient Currently in Pain: Yes    Vision/Hearing  Vision  Vision: Within Functional Limits  Hearing  Hearing: Within functional limits    Orientation  Orientation  Overall Orientation Status: Within Normal Limits    Social/Functional History  Social/Functional History  Lives With: Spouse  ADL Assistance: Independent  Ambulation Assistance: Independent  Transfer Assistance: Independent    Objective     Observation/Palpation  Palpation: Point tender at R greater trochanter, and pulls away from palpation d/t pain. Also with TTP along R IT band. Denies TTP at lumbar spine or bilat PSIS. Observation: In supine, RLE longer than L, with R ASIS anteriorly rotated. Stands with equal WB bilaterally. Spine  Lumbar: 70 degrees flexion, 25 degrees extension, 35 degrees SB bilat, 100% rotation bilaterally    Strength RLE  R Hip Flexion: 5/5  R Hip ABduction: 5/5  R Hip ADduction: 5/5  R Knee Flexion: 5/5  R Knee Extension: 5/5  R Ankle Dorsiflexion: 5/5  R Ankle Plantar flexion: 5/5  Strength LLE  L Hip Flexion: 5/5  L Hip ABduction: 5/5  L Hip ADduction: 5/5  L Knee Flexion: 5/5  L Knee Extension: 5/5  L Ankle Dorsiflexion: 5/5  L Ankle Plantar Flexion: 5/5  Strength Other  Other: Fair pelvic tilt and TA contraction     Additional Measures  Flexibility: L HS flexibility to 85 degrees, R HS flexibility to 80 degrees. R hip ER mildly limited vs L. Special Tests:  On right, (-)Escobar test, (+)Kwesi's, (-)TUAN, (+)FADIR  Other: Lower Extremity Functional Scale: 51/80= 36.25% impairment  Sensation  Overall Sensation Status: WNL             Ambulation  Ambulation?: Yes  Ambulation 1  Surface: level tile  Device: No Device  Assistance: Independent  Quality of Gait: Ambulates with R foot in mild eversion (d/t hx R ankle injury)     Exercises  Exercise 1: Assess leg length  Exercise 2: Isometric R hip extension from [facility]  Exercise 3: Isometric L hip flexion from 90/90  Exercise 4: TA contraction  Exercise 5: Pelvic tilt  Exercise 6: Hooklying lumbar rotation to L  Exercise 7: Supine quadratus stretch to L  Exercise 8: R piriformis stretch (figure 4 and knee to opp chest)  Exercise 9: Axial traction LLE  Exercise 10: Contract/relax R hip ER, abd/add, HS  Exercise 11: Sidelying R hip flexion stretch  Exercise 12: Sidelying R IT band stretch  Exercise 13: STM (roller) to R IT band  Exercise 14: Sidelying R hip abduction  Exercise 15: Sidelying R hip clamshell  Exercise 16: Standing hip abd/ext  Exercise 17: Standing march  Exercise 18: Standing R IT band stretch (LLE crossed over R, reach to R foot)  Exercise 19: U/S to R greater trochanter area                      Assessment   Conditions Requiring Skilled Therapeutic Intervention  Body structures, Functions, Activity limitations: Increased pain; Decreased ROM; Decreased strength  Assessment: Pt presents with complaint of R hip and low back pain. Demonstrates decreased core strength and limited ROM. Will benefit from skilled PT intervention to address listed impairments. Treatment Diagnosis: R hip pain  Prognosis: Good  Decision Making: Medium Complexity  History: See above  Exam: See above  Clinical Presentation: Evolving  REQUIRES PT FOLLOW UP: Yes  Discharge Recommendations: Continue to assess pending progress  Activity Tolerance  Activity Tolerance: Patient Tolerated treatment well; Patient limited by pain         Plan   Plan  Times per week: 2x  Plan weeks: 4-6 weeks  Current Treatment Recommendations: Strengthening, Home Exercise Program, Manual Therapy - Soft Tissue Mobilization, Manual Therapy - Joint Manipulation, Patient/Caregiver Education & Training, ROM, Modalities    Goals  Short term goals  Time Frame for Short term goals: 3-4 weeks  Short term goal 1: Independent with HEP  Short term goal 2: Perform 10 good quality TA contractions  Long term goals  Time Frame for Long term goals : 4-6 weeks  Long term goal 1: Decrease TTP at R greater trochanter by at least 50%  Long term goal 2: Report ability to tolerate laying on R side for sleep. Long term goal 3: Improve LEFS score to 20% impairment or less.   Patient Goals   Patient goals : reduce R hip pain       Therapy Time   Individual Concurrent Group Co-treatment   Time In 0900         Time Out 0940         Minutes 40            Electronically signed by Danii Hirsch PT on 11/30/2021 at 2:50 PM

## 2021-12-02 ENCOUNTER — HOSPITAL ENCOUNTER (OUTPATIENT)
Dept: PHYSICAL THERAPY | Age: 46
Setting detail: THERAPIES SERIES
Discharge: HOME | End: 2021-12-02
Payer: MEDICAID

## 2021-12-02 PROCEDURE — 97035 APP MDLTY 1+ULTRASOUND EA 15: CPT

## 2021-12-02 PROCEDURE — 97110 THERAPEUTIC EXERCISES: CPT

## 2021-12-02 ASSESSMENT — PAIN DESCRIPTION - PROGRESSION: CLINICAL_PROGRESSION: GRADUALLY WORSENING

## 2021-12-02 NOTE — PROGRESS NOTES
Physical Therapy  Daily Treatment Note  Date: 2021  Patient Name: Dana Pollard  MRN: [id number]     :   1975    Subjective:   General  Chart Reviewed: Yes  Response To Previous Treatment: Not applicable  Family / Caregiver Present: No  Referring Practitioner: Hadley Oconnell MD  PT Visit Information  PT Insurance Information: [facility]  Total # of Visits Approved:  (Anticipate 8-12)  Total # of Visits to Date: 2  Plan of Care/Certification Expiration Date: 22  Progress Note Due Date: 21  Subjective  Subjective: Patient states she has no pain unless she lays down or rests.   Pain Screening  Patient Currently in Pain: No  Pain Assessment  Clinical Progression: Gradually worsening  Vital Signs  Patient Currently in Pain: No       Treatment Activities:                                    Exercises  Exercise 1: Assess leg length - no leg length discrepency 2021  Exercise 2: Isometric R hip extension from [facility]- not today  Exercise 3: Isometric L hip flexion from 90/90- not today  Exercise 4: TA contraction alone 10 sec x 10, with alt ue 1 x 10, with alt le 1 x 10, with alt ue/le 1 x 10  Exercise 5: Pelvic tilt 1 x 10  Exercise 6: Hooklying lumbar rotation to L 1 x 10  Exercise 7: Supine quadratus stretch to L 4 x 15 sec  Exercise 8: R piriformis stretch (figure 4 and knee to opp chest) 4 x 15 sec  Exercise 9: Axial traction LLE 4 x 15 sec  Exercise 10: Contract/relax R hip ER, abd/add, HS 4 x 15 sec ea *5 sec contraction  Exercise 11: Sidelying R hip flexion stretch 4 x 15 sec  Exercise 12: Sidelying R IT band stretch 4 x 15 sec  Exercise 13: STM (roller) to R IT band x 5  min  Exercise 14: Sidelying R hip abduction 1 x 10  Exercise 15: Sidelying R hip clamshell 1 x 10  Exercise 16: Standing hip abd/ext 1 x 10 10 ea  Exercise 17: Standing march 1 x 10  Exercise 18: Standing R IT band stretch (LLE crossed over R, reach to R foot) 4 x 15 sec  Exercise 19: U/S to R greater trochanter area 2.0 watt/cm2 x 8 min                               Assessment:   Conditions Requiring Skilled Therapeutic Intervention  Body structures, Functions, Activity limitations: Increased pain; Decreased ROM; Decreased strength  Assessment: Patient tolerated initiation of tx well with mod v.c. and demo for proper tech. She was able to complete all attempted ex's and required min to no rest breaks. She reported slight discomfort after session, but did not rate. Treatment Diagnosis: R hip pain  REQUIRES PT FOLLOW UP: Yes  Discharge Recommendations: Continue to assess pending progress    Goals:  Short term goals  Time Frame for Short term goals: 3-4 weeks  Short term goal 1: Independent with HEP  Short term goal 2: Perform 10 good quality TA contractions  Long term goals  Time Frame for Long term goals : 4-6 weeks  Long term goal 1: Decrease TTP at R greater trochanter by at least 50%  Long term goal 2: Report ability to tolerate laying on R side for sleep. Long term goal 3: Improve LEFS score to 20% impairment or less.   Patient Goals   Patient goals : reduce R hip pain  Plan:    Plan  Times per week: 2x  Plan weeks: 4-6 weeks  Current Treatment Recommendations: Strengthening, Home Exercise Program, Manual Therapy - Soft Tissue Mobilization, Manual Therapy - Joint Manipulation, Patient/Caregiver Education & Training, ROM, Modalities  Timed Code Treatment Minutes: 40 Minutes     Therapy Time   Individual Concurrent Group Co-treatment   Time In [phone]         Time Out 3580         Minutes 44         Timed Code Treatment Minutes: 40 Minutes  Electronically signed by Linette Melendez PT on 12/2/2021 at 8:50 AM

## 2021-12-03 RX ORDER — LEVOTHYROXINE SODIUM 0.05 MG/1
TABLET ORAL
Qty: 30 TABLET | Refills: 11 | Status: SHIPPED | OUTPATIENT
Start: 2021-12-03

## 2021-12-07 ENCOUNTER — HOSPITAL ENCOUNTER (OUTPATIENT)
Dept: PHYSICAL THERAPY | Age: 46
Setting detail: THERAPIES SERIES
Discharge: HOME | End: 2021-12-07
Payer: MEDICAID

## 2021-12-07 PROCEDURE — 97035 APP MDLTY 1+ULTRASOUND EA 15: CPT

## 2021-12-07 PROCEDURE — 97110 THERAPEUTIC EXERCISES: CPT

## 2021-12-07 ASSESSMENT — PAIN DESCRIPTION - PAIN TYPE: TYPE: CHRONIC PAIN

## 2021-12-07 ASSESSMENT — PAIN SCALES - GENERAL: PAINLEVEL_OUTOF10: 4

## 2021-12-07 ASSESSMENT — PAIN DESCRIPTION - ORIENTATION: ORIENTATION: RIGHT

## 2021-12-07 ASSESSMENT — PAIN DESCRIPTION - LOCATION: LOCATION: HIP

## 2021-12-07 NOTE — PROGRESS NOTES
phyPhysical Therapy  Daily Treatment Note  Date: 2021  Patient Name: Jc Caldwell  MRN: [id number]     :   1975    Subjective:   General  Referring Practitioner: Francisco Javier Foster MD  PT Insurance Information: [facility]  Total # of Visits Approved:  (8-12)  Total # of Visits to Date: 3  Plan of Care/Certification Expiration Date: 22  Progress Note Due Date: 21  Subjective: i have some pain in my R hip but not real bad  Patient Currently in Pain: Yes  Pain Level: 4  Pain Type: Chronic pain  Pain Location: Hip  Pain Orientation: Right       Treatment Activities:     Exercises  Exercise 1: Assess leg length -- LLE longer  Exercise 2: Isometric R hip extension from [facility]--:  L ext  5\" x 5  Exercise 3: Isometric L hip flexion from --  R flex  5\" x 5  Exercise 4: TA contraction alone 10 sec x 10, with alt ue 1 x 10, with alt le 1 x 10, with alt ue/le 1 x 10  Exercise 5: Pelvic tilt 1 x 10  Exercise 6: Hooklying lumbar rotation to L 1 x 10  Exercise 7: Supine quadratus stretch to L 4 x 15 sec  Exercise 8: R piriformis stretch (figure 4  15\" x 4 and knee to opp chest x 0)  Exercise 9: Axial traction LLE 4 x 15 sec  Exercise 10: Contract/relax R hip ER, abd/add, HS 4 x 15 sec ea *5 sec contraction  Exercise 11: Sidelying R hip flexion stretch 4 x 15 sec  : Aurelio test position per HEP  Exercise 12: Sidelying R IT band stretch 4 x 15 sec  Exercise 13: STM (roller) to R IT band x 5  min  Exercise 14: Sidelying R hip abduction 1 x 10  Exercise 15: Sidelying R hip clamshell 1 x 10  Exercise 16: Standing hip abd/ext 1 x 10 10 ea  Exercise 17: Standing march 1 x 10  Exercise 18: Standing R IT band stretch (LLE crossed over R, reach to R foot) 4 x 15 sec  Exercise 19: U/S to R greater trochanter area 2.0 watt/cm2 x 8 min  Exercise 20: : HEP ISSUED     Assessment:   Conditions Requiring Skilled Therapeutic Intervention  Body structures, Functions, Activity limitations: Increased pain; Decreased ROM; Decreased strength  Assessment: HEP issued with pictures and written instructions and reviewed during therapy session. LLE slightly longer than R and after synergistic techniques her leg length was even, performed hip flexor stretch in [employer] per HEP today vs sidelying. Will monitor her progress and advance routine accordingly. Treatment Diagnosis: R hip pain  REQUIRES PT FOLLOW UP: Yes         Goals:  Short term goals  Time Frame for Short term goals: 3-4 weeks  Short term goal 1: Independent with HEP  Short term goal 2: Perform 10 good quality TA contractions  Long term goals  Time Frame for Long term goals : 4-6 weeks  Long term goal 1: Decrease TTP at R greater trochanter by at least 50%  Long term goal 2: Report ability to tolerate laying on R side for sleep. Long term goal 3: Improve LEFS score to 20% impairment or less.   Patient Goals   Patient goals : reduce R hip pain    Plan:    Times per week: 2x  Plan weeks: 4-6 weeks  Current Treatment Recommendations: Strengthening, Home Exercise Program, Manual Therapy - Soft Tissue Mobilization, Manual Therapy - Joint Manipulation, Patient/Caregiver Education & Training, ROM, Modalities        Therapy Time   Individual Concurrent Group Co-treatment   Time In 1737         Time Out 0845         Minutes 48               aRdha Andrews PTA    Electronically signed by Radha Andrews PTA on 12/7/2021 at 8:52 AM

## 2021-12-09 ENCOUNTER — HOSPITAL ENCOUNTER (OUTPATIENT)
Dept: PHYSICAL THERAPY | Age: 46
Setting detail: THERAPIES SERIES
Discharge: HOME | End: 2021-12-09
Payer: MEDICAID

## 2021-12-09 PROCEDURE — 97110 THERAPEUTIC EXERCISES: CPT

## 2021-12-09 PROCEDURE — 97035 APP MDLTY 1+ULTRASOUND EA 15: CPT

## 2021-12-09 ASSESSMENT — PAIN SCALES - GENERAL: PAINLEVEL_OUTOF10: 2

## 2021-12-09 ASSESSMENT — PAIN DESCRIPTION - ORIENTATION: ORIENTATION: RIGHT

## 2021-12-09 ASSESSMENT — PAIN DESCRIPTION - DESCRIPTORS: DESCRIPTORS: ACHING;DULL

## 2021-12-09 ASSESSMENT — PAIN DESCRIPTION - LOCATION: LOCATION: HIP

## 2021-12-09 ASSESSMENT — PAIN DESCRIPTION - PAIN TYPE: TYPE: CHRONIC PAIN

## 2021-12-09 NOTE — PROGRESS NOTES
Daily Treatment Note  Date: 2021  Patient Name: Rose Delgado  MRN: [id number]     :   1975    Subjective:   General  Referring Practitioner: Butch Yip MD  PT Visit Information  PT Insurance Information: [facility]  Total # of Visits Approved:  (8-12)  Total # of Visits to Date: 4  Plan of Care/Certification Expiration Date: 22  Progress Note Due Date: 21  Subjective  Subjective: My R hip is hurting as bad as it has been. It still hurts at night when I am sleeping due to me sleeping on my R side.   Pain Screening  Patient Currently in Pain: Yes  Pain Assessment  Pain Assessment: 0-10  Pain Level: 2  Pain Type: Chronic pain  Pain Location: Hip  Pain Orientation: Right  Pain Descriptors: Aching; Dull  Vital Signs  Patient Currently in Pain: Yes       Treatment Activities:     Exercises  Exercise 1: Assess leg length -- EVEN TODAY  Exercise 2: Isometric R hip extension from [facility]--:  L ext  5\" x 5 NOT TODAY  Exercise 3: Isometric L hip flexion from 90/90--  R flex  5\" x 5 NOT TODAY  Exercise 4: TA contraction alone 10 sec x 10, with alt ue 1 x 10, with alt le 1 x 10, with alt ue/le 1 x 10  Exercise 5: Pelvic tilt 1 x 10  Exercise 6: Hooklying lumbar rotation to L 1 x 10  Exercise 7: Supine quadratus stretch to L 5 x 15 sec  Exercise 8: R piriformis stretch (figure 4  15\" x 5 and knee to opp chest x 0)  Exercise 9: Axial traction LLE 4 x 15 sec  Exercise 10: Contract/relax R hip ER, abd/add, HS 4 x 15 sec ea *5 sec contraction  Exercise 11: Sidelying R hip flexion stretch 4 x 15 sec  Exercise 12: Sidelying R IT band stretch 4 x 15 sec  Exercise 13: STM (roller) to R IT band x 5  min  Exercise 14: Sidelying R hip abduction 1 x 15  Exercise 15: Sidelying R hip clamshell 1 x 15  Exercise 16: Standing hip abd/ext 1 x 10 15 ea  Exercise 17: Standing march 1 x 15  Exercise 18: Standing R IT band stretch (LLE crossed over R, reach to R foot) 4 x 15 sec  Exercise 19: U/S to R greater trochanter area 2.0 watt/cm2 x 8 min  Exercise 20: 12/7: HEP ISSUED     Assessment:   Conditions Requiring Skilled Therapeutic Intervention  Body structures, Functions, Activity limitations: Increased pain; Decreased ROM; Decreased strength  Assessment: Patient is progressing well with treatment, having a decrease in her pain rating this date compared to previous visits. She completes exercises having no c/o increased pain and/or symptoms. Patient able to tolerate increased reps with some exercises. Continue with current POC as patient tolerates. Treatment Diagnosis: R hip pain  REQUIRES PT FOLLOW UP: Yes      Goals:  Short term goals  Time Frame for Short term goals: 3-4 weeks  Short term goal 1: Independent with HEP  Short term goal 2: Perform 10 good quality TA contractions  Long term goals  Time Frame for Long term goals : 4-6 weeks  Long term goal 1: Decrease TTP at R greater trochanter by at least 50%  Long term goal 2: Report ability to tolerate laying on R side for sleep. Long term goal 3: Improve LEFS score to 20% impairment or less.   Patient Goals   Patient goals : reduce R hip pain    Plan:    Plan  Times per week: 2x  Plan weeks: 4-6 weeks  Current Treatment Recommendations: Strengthening, Home Exercise Program, Manual Therapy - Soft Tissue Mobilization, Manual Therapy - Joint Manipulation, Patient/Caregiver Education & Training, ROM, Modalities  Timed Code Treatment Minutes: 44 Minutes     Therapy Time   Individual Concurrent Group Co-treatment   Time In [phone]         Time Out 0362         Minutes 53         Timed Code Treatment Minutes: 44 Minutes       Electronically signed by Carmen Holloway PTA on 12/9/2021 at 8:56 AM

## 2021-12-14 ENCOUNTER — HOSPITAL ENCOUNTER (OUTPATIENT)
Dept: PHYSICAL THERAPY | Age: 46
Setting detail: THERAPIES SERIES
Discharge: HOME | End: 2021-12-14
Payer: MEDICAID

## 2021-12-14 PROCEDURE — 97035 APP MDLTY 1+ULTRASOUND EA 15: CPT

## 2021-12-14 PROCEDURE — 97110 THERAPEUTIC EXERCISES: CPT

## 2021-12-14 ASSESSMENT — PAIN SCALES - GENERAL: PAINLEVEL_OUTOF10: 5

## 2021-12-14 ASSESSMENT — PAIN DESCRIPTION - LOCATION: LOCATION: HIP

## 2021-12-14 ASSESSMENT — PAIN DESCRIPTION - ORIENTATION: ORIENTATION: RIGHT

## 2021-12-14 ASSESSMENT — PAIN DESCRIPTION - PAIN TYPE: TYPE: CHRONIC PAIN

## 2021-12-14 NOTE — PROGRESS NOTES
Physical Therapy  Daily Treatment Note  Date: 2021  Patient Name: Hal Healy  MRN: [id number]     :   1975    Subjective:   General  Referring Practitioner: Clarisse Garces MD  PT Insurance Information: [facility]  Total # of Visits Approved:  (8-)  Total # of Visits to Date: 5  Plan of Care/Certification Expiration Date: 22  Progress Note Due Date: 21  Subjective: my hip is sore this morning but the therapy is helping  Patient Currently in Pain: Yes  Pain Level: 5  Pain Type: Chronic pain  Pain Location: Hip  Pain Orientation: Right       Treatment Activities:     Exercises  Exercise 1: Assess leg length -- EVEN TODAY  Exercise 2: Isometric R hip extension from [facility]--:  L ext  5\" x 5 NOT TODAY  Exercise 3: Isometric L hip flexion from 90/90--  R flex  5\" x 5 NOT TODAY  Exercise 4: TA contraction alone 10 sec x 10, with alt ue 1 x 10, with alt le 1 x 10, with alt ue/le 1 x 10  Exercise 5: Pelvic tilt 1 x 10  Exercise 6: Hooklying lumbar rotation to L 1 x 10  Exercise 7: Supine R quadratus stretch rotating to L 5 x 15 sec  Exercise 8: R piriformis stretch (figure 4  15\" x 5 and knee to opp chest x 0)  Exercise 9: Axial traction LLE 4 x 15 sec  Exercise 10: Contract/relax R hip ER, abd/add, HS 4 x 15 sec ea *5 sec contraction  Exercise 11: Sidelying R hip flexion stretch 4 x 15 sec  Exercise 12: Sidelying R IT band stretch 4 x 15 sec  Exercise 13: STM (roller) to R IT band x 5  min  Exercise 14: Sidelying R hip abduction 1 x 15  Exercise 15: Sidelying R hip clamshell 1 x 15  Exercise 16: Standing hip abd/ext 1 x 15 ea  Exercise 17: Standing march 1 x 15  Exercise 18: Standing R IT band stretch (LLE crossed over R, reach to R foot) 4 x 15 sec  Exercise 19: U/S to R greater trochanter area 2.0 watt/cm2 x 8 min  Exercise 20: : HEP ISSUED     Assessment:   Conditions Requiring Skilled Therapeutic Intervention  Body structures, Functions, Activity limitations: Increased pain; Decreased ROM; Decreased strength  Assessment: Patients leg length was ecen today so synergistic techniques were not performed, still rather tender over R greater trochanter but able to perform esercises and stretches w/o any increased pain and advance her routine as appropriate. Treatment Diagnosis: R hip pain  REQUIRES PT FOLLOW UP: Yes        Goals:  Short term goals  Time Frame for Short term goals: 3-4 weeks  Short term goal 1: Independent with HEP  Short term goal 2: Perform 10 good quality TA contractions  Long term goals  Time Frame for Long term goals : 4-6 weeks  Long term goal 1: Decrease TTP at R greater trochanter by at least 50%  Long term goal 2: Report ability to tolerate laying on R side for sleep. Long term goal 3: Improve LEFS score to 20% impairment or less.   Patient Goals   Patient goals : reduce R hip pain    Plan:    Times per week: 2x  Plan weeks: 4-6 weeks  Current Treatment Recommendations: Strengthening, Home Exercise Program, Manual Therapy - Soft Tissue Mobilization, Manual Therapy - Joint Manipulation, Patient/Caregiver Education & Training, ROM, Modalities        Therapy Time   Individual Concurrent Group Co-treatment   Time In 7321         Time Out 1226         Minutes 52               Spike Casas PTA    Electronically signed by Spike Casas PTA on 12/14/2021 at 8:47 AM

## 2021-12-16 ENCOUNTER — HOSPITAL ENCOUNTER (OUTPATIENT)
Dept: PHYSICAL THERAPY | Age: 46
Setting detail: THERAPIES SERIES
Discharge: HOME | End: 2021-12-16
Payer: MEDICAID

## 2021-12-16 PROCEDURE — 97035 APP MDLTY 1+ULTRASOUND EA 15: CPT

## 2021-12-16 PROCEDURE — 97110 THERAPEUTIC EXERCISES: CPT

## 2021-12-16 ASSESSMENT — PAIN DESCRIPTION - PAIN TYPE: TYPE: CHRONIC PAIN

## 2021-12-16 ASSESSMENT — PAIN DESCRIPTION - LOCATION: LOCATION: HIP

## 2021-12-16 ASSESSMENT — PAIN DESCRIPTION - DESCRIPTORS: DESCRIPTORS: ACHING;DULL

## 2021-12-16 ASSESSMENT — PAIN DESCRIPTION - ORIENTATION: ORIENTATION: RIGHT

## 2021-12-16 ASSESSMENT — PAIN SCALES - GENERAL: PAINLEVEL_OUTOF10: 1

## 2021-12-16 NOTE — PROGRESS NOTES
Daily Treatment Note  Date: 2021  Patient Name: Shanice Dietrich  MRN: [id number]     :   1975    Subjective:   General  Referring Practitioner: Jer Zimmerman MD  PT Visit Information  PT Insurance Information: [facility]  Total # of Visits Approved:  (8-12)  Total # of Visits to Date: 6  Plan of Care/Certification Expiration Date: 22  Progress Note Due Date: 21  Subjective  Subjective: I am feeling pretty good this morning, my hip isnt bothering me very much.   Pain Screening  Patient Currently in Pain: Yes  Pain Assessment  Pain Assessment: 0-10  Pain Level: 1  Pain Type: Chronic pain  Pain Location: Hip  Pain Orientation: Right  Pain Descriptors: Aching; Dull  Vital Signs  Patient Currently in Pain: Yes       Treatment Activities:     Exercises  Exercise 1: Assess leg length -- EVEN TODAY  Exercise 2: Isometric R hip extension from [facility]--:  L ext  5\" x 5 NOT TODAY  Exercise 3: Isometric L hip flexion from 90/90--  R flex  5\" x 5 NOT TODAY  Exercise 4: TA contraction alone 10 sec x 10, with alt ue 1 x 10, with alt le 1 x 10, with alt ue/le 1 x 10  Exercise 5: Pelvic tilt 1 x 10  Exercise 6: Hooklying lumbar rotation to L 1 x 15  Exercise 7: Supine R quadratus stretch rotating to L 5 x 15 sec  Exercise 8: R piriformis stretch (figure 4  15\" x 5 and knee to opp chest x 15 sec hold)  Exercise 9: Axial traction LLE 4 x 15 sec  Exercise 10: Contract/relax R hip ER, abd/add, HS 4 x 15 sec ea *5 sec contraction  Exercise 11: Sidelying R hip flexion stretch 4 x 15 sec  Exercise 12: Sidelying R IT band stretch 4 x 15 sec  Exercise 13: STM (roller) to R IT band x 5  min  Exercise 14: Sidelying R hip abduction 1.5# x 10  Exercise 15: Sidelying R hip clamshell 1.5# x 10  Exercise 16: Standing hip abd/ext 1.5# x 10 ea  Exercise 17: Standing march 1.5# x 10/ 6in step ups frontal/lateral 1.5# x 10  Exercise 18: Standing R IT band stretch (LLE crossed over R, reach to R foot) 4 x 15 sec  Exercise 19: U/S to R greater trochanter area 2.0 watt/cm2 x 8 min  Exercise 20: 12/7: HEP ISSUED    Assessment:   Conditions Requiring Skilled Therapeutic Intervention  Body structures, Functions, Activity limitations: Increased pain; Decreased ROM; Decreased strength  Assessment: Patient continues to show improvement in her symptoms this date having continued even leg length and no c/o increased symptoms throughtout tx. Increased reps and resistance of some exercises this date with patient having no issues. Continue with current POC. Treatment Diagnosis: R hip pain  REQUIRES PT FOLLOW UP: Yes      Goals:  Short term goals  Time Frame for Short term goals: 3-4 weeks  Short term goal 1: Independent with HEP  Short term goal 2: Perform 10 good quality TA contractions  Long term goals  Time Frame for Long term goals : 4-6 weeks  Long term goal 1: Decrease TTP at R greater trochanter by at least 50%  Long term goal 2: Report ability to tolerate laying on R side for sleep. Long term goal 3: Improve LEFS score to 20% impairment or less.   Patient Goals   Patient goals : reduce R hip pain    Plan:    Plan  Times per week: 2x  Plan weeks: 4-6 weeks  Current Treatment Recommendations: Strengthening, Home Exercise Program, Manual Therapy - Soft Tissue Mobilization, Manual Therapy - Joint Manipulation, Patient/Caregiver Education & Training, ROM, Modalities  Timed Code Treatment Minutes: 42 Minutes     Therapy Time   Individual Concurrent Group Co-treatment   Time In [phone]         Time Out 7135         Minutes 50         Timed Code Treatment Minutes: 42 Minutes       Electronically signed by Adelfo Stafford PTA on 12/16/2021 at 8:56 AM

## 2021-12-20 ENCOUNTER — HOSPITAL ENCOUNTER (OUTPATIENT)
Dept: PHYSICAL THERAPY | Age: 46
Setting detail: THERAPIES SERIES
Discharge: HOME | End: 2021-12-20
Payer: MEDICAID

## 2021-12-20 PROCEDURE — 97110 THERAPEUTIC EXERCISES: CPT

## 2021-12-20 NOTE — PROGRESS NOTES
Physical Therapy  Daily Treatment Note  Date: 2021  Patient Name: Micha Lau  MRN: [id number]     :   1975    Subjective:   General  Chart Reviewed: Yes  Response To Previous Treatment: Patient with no complaints from previous session. Family / Caregiver Present: No  Referring Practitioner: Giuliano Phillips MD  PT Visit Information  PT Insurance Information: [facility]  Total # of Visits to Date: 7  Plan of Care/Certification Expiration Date: 22  Progress Note Due Date: 21  Subjective  Subjective: She is not having any hip pain today. Initially tearful as she states she is having other stresses going on. Asked if she felt ok to continue with therapy session and she stated that she wanted to because it takes her mind off things.   Pain Screening  Patient Currently in Pain: Denies  Vital Signs  Patient Currently in Pain: Denies       Treatment Activities:      Exercises  Exercise 1: Assess leg length -- EVEN TODAY  Exercise 2: Isometric R hip extension from SKTC--:  L ext  5\" x 5 NOT TODAY  Exercise 3: Isometric L hip flexion from 90/90--  R flex  5\" x 5 NOT TODAY  Exercise 4: TA contraction alone 10 sec x 10, with alt ue 1 x 10, with alt le 1 x 10, with alt ue/le 1 x 10  Exercise 5: Pelvic tilt 1 x 10  Exercise 6: Hooklying lumbar rotation to L 1 x 15  Exercise 7: Supine R quadratus stretch rotating to L 5 x 15 sec  Exercise 8: R piriformis stretch (figure 4  15\" x 5 and knee to opp chest x 15 sec hold)  Exercise 9: Axial traction LLE 4 x 15 sec  Exercise 10: Contract/relax R hip ER, abd/add, HS 4 x 15 sec ea *5 sec contraction  Exercise 11: Sidelying R hip flexion stretch 4 x 15 sec  Exercise 12: Sidelying R IT band stretch 4 x 15 sec  Exercise 13: STM (roller) to R IT band x 5  min  Exercise 14: Sidelying R hip abduction 1.5# x 10  Exercise 15: Sidelying R hip clamshell 1.5# x 10  Exercise 16: Standing hip abd/ext 1.5# x 10 ea  Exercise 17: Standing march 1.5# x 10/ 6in step ups frontal/lateral 1.5# x 10  Exercise 18: Standing R IT band stretch (LLE crossed over R, reach to R foot) 4 x 15 sec  Exercise 19: U/S to R greater trochanter area 2.0 watt/cm2 x 8 min- not today  Exercise 20: 12/7: HEP ISSUED          Assessment:   Conditions Requiring Skilled Therapeutic Intervention  Body structures, Functions, Activity limitations: Increased pain;Decreased ROM; Decreased strength  Assessment: Pt again with even leg length today, as well as no pain. She shows good familiarity with ex routine and is able to complete today without pain. Did not perform U/S today d/t not having pain. Treatment Diagnosis: R hip pain  REQUIRES PT FOLLOW UP: Yes  Discharge Recommendations: Continue to assess pending progress    Goals:  Short term goals  Time Frame for Short term goals: 3-4 weeks  Short term goal 1: Independent with HEP- met (12/20: HEP issued at previous visit and pt denies questions or difficulty)  Short term goal 2: Perform 10 good quality TA contractions  Long term goals  Time Frame for Long term goals : 4-6 weeks  Long term goal 1: Decrease TTP at R greater trochanter by at least 50%  Long term goal 2: Report ability to tolerate laying on R side for sleep. Long term goal 3: Improve LEFS score to 20% impairment or less.   Patient Goals   Patient goals : reduce R hip pain  Plan:    Plan  Times per week: 2x  Plan weeks: 4-6 weeks  Current Treatment Recommendations: Strengthening,Home Exercise Program,Manual Therapy - Soft Tissue Mobilization,Manual Therapy - Joint Manipulation,Patient/Caregiver Education & Training,ROM,Modalities  Timed Code Treatment Minutes: 45 Minutes     Therapy Time   Individual Concurrent Group Co-treatment   Time In 4397         Time Out 6011         Minutes 45         Timed Code Treatment Minutes: 45 Minutes  Electronically signed by Jaquan Harris PT on 12/20/2021 at 10:53 AM

## 2021-12-22 ENCOUNTER — HOSPITAL ENCOUNTER (OUTPATIENT)
Dept: PHYSICAL THERAPY | Age: 46
Setting detail: THERAPIES SERIES
Discharge: HOME | End: 2021-12-22
Payer: MEDICAID

## 2021-12-22 PROCEDURE — 97530 THERAPEUTIC ACTIVITIES: CPT

## 2021-12-22 ASSESSMENT — PAIN SCALES - GENERAL: PAINLEVEL_OUTOF10: 0

## 2021-12-22 NOTE — PROGRESS NOTES
Physical Therapy  Daily Treatment Note  Date: 2021  Patient Name: Varun Manriquez  MRN: [id number]     :   1975    Subjective:   General  Chart Reviewed: Yes  Response To Previous Treatment: Patient with no complaints from previous session. Family / Caregiver Present: No  Referring Practitioner: Hong Fabian MD  PT Visit Information  PT Insurance Information: [facility]  Total # of Visits to Date: 8  Plan of Care/Certification Expiration Date: 22  Progress Note Due Date: 21  Subjective  Subjective: She denies pain today. She feels that her condition is improving.   Pain Screening  Patient Currently in Pain: Denies  Pain Assessment  Pain Assessment: 0-10  Pain Level: 0  Vital Signs  Patient Currently in Pain: Denies       Treatment Activities:                                    Exercises  Exercise 1: R>L  Exercise 2: Isometric R hip extension from SKTC--:  L ext  5\" X 5 eps  Exercise 3: Isometric L hip flexion from 90/90--  R flex  5\" x 5  Exercise 4: TA contraction alone 10 sec x 10, with alt ue 1 x 10, with alt le 1 x 10, with alt ue/le 1 x 10  Exercise 5: Pelvic tilt 1 x 10  Exercise 6: Hooklying lumbar rotation to L 1 x 15  Exercise 7: Supine R quadratus stretch rotating to L 5 x 15 sec  Exercise 8: R piriformis stretch (figure 4  15\" x 5 and knee to opp chest x 15 sec hold)  Exercise 9: Axial traction LLE 4 x 15 sec  Exercise 10: Contract/relax R hip ER, abd/add, HS 4 x 15 sec ea *5 sec contraction; not today  Exercise 11: Sidelying R hip flexion stretch 4 x 15 sec  Exercise 12: Sidelying R IT band stretch 4 x 15 sec  Exercise 13: STM (roller) to R IT band x 5  min  Exercise 14: Sidelying R hip abduction 1.5# x 10  Exercise 15: Sidelying R hip clamshell 1.5# x 10  Exercise 16: Standing hip abd/ext 1.5# x 10 ea  Exercise 17: Standing march 1.5# x 10/ 6in step ups frontal/lateral 1.5# x 10  Exercise 18: Standing R IT band stretch (LLE crossed over R, reach to R foot) 4 x 15 sec  Exercise 19: U/S to R greater trochanter area 2.0 watt/cm2 x 8 min- not today  Exercise 20: 12/7: HEP ISSUED, not today                               Assessment:   Conditions Requiring Skilled Therapeutic Intervention  Body structures, Functions, Activity limitations: Increased pain;Decreased ROM; Decreased strength  Assessment: She relates having no hip pain today. RLE>LLE upon arrival with decreased difference noted at end of PT session. Treatment Diagnosis: R hip pain  Prognosis: Good  REQUIRES PT FOLLOW UP: Yes  Discharge Recommendations: Continue to assess pending progress    Goals:  Short term goals  Time Frame for Short term goals: 3-4 weeks  Short term goal 1: Independent with HEP- met (12/20: HEP issued at previous visit and pt denies questions or difficulty)  Short term goal 2: Perform 10 good quality TA contractions  Long term goals  Time Frame for Long term goals : 4-6 weeks  Long term goal 1: Decrease TTP at R greater trochanter by at least 50%  Long term goal 2: Report ability to tolerate laying on R side for sleep. Long term goal 3: Improve LEFS score to 20% impairment or less.   Patient Goals   Patient goals : reduce R hip pain  Plan:    Plan  Times per week: 2x  Plan weeks: 4-6 weeks  Current Treatment Recommendations: Strengthening,Home Exercise Program,Manual Therapy - Soft Tissue Mobilization,Manual Therapy - Joint Manipulation,Patient/Caregiver Education & Training,ROM,Modalities  Timed Code Treatment Minutes: 52 Minutes     Therapy Time   Individual Concurrent Group Co-treatment   Time In 0802         Time Out 9746         Minutes 45         Timed Code Treatment Minutes: 52 Minutes  Electronically signed by Karena Martini PT on 12/22/2021 at 12:55 PM

## 2021-12-27 ENCOUNTER — APPOINTMENT (OUTPATIENT)
Dept: PHYSICAL THERAPY | Age: 46
End: 2021-12-27
Payer: MEDICAID

## 2021-12-29 ENCOUNTER — HOSPITAL ENCOUNTER (OUTPATIENT)
Dept: PHYSICAL THERAPY | Age: 46
Setting detail: THERAPIES SERIES
Discharge: HOME | End: 2021-12-29
Payer: MEDICAID

## 2021-12-29 PROCEDURE — 97140 MANUAL THERAPY 1/> REGIONS: CPT

## 2021-12-29 PROCEDURE — 97110 THERAPEUTIC EXERCISES: CPT

## 2021-12-29 NOTE — PROGRESS NOTES
Daily Treatment Note  Date: 2021  Patient Name: Pattie Hirsch  MRN: [id number]     :   1975    Subjective:   General  Chart Reviewed: Yes  Response To Previous Treatment: Patient with no complaints from previous session. Family / Caregiver Present: No  Referring Practitioner: Cash Jackson MD  PT Visit Information  PT Insurance Information: [facility]  Total # of Visits to Date: 5  Plan of Care/Certification Expiration Date: 22  Progress Note Due Date: 21  Subjective  Subjective: I am not hurting today and can tell my R hip is improving  Pain Screening  Patient Currently in Pain: Denies  Vital Signs  Patient Currently in Pain: Denies       Treatment Activities:     Exercises  Exercise 1: B LE Length checked EVEN today  Exercise 2: Isometric R hip extension from SKTC--:  L ext  5\" X 5 eps NOT TODAY  Exercise 3: Isometric L hip flexion from /90--  R flex  5\" x 5 NOT TODAY  Exercise 4: TA contraction alone 10 sec x 10, with alt ue 1 x 10, with alt le 1 x 10, with alt ue/le 1 x 10  Exercise 5: Pelvic tilt 1 x 10  Exercise 6: Hooklying lumbar rotation to L 1 x 15  Exercise 7: Supine R quadratus stretch rotating to L 5 x 15 sec  Exercise 8: R piriformis stretch (figure 4  15\" x 5 and knee to opp chest x 15 sec hold)  Exercise 9: Axial traction LLE 4 x 15 sec  Exercise 10: Contract/relax R hip ER, abd/add, HS 4 x 15 sec ea *5 sec contraction;   Exercise 11: Sidelying R hip flexion stretch 4 x 15 sec  Exercise 12: Sidelying R IT band stretch 4 x 15 sec  Exercise 13: STM (roller) to R IT band x 10 min  Exercise 14: Sidelying R hip abduction 1.5# x 15  Exercise 15: Sidelying R hip clamshell 1.5# x 15  Exercise 16: Standing hip abd/ext 1.5# x 15 ea  Exercise 17: Standing march 1.5# x 15/ 6in step ups frontal/lateral 1.5# x 15  Exercise 18: Standing R IT band stretch (LLE crossed over R, reach to R foot) 4 x 15 sec  Exercise 19: U/S to R greater trochanter area 2.0 watt/cm2 x 8 min- not today  Exercise 20: 12/7: HEP ISSUED     Assessment:   Conditions Requiring Skilled Therapeutic Intervention  Body structures, Functions, Activity limitations: Increased pain;Decreased ROM; Decreased strength  Assessment: Patient continues to be pain free this date with therapist increasing reps of exercises with patient having no c/o increased pain or discomfort. Holding on US this date due to patient having no pain. She is scheduled to be reassessed at her next appointment by PT. Treatment Diagnosis: R hip pain  Prognosis: Good  REQUIRES PT FOLLOW UP: Yes  Discharge Recommendations: Continue to assess pending progress      Goals:  Short term goals  Time Frame for Short term goals: 3-4 weeks  Short term goal 1: Independent with HEP- met (12/20: HEP issued at previous visit and pt denies questions or difficulty)  Short term goal 2: Perform 10 good quality TA contractions  Long term goals  Time Frame for Long term goals : 4-6 weeks  Long term goal 1: Decrease TTP at R greater trochanter by at least 50%  Long term goal 2: Report ability to tolerate laying on R side for sleep. Long term goal 3: Improve LEFS score to 20% impairment or less.   Patient Goals   Patient goals : reduce R hip pain    Plan:    Plan  Times per week: 2x  Plan weeks: 4-6 weeks  Current Treatment Recommendations: Strengthening,Home Exercise Program,Manual Therapy - Soft Tissue Mobilization,Manual Therapy - Joint Manipulation,Patient/Caregiver Education & Training,ROM,Modalities  Timed Code Treatment Minutes: 41 Minutes     Therapy Time   Individual Concurrent Group Co-treatment   Time In 9284         Time Out 8045         Minutes 41         Timed Code Treatment Minutes: 41 Minutes       Electronically signed by Alise Kidd PTA on 12/29/2021 at 8:53 AM

## 2021-12-30 ENCOUNTER — HOSPITAL ENCOUNTER (OUTPATIENT)
Dept: PHYSICAL THERAPY | Age: 46
Setting detail: THERAPIES SERIES
Discharge: HOME | End: 2021-12-30
Payer: MEDICAID

## 2021-12-30 PROCEDURE — 97110 THERAPEUTIC EXERCISES: CPT

## 2021-12-30 PROCEDURE — 97140 MANUAL THERAPY 1/> REGIONS: CPT

## 2021-12-30 NOTE — PROGRESS NOTES
[facility]  OUTPATIENT PHYSICAL THERAPY  DISCHARGE SUMMARY    Date: 2021  Patient Name: Arcelia Hannah        MRN: [id number]    ACCOUNT #: [de-identified]  : 1975  (40 y.o.)  Gender: female   Referring Practitioner: Nayely Lozano MD  Diagnosis: R hip pain  Referral Date : 10/19/21          Total # of Visits to Date: 10    Subjective  Subjective: patient states she can tell the therapy has helped. She says it is much better and that today she has no pain. She reports that she did well with tx yesterday. Objective  Treatments received include: ther-ex, US, manual   See objective/subjective data in goals    Assessment  Assessment: Patient did well with tx today with decreased tenderness to palpation, improved core stability, independence with HEP and improve ability to sleep. Patient has met all goals at this time and is agreeable to making today her last tx and continuing with HEP.            Plan  D/C with independent HEP         Goals  Short term goals  Time Frame for Short term goals: 3-4 weeks  Short term goal 1: Independent with HEP- MET (2021: Patient reports she is independently performing HEP)  Short term goal 2: Perform 10 good quality TA contractions- MET (2021: Patient performed 10 good quality TA contractions)    Long term goals  Time Frame for Long term goals : 4-6 weeks  Long term goal 1: Decrease TTP at R greater trochanter by at least 50%- MET (2021: Patient reported decreased TTP at right greater trochanter by 50-70%)  Long term goal 2: Report ability to tolerate laying on R side for sleep- MET (2021: Patient reports abiltity to tolerate laying on right side for sleep with rare occassion of mild discomfort but has no difficutly contiuing to lay on right side)  Long term goal 3: Improve LEFS score to 20% impairment or less- MET (2021: Patient scored 0% impairment on LEFS)         Electronically signed by Armida Naylor PT on 2021 at 8:36 AM

## 2021-12-30 NOTE — PROGRESS NOTES
Physical Therapy  Daily Treatment Note/ REASSESSMENT  Date: 2021  Patient Name: Lincoln Dailey  MRN: [id number]     :   1975    Subjective:   General  Chart Reviewed: Yes  Response To Previous Treatment: Patient with no complaints from previous session. Family / Caregiver Present: No  Referring Practitioner: Chapo Billy MD  PT Visit Information  PT Insurance Information: [facility]  Total # of Visits to Date: 10  Plan of Care/Certification Expiration Date: 22  Progress Note Due Date: 22  Subjective  Subjective: patient states she can tell the therapy has helped. She says it is much better and that today she has no pain. She reports that she did well with tx yesterday. Pain Screening  Patient Currently in Pain: Denies  Vital Signs  Patient Currently in Pain: Denies       Treatment Activities:                                    Exercises  Exercise 1: B LE Length checked EVEN today  Exercise 2: Isometric R hip extension from SKTC--:  L ext  5\" X 5 eps NOT TODAY  Exercise 3: Isometric L hip flexion from 90/90--  R flex  5\" x 5 NOT TODAY  Exercise 4: TA contraction alone 10 sec x 10, with alt ue 1 x 10, with alt le 1 x 10, with alt ue/le 1 x 10  Exercise 5: Pelvic tilt 1 x 10  Exercise 6: Hooklying lumbar rotation to L 1 x 15  Exercise 7: Supine R quadratus stretch rotating to L 4 x 15 sec  Exercise 8: R piriformis stretch (figure 4  15\" x 5 and knee to opp chest  5 x 15 sec hold)  Exercise 9: Axial traction LLE 4 x 15 sec  Exercise 10: Contract/relax R hip ER, abd/add, HS 4 x 15 sec ea *5 sec contraction;   Exercise 11: Sidelying R hip flexion stretch 4 x 15 sec  Exercise 12: Sidelying R IT band stretch 4 x 15 sec  Exercise 13: STM (roller) to R IT band x 5 min  Exercise 14: Sidelying R hip abduction 1.5# x 15  Exercise 15: Sidelying R hip clamshell 1.5# x 15  Exercise 16: Standing hip abd/ext 1.5# x 15 ea  Exercise 17: Standing march 1.5# x 15/ 6in step ups frontal/lateral 1.5# x 15  Exercise 18: Standing R IT band stretch (LLE crossed over R, reach to R foot) 4 x 15 sec  Exercise 20: 12/7: HEP ISSUED                               Assessment:   Conditions Requiring Skilled Therapeutic Intervention  Assessment: Patient did well with tx today with decreased tenderness to palpation, improved core stability, independence with HEP and improve ability to sleep. Patient has met all goals at this time and is agreeable to making today her last tx and continuing with HEP.   REQUIRES PT FOLLOW UP: No    Goals:  Short term goals  Time Frame for Short term goals: 3-4 weeks  Short term goal 1: Independent with HEP- MET (12/30/2021: Patient reports she is independently performing HEP)  Short term goal 2: Perform 10 good quality TA contractions- MET (12/30/2021: Patient performed 10 good quality TA contractions)  Long term goals  Time Frame for Long term goals : 4-6 weeks  Long term goal 1: Decrease TTP at R greater trochanter by at least 50%- MET (12/30/2021: Patient reported decreased TTP at right greater trochanter by 50-70%)  Long term goal 2: Report ability to tolerate laying on R side for sleep- MET (12/30/2021: Patient reports abiltity to tolerate laying on right side for sleep with rare occassion of mild discomfort but has no difficutly contiuing to lay on right side)  Long term goal 3: Improve LEFS score to 20% impairment or less- MET (12/30/2021: Patient scored 0% impairment on LEFS)  Patient Goals   Patient goals : reduce R hip pain  Plan:    Plan  Plan Comment: D/C with indepdent HEP  Timed Code Treatment Minutes: 41 Minutes     Therapy Time   Individual Concurrent Group Co-treatment   Time In [address] [place] [address]         Time Out 0832         Minutes 41         Timed Code Treatment Minutes: 41 Minutes  Electronically signed by Rm Urbina PT on 12/30/2021 at 8:35 AM

## 2022-01-11 ENCOUNTER — HOSPITAL ENCOUNTER (EMERGENCY)
Age: 47
Discharge: HOME | End: 2022-01-11
Payer: MEDICAID

## 2022-01-11 ENCOUNTER — APPOINTMENT (OUTPATIENT)
Dept: GENERAL RADIOLOGY | Age: 47
End: 2022-01-11
Payer: MEDICAID

## 2022-01-11 VITALS
DIASTOLIC BLOOD PRESSURE: 88 MMHG | TEMPERATURE: 98.2 F | RESPIRATION RATE: 16 BRPM | SYSTOLIC BLOOD PRESSURE: 142 MMHG | BODY MASS INDEX: 35.14 KG/M2 | HEIGHT: 60 IN | WEIGHT: 179 LBS | OXYGEN SATURATION: 97 % | HEART RATE: 58 BPM

## 2022-01-11 DIAGNOSIS — R07.9 CHEST PAIN, UNSPECIFIED TYPE: Primary | ICD-10-CM

## 2022-01-11 LAB
ALBUMIN SERPL-MCNC: 3.6 G/DL (ref 3.5–5.2)
ALP BLD-CCNC: 69 U/L (ref 35–104)
ALT SERPL-CCNC: 21 U/L (ref 5–33)
ANION GAP SERPL CALCULATED.3IONS-SCNC: 6 MMOL/L (ref 7–19)
AST SERPL-CCNC: 19 U/L (ref 5–32)
BASOPHILS ABSOLUTE: 0 K/UL (ref 0–0.2)
BASOPHILS RELATIVE PERCENT: 0.3 % (ref 0–1)
BILIRUB SERPL-MCNC: <0.2 MG/DL (ref 0.2–1.2)
BILIRUBIN URINE: NEGATIVE
BLOOD, URINE: NEGATIVE
BUN BLDV-MCNC: 5 MG/DL (ref 6–20)
CALCIUM SERPL-MCNC: 8.6 MG/DL (ref 8.6–10)
CHLORIDE BLD-SCNC: 103 MMOL/L (ref 98–111)
CLARITY: CLEAR
CO2: 33 MMOL/L (ref 22–29)
COLOR: YELLOW
CREAT SERPL-MCNC: 0.5 MG/DL (ref 0.5–0.9)
EOSINOPHILS ABSOLUTE: 0.1 K/UL (ref 0–0.6)
EOSINOPHILS RELATIVE PERCENT: 1.4 % (ref 0–5)
GFR AFRICAN AMERICAN: >59
GFR NON-AFRICAN AMERICAN: >60
GLUCOSE BLD-MCNC: 99 MG/DL (ref 74–109)
GLUCOSE URINE: NEGATIVE MG/DL
HCG(URINE) PREGNANCY TEST: NEGATIVE
HCT VFR BLD CALC: 38.3 % (ref 37–47)
HEMOGLOBIN: 12.3 G/DL (ref 12–16)
IMMATURE GRANULOCYTES #: 0 K/UL
KETONES, URINE: NEGATIVE MG/DL
LEUKOCYTE ESTERASE, URINE: NEGATIVE
LYMPHOCYTES ABSOLUTE: 3.5 K/UL (ref 1.1–4.5)
LYMPHOCYTES RELATIVE PERCENT: 40.5 % (ref 20–40)
MAGNESIUM: 1.6 MG/DL (ref 1.6–2.6)
MCH RBC QN AUTO: 29.4 PG (ref 27–31)
MCHC RBC AUTO-ENTMCNC: 32.1 G/DL (ref 33–37)
MCV RBC AUTO: 91.4 FL (ref 81–99)
MONOCYTES ABSOLUTE: 0.4 K/UL (ref 0–0.9)
MONOCYTES RELATIVE PERCENT: 4.9 % (ref 0–10)
NEUTROPHILS ABSOLUTE: 4.6 K/UL (ref 1.5–7.5)
NEUTROPHILS RELATIVE PERCENT: 52.8 % (ref 50–65)
NITRITE, URINE: NEGATIVE
PDW BLD-RTO: 12.8 % (ref 11.5–14.5)
PH UA: 7.5 (ref 5–8)
PLATELET # BLD: 258 K/UL (ref 130–400)
PMV BLD AUTO: 10.8 FL (ref 9.4–12.3)
POTASSIUM REFLEX MAGNESIUM: 3 MMOL/L (ref 3.5–5)
PRO-BNP: 186 PG/ML (ref 0–450)
PROTEIN UA: NEGATIVE MG/DL
RBC # BLD: 4.19 M/UL (ref 4.2–5.4)
SODIUM BLD-SCNC: 142 MMOL/L (ref 136–145)
SPECIFIC GRAVITY UA: 1 (ref 1–1.03)
TOTAL PROTEIN: 6.5 G/DL (ref 6.6–8.7)
TROPONIN: <0.01 NG/ML (ref 0–0.03)
TROPONIN: <0.01 NG/ML (ref 0–0.03)
UROBILINOGEN, URINE: 1 E.U./DL
WBC # BLD: 8.7 K/UL (ref 4.8–10.8)

## 2022-01-11 PROCEDURE — 83880 ASSAY OF NATRIURETIC PEPTIDE: CPT

## 2022-01-11 PROCEDURE — 81003 URINALYSIS AUTO W/O SCOPE: CPT

## 2022-01-11 PROCEDURE — 84484 ASSAY OF TROPONIN QUANT: CPT

## 2022-01-11 PROCEDURE — 99283 EMERGENCY DEPT VISIT LOW MDM: CPT

## 2022-01-11 PROCEDURE — 6370000000 HC RX 637 (ALT 250 FOR IP): Performed by: NURSE PRACTITIONER

## 2022-01-11 PROCEDURE — 80053 COMPREHEN METABOLIC PANEL: CPT

## 2022-01-11 PROCEDURE — 85025 COMPLETE CBC W/AUTO DIFF WBC: CPT

## 2022-01-11 PROCEDURE — 93005 ELECTROCARDIOGRAM TRACING: CPT | Performed by: NURSE PRACTITIONER

## 2022-01-11 PROCEDURE — 36415 COLL VENOUS BLD VENIPUNCTURE: CPT

## 2022-01-11 PROCEDURE — 71045 X-RAY EXAM CHEST 1 VIEW: CPT

## 2022-01-11 PROCEDURE — 84703 CHORIONIC GONADOTROPIN ASSAY: CPT

## 2022-01-11 PROCEDURE — 83735 ASSAY OF MAGNESIUM: CPT

## 2022-01-11 RX ORDER — ASPIRIN 81 MG/1
324 TABLET, CHEWABLE ORAL ONCE
Status: DISCONTINUED | OUTPATIENT
Start: 2022-01-11 | End: 2022-01-11 | Stop reason: HOSPADM

## 2022-01-11 RX ORDER — POTASSIUM CHLORIDE 20 MEQ/1
40 TABLET, EXTENDED RELEASE ORAL ONCE
Status: COMPLETED | OUTPATIENT
Start: 2022-01-11 | End: 2022-01-11

## 2022-01-11 RX ADMIN — POTASSIUM CHLORIDE 40 MEQ: 1500 TABLET, EXTENDED RELEASE ORAL at 19:01

## 2022-01-11 ASSESSMENT — ENCOUNTER SYMPTOMS
VOMITING: 0
SHORTNESS OF BREATH: 0
ABDOMINAL PAIN: 0
COUGH: 0
BACK PAIN: 0
NAUSEA: 0
DIARRHEA: 0
RHINORRHEA: 0

## 2022-01-11 ASSESSMENT — HEART SCORE: ECG: 1

## 2022-01-11 ASSESSMENT — PAIN SCALES - GENERAL: PAINLEVEL_OUTOF10: 5

## 2022-01-11 NOTE — Clinical Note
Tiffanie Kulkarni was seen and treated in our emergency department on 1/11/2022. She may return to work on 01/14/2022. If you have any questions or concerns, please don't hesitate to call.       Nichol Rivera

## 2022-01-11 NOTE — ED NOTES
Bed: 20  Expected date:   Expected time:   Means of arrival:   Comments:  EMS chest Pain     Todd Walters RN  01/11/22 9815

## 2022-01-11 NOTE — ED PROVIDER NOTES
[facility] EMERGENCY DEPT  EMERGENCY DEPARTMENT ENCOUNTER      Pt Name: Ramon Welsh  MRN: [id number]  Rodogfjoaquin 1975  Date of evaluation: 1/11/2022  Provider: JUSTIN Reynolds26       Chief Complaint   Patient presents with    Chest Pain         HISTORY OF PRESENT ILLNESS   (Location/Symptom, Timing/Onset, Context/Setting, Quality, Duration, Modifying Factors, Severity)  Note limiting factors. Ramon Welsh is a 40 y.o. female who presents to the emergency department with concern for substernal chest pain that started a few hours prior to arrival. It is constant. Nothing makes better or worse. She was driving when it started. It has some sharpness and some dullness to it. She has high cholesterol. She is a non smoker. She has no shortness of breath. Denies recent illness. No n/v/d. HPI    Nursing Notes were reviewed. REVIEW OF SYSTEMS    (2-9 systems for level 4, 10 or more for level 5)     Review of Systems   Constitutional: Negative for chills and fever. HENT: Negative for congestion and rhinorrhea. Respiratory: Negative for cough and shortness of breath. Cardiovascular: Positive for chest pain. Negative for palpitations and leg swelling. Gastrointestinal: Negative for abdominal pain, diarrhea, nausea and vomiting. Genitourinary: Negative for dysuria, flank pain, frequency and urgency. Musculoskeletal: Negative for back pain, myalgias and neck pain. Neurological: Negative for dizziness, syncope, weakness, light-headedness and headaches. Except as noted above the remainder of the review of systems was reviewed and negative.        PAST MEDICAL HISTORY     Past Medical History:   Diagnosis Date    Anxiety     Asthma     born with asthmatic bronchitis    CPAP (continuous positive airway pressure) dependence     6cm to 16cm    Degenerative disk disease     Depression     mood disorders    Hemorrhoid 11/2015    Hyperlipidemia     Hypoglycemia     Hypothyroid     Left ureteral stone 5/25/2016    OAB (overactive bladder)     Obstructive sleep apnea     AHI: 37.9 per PSG, 10/2019    Obstructive sleep apnea 4/30/2020    Renal stone 5/25/2016         SURGICAL HISTORY       Past Surgical History:   Procedure Laterality Date    BREAST BIOPSY Left 2018    benign calcification    CHOLECYSTECTOMY  1995    COLONOSCOPY  04/29/2015    Dr. En Lopez  11/4/15    Dr. Maxim Aggarwal  11/16/15    Ellen Ugarte  11/16/15    Hemorrhoidectomy & closed lateral internal sphincterotomy    HYSTEROSCOPY  11/4/15    Dr. Nancy Burdick POLYPECTOMY  11/4/15    Dr. Oma Deng  14 years ago         [address]       Discharge Medication List as of 1/11/2022  8:39 PM      CONTINUE these medications which have NOT CHANGED    Details   levothyroxine (SYNTHROID) 50 MCG tablet TAKE 1 TABLET BY MOUTH EVERY DAY, Disp-30 tablet, R-11Normal      celecoxib (CELEBREX) 200 MG capsule Take 1 capsule by mouth daily, Disp-60 capsule, R-3Normal      omeprazole (PRILOSEC) 40 MG delayed release capsule TAKE 1 CAPSULE BY MOUTH EVERY DAY, Disp-30 capsule, R-5Normal      COMBIVENT RESPIMAT  MCG/ACT AERS inhaler INHALE 1 PUFF EVERY 4 HOURS AS NEEDED FOR WHEEZING, Disp-1 each, R-5Normal      fluticasone (FLONASE) 50 MCG/ACT nasal spray SPRAY 2 SPRAYS INTO EACH NOSTRIL EVERY DAY, Disp-2 each, R-5, DAWNormal      ondansetron (ZOFRAN ODT) 4 MG disintegrating tablet Place 1 tablet under the tongue every 8 hours as needed for Nausea or Vomiting, Disp-21 tablet, R-0Normal      levocetirizine (XYZAL) 5 MG tablet TAKE 1 TABLET BY MOUTH EVERY DAY AT NIGHT, Disp-30 tablet, R-5Normal      methocarbamol (ROBAXIN) 500 MG tablet TAKE 1 TABLET BY MOUTH TWICE A DAY, Disp-60 tablet, R-3Normal      atorvastatin (LIPITOR) 20 MG tablet TAKE 1 TABLET BY MOUTH EVERY DAY, Disp-30 tablet, R-11Normal      propranolol (INDERAL) 40 MG tablet TAKE 1 TABLET BY MOUTH THREE TIMES A DAY, Disp-270 tablet, R-3Normal      aspirin (ASPIRIN CHILDRENS) 81 MG chewable tablet Take 1 tablet by mouth daily, Disp-30 tablet, R-0             ALLERGIES     Codeine    FAMILY HISTORY       Family History   Problem Relation Age of Onset    High Blood Pressure Mother     Other Mother         early alzheimers    High Blood Pressure Father     Diabetes Paternal Grandfather     Breast Cancer Paternal Aunt 29    Colon Cancer Neg Hx     Colon Polyps Neg Hx           SOCIAL HISTORY       Social History     Socioeconomic History    Marital status:      Spouse name: None    Number of children: None    Years of education: None    Highest education level: None   Occupational History    None   Tobacco Use    Smoking status: Former Smoker     Packs/day: 0.25     Years: 27.00     Pack years: 6.75     Quit date: 2009     Years since quittin.7    Smokeless tobacco: Former User    Tobacco comment: Not employed   Vaping Use    Vaping Use: Never used   Substance and Sexual Activity    Alcohol use: Yes     Alcohol/week: 0.0 standard drinks     Comment: rare    Drug use: No    Sexual activity: Yes   Other Topics Concern    None   Social History Narrative    None     Social Determinants of Health     Financial Resource Strain: Low Risk     Difficulty of Paying Living Expenses: Not hard at all   Food Insecurity: No Food Insecurity    Worried About Running Out of Food in the Last Year: Never true    [place] of Food in the Last Year: Never true   Transportation Needs:     Lack of Transportation (Medical): Not on file    Lack of Transportation (Non-Medical):  Not on file   Physical Activity:     Days of Exercise per Week: Not on file    Minutes of Exercise per Session: Not on file   Stress:     Feeling of Stress : Not on file   Social Connections:     Frequency of Communication with Friends and Family: Not on file    Frequency of Social Gatherings with Friends and Family: Not on file    Attends [employer] Services: Not on file    Active Member of Clubs or Organizations: Not on file    Attends Club or Organization Meetings: Not on file    Marital Status: Not on file   Intimate Partner Violence:     Fear of Current or Ex-Partner: Not on file    Emotionally Abused: Not on file    Physically Abused: Not on file    Sexually Abused: Not on file   Housing Stability:     Unable to Pay for Housing in the Last Year: Not on file    Number of [place] in the Last Year: Not on file    Unstable Housing in the Last Year: Not on file       SCREENINGS               Heart Score for chest pain patients  History: Slightly Suspicious  ECG: Non-Specifc repolarization disturbance/LBTB/PM  Patient Age: < 45 years  *Risk factors for Atherosclerotic disease: Hypercholesterolemia  Risk Factors: > 3 Risk factors or history of atherosclerotic disease*  Troponin: < 1X normal limit  Heart Score Total: 3               CIWA Assessment  BP: (!) 142/88  Pulse: 58                 PHYSICAL EXAM    (up to 7 for level 4, 8 or more for level 5)     ED Triage Vitals [01/11/22 1649]   BP Temp Temp Source Pulse Resp SpO2 Height Weight   (!) 142/88 98.2 °F (36.8 °C) Oral 58 16 97 % 5' (1.524 m) 179 lb (81.2 kg)       Physical Exam  Vitals reviewed. Constitutional:       General: She is not in acute distress. Appearance: Normal appearance. She is not ill-appearing, toxic-appearing or diaphoretic. HENT:      Head: Normocephalic and atraumatic. Right Ear: Tympanic membrane, ear canal and external ear normal.      Left Ear: Tympanic membrane, ear canal and external ear normal.      Nose: Nose normal.      Mouth/Throat:      Mouth: Mucous membranes are moist.      Pharynx: Oropharynx is clear. Eyes:      Extraocular Movements: Extraocular movements intact. Conjunctiva/sclera: Conjunctivae normal.      Pupils: Pupils are equal, round, and reactive to light.    Cardiovascular:      Rate and Rhythm: Normal rate and regular rhythm. Pulses: Normal pulses. Heart sounds: Normal heart sounds. Pulmonary:      Effort: Pulmonary effort is normal.      Breath sounds: Normal breath sounds. Abdominal:      General: Bowel sounds are normal. There is no distension. Palpations: Abdomen is soft. Tenderness: There is no abdominal tenderness. There is no right CVA tenderness, left CVA tenderness or guarding. Musculoskeletal:         General: No tenderness. Normal range of motion. Cervical back: Normal range of motion and neck supple. Right lower leg: No edema. Left lower leg: No edema. Skin:     General: Skin is warm and dry. Capillary Refill: Capillary refill takes less than 2 seconds. Neurological:      General: No focal deficit present. Mental Status: She is alert and oriented to person, place, and time. DIAGNOSTIC RESULTS     EKG: All EKG's are interpreted by the Emergency Department Physician who either signs or Co-signs this chart in the absence of a cardiologist.    Sinus rhythm, no stemi, rate 57. Reviewed by Dr Aden Salvage:   Non-plain film images such as CT, Ultrasound and MRI are read by the radiologist. Plain radiographic images are visualized and preliminarily interpreted by the emergency physician with the below findings:    Interpretation per the Radiologist below, if available at the time of this note:    XR CHEST PORTABLE   Final Result   No acute findings.    Signed by Dr Mohsen Steele            ED BEDSIDE ULTRASOUND:   Performed by ED Physician - none    LABS:  Labs Reviewed   CBC WITH AUTO DIFFERENTIAL - Abnormal; Notable for the following components:       Result Value    RBC 4.19 (*)     MCHC 32.1 (*)     Lymphocytes % 40.5 (*)     All other components within normal limits   COMPREHENSIVE METABOLIC PANEL W/ REFLEX TO MG FOR LOW K - Abnormal; Notable for the following components:    Potassium reflex Magnesium 3.0 (*)     CO2 33 (*)     Anion Gap 6 (*)     BUN 5 (*)     Total Protein 6.5 (*)     All other components within normal limits   TROPONIN   BRAIN NATRIURETIC PEPTIDE   URINALYSIS   PREGNANCY, URINE   MAGNESIUM   TROPONIN       All other labs were within normal range or not returned as of this dictation. EMERGENCY DEPARTMENT COURSE and DIFFERENTIAL DIAGNOSIS/MDM:   Vitals:    Vitals:    01/11/22 1649   BP: (!) 142/88   Pulse: 58   Resp: 16   Temp: 98.2 °F (36.8 °C)   TempSrc: Oral   SpO2: 97%   Weight: 179 lb (81.2 kg)   Height: 5' (1.524 m)         MDM      REASSESSMENT      Signed out to Nikki PROCTOR at time of my shift end. I have discussed with patient given findings and heart score she is likely a candidate for disposition home today (if delta ekg and trop are normal) with close follow up with PCP for further cardiac eval if warranted. She voices understanding and agreement with this plan. CRITICAL CARE TIME       CONSULTS:  None    PROCEDURES:  Unless otherwise noted below, none     Procedures         FINAL IMPRESSION      1. Chest pain, unspecified type          DISPOSITION/PLAN   DISPOSITION        PATIENT REFERRED TO:  Renny Grimes MD  [address] [place] [address] Dr Sparks [address] [place] [address]    Call in 1 day        DISCHARGE MEDICATIONS:  Discharge Medication List as of 1/11/2022  8:39 PM        Controlled Substances Monitoring:     RX Monitoring 5/17/2016   Attestation The Prescription Monitoring Report for this patient was reviewed today.        (Please note that portions of this note were completed with a voice recognition program.  Efforts were made to edit the dictations but occasionally words are mis-transcribed.)    JUSTIN Frazier CNP (electronically signed)  Attending Emergency Physician         JUSTIN Frazier CNP  01/12/22 1210

## 2022-01-12 ENCOUNTER — OFFICE VISIT (OUTPATIENT)
Dept: PRIMARY CARE CLINIC | Age: 47
End: 2022-01-12
Payer: MEDICAID

## 2022-01-12 VITALS
OXYGEN SATURATION: 97 % | TEMPERATURE: 97.2 F | DIASTOLIC BLOOD PRESSURE: 72 MMHG | BODY MASS INDEX: 35.73 KG/M2 | HEIGHT: 60 IN | WEIGHT: 182 LBS | SYSTOLIC BLOOD PRESSURE: 118 MMHG | HEART RATE: 52 BPM

## 2022-01-12 DIAGNOSIS — K21.9 GASTROESOPHAGEAL REFLUX DISEASE WITHOUT ESOPHAGITIS: ICD-10-CM

## 2022-01-12 DIAGNOSIS — R07.89 ATYPICAL CHEST PAIN: Primary | ICD-10-CM

## 2022-01-12 PROCEDURE — 99214 OFFICE O/P EST MOD 30 MIN: CPT | Performed by: FAMILY MEDICINE

## 2022-01-12 PROCEDURE — 1111F DSCHRG MED/CURRENT MED MERGE: CPT | Performed by: FAMILY MEDICINE

## 2022-01-12 RX ORDER — OMEPRAZOLE 40 MG/1
40 CAPSULE, DELAYED RELEASE ORAL
Qty: 30 CAPSULE | Refills: 0 | Status: SHIPPED | OUTPATIENT
Start: 2022-01-12 | End: 2022-01-12

## 2022-01-12 RX ORDER — PANTOPRAZOLE SODIUM 40 MG/1
40 TABLET, DELAYED RELEASE ORAL
Qty: 30 TABLET | Refills: 0 | Status: SHIPPED | OUTPATIENT
Start: 2022-01-12 | End: 2022-02-09

## 2022-01-12 NOTE — PATIENT INSTRUCTIONS
We are committed to providing you with the best care possible. In order to help us achieve these goals please remember to bring all medications, herbal products, and over the counter supplements with you to each visit. If your provider has ordered testing for you, please be sure to follow up with our office if you have not received results within 7 days after the testing took place. *If you receive a survey after visiting one of our offices, please take time to share your experience concerning your physician office visit. These surveys are confidential and no health information about you is shared. We are eager to improve for you and we are counting on your feedback to help make that happen. Patient Education        Gastroesophageal Reflux Disease (GERD): Care Instructions  Overview     Gastroesophageal reflux disease (GERD) is the backward flow of stomach acid into the esophagus. The esophagus is the tube that leads from your throat to your stomach. A one-way valve prevents the stomach acid from backing up into this tube. But when you have GERD, this valve does not close tightly enough. This can also cause pain and swelling in your esophagus. (This is called esophagitis.)  If you have mild GERD symptoms including heartburn, you may be able to control the problem with antacids or over-the-counter medicine. You can also make lifestyle changes to help reduce your symptoms. These include changing your diet and eating habits, such as not eating late at night and losing weight. Follow-up care is a key part of your treatment and safety. Be sure to make and go to all appointments, and call your doctor if you are having problems. It's also a good idea to know your test results and keep a list of the medicines you take. How can you care for yourself at home? · Take your medicines exactly as prescribed. Call your doctor if you think you are having a problem with your medicine.   · Your doctor may recommend over-the-counter medicine. For mild or occasional indigestion, antacids, such as Tums, Gaviscon, Mylanta, or Maalox, may help. Your doctor also may recommend over-the-counter acid reducers, such as famotidine (Pepcid AC), cimetidine (Tagamet HB), or omeprazole (Prilosec). Read and follow all instructions on the label. If you use these medicines often, talk with your doctor. · Change your eating habits. ? It's best to eat several small meals instead of two or three large meals. ? After you eat, wait 2 to 3 hours before you lie down. ? Avoid foods that make your symptoms worse. These may include chocolate, mint, alcohol, pepper, spicy foods, high-fat foods, or drinks with caffeine in them, such as tea, coffee, jennifer, or energy drinks. If your symptoms are worse after you eat a certain food, you may want to stop eating it to see if your symptoms get better. · Do not smoke or chew tobacco. Smoking can make GERD worse. If you need help quitting, talk to your doctor about stop-smoking programs and medicines. These can increase your chances of quitting for good. · If you have GERD symptoms at night, raise the head of your bed 6 to 8 inches by putting the frame on blocks or placing a foam wedge under the head of your mattress. (Adding extra pillows does not work.)  · Do not wear tight clothing around your middle. · Lose weight if you need to. Losing just 5 to 10 pounds can help. When should you call for help? Call your doctor now or seek immediate medical care if:    · You have new or different belly pain.     · Your stools are black and tarlike or have streaks of blood. Watch closely for changes in your health, and be sure to contact your doctor if:    · Your symptoms have not improved after 2 days.     · Food seems to catch in your throat or chest.   Where can you learn more? Go to https://deny.Kno. org and sign in to your shopkick account.  Enter E039 in the Presdo box to learn more about \"Gastroesophageal Reflux Disease (GERD): Care Instructions. \"     If you do not have an account, please click on the \"Sign Up Now\" link. Current as of: September 8, 2021               Content Version: 13.1  © 4453-8635 Healthwise, Incorporated. Care instructions adapted under license by [facility] ([facility]). If you have questions about a medical condition or this instruction, always ask your healthcare professional. [address] any warranty or liability for your use of this information.

## 2022-01-12 NOTE — PROGRESS NOTES
Post-Discharge Transitional Care Management Services or Hospital Follow Up      Julia Perezjanette   YOB: 1975    Date of Office Visit:  1/12/2022  Date of Hospital Admission: 1/11/22  Date of Hospital Discharge: 1/11/22  Risk of hospital readmission (high >=14%. Medium >=10%) :No data recorded    Care management risk score Rising risk (score 2-5) and Complex Care (Scores >=6): 1     Non face to face  following discharge, date last encounter closed (first attempt may have been earlier): *No documented post hospital discharge outreach found in the last 14 days    Call initiated 2 business days of discharge: *No response recorded in the last 14 days    Patient Active Problem List   Diagnosis    External hemorrhoid    Internal hemorrhoids    Non morbid obesity due to excess calories    Psychophysiologic insomnia    Anxiety    Hypothyroidism    Hyperlipidemia    CPAP (continuous positive airway pressure) dependence    Obstructive sleep apnea    Otalgia of right ear    Tinnitus aurium, bilateral       Allergies   Allergen Reactions    Codeine Nausea And Vomiting, Swelling and Rash       Medications listed as ordered at the time of discharge from hospital     Medication List          Accurate as of January 12, 2022  1:20 PM. If you have any questions, ask your nurse or doctor.             START taking these medications    pantoprazole 40 MG tablet  Commonly known as: PROTONIX  Take 1 tablet by mouth every morning (before breakfast)  Started by: Luis Daniel Huston MD        CONTINUE taking these medications    aspirin 81 MG chewable tablet  Commonly known as: Aspirin Childrens  Take 1 tablet by mouth daily     atorvastatin 20 MG tablet  Commonly known as: LIPITOR  TAKE 1 TABLET BY MOUTH EVERY DAY     celecoxib 200 MG capsule  Commonly known as: CeleBREX  Take 1 capsule by mouth daily     Combivent Respimat  MCG/ACT Aers inhaler  Generic drug: albuterol-ipratropium  INHALE 1 PUFF EVERY 4 HOURS AS NEEDED FOR WHEEZING     fluticasone 50 MCG/ACT nasal spray  Commonly known as: FLONASE  SPRAY 2 SPRAYS INTO EACH NOSTRIL EVERY DAY     levocetirizine 5 MG tablet  Commonly known as: XYZAL  TAKE 1 TABLET BY MOUTH EVERY DAY AT NIGHT     levothyroxine 50 MCG tablet  Commonly known as: SYNTHROID  TAKE 1 TABLET BY MOUTH EVERY DAY     methocarbamol 500 MG tablet  Commonly known as: ROBAXIN  TAKE 1 TABLET BY MOUTH TWICE A DAY     ondansetron 4 MG disintegrating tablet  Commonly known as: Zofran ODT  Place 1 tablet under the tongue every 8 hours as needed for Nausea or Vomiting     propranolol 40 MG tablet  Commonly known as: INDERAL  TAKE 1 TABLET BY MOUTH THREE TIMES A DAY           Where to Get Your Medications      These medications were sent to [facility]/pharmacy #[id number] [place] [address], [address] [place] [address] [address] , [address] [facility] [place] [address]    Phone: [phone]   · pantoprazole 40 MG tablet           Medications marked \"taking\" at this time  Outpatient Medications Marked as Taking for the 1/12/22 encounter (Office Visit) with Eliezer Whittington MD   Medication Sig Dispense Refill    pantoprazole (PROTONIX) 40 MG tablet Take 1 tablet by mouth every morning (before breakfast) 30 tablet 0    levothyroxine (SYNTHROID) 50 MCG tablet TAKE 1 TABLET BY MOUTH EVERY DAY 30 tablet 11    celecoxib (CELEBREX) 200 MG capsule Take 1 capsule by mouth daily 60 capsule 3    COMBIVENT RESPIMAT  MCG/ACT AERS inhaler INHALE 1 PUFF EVERY 4 HOURS AS NEEDED FOR WHEEZING 1 each 5    fluticasone (FLONASE) 50 MCG/ACT nasal spray SPRAY 2 SPRAYS INTO EACH NOSTRIL EVERY DAY 2 each 5    ondansetron (ZOFRAN ODT) 4 MG disintegrating tablet Place 1 tablet under the tongue every 8 hours as needed for Nausea or Vomiting 21 tablet 0    levocetirizine (XYZAL) 5 MG tablet TAKE 1 TABLET BY MOUTH EVERY DAY AT NIGHT 30 tablet 5    methocarbamol (ROBAXIN) 500 MG tablet TAKE 1 TABLET BY MOUTH TWICE A DAY 60 tablet 3  atorvastatin (LIPITOR) 20 MG tablet TAKE 1 TABLET BY MOUTH EVERY DAY 30 tablet 11    propranolol (INDERAL) 40 MG tablet TAKE 1 TABLET BY MOUTH THREE TIMES A  tablet 3    aspirin (ASPIRIN CHILDRENS) 81 MG chewable tablet Take 1 tablet by mouth daily 30 tablet 0        Medications patient taking as of now reconciled against medications ordered at time of hospital discharge: Yes    Chief Complaint   Patient presents with    Follow-Up from Hospital     patient was admitted for chest pain and dizziness. she states that her chest is still hurting but has improved since discharge. The dizziness has gone away.  Chest Pain       History of Present illness - Follow up of Hospital diagnosis(es): Atypical Chest Pain                                                                                                        GERD    Inpatient course: Discharge summary reviewed- see chart. Interval history/Current status:  Pt presented to the ER with c/o mid sternal chest pain, no radiation to the shoulder. Cardiac w-up in ER was negative. Pt denies feeling stressed or anxious  She states that today she has mild pain in the L upper chest wall but no other accompanying symptoms. A comprehensive review of systems was negative except for what was noted in the HPI. Vitals:    01/12/22 1257   BP: 118/72   Site: Left Upper Arm   Position: Sitting   Pulse: 52   Temp: 97.2 °F (36.2 °C)   TempSrc: Temporal   SpO2: 97%   Weight: 182 lb (82.6 kg)   Height: 5' (1.524 m)     Body mass index is 35.54 kg/m².    Wt Readings from Last 3 Encounters:   01/12/22 182 lb (82.6 kg)   01/11/22 179 lb (81.2 kg)   10/27/21 177 lb (80.3 kg)     BP Readings from Last 3 Encounters:   01/12/22 118/72   01/11/22 (!) 142/88   10/19/21 126/84        Physical Exam:  General Appearance: alert and oriented to person, place and time, well developed and well- nourished, in no acute distress  Skin: warm and dry, no rash or erythema  Head: normocephalic and atraumatic  Eyes: pupils equal, round, and reactive to light, extraocular eye movements intact, conjunctivae normal  ENT: tympanic membrane, external ear and ear canal normal bilaterally, nose without deformity, nasal mucosa and turbinates normal without polyps  Neck: supple and non-tender without mass, no thyromegaly or thyroid nodules, no cervical lymphadenopathy  Pulmonary/Chest: clear to auscultation bilaterally- no wheezes, rales or rhonchi, normal air movement, no respiratory distress  Cardiovascular: normal rate, regular rhythm, normal S1 and S2, no murmurs, rubs, clicks, or gallops, distal pulses intact, no carotid bruits  Abdomen: soft, non-tender, non-distended, normal bowel sounds, no masses or organomegaly  Extremities: no cyanosis, clubbing or edema  Musculoskeletal: normal range of motion, no joint swelling, deformity or tenderness  Neurologic: reflexes normal and symmetric, no cranial nerve deficit, gait, coordination and speech normal    Assessment/Plan:  1. Atypical chest pain  - NM DISCHARGE MEDS RECONCILED W/ CURRENT OUTPATIENT MED LIST    2.  Gastroesophageal reflux disease without esophagitis      Anti-reflux measures  Increase oral fluid intake  Avoid too much caffeine        Medical Decision Making: straightforward

## 2022-01-12 NOTE — ED PROVIDER NOTES
[facility]  eMERGENCYdEPARTMENT eNCOUnter      Pt Name: Cj Busch  MRN: [id number]  Birthdate 1975of evaluation: 1/11/2022  [address] [facility], [address] [place] [address]    Emergency Department care of this patient was assumed at from [facility], [facility]. We have discussed the case and the plan of care. I have seen and evaluated patient and reviewed ED course. CHIEF COMPLAINT       Chief Complaint   Patient presents with    Chest Pain         PHYSICAL EXAM    (up to 7 for level 4, 8 or more for level 5)     ED Triage Vitals [01/11/22 1649]   BP Temp Temp Source Pulse Resp SpO2 Height Weight   (!) 142/88 98.2 °F (36.8 °C) Oral 58 16 97 % 5' (1.524 m) 179 lb (81.2 kg)       DIAGNOSTIC RESULTS     EKG: All EKG's are interpreted by the Emergency Department Physician who either signs or Co-signs this chart inthe absence of a cardiologist.    Repeat EKG at [address] Normal sinus rhythm at a rate of 54. No STEMI or acute ischemia. Interpreted by attending    RADIOLOGY:   Non-plain film imagessuch as CT, Ultrasound and MRI are read by the radiologist. Plain radiographic images are visualized and preliminarily interpreted by the emergency physician with the below findings:        XR CHEST PORTABLE   Final Result   No acute findings.    Signed by Dr Uli Arce:  Orlando Cloud - Abnormal; Notable for the following components:       Result Value    RBC 4.19 (*)     MCHC 32.1 (*)     Lymphocytes % 40.5 (*)     All other components within normal limits   COMPREHENSIVE METABOLIC PANEL W/ REFLEX TO MG FOR LOW K - Abnormal; Notable for the following components:    Potassium reflex Magnesium 3.0 (*)     CO2 33 (*)     Anion Gap 6 (*)     BUN 5 (*)     Total Protein 6.5 (*)     All other components within normal limits   TROPONIN   BRAIN NATRIURETIC PEPTIDE   URINALYSIS   PREGNANCY, URINE   MAGNESIUM   TROPONIN       All other labs were within normal range or not returned as of this dictation. EMERGENCY DEPARTMENT COURSE and DIFFERENTIAL DIAGNOSIS/MDM:   :    Vitals:    01/11/22 1649   BP: (!) 142/88   Pulse: 58   Resp: 16   Temp: 98.2 °F (36.8 °C)   TempSrc: Oral   SpO2: 97%   Weight: 179 lb (81.2 kg)   Height: 5' (1.524 m)       MDM  Patient is a 71-year-old female who presented with chest pain to the ER. The patient was originally seen in work-up started by JUSTIN Arce. Her work-up was benign. When I assumed care, the patient was waiting for repeat EKG and troponin. Repeat EKG shows normal sinus rhythm without STEMI or acute ischemia. Her repeat troponin is also negative ruling out an NSTEMI. The patient is aware that she should follow-up with her primary care provider in the next 24 to 48 hours. All of her questions were answered. Return precautions were given to her. FINAL IMPRESSION      1.  Chest pain, unspecified type          DISPOSITION/PLAN   DISPOSITION Decision To Discharge    PATIENT REFERRED TO:  Aaron Curran MD  [address] [place] [address] Dr Ashleigh Licea [address] [place] [address] [facility]    Call in 1 day        (Please note that portions ofthis note were completed with a voice recognition program.  Efforts were made to edit the dictations but occasionally words are mis-transcribed.)    ESTHELA Escalera(electronically signed)     Nichol Escalera  01/11/22 2041

## 2022-01-13 LAB
EKG P AXIS: 27 DEGREES
EKG P AXIS: 34 DEGREES
EKG P-R INTERVAL: 116 MS
EKG P-R INTERVAL: 126 MS
EKG Q-T INTERVAL: 448 MS
EKG Q-T INTERVAL: 452 MS
EKG QRS DURATION: 86 MS
EKG QRS DURATION: 94 MS
EKG QTC CALCULATION (BAZETT): 440 MS
EKG QTC CALCULATION (BAZETT): 441 MS
EKG T AXIS: 41 DEGREES
EKG T AXIS: 50 DEGREES

## 2022-01-25 ENCOUNTER — HOSPITAL ENCOUNTER (OUTPATIENT)
Dept: WOMENS IMAGING | Age: 47
Discharge: HOME | End: 2022-01-25
Payer: MEDICAID

## 2022-01-25 DIAGNOSIS — Z12.31 VISIT FOR SCREENING MAMMOGRAM: ICD-10-CM

## 2022-01-25 PROCEDURE — 77063 BREAST TOMOSYNTHESIS BI: CPT

## 2022-01-26 ENCOUNTER — OFFICE VISIT (OUTPATIENT)
Dept: SURGERY | Age: 47
End: 2022-01-26
Payer: MEDICAID

## 2022-01-26 VITALS — WEIGHT: 171 LBS | TEMPERATURE: 98.7 F | HEIGHT: 60 IN | BODY MASS INDEX: 33.57 KG/M2

## 2022-01-26 DIAGNOSIS — Z12.31 VISIT FOR SCREENING MAMMOGRAM: Primary | ICD-10-CM

## 2022-01-26 PROCEDURE — 1036F TOBACCO NON-USER: CPT | Performed by: PHYSICIAN ASSISTANT

## 2022-01-26 PROCEDURE — G8417 CALC BMI ABV UP PARAM F/U: HCPCS | Performed by: PHYSICIAN ASSISTANT

## 2022-01-26 PROCEDURE — G8427 DOCREV CUR MEDS BY ELIG CLIN: HCPCS | Performed by: PHYSICIAN ASSISTANT

## 2022-01-26 PROCEDURE — 99213 OFFICE O/P EST LOW 20 MIN: CPT | Performed by: PHYSICIAN ASSISTANT

## 2022-01-26 PROCEDURE — G8484 FLU IMMUNIZE NO ADMIN: HCPCS | Performed by: PHYSICIAN ASSISTANT

## 2022-01-26 NOTE — PROGRESS NOTES
HPI:  Esperanza Cam is in for follow-up breast check. She has not noticed any changes in her breasts. Her imaging was reviewed and is noted below. EXAMINATION: [facility] DIGITAL SCREEN W OR WO CAD BILATERAL 1/25/2022 9:45   AM   HISTORY: SCREENING BILATERAL DIGITAL MAMMOGRAM WITH TOMOSYNTHESIS   1/25/2022 7:29 AM   CLINICAL HISTORY: Screening.     COMPARISON STUDIES: January 18, 2021, January 8, 2020 and December 28, 2018. FINDINGS:    Digital CC and MLO views of bilateral breasts were obtained. Tomosynthesis in the MLO and CC projections was also performed. There are scattered fibroglandular densities (approximately 25-50%   glandular tissue) consistent with a type B parenchymal pattern. No   suspicious masses or calcifications are identified. There is no architectural distortion. A biopsy clip is present in the   left breast.    This study was interpreted with CAD. IMPRESSION AND RECOMMENDATION:    No mammographic evidence of malignancy. Recommendation is for the   patient to return for routine mammography in one year or sooner, if   clinically indicated.    Assessment: BI-RADS Category 2 benign       BREAST EXAM:  On examination, she has fibrocystic changes throughout both breasts, no dominant masses, no skin or nipple changes and no axillary adenopathy. I see nothing suspicious for breast cancer. ASSESSMENT:  Benign fibrocystic changes              PLAN:  I will plan to see her back in 1 year for physical exam and mammograms. She will contact me if anything significant changes. 20 minutes spent, which includes face to face with patient, record review, evaluation, planning, and education.

## 2022-02-09 RX ORDER — PANTOPRAZOLE SODIUM 40 MG/1
TABLET, DELAYED RELEASE ORAL
Qty: 30 TABLET | Refills: 0 | Status: SHIPPED | OUTPATIENT
Start: 2022-02-09 | End: 2022-03-10

## 2022-02-15 ENCOUNTER — OFFICE VISIT (OUTPATIENT)
Dept: PRIMARY CARE CLINIC | Age: 47
End: 2022-02-15
Payer: MEDICAID

## 2022-02-15 VITALS
SYSTOLIC BLOOD PRESSURE: 132 MMHG | TEMPERATURE: 97.5 F | OXYGEN SATURATION: 99 % | HEIGHT: 60 IN | DIASTOLIC BLOOD PRESSURE: 74 MMHG | WEIGHT: 172.8 LBS | HEART RATE: 66 BPM | BODY MASS INDEX: 33.92 KG/M2

## 2022-02-15 DIAGNOSIS — Z12.11 SCREENING FOR COLON CANCER: ICD-10-CM

## 2022-02-15 DIAGNOSIS — R07.89 CHEST WALL PAIN: Primary | ICD-10-CM

## 2022-02-15 DIAGNOSIS — F41.9 ANXIETY: ICD-10-CM

## 2022-02-15 DIAGNOSIS — E03.9 ACQUIRED HYPOTHYROIDISM: ICD-10-CM

## 2022-02-15 DIAGNOSIS — E66.9 OBESITY (BMI 30.0-34.9): ICD-10-CM

## 2022-02-15 DIAGNOSIS — K21.9 GASTROESOPHAGEAL REFLUX DISEASE WITHOUT ESOPHAGITIS: ICD-10-CM

## 2022-02-15 LAB
CHOLESTEROL, TOTAL: 192 MG/DL (ref 160–199)
HDLC SERPL-MCNC: 48 MG/DL (ref 65–121)
LDL CHOLESTEROL CALCULATED: 114 MG/DL
TRIGL SERPL-MCNC: 150 MG/DL (ref 0–149)
TSH REFLEX FT4: 1.6 UIU/ML (ref 0.35–5.5)

## 2022-02-15 PROCEDURE — 99214 OFFICE O/P EST MOD 30 MIN: CPT | Performed by: STUDENT IN AN ORGANIZED HEALTH CARE EDUCATION/TRAINING PROGRAM

## 2022-02-15 PROCEDURE — G8484 FLU IMMUNIZE NO ADMIN: HCPCS | Performed by: STUDENT IN AN ORGANIZED HEALTH CARE EDUCATION/TRAINING PROGRAM

## 2022-02-15 PROCEDURE — 1036F TOBACCO NON-USER: CPT | Performed by: STUDENT IN AN ORGANIZED HEALTH CARE EDUCATION/TRAINING PROGRAM

## 2022-02-15 PROCEDURE — G8427 DOCREV CUR MEDS BY ELIG CLIN: HCPCS | Performed by: STUDENT IN AN ORGANIZED HEALTH CARE EDUCATION/TRAINING PROGRAM

## 2022-02-15 PROCEDURE — G8417 CALC BMI ABV UP PARAM F/U: HCPCS | Performed by: STUDENT IN AN ORGANIZED HEALTH CARE EDUCATION/TRAINING PROGRAM

## 2022-02-15 NOTE — PROGRESS NOTES
[address] [facility]  [address] [place] [address]  [address] [address]  Dept: [phone]  Dept Fax: [phone]  Loc: [phone]      Subjective:     Chief Complaint   Patient presents with    1 Month Follow-Up    Discuss Medications       HPI:  Ida Marino is a 56 y.o. female presenting for    Patient was in the ER last month for atypical chest pain. She was discharged with reassuring work-up, followed up with Dr. Leavy Severs in the office afterward. Acid reflux treatment was discussed, prilosec was changed to protonix. Says GERD sx somewhat better however persistent and that presenting sx (chest pain) still there. Pt is L upper chest wall, feels some improvement w/ palpation. Pt recounts cp episode, thinks was anxiety-related. She says she is prone to having panic-like attacks. She started working at [employer] 11/2021, quit 3 weeks ago. Anxiety is now somewhat better. She does yoga which helps a little. Has had counseling at [facility] in past, was not satisfied as provider kept changing.       ROS:   Reviewed with patient and noted to be negative except that listed in HPI    PMHx:  Past Medical History:   Diagnosis Date    Anxiety     Asthma     born with asthmatic bronchitis    CPAP (continuous positive airway pressure) dependence     6cm to 16cm    Degenerative disk disease     Depression     mood disorders    Hemorrhoid 11/2015    Hyperlipidemia     Hypoglycemia     Hypothyroid     Left ureteral stone 5/25/2016    OAB (overactive bladder)     Obstructive sleep apnea     AHI: 37.9 per PSG, 10/2019    Obstructive sleep apnea 4/30/2020    Renal stone 5/25/2016     Patient Active Problem List   Diagnosis    External hemorrhoid    Internal hemorrhoids    Non morbid obesity due to excess calories    Psychophysiologic insomnia    Anxiety    Hypothyroidism    Hyperlipidemia    CPAP (continuous positive airway pressure) dependence    Obstructive sleep apnea    Otalgia of right ear    Tinnitus aurium, bilateral       PSHx:  Past Surgical History:   Procedure Laterality Date    BREAST BIOPSY Left 2018    benign calcification    CHOLECYSTECTOMY      COLONOSCOPY  2015    Dr. Jacquelene Saint  11/4/15    Dr. Aileen Phalen  11/16/15    Ellen [address]  11/16/15    Hemorrhoidectomy & closed lateral internal sphincterotomy    HYSTEROSCOPY  11/4/15    Dr. Diana Douglass POLYPECTOMY  11/4/15    Dr. Massiel Mcknight  14 years ago       PFHx:  Family History   Problem Relation Age of Onset    High Blood Pressure Mother     Other Mother         early alzheimers    High Blood Pressure Father     Diabetes Paternal Grandfather     Breast Cancer Paternal Aunt 29    Colon Cancer Neg Hx     Colon Polyps Neg Hx        SocialHx:  Social History     Tobacco Use    Smoking status: Former Smoker     Packs/day: 0.25     Years: 27.00     Pack years: 6.75     Quit date: 2009     Years since quittin.8    Smokeless tobacco: Former User    Tobacco comment: Not employed   Substance Use Topics    Alcohol use: Yes     Alcohol/week: 0.0 standard drinks     Comment: rare       Allergies:   Allergies   Allergen Reactions    Codeine Nausea And Vomiting, Swelling and Rash       Medications:  Current Outpatient Medications   Medication Sig Dispense Refill    pantoprazole (PROTONIX) 40 MG tablet TAKE 1 TABLET BY MOUTH EVERY DAY BEFORE BREAKFAST 30 tablet 0    levothyroxine (SYNTHROID) 50 MCG tablet TAKE 1 TABLET BY MOUTH EVERY DAY 30 tablet 11    celecoxib (CELEBREX) 200 MG capsule Take 1 capsule by mouth daily 60 capsule 3    COMBIVENT RESPIMAT  MCG/ACT AERS inhaler INHALE 1 PUFF EVERY 4 HOURS AS NEEDED FOR WHEEZING 1 each 5    fluticasone (FLONASE) 50 MCG/ACT nasal spray SPRAY 2 SPRAYS INTO EACH NOSTRIL EVERY DAY 2 each 5    ondansetron (ZOFRAN ODT) 4 MG disintegrating tablet Place 1 tablet under the tongue every 8 hours as needed for Nausea or Vomiting 21 tablet 0    levocetirizine (XYZAL) 5 MG tablet TAKE 1 TABLET BY MOUTH EVERY DAY AT NIGHT 30 tablet 5    atorvastatin (LIPITOR) 20 MG tablet TAKE 1 TABLET BY MOUTH EVERY DAY 30 tablet 11    propranolol (INDERAL) 40 MG tablet TAKE 1 TABLET BY MOUTH THREE TIMES A  tablet 3    aspirin (ASPIRIN CHILDRENS) 81 MG chewable tablet Take 1 tablet by mouth daily (Patient not taking: Reported on 2/15/2022) 30 tablet 0     No current facility-administered medications for this visit. Objective:   PE:  /74   Pulse 66   Temp 97.5 °F (36.4 °C)   Ht 5' (1.524 m)   Wt 172 lb 12.8 oz (78.4 kg)   SpO2 99%   BMI 33.75 kg/m²   Physical Exam  Constitutional:       General: She is not in acute distress. Appearance: Normal appearance. HENT:      Head: Normocephalic. Nose: Nose normal.      Mouth/Throat:      Mouth: Mucous membranes are moist.      Pharynx: Oropharynx is clear. No oropharyngeal exudate or posterior oropharyngeal erythema. Eyes:      General: No scleral icterus. Extraocular Movements: Extraocular movements intact. Conjunctiva/sclera: Conjunctivae normal.   Cardiovascular:      Rate and Rhythm: Normal rate and regular rhythm. Pulses: Normal pulses. Heart sounds: No murmur heard. Pulmonary:      Effort: Pulmonary effort is normal.      Breath sounds: Normal breath sounds. Chest:          Comments: Area of chest discomfort noted. Some improvement appears to be had with palpation. Abdominal:      General: There is no distension. Palpations: Abdomen is soft. There is no mass. Tenderness: There is no abdominal tenderness. Musculoskeletal:         General: Normal range of motion. Cervical back: Normal range of motion. Skin:     General: Skin is warm and dry. Capillary Refill: Capillary refill takes less than 2 seconds. Neurological:      General: No focal deficit present.       Mental Status: She is alert and oriented to person, place, and time. Psychiatric:         Mood and Affect: Mood normal.         Behavior: Behavior normal.         Thought Content: Thought content normal.            Assessment & Plan   Ashanti Madden was seen today for 1 month follow-up and discuss medications. Diagnoses and all orders for this visit:    Chest wall pain  -Appears likely more musculoskeletal or even anxiety related than cardiac or GERD. Discussed topical over-the-counter remedies such as lidocaine patch, Salonpas, or Biofreeze. Anxiety  -Do not think patient needs medication at this time. Patient agrees with getting some counseling, would prefer female provider with ability for continuity.  -     External Referral To Counseling Services    Gastroesophageal reflux disease without esophagitis  -Recommend GI evaluation for continued GERD symptoms as well as colon cancer screening.  -     [facility], Gastroenterology, [place]    Acquired hypothyroidism  -Continue current dose of levothyroxine for now  -     TSH WITH REFLEX TO FT4; Future    Screening for colon cancer  -     [facility], Gastroenterology, [address] [place] [address]    Obesity (BMI 30.0-34.9)  -     Lipid Panel; Future    Return in about 3 weeks (around 3/8/2022) for follow up after labs, counseling, GI. All questions were answered. Medications, including possible adverse effects, and instructions were reviewed and  understanding was confirmed. Follow-up recommendations, including when to contact or return to office (ie; if symptoms worsen or fail to improve), were discussed and acknowledged.     Electronically signed by Olive Solomon MD on 2/15/22 at 9:06 AM CST

## 2022-03-05 DIAGNOSIS — J30.2 SEASONAL ALLERGIES: ICD-10-CM

## 2022-03-06 RX ORDER — ATORVASTATIN CALCIUM 20 MG/1
TABLET, FILM COATED ORAL
Qty: 30 TABLET | Refills: 5 | Status: SHIPPED | OUTPATIENT
Start: 2022-03-06 | End: 2022-08-31

## 2022-03-06 RX ORDER — LEVOCETIRIZINE DIHYDROCHLORIDE 5 MG/1
TABLET, FILM COATED ORAL
Qty: 30 TABLET | Refills: 5 | Status: SHIPPED | OUTPATIENT
Start: 2022-03-06 | End: 2022-08-31

## 2022-03-08 ENCOUNTER — OFFICE VISIT (OUTPATIENT)
Dept: GASTROENTEROLOGY | Age: 47
End: 2022-03-08
Payer: MEDICAID

## 2022-03-08 VITALS
OXYGEN SATURATION: 98 % | DIASTOLIC BLOOD PRESSURE: 80 MMHG | BODY MASS INDEX: 32.39 KG/M2 | WEIGHT: 165 LBS | HEIGHT: 60 IN | SYSTOLIC BLOOD PRESSURE: 120 MMHG | HEART RATE: 61 BPM

## 2022-03-08 DIAGNOSIS — Z12.11 SCREEN FOR COLON CANCER: ICD-10-CM

## 2022-03-08 DIAGNOSIS — R13.10 DYSPHAGIA, UNSPECIFIED TYPE: ICD-10-CM

## 2022-03-08 DIAGNOSIS — Z11.52 ENCOUNTER FOR SCREENING FOR COVID-19: Primary | ICD-10-CM

## 2022-03-08 DIAGNOSIS — K21.9 GASTROESOPHAGEAL REFLUX DISEASE, UNSPECIFIED WHETHER ESOPHAGITIS PRESENT: Primary | ICD-10-CM

## 2022-03-08 PROCEDURE — G8417 CALC BMI ABV UP PARAM F/U: HCPCS | Performed by: NURSE PRACTITIONER

## 2022-03-08 PROCEDURE — G8484 FLU IMMUNIZE NO ADMIN: HCPCS | Performed by: NURSE PRACTITIONER

## 2022-03-08 PROCEDURE — 1036F TOBACCO NON-USER: CPT | Performed by: NURSE PRACTITIONER

## 2022-03-08 PROCEDURE — 99214 OFFICE O/P EST MOD 30 MIN: CPT | Performed by: NURSE PRACTITIONER

## 2022-03-08 PROCEDURE — G8427 DOCREV CUR MEDS BY ELIG CLIN: HCPCS | Performed by: NURSE PRACTITIONER

## 2022-03-08 ASSESSMENT — ENCOUNTER SYMPTOMS
CONSTIPATION: 0
DIARRHEA: 0
RECTAL PAIN: 0
VOMITING: 0
BLOOD IN STOOL: 0
CHOKING: 0
SHORTNESS OF BREATH: 0
NAUSEA: 1
ABDOMINAL DISTENTION: 0
TROUBLE SWALLOWING: 1
COUGH: 0
ANAL BLEEDING: 0
ABDOMINAL PAIN: 0

## 2022-03-08 NOTE — PROGRESS NOTES
Subjective:     Patient ID: Carmelita Sheets is a 56 y.o. female  PCP: Nataliia Heart MD  Referring Provider: Rama Larson MD    HPI  Patient presents to the office today with the following complaints: Colonoscopy and Gastroesophageal Reflux      Pt seen today for chronic reflux. \"I have horrible reflux. \"  She is currently taking Protonix daily. This has not been very effective. She continues to have breakthrough symptoms daily. She c/o pill dysphagia and some foods. She c/o water brash during the day and night. She c/o nausea, denies any vomiting. Patient denies any lower GI symptoms such as constipation, diarrhea, change in bowel habits, rectal bleeding, and rectal pain. Pt states she is past due for colonoscopy. Last Colonoscopy 2015 - internal hemorrhoids  Denies any family hx colon cancer or colon polyps    Assessment:     1. Gastroesophageal reflux disease, unspecified whether esophagitis present    2. Dysphagia, unspecified type    3. Screen for colon cancer            Plan:   - Continue Protonix 40 mg po daily  - Schedule colonoscopy and endoscopy  Instruct on bowel prep. Nothing to eat or drink after midnight the day of the exam.  Unable to drive for 24 hours after the procedure. No aspirin or nonsteroidal anti-inflammatories for 5 days before procedure. I have discussed the benefits, alternatives, and risks (including bleeding, perforation and death)  for pursuing Endoscopy (EGD/Colonscopy/EUS/ERCP) with the patient and they are willing to continue. We also discussed the need for anesthesia, IV access, proper dietary changes, medication changes if necessary, and need for bowel prep (if ordered) prior to their Endoscopic procedure. They are aware they must have someone accompany them to their scheduled procedure to drive them home - they agree to the above and are willing to continue. Orders  No orders of the defined types were placed in this encounter.     Medications  No orders of the defined types were placed in this encounter. Patient History:     Past Medical History:   Diagnosis Date    Anxiety     Asthma     born with asthmatic bronchitis    CPAP (continuous positive airway pressure) dependence     6cm to 16cm    Degenerative disk disease     Depression     mood disorders    Hemorrhoid 2015    Hyperlipidemia     Hypoglycemia     Hypothyroid     Left ureteral stone 2016    OAB (overactive bladder)     Obstructive sleep apnea     AHI: 37.9 per PSG, 10/2019    Obstructive sleep apnea 2020    Renal stone 2016       Past Surgical History:   Procedure Laterality Date    BREAST BIOPSY Left 2018    benign calcification    CHOLECYSTECTOMY      COLONOSCOPY  2015    Dr Avilez-Internal hemorrhoids, repeat at age 49   [employer]  2015    Dr. Emma Hill  2015    Hemorrhoidectomy & closed lateral internal sphincterotomy    HYSTEROSCOPY  2015    Dr. Juana Ramírez POLYPECTOMY  2015    Dr. Rodríguez Newman  14 years ago       Family History   Problem Relation Age of Onset    High Blood Pressure Mother     Other Mother         early alzheimers    High Blood Pressure Father     Diabetes Paternal Grandfather     Breast Cancer Paternal Aunt 58 [facility]    Colon Cancer Neg Hx     Colon Polyps Neg Hx        Social History     Socioeconomic History    Marital status:      Spouse name: None    Number of children: None    Years of education: None    Highest education level: None   Occupational History    None   Tobacco Use    Smoking status: Former Smoker     Packs/day: 0.25     Years: 27.00     Pack years: 6.75     Quit date: 2009     Years since quittin.8    Smokeless tobacco: Former User    Tobacco comment: Not employed   Vaping Use    Vaping Use: Never used   Substance and Sexual Activity    Alcohol use:  Yes     Alcohol/week: 0.0 standard drinks     Comment: rare    Drug use: No    Sexual activity: Yes   Other Topics Concern    None   Social History Narrative    None     Social Determinants of Health     Financial Resource Strain: Low Risk     Difficulty of Paying Living Expenses: Not hard at all   Food Insecurity: No Food Insecurity    Worried About Running Out of Food in the Last Year: Never true    [place] of Food in the Last Year: Never true   Transportation Needs:     Lack of Transportation (Medical): Not on file    Lack of Transportation (Non-Medical):  Not on file   Physical Activity:     Days of Exercise per Week: Not on file    Minutes of Exercise per Session: Not on file   Stress:     Feeling of Stress : Not on file   Social Connections:     Frequency of Communication with Friends and Family: Not on file    Frequency of Social Gatherings with Friends and Family: Not on file    Attends Evangelical Services: Not on file    Active Member of Clubs or Organizations: Not on file    Attends Club or Organization Meetings: Not on file    Marital Status: Not on file   Intimate Partner Violence:     Fear of Current or Ex-Partner: Not on file    Emotionally Abused: Not on file    Physically Abused: Not on file    Sexually Abused: Not on file   Housing Stability:     Unable to Pay for Housing in the Last Year: Not on file    Number of Places Lived in the Last Year: Not on file    Unstable Housing in the Last Year: Not on file       Current Outpatient Medications   Medication Sig Dispense Refill    atorvastatin (LIPITOR) 20 MG tablet TAKE 1 TABLET BY MOUTH EVERY DAY 30 tablet 5    levocetirizine (XYZAL) 5 MG tablet TAKE 1 TABLET BY MOUTH EVERY DAY AT NIGHT 30 tablet 5    pantoprazole (PROTONIX) 40 MG tablet TAKE 1 TABLET BY MOUTH EVERY DAY BEFORE BREAKFAST 30 tablet 0    levothyroxine (SYNTHROID) 50 MCG tablet TAKE 1 TABLET BY MOUTH EVERY DAY 30 tablet 11    COMBIVENT RESPIMAT  MCG/ACT AERS inhaler INHALE 1 PUFF EVERY 4 HOURS AS NEEDED FOR WHEEZING 1 each 5    fluticasone (FLONASE) 50 MCG/ACT nasal spray SPRAY 2 SPRAYS INTO EACH NOSTRIL EVERY DAY 2 each 5    ondansetron (ZOFRAN ODT) 4 MG disintegrating tablet Place 1 tablet under the tongue every 8 hours as needed for Nausea or Vomiting 21 tablet 0    propranolol (INDERAL) 40 MG tablet TAKE 1 TABLET BY MOUTH THREE TIMES A  tablet 3    celecoxib (CELEBREX) 200 MG capsule Take 1 capsule by mouth daily (Patient not taking: Reported on 3/8/2022) 60 capsule 3     No current facility-administered medications for this visit. Allergies   Allergen Reactions    Codeine Nausea And Vomiting, Swelling and Rash       Review of Systems   Constitutional: Negative for activity change, appetite change, fatigue, fever and unexpected weight change. HENT: Positive for trouble swallowing. Respiratory: Negative for cough, choking and shortness of breath. Cardiovascular: Negative for chest pain. Gastrointestinal: Positive for nausea. Negative for abdominal distention, abdominal pain, anal bleeding, blood in stool, constipation, diarrhea, rectal pain and vomiting. Reflux    Allergic/Immunologic: Negative for food allergies. All other systems reviewed and are negative. Objective:     /80 (Site: Right Upper Arm)   Pulse 61   Ht 5' (1.524 m)   Wt 165 lb (74.8 kg)   SpO2 98%   BMI 32.22 kg/m²     Physical Exam  Vitals reviewed. Constitutional:       General: She is not in acute distress. Appearance: She is well-developed. HENT:      Head: Normocephalic and atraumatic. Right Ear: External ear normal.      Left Ear: External ear normal.      Nose: Nose normal.      Comments: Mask on     Mouth/Throat:      Comments: Mask on  Eyes:      Conjunctiva/sclera: Conjunctivae normal.      Pupils: Pupils are equal, round, and reactive to light. Cardiovascular:      Rate and Rhythm: Normal rate and regular rhythm. Heart sounds: Normal heart sounds. No murmur heard.   No friction rub. No gallop. Pulmonary:      Effort: Pulmonary effort is normal. No respiratory distress. Breath sounds: Normal breath sounds. Abdominal:      General: Bowel sounds are normal. There is no distension. Palpations: Abdomen is soft. There is no mass. Tenderness: There is no abdominal tenderness. There is no guarding or rebound. Musculoskeletal:         General: Normal range of motion. Cervical back: Normal range of motion and neck supple. Skin:     General: Skin is warm and dry. Findings: No rash. Nails: There is no clubbing. Neurological:      Mental Status: She is alert and oriented to person, place, and time. Gait: Gait normal.   Psychiatric:         Behavior: Behavior normal.         Thought Content:  Thought content normal.

## 2022-03-08 NOTE — PATIENT INSTRUCTIONS
Schedule colonoscopy and endoscopy. Do not eat or drink after midnight the day of the procedure. Allowed medications can be taken with a small sip of water. Please review your prep instructions for allowed medications. You will not be able to drive for 24 hours after the procedure due to sedation. Must have a responsible adult, 18 years or older, accompany you to drive you home the day of your procedure. If you are on blood thinners, clearance from the prescribing physician will be obtained before your procedure is scheduled. If it is determined it is not safe to hold these medications for a short time an alternative procedure for evaluation may be recommended. No aspirin, ibuprofen, naproxen, fish oil or vitamin E for 5 days before procedure. Risks of colonoscopy and endoscopy include, but are not limited to, perforation, bleeding, and infection, Risk of perforation and bleeding are increased if there is a polyp removed or dilation completed. Anesthesia risks will be reviewed with you before the procedure by a member of the anesthesia department. Your physician may also schedule a follow up appointment with the nurse practitioner to discuss pathology, symptoms or to check if you have had any problems related to your procedure. If you prefer not to return to the office after your procedure please discuss this with your physician on the day of your colonoscopy. The physician will talk with you and/or your family after the procedure is completed. Final recommendations are based on the pathologist report if biopsies or specimens are taken. If polyps are removed during the procedure they will be sent to a pathologist for analysis. Unless you have a follow up appointment scheduled, you will be notified by mail of the pathology results within 4 weeks. If you have not received results after 4 weeks you may call the office to obtain this information. For Colonoscopy:   You will be given specific directions regarding restrictions to diet and bowel prep instructions including laxatives. Please read these instructions one week prior to your scheduled procedure to ensure that you are prepared. If you have any questions regarding these instructions please call our office Mon through Fri from 8:00 am to 4:00 pm.     Follow prep instructions provided for bowel prep. Take all of the bowel prep as directed. If you are having problems with nausea, stop your prep for 30-45 min to allow the nausea to subside before resuming your prep. It is important to drink plenty of fluids throughout the day before taking your laxatives. This will help to protect your kidneys, prevent dehydration and maximize the effect of the bowel prep. Your diet before a colonoscopy bowel preparation is very important to ensure a successful colon exam. It is recommended to consider certain changes to your diet three to four days prior to the procedure. Remember that your bowels need to be completely empty for the exam.    What foods are good to eat? Cut down on heavy solid foods three to four days before the procedure and start introducing lighter meals to your diet. The following food suggestions are a good part of your diet before a colonoscopy bowel preparation.  Light meat that is easily digestible such as chicken (without the skin)    Potatoes without skin    Cheese    Eggs    A light meal of steamed white fish    Light clear soups    Foods and drinks to avoid  Avoid foods that contain too much fiber. Stay clear of dark colored beverages. They can stick to the walls of the digestive tract and make it difficult to differentiate from blood.  Some of these foods are:   Red meat, rice, nuts and vegetables    Milk, other milk based fluids and cream    Most fruit and puddings    Whole grain pasta    Cereals, bran and seeds    Colored beverages, especially those that are red or purple in color    Red colored Jell-O   On the day before the colonoscopy, continue to drink plenty of clear fluids. It is important   to keep yourself hydrated before the exam.     Please follow all instructions as provided for cleansing the bowel. Failure to have an adequately prepped colon may cause you to have incomplete exam with further testing required.      [email or web address]

## 2022-03-10 RX ORDER — PANTOPRAZOLE SODIUM 40 MG/1
TABLET, DELAYED RELEASE ORAL
Qty: 30 TABLET | Refills: 0 | Status: SHIPPED | OUTPATIENT
Start: 2022-03-10 | End: 2022-03-24 | Stop reason: SDUPTHER

## 2022-03-21 DIAGNOSIS — Z11.52 ENCOUNTER FOR SCREENING FOR COVID-19: ICD-10-CM

## 2022-03-21 DIAGNOSIS — Z11.52 ENCOUNTER FOR SCREENING FOR COVID-19: Primary | ICD-10-CM

## 2022-03-21 LAB — SARS-COV-2, PCR: NOT DETECTED

## 2022-03-22 RX ORDER — ONDANSETRON 4 MG/1
4 TABLET, FILM COATED ORAL 3 TIMES DAILY PRN
Qty: 30 TABLET | Refills: 0 | Status: SHIPPED | OUTPATIENT
Start: 2022-03-22 | End: 2022-10-25

## 2022-03-23 ENCOUNTER — HOSPITAL ENCOUNTER (OUTPATIENT)
Age: 47
Setting detail: SPECIMEN
Discharge: HOME | End: 2022-03-23
Payer: MEDICAID

## 2022-03-23 ENCOUNTER — ANESTHESIA EVENT (OUTPATIENT)
Dept: OPERATING ROOM | Age: 47
End: 2022-03-23

## 2022-03-23 ENCOUNTER — APPOINTMENT (OUTPATIENT)
Dept: OPERATING ROOM | Age: 47
End: 2022-03-23

## 2022-03-23 ENCOUNTER — ANESTHESIA (OUTPATIENT)
Dept: OPERATING ROOM | Age: 47
End: 2022-03-23

## 2022-03-23 ENCOUNTER — HOSPITAL ENCOUNTER (OUTPATIENT)
Age: 47
Setting detail: OUTPATIENT SURGERY
Discharge: HOME | End: 2022-03-23
Attending: INTERNAL MEDICINE | Admitting: INTERNAL MEDICINE
Payer: MEDICAID

## 2022-03-23 VITALS — OXYGEN SATURATION: 98 % | DIASTOLIC BLOOD PRESSURE: 83 MMHG | SYSTOLIC BLOOD PRESSURE: 149 MMHG

## 2022-03-23 VITALS
RESPIRATION RATE: 18 BRPM | HEIGHT: 60 IN | DIASTOLIC BLOOD PRESSURE: 77 MMHG | BODY MASS INDEX: 31.8 KG/M2 | HEART RATE: 82 BPM | OXYGEN SATURATION: 98 % | TEMPERATURE: 98.6 F | WEIGHT: 162 LBS | SYSTOLIC BLOOD PRESSURE: 131 MMHG

## 2022-03-23 PROCEDURE — 43450 DILATE ESOPHAGUS 1/MULT PASS: CPT | Performed by: INTERNAL MEDICINE

## 2022-03-23 PROCEDURE — 45384 COLONOSCOPY W/LESION REMOVAL: CPT | Performed by: INTERNAL MEDICINE

## 2022-03-23 PROCEDURE — 43450 DILATE ESOPHAGUS 1/MULT PASS: CPT

## 2022-03-23 PROCEDURE — 45384 COLONOSCOPY W/LESION REMOVAL: CPT

## 2022-03-23 PROCEDURE — 88305 TISSUE EXAM BY PATHOLOGIST: CPT

## 2022-03-23 PROCEDURE — 43239 EGD BIOPSY SINGLE/MULTIPLE: CPT

## 2022-03-23 PROCEDURE — 43239 EGD BIOPSY SINGLE/MULTIPLE: CPT | Performed by: INTERNAL MEDICINE

## 2022-03-23 RX ORDER — SODIUM CHLORIDE 9 MG/ML
INJECTION, SOLUTION INTRAVENOUS CONTINUOUS
Status: DISCONTINUED | OUTPATIENT
Start: 2022-03-23 | End: 2022-03-23 | Stop reason: HOSPADM

## 2022-03-23 RX ORDER — LIDOCAINE HYDROCHLORIDE 10 MG/ML
INJECTION, SOLUTION INFILTRATION; PERINEURAL PRN
Status: DISCONTINUED | OUTPATIENT
Start: 2022-03-23 | End: 2022-03-23 | Stop reason: SDUPTHER

## 2022-03-23 RX ORDER — DIPHENHYDRAMINE HYDROCHLORIDE 50 MG/ML
12.5 INJECTION INTRAMUSCULAR; INTRAVENOUS
Status: DISCONTINUED | OUTPATIENT
Start: 2022-03-23 | End: 2022-03-23 | Stop reason: HOSPADM

## 2022-03-23 RX ORDER — SODIUM CHLORIDE, SODIUM LACTATE, POTASSIUM CHLORIDE, CALCIUM CHLORIDE 600; 310; 30; 20 MG/100ML; MG/100ML; MG/100ML; MG/100ML
INJECTION, SOLUTION INTRAVENOUS CONTINUOUS PRN
Status: DISCONTINUED | OUTPATIENT
Start: 2022-03-23 | End: 2022-03-23 | Stop reason: SDUPTHER

## 2022-03-23 RX ORDER — SODIUM CHLORIDE 0.9 % (FLUSH) 0.9 %
5-40 SYRINGE (ML) INJECTION PRN
Status: DISCONTINUED | OUTPATIENT
Start: 2022-03-23 | End: 2022-03-23 | Stop reason: HOSPADM

## 2022-03-23 RX ORDER — SODIUM CHLORIDE 9 MG/ML
25 INJECTION, SOLUTION INTRAVENOUS PRN
Status: DISCONTINUED | OUTPATIENT
Start: 2022-03-23 | End: 2022-03-23 | Stop reason: HOSPADM

## 2022-03-23 RX ORDER — ONDANSETRON 2 MG/ML
4 INJECTION INTRAMUSCULAR; INTRAVENOUS
Status: DISCONTINUED | OUTPATIENT
Start: 2022-03-23 | End: 2022-03-23 | Stop reason: HOSPADM

## 2022-03-23 RX ORDER — SODIUM CHLORIDE 0.9 % (FLUSH) 0.9 %
5-40 SYRINGE (ML) INJECTION EVERY 12 HOURS SCHEDULED
Status: DISCONTINUED | OUTPATIENT
Start: 2022-03-23 | End: 2022-03-23 | Stop reason: HOSPADM

## 2022-03-23 RX ORDER — PROPOFOL 10 MG/ML
INJECTION, EMULSION INTRAVENOUS PRN
Status: DISCONTINUED | OUTPATIENT
Start: 2022-03-23 | End: 2022-03-23 | Stop reason: SDUPTHER

## 2022-03-23 RX ADMIN — LIDOCAINE HYDROCHLORIDE 50 MG: 10 INJECTION, SOLUTION INFILTRATION; PERINEURAL at 10:41

## 2022-03-23 RX ADMIN — PROPOFOL 600 MG: 10 INJECTION, EMULSION INTRAVENOUS at 10:41

## 2022-03-23 RX ADMIN — SODIUM CHLORIDE: 9 INJECTION, SOLUTION INTRAVENOUS at 10:00

## 2022-03-23 RX ADMIN — SODIUM CHLORIDE, SODIUM LACTATE, POTASSIUM CHLORIDE, CALCIUM CHLORIDE: 600; 310; 30; 20 INJECTION, SOLUTION INTRAVENOUS at 10:30

## 2022-03-23 NOTE — OP NOTE
Patient: New Matos : 1975  Med Rec#: [id number] Acc#: [id number]   Primary Care Provider Sharmila Carnes MD    Date of Procedure:  3/23/2022    Endoscopist: Jey Mccann MD, MD    Referring Provider: Sharmila Carnes MD,     Operation Performed: Colonoscopy up to the terminal ileum with hot biopsy removal of a small descending colon polyp. Indications: Colon cancer screening    Anesthesia:  Sedation was administered by anesthesia who monitored the patient during the procedure. I met with New Matos prior to procedure. We discussed the procedure itself, and I have discussed the risks of endoscopy (including-- but not limited to-- pain, discomfort, bleeding potentially requiring second endoscopic procedure and/or blood transfusion, organ perforation requiring operative repair, damage to organs near the colon, infection, aspiration, cardiopulmonary/allergic reaction), benefits, indications to endoscopy. Additionally, we discussed options other than colonoscopy. The patient expressed understanding. All questions answered. The patient decided to proceed with the procedure. Signed informed consent was placed on the chart. Blood Loss: minimal    Withdrawal time: More than 6 minutes  Bowel Prep: adequate     Complications: no immediate complications    DESCRIPTION OF PROCEDURE:     A time out was performed. After written informed consent was obtained, the patient was placed in the left lateral position. The perianal area was inspected, and a digital rectal exam was performed. A rectal exam was performed: normal tone, no palpable lesions. At this point, a forward viewing Olympus colonoscope was inserted into the anus and carefully advanced to the terminal ileum. The cecum was identified by the ileocecal valve and the appendiceal orifice. The colonoscope was then slowly withdrawn with careful inspection of the mucosa in a linear and circumferential fashion.  The scope was retroflexed in the rectum. Suction was utilized during the procedure to remove as much air as possible from the bowel. The colonoscope was removed from the patient, and the procedure was terminated. Findings are listed below. Findings:   A 4 mm in diameter sessile polyp in the descending colon was removed by hot biopsy method. Otherwise, the mucosa appeared normal throughout the entire examined colon. NO large polyps or masses or strictures or colitis. Internal hemorrhoids-Grade 1    Retroflexion in the rectum was otherwise normal and revealed no further abnormalities     Recommendations:  1. Repeat colonoscopy: pending pathology -in 5 years for colorectal cancer surveillance  2. Await biopsy results-you will receive a letter with your results within 7-10 days    - Keep scheduled f/u appts with other MDs     - NO ASA/NSAIDs x 2 weeks    Findings and recommendations were discussed w/ the patient. A copy of the images was provided.     Haylee Larry MD, MD  3/23/2022  10:38 AM

## 2022-03-23 NOTE — ANESTHESIA PRE PROCEDURE
Department of Anesthesiology  Preprocedure Note       Name:  Jerod Terrazas   Age:  56 y.o.  :  1975                                          MRN:  [id number]         Date:  3/23/2022      Surgeon: Leila Torres):  Rashad Coats MD    Procedure: Procedure(s):  EGD BIOPSY  COLORECTAL CANCER SCREENING, NOT HIGH RISK    Medications prior to admission:   Prior to Admission medications    Medication Sig Start Date End Date Taking? Authorizing Provider   ondansetron (ZOFRAN) 4 MG tablet Take 1 tablet by mouth 3 times daily as needed for Nausea or Vomiting 3/22/22   Candia Meigs, MD   pantoprazole (PROTONIX) 40 MG tablet TAKE 1 TABLET BY MOUTH EVERY DAY BEFORE BREAKFAST 3/10/22   Roopa Valentin MD   atorvastatin (LIPITOR) 20 MG tablet TAKE 1 TABLET BY MOUTH EVERY DAY 3/6/22   Candia Meigs, MD   levocetirizine (XYZAL) 5 MG tablet TAKE 1 TABLET BY MOUTH EVERY DAY AT NIGHT 3/6/22   Candia Meigs, MD   levothyroxine (SYNTHROID) 50 MCG tablet TAKE 1 TABLET BY MOUTH EVERY DAY 12/3/21   Candia Meigs, MD   celecoxib (CELEBREX) 200 MG capsule Take 1 capsule by mouth daily  Patient not taking: Reported on 3/8/2022 10/11/21   Candia Meigs, MD   COMBIVENT RESPIMAT  MCG/ACT AERS inhaler INHALE 1 PUFF EVERY 4 HOURS AS NEEDED FOR WHEEZING 10/3/21   JUSTIN Sandoval   fluticasone (FLONASE) 50 MCG/ACT nasal spray SPRAY 2 SPRAYS INTO EACH NOSTRIL EVERY DAY 21   Candia Meigs, MD   ondansetron (ZOFRAN ODT) 4 MG disintegrating tablet Place 1 tablet under the tongue every 8 hours as needed for Nausea or Vomiting 21   Candia Meigs, MD   propranolol (INDERAL) 40 MG tablet TAKE 1 TABLET BY MOUTH THREE TIMES A DAY 3/5/21   JUSTIN Sandoval       Current medications:    No current facility-administered medications for this encounter. Allergies:     Allergies   Allergen Reactions    Codeine Nausea And Vomiting, Swelling and Rash       Problem List:    Patient Active Problem List   Diagnosis Code    External hemorrhoid K64.4    Internal hemorrhoids K64.8    Non morbid obesity due to excess calories E66.09    Psychophysiologic insomnia F51.04    Anxiety F41.9    Hypothyroidism E03.9    Hyperlipidemia E78.5    CPAP (continuous positive airway pressure) dependence Z99.89    Obstructive sleep apnea G47.33    Otalgia of right ear H92.01    Tinnitus aurium, bilateral H93.13       Past Medical History:        Diagnosis Date    Anxiety     Asthma     born with asthmatic bronchitis    CPAP (continuous positive airway pressure) dependence     6cm to 16cm    Degenerative disk disease     Depression     mood disorders    Hemorrhoid 2015    Hyperlipidemia     Hypoglycemia     Hypothyroid     Left ureteral stone 2016    OAB (overactive bladder)     Obstructive sleep apnea     AHI: 37.9 per [facility], 10/2019    Obstructive sleep apnea 2020    Renal stone 2016       Past Surgical History:        Procedure Laterality Date    BREAST BIOPSY Left 2018    benign calcification    CHOLECYSTECTOMY      COLONOSCOPY  2015    Dr Avilez-Internal hemorrhoids, repeat at age 49   [facility] ENDOMETRIAL ABLATION  2015    Dr. Maxim Davis  2015    Hemorrhoidectomy & closed lateral internal sphincterotomy    HYSTEROSCOPY  2015    Dr. Adriana Alonso POLYPECTOMY  2015    Dr. Box Ny  14 years ago       Social History:    Social History     Tobacco Use    Smoking status: Former Smoker     Packs/day: 0.25     Years: 27.00     Pack years: 6.75     Quit date: 2009     Years since quittin.9    Smokeless tobacco: Former User    Tobacco comment: Not employed   Substance Use Topics    Alcohol use: Yes     Alcohol/week: 0.0 standard drinks     Comment: rare                                Counseling given: Not Answered  Comment: Not employed      Vital Signs (Current): There were no vitals filed for this visit. BP Readings from Last 3 Encounters:   03/08/22 120/80   02/15/22 132/74   01/12/22 118/72       NPO Status:                                                                                 BMI:   Wt Readings from Last 3 Encounters:   03/08/22 165 lb (74.8 kg)   02/15/22 172 lb 12.8 oz (78.4 kg)   01/26/22 171 lb (77.6 kg)     There is no height or weight on file to calculate BMI.    CBC:   Lab Results   Component Value Date    WBC 8.7 01/11/2022    RBC 4.19 01/11/2022    HGB 12.3 01/11/2022    HCT 38.3 01/11/2022    MCV 91.4 01/11/2022    RDW 12.8 01/11/2022     01/11/2022       CMP:   Lab Results   Component Value Date     01/11/2022    K 3.0 01/11/2022     01/11/2022    CO2 33 01/11/2022    BUN 5 01/11/2022    CREATININE 0.5 01/11/2022    GFRAA >59 01/11/2022    LABGLOM >60 01/11/2022    GLUCOSE 99 01/11/2022    PROT 6.5 01/11/2022    PROT 7.3 10/02/2012    CALCIUM 8.6 01/11/2022    BILITOT <0.2 01/11/2022    ALKPHOS 69 01/11/2022    AST 19 01/11/2022    ALT 21 01/11/2022       POC Tests: No results for input(s): POCGLU, POCNA, POCK, POCCL, POCBUN, POCHEMO, POCHCT in the last 72 hours.     Coags:   Lab Results   Component Value Date    PROTIME 11.6 11/05/2015    INR 0.87 11/05/2015       HCG (If Applicable):   Lab Results   Component Value Date    PREGTESTUR Negative 01/11/2022        ABGs: No results found for: PHART, PO2ART, JRS2AUU, FBH7DRL, BEART, J1ULFRIK     Type & Screen (If Applicable):  No results found for: LABABO, LABRH    Drug/Infectious Status (If Applicable):  No results found for: HIV, HEPCAB    COVID-19 Screening (If Applicable):   Lab Results   Component Value Date    COVID19 Not Detected 03/21/2022           Anesthesia Evaluation  Patient summary reviewed and Nursing notes reviewed  Airway: Mallampati: II  TM distance: >3 FB     Mouth opening: > = 3 FB Dental:          Pulmonary:normal exam    (+) sleep apnea: on CPAP,  asthma: seasonal asthma, Cardiovascular:    (+) hyperlipidemia                  Neuro/Psych:   (+) psychiatric history: stable with treatmentdepression/anxiety             GI/Hepatic/Renal:   (+) renal disease: kidney stones,           Endo/Other:    (+) hypothyroidism::., .                 Abdominal:             Vascular: Other Findings:             Anesthesia Plan      MAC     ASA 2       Induction: intravenous. Anesthetic plan and risks discussed with patient.                       JUSTIN Pimentel - CRNA   3/23/2022

## 2022-03-23 NOTE — OP NOTE
Endoscopic Procedure Note    Patient: Queenie Duron : 1975  Med Rec#: [id number] Acc#: [id number]     Primary Care Provider Lisa Haro MD    Endoscopist: Samuel Guzman MD, MD    Date of Procedure:  3/23/2022    Procedure:   1. EGD with Tam bougie dilation of the esophagus and cold biopsies    Indications:     1. Gastroesophageal reflux disease, unspecified whether esophagitis present    2. Dysphagia, unspecified type          Anesthesia:  Sedation was administered by anesthesia who monitored the patient during the procedure. Estimated Blood Loss: minimal    Procedure:   After reviewing the patient's chart and obtaining informed consent, the patient was placed in the left lateral decubitus position. A forward-viewing Olympus endoscope was lubricated and inserted through the mouth into the oropharynx. Under direct visualization, the upper esophagus was intubated. The scope was advanced to the level of the third portion of duodenum. Scope was slowly withdrawn with careful inspection of the mucosal surfaces. The scope was retroflexed for inspection of the gastric fundus and incisura. Findings and maneuvers are listed in impression below. Next, a lubricated Tam weighted Bougie dilator-54 Fr was gently introduced into the patient's mouth and passed into the Esophagus and into the proximal stomach without much resistance and then withdrawn. Repeat EGD was performed to verify dilation and scope tip was passed into the stomach. NO evidence of perforation or excessive bleeding was noted subsequent to the dilation. The patient tolerated the procedure well. The scope was removed. There were no immediate complications. Findings/IMPRESSION:  Esophagus: normal and EG junction at 36 cm also appeared normal.    NO erosions or ulcers or nodules or strictures or webs or rings or mass lesions or extrinsic compression or diverticula noted.  An empirical Tam 54 fr bougie dilation was performed and random cold biopsies were taken to check for EoE and NERD. There is no obvious hiatal hernia present. Stomach:  Normal.    NO ulcers or masses or gastric outlet obstruction or retained food or fluid. Rugae were normal and lumen distended well with insufflation. Retroflexed views otherwise revealed a normal GE junction, fundus and cardia as well. Duodenum: normal       RECOMMENDATIONS:    1. Await path results, the patient will be contacted in 7-10 days with biopsy results. 2.  Magic mouthwash 5 ml PO Swish and swallow q3h PRN ONLY IF patient has post-procedural sorethroat or chest pain. 3. Full liquids to soft diet today suzanna discharge from the surgicenter; may advance  diet starting in AM tomorrow. 4. May resume other meds except any ASA/NSAIDs; may use cough drops or lozenges PRN; also OTC/prescription PPI or H2RA PO qday or BID with anti-GERD measures. 5. NO ASA/NSAIDs x 2 weeks  6. OP f/u in 4-6 weeks by Ms. Ragland; will consider an Esophageal manometry later if the patient's dysphagia persists. The results were discussed with the patient and family. A copy of the images obtained were given to the patient.      Ora Wallis MD, MD  3/23/2022  10:39 AM

## 2022-03-23 NOTE — ANESTHESIA POSTPROCEDURE EVALUATION
Department of Anesthesiology  Postprocedure Note    Patient: Micah Damon  MRN: [id number]  YOB: 1975  Date of evaluation: 3/23/2022  Time:  11:02 AM     Procedure Summary     Date: 03/23/22 Room / Location: [facility] ENDO 02 / [address]    Anesthesia Start: 1030 Anesthesia Stop: 1102    Procedures:       EGD BIOPSY dilation 54fr (N/A Esophagus)      COLORECTAL CANCER SCREENING, NOT HIGH RISK (N/A Abdomen) Diagnosis: (CHRONIC REFLUX, DYSPHAGIA, NAUSEA, SCREEN)    Surgeons: Steph Hodges MD Responsible Provider: JUSTIN Oneil CRNA    Anesthesia Type: MAC ASA Status: 2          Anesthesia Type: MAC    Radha Phase I:      Radha Phase II:      Last vitals: Reviewed and per EMR flowsheets.        Anesthesia Post Evaluation    Patient location during evaluation: bedside  Patient participation: complete - patient participated  Level of consciousness: sleepy but conscious  Pain score: 0  Airway patency: patent  Nausea & Vomiting: no nausea and no vomiting  Complications: no  Cardiovascular status: hemodynamically stable  Respiratory status: acceptable  Hydration status: euvolemic

## 2022-03-23 NOTE — H&P
Patient Name: Ida Marino  : 1975  MRN: [id number]  DATE: 22    Allergies: Allergies   Allergen Reactions    Codeine Nausea And Vomiting, Swelling and Rash        ENDOSCOPY  History and Physical    Procedure:    [] Diagnostic Colonoscopy       [x] Screening Colonoscopy  [x] EGD      [] ERCP      [] EUS       [] Other    [x] Previous office notes/History and Physical reviewed from the patients chart. Please see EMR for further details of HPI. I have examined the patient's status immediately prior to the procedure and:      Indications/HPI: For both her EGD and colonoscopy exams today:  1. Gastroesophageal reflux disease, unspecified whether esophagitis present    2. Dysphagia, unspecified type    3. Screen for colon cancer      []Abdominal Pain   []Cancer- GI/Lung     []Fhx of colon CA/polyps  []History of Polyps  []Barretts            []Melena  []Abnormal Imaging              []Dysphagia              []Persistent Pneumonia   []Anemia                            []Food Impaction        []History of Polyps  [] GI Bleed             []Pulmonary nodule/Mass   []Change in bowel habits []Heartburn/Reflux  []Rectal Bleed (BRBPR)  []Chest Pain - Non Cardiac []Heme (+) Stool []Ulcers  []Constipation  []Hemoptysis  []Varices  []Diarrhea  []Hypoxemia    []Nausea/Vomiting   []Screening   []Crohns/Colitis  []Other:     Anesthesia:   [x] MAC [] Moderate Sedation   [] General   [] None     ROS: 12 pt Review of Symptoms was negative unless mentioned above    Medications:   Prior to Admission medications    Medication Sig Start Date End Date Taking?  Authorizing Provider   ondansetron (ZOFRAN) 4 MG tablet Take 1 tablet by mouth 3 times daily as needed for Nausea or Vomiting 3/22/22   Dianne Arnold MD   pantoprazole (PROTONIX) 40 MG tablet TAKE 1 TABLET BY MOUTH EVERY DAY BEFORE BREAKFAST 3/10/22   Roopa Valentin MD   atorvastatin (LIPITOR) 20 MG tablet TAKE 1 TABLET BY MOUTH EVERY DAY 3/6/22   Dianne Arnold MD

## 2022-03-24 ENCOUNTER — OFFICE VISIT (OUTPATIENT)
Dept: PRIMARY CARE CLINIC | Age: 47
End: 2022-03-24
Payer: MEDICAID

## 2022-03-24 VITALS
SYSTOLIC BLOOD PRESSURE: 116 MMHG | DIASTOLIC BLOOD PRESSURE: 78 MMHG | TEMPERATURE: 97.7 F | WEIGHT: 164.7 LBS | HEART RATE: 71 BPM | OXYGEN SATURATION: 98 % | BODY MASS INDEX: 32.34 KG/M2 | HEIGHT: 60 IN

## 2022-03-24 DIAGNOSIS — G56.03 BILATERAL CARPAL TUNNEL SYNDROME: Primary | ICD-10-CM

## 2022-03-24 DIAGNOSIS — E78.2 MIXED HYPERLIPIDEMIA: ICD-10-CM

## 2022-03-24 DIAGNOSIS — K21.9 GASTROESOPHAGEAL REFLUX DISEASE WITHOUT ESOPHAGITIS: ICD-10-CM

## 2022-03-24 DIAGNOSIS — E03.9 ACQUIRED HYPOTHYROIDISM: ICD-10-CM

## 2022-03-24 DIAGNOSIS — F41.9 ANXIETY: ICD-10-CM

## 2022-03-24 PROCEDURE — G8484 FLU IMMUNIZE NO ADMIN: HCPCS | Performed by: STUDENT IN AN ORGANIZED HEALTH CARE EDUCATION/TRAINING PROGRAM

## 2022-03-24 PROCEDURE — 99214 OFFICE O/P EST MOD 30 MIN: CPT | Performed by: STUDENT IN AN ORGANIZED HEALTH CARE EDUCATION/TRAINING PROGRAM

## 2022-03-24 PROCEDURE — G8427 DOCREV CUR MEDS BY ELIG CLIN: HCPCS | Performed by: STUDENT IN AN ORGANIZED HEALTH CARE EDUCATION/TRAINING PROGRAM

## 2022-03-24 PROCEDURE — 1036F TOBACCO NON-USER: CPT | Performed by: STUDENT IN AN ORGANIZED HEALTH CARE EDUCATION/TRAINING PROGRAM

## 2022-03-24 PROCEDURE — G8417 CALC BMI ABV UP PARAM F/U: HCPCS | Performed by: STUDENT IN AN ORGANIZED HEALTH CARE EDUCATION/TRAINING PROGRAM

## 2022-03-24 RX ORDER — PANTOPRAZOLE SODIUM 40 MG/1
TABLET, DELAYED RELEASE ORAL
Qty: 30 TABLET | Refills: 0 | Status: SHIPPED | OUTPATIENT
Start: 2022-03-24 | End: 2022-04-27 | Stop reason: SDUPTHER

## 2022-04-21 ENCOUNTER — OFFICE VISIT (OUTPATIENT)
Dept: PRIMARY CARE CLINIC | Age: 47
End: 2022-04-21
Payer: MEDICAID

## 2022-04-21 VITALS
WEIGHT: 162 LBS | DIASTOLIC BLOOD PRESSURE: 84 MMHG | OXYGEN SATURATION: 98 % | RESPIRATION RATE: 20 BRPM | HEIGHT: 60 IN | HEART RATE: 68 BPM | SYSTOLIC BLOOD PRESSURE: 102 MMHG | TEMPERATURE: 97.6 F | BODY MASS INDEX: 31.8 KG/M2

## 2022-04-21 DIAGNOSIS — G56.03 CARPAL TUNNEL SYNDROME, BILATERAL: Primary | ICD-10-CM

## 2022-04-21 DIAGNOSIS — F41.9 ANXIETY: ICD-10-CM

## 2022-04-21 PROCEDURE — 99214 OFFICE O/P EST MOD 30 MIN: CPT | Performed by: STUDENT IN AN ORGANIZED HEALTH CARE EDUCATION/TRAINING PROGRAM

## 2022-04-21 PROCEDURE — G8427 DOCREV CUR MEDS BY ELIG CLIN: HCPCS | Performed by: STUDENT IN AN ORGANIZED HEALTH CARE EDUCATION/TRAINING PROGRAM

## 2022-04-21 PROCEDURE — 1036F TOBACCO NON-USER: CPT | Performed by: STUDENT IN AN ORGANIZED HEALTH CARE EDUCATION/TRAINING PROGRAM

## 2022-04-21 PROCEDURE — G8417 CALC BMI ABV UP PARAM F/U: HCPCS | Performed by: STUDENT IN AN ORGANIZED HEALTH CARE EDUCATION/TRAINING PROGRAM

## 2022-04-21 NOTE — PROGRESS NOTES
calcification    CHOLECYSTECTOMY      COLONOSCOPY  2015    Dr Avilez-Internal hemorrhoids, repeat at age 49    COLONOSCOPY N/A 2022    Dr Jose Aguilar, [place],  Int hemorrhoids Gr 1, 5 year recall    ENDOMETRIAL ABLATION  2015    Dr. Yani Noble  2015    Hemorrhoidectomy & closed lateral internal sphincterotomy    HYSTEROSCOPY  2015    Dr. Ta Saunders POLYPECTOMY  2015    Dr. Cindy Underwood  14 years ago    UPPER GASTROINTESTINAL ENDOSCOPY N/A 2022    Dr Jose Aguilar, W 47 Fr dil, (-) int Metaplasia    UPPER GASTROINTESTINAL ENDOSCOPY  2022    Sebastian Mueller Esparto 47 Fr bougie dilation       PFHx:  Family History   Problem Relation Age of Onset    High Blood Pressure Mother     Other Mother         early alzheimers    High Blood Pressure Father     Diabetes Paternal Grandfather     Breast Cancer Paternal Aunt 29    Colon Cancer Neg Hx     Colon Polyps Neg Hx        SocialHx:  Social History     Tobacco Use    Smoking status: Former Smoker     Packs/day: 0.25     Years: 27.00     Pack years: 6.75     Quit date: 2009     Years since quittin.0    Smokeless tobacco: Former User    Tobacco comment: Not employed   Substance Use Topics    Alcohol use: Yes     Alcohol/week: 0.0 standard drinks     Comment: rare       Allergies:   Allergies   Allergen Reactions    Codeine Nausea And Vomiting, Swelling and Rash       Medications:  Current Outpatient Medications   Medication Sig Dispense Refill    pantoprazole (PROTONIX) 40 MG tablet TAKE 1 TABLET BY MOUTH EVERY DAY BEFORE BREAKFAST 30 tablet 0    ondansetron (ZOFRAN) 4 MG tablet Take 1 tablet by mouth 3 times daily as needed for Nausea or Vomiting 30 tablet 0    atorvastatin (LIPITOR) 20 MG tablet TAKE 1 TABLET BY MOUTH EVERY DAY 30 tablet 5    levocetirizine (XYZAL) 5 MG tablet TAKE 1 TABLET BY MOUTH EVERY DAY AT NIGHT 30 tablet 5    levothyroxine (SYNTHROID) 50 MCG tablet TAKE 1 TABLET BY MOUTH EVERY DAY 30 tablet 11    COMBIVENT RESPIMAT  MCG/ACT AERS inhaler INHALE 1 PUFF EVERY 4 HOURS AS NEEDED FOR WHEEZING 1 each 5    fluticasone (FLONASE) 50 MCG/ACT nasal spray SPRAY 2 SPRAYS INTO EACH NOSTRIL EVERY DAY 2 each 5    ondansetron (ZOFRAN ODT) 4 MG disintegrating tablet Place 1 tablet under the tongue every 8 hours as needed for Nausea or Vomiting 21 tablet 0    propranolol (INDERAL) 40 MG tablet TAKE 1 TABLET BY MOUTH THREE TIMES A  tablet 3     No current facility-administered medications for this visit. Objective:   PE:  /84   Pulse 68   Temp 97.6 °F (36.4 °C) (Temporal)   Resp 20   Ht 5' (1.524 m)   Wt 162 lb (73.5 kg)   SpO2 98%   BMI 31.64 kg/m²   Physical Exam  Constitutional:       General: She is not in acute distress. Appearance: Normal appearance. HENT:      Head: Normocephalic. Right Ear: There is no impacted cerumen. Nose: Nose normal.      Mouth/Throat:      Mouth: Mucous membranes are moist.      Pharynx: Oropharynx is clear. No oropharyngeal exudate or posterior oropharyngeal erythema. Eyes:      General: No scleral icterus. Extraocular Movements: Extraocular movements intact. Conjunctiva/sclera: Conjunctivae normal.   Cardiovascular:      Rate and Rhythm: Normal rate and regular rhythm. Pulses: Normal pulses. Heart sounds: No murmur heard. Pulmonary:      Effort: Pulmonary effort is normal.      Breath sounds: Normal breath sounds. Musculoskeletal:         General: Normal range of motion. Cervical back: Normal range of motion. Skin:     General: Skin is warm and dry. Capillary Refill: Capillary refill takes less than 2 seconds. Neurological:      General: No focal deficit present. Mental Status: She is alert and oriented to person, place, and time.       Comments: +Phalen's, no change from prior exam   Psychiatric:         Mood and Affect: Mood normal.         Behavior: Behavior normal.         Thought Content: Thought content normal.            Assessment & Plan   Jo Dumont was seen today for carpal tunnel and fall. Diagnoses and all orders for this visit:    Carpal tunnel syndrome, bilateral  -     [facility], [place]    Anxiety  -Continue evaluation with Adriana Lowery therapy      Return in about 4 months (around 8/21/2022) for Follow up. All questions were answered. Medications, including possible adverse effects, and instructions were reviewed and  understanding was confirmed. Follow-up recommendations, including when to contact or return to office (ie; if symptoms worsen or fail to improve), were discussed and acknowledged.     Electronically signed by Ines Morales MD on 4/21/22 at 8:31 AM CDT

## 2022-04-26 ENCOUNTER — TELEPHONE (OUTPATIENT)
Dept: NEUROSURGERY | Age: 47
End: 2022-04-26

## 2022-04-26 NOTE — TELEPHONE ENCOUNTER
Called patient to let her know that I will contact Gavin Pandey's office to order NCS and once order is placed I will call her and schedule NCS and an appointment with Dr. Nayana Garcia. I explained to patient that it could be Wednesday before I called her back. She voiced understanding. Left voicemail for Ngozi Maria MA to return my call about order for NCS. I was informed she was out of office on Tuesday's at 12:30.

## 2022-04-27 ENCOUNTER — OFFICE VISIT (OUTPATIENT)
Dept: GASTROENTEROLOGY | Age: 47
End: 2022-04-27
Payer: MEDICAID

## 2022-04-27 VITALS
WEIGHT: 167 LBS | OXYGEN SATURATION: 99 % | HEART RATE: 64 BPM | SYSTOLIC BLOOD PRESSURE: 120 MMHG | DIASTOLIC BLOOD PRESSURE: 80 MMHG | HEIGHT: 60 IN | BODY MASS INDEX: 32.79 KG/M2

## 2022-04-27 DIAGNOSIS — K63.5 HYPERPLASTIC POLYP OF DESCENDING COLON: ICD-10-CM

## 2022-04-27 DIAGNOSIS — G56.03 CARPAL TUNNEL SYNDROME, BILATERAL: Primary | ICD-10-CM

## 2022-04-27 DIAGNOSIS — R10.13 DYSPEPSIA: Primary | ICD-10-CM

## 2022-04-27 PROCEDURE — G8427 DOCREV CUR MEDS BY ELIG CLIN: HCPCS | Performed by: NURSE PRACTITIONER

## 2022-04-27 PROCEDURE — 1036F TOBACCO NON-USER: CPT | Performed by: NURSE PRACTITIONER

## 2022-04-27 PROCEDURE — G8417 CALC BMI ABV UP PARAM F/U: HCPCS | Performed by: NURSE PRACTITIONER

## 2022-04-27 PROCEDURE — 99213 OFFICE O/P EST LOW 20 MIN: CPT | Performed by: NURSE PRACTITIONER

## 2022-04-27 RX ORDER — PANTOPRAZOLE SODIUM 40 MG/1
TABLET, DELAYED RELEASE ORAL
Qty: 90 TABLET | Refills: 3 | Status: SHIPPED | OUTPATIENT
Start: 2022-04-27

## 2022-04-27 ASSESSMENT — ENCOUNTER SYMPTOMS
NAUSEA: 0
TROUBLE SWALLOWING: 0
CONSTIPATION: 0
VOMITING: 0
ABDOMINAL PAIN: 0
ABDOMINAL DISTENTION: 0
ANAL BLEEDING: 0
SHORTNESS OF BREATH: 0
RECTAL PAIN: 0
DIARRHEA: 0
CHOKING: 0
BLOOD IN STOOL: 0
COUGH: 0

## 2022-04-27 NOTE — PROGRESS NOTES
Subjective:     Patient ID: Esha Sanon is a 56 y.o. female  PCP: Guy Scott MD  Referring Provider: No ref. provider found    HPI  Patient presents to the office today with the following complaints: Follow-up      Pt seen today for follow up after EGD and Colonoscopy on 3/23/22 for c/o reflux and dysphagia. She is currently taking Protonix 40 mg po daily. She has stopped NSAIDs, Celebrex, and symptoms have improved. She denies any further issues or concerns. Colonoscopy Findings 3/23/22: A 4 mm in diameter sessile polyp in the descending colon was removed by hot biopsy method. Otherwise, the mucosa appeared normal throughout the entire examined colon. NO large polyps or masses or strictures or colitis. Internal hemorrhoids-Grade 1  Retroflexion in the rectum was otherwise normal and revealed no further abnormalities   Recommendations:  1. Repeat colonoscopy: pending pathology -in 5 years for colorectal cancer surveillance  2. Await biopsy results-you will receive a letter with your results within 7-10 days  - Keep scheduled f/u appts with other MDs   - NO ASA/NSAIDs x 2 weeks    EGD Findings/IMPRESSION 3/23/22:  Esophagus: normal and EG junction at 36 cm also appeared normal.  NO erosions or ulcers or nodules or strictures or webs or rings or mass lesions or extrinsic compression or diverticula noted. An empirical Tam 54 fr bougie dilation was performed and random cold biopsies were taken to check for EoE and NERD. There is no obvious hiatal hernia present. Stomach:  Normal.  NO ulcers or masses or gastric outlet obstruction or retained food or fluid. Rugae were normal and lumen distended well with insufflation. Retroflexed views otherwise revealed a normal GE junction, fundus and cardia as well. Duodenum: normal  RECOMMENDATIONS:    1. Await path results, the patient will be contacted in 7-10 days with biopsy results.    2.  Magic mouthwash 5 ml PO Swish and swallow q3h PRN ONLY IF patient has post-procedural sorethroat or chest pain. 3. Full liquids to soft diet today suzanna discharge from the surgicenter; may advance  diet starting in AM tomorrow. 4. May resume other meds except any ASA/NSAIDs; may use cough drops or lozenges PRN; also OTC/prescription PPI or H2RA PO qday or BID with anti-GERD measures. 5. NO ASA/NSAIDs x 2 weeks  6. OP f/u in 4-6 weeks by Ms. Ragland; will consider an Esophageal manometry later if the patient's dysphagia persists. FINAL DIAGNOSIS:   A.  Esophagus, random biopsies:   Histologic normal esophageal mucosa. Intestinal metaplasia is not present. Eosinophils are not present. Marquis Christianson, descending colon, biopsy:   Hyperplastic polyp. All scope and pathology reports were reviewed and discussed with patient. All questions answered, pt verbalized understanding. Assessment:     1. Dyspepsia    2. Hyperplastic polyp of descending colon            Plan:   - Continue Protonix, refills provided  - Anti-reflux precautions  - Follow up 1 year or sooner if needed  - Call with any questions or concerns      Orders  No orders of the defined types were placed in this encounter.     Medications  Orders Placed This Encounter   Medications    pantoprazole (PROTONIX) 40 MG tablet     Sig: TAKE 1 TABLET BY MOUTH EVERY DAY BEFORE BREAKFAST     Dispense:  90 tablet     Refill:  3         Patient History:     Past Medical History:   Diagnosis Date    Anxiety     Asthma     born with asthmatic bronchitis    CPAP (continuous positive airway pressure) dependence     6cm to 16cm    Degenerative disk disease     Depression     mood disorders    Hemorrhoid 11/2015    Hyperlipidemia     Hypoglycemia     Hypothyroid     Left ureteral stone 5/25/2016    OAB (overactive bladder)     Obstructive sleep apnea     AHI: 37.9 per [facility], 10/2019    Obstructive sleep apnea 4/30/2020    Renal stone 5/25/2016       Past Surgical History:   Procedure Laterality Date    BREAST BIOPSY Left 2018    benign calcification    CHOLECYSTECTOMY  1995    COLONOSCOPY  2015    Dr Avilez-Internal hemorrhoids, repeat at age 49    COLONOSCOPY N/A 2022    Dr Nereida Smart, [place],  Int hemorrhoids Gr 1, 5 year recall    ENDOMETRIAL ABLATION  2015    Dr. Meliton Graves  2015    Hemorrhoidectomy & closed lateral internal sphincterotomy    HYSTEROSCOPY  2015    Dr. Jessica Boo POLYPECTOMY  2015    Dr. Montoya Crimes  14 years ago    UPPER GASTROINTESTINAL ENDOSCOPY N/A 2022    Dr Nereida Smart, W 47 Fr dil, (-) int Metaplasia    UPPER GASTROINTESTINAL ENDOSCOPY  2022    Dr Nereida Smart, Carmella Redo 47 Fr bougie dilation       Family History   Problem Relation Age of Onset    High Blood Pressure Mother     Other Mother         early alzheimers    High Blood Pressure Father     Diabetes Paternal Grandfather     Breast Cancer Paternal Aunt 29    Colon Cancer Neg Hx     Colon Polyps Neg Hx        Social History     Socioeconomic History    Marital status:      Spouse name: None    Number of children: None    Years of education: None    Highest education level: None   Occupational History    None   Tobacco Use    Smoking status: Former Smoker     Packs/day: 0.25     Years: 27.00     Pack years: 6.75     Quit date: 2009     Years since quittin.0    Smokeless tobacco: Former User    Tobacco comment: Not employed   Vaping Use    Vaping Use: Never used   Substance and Sexual Activity    Alcohol use:  Yes     Alcohol/week: 0.0 standard drinks     Comment: rare    Drug use: No    Sexual activity: Yes   Other Topics Concern    None   Social History Narrative    None     Social Determinants of Health     Financial Resource Strain: Low Risk     Difficulty of Paying Living Expenses: Not hard at all   Food Insecurity: No Food Insecurity    Worried About [address] in the Last Year: Never true    [place] of Food in the Last Year: Never true   Transportation Needs:     Lack of Transportation (Medical): Not on file    Lack of Transportation (Non-Medical):  Not on file   Physical Activity:     Days of Exercise per Week: Not on file    Minutes of Exercise per Session: Not on file   Stress:     Feeling of Stress : Not on file   Social Connections:     Frequency of Communication with Friends and Family: Not on file    Frequency of Social Gatherings with Friends and Family: Not on file    Attends Episcopal Services: Not on file    Active Member of Clubs or Organizations: Not on file    Attends Club or Organization Meetings: Not on file    Marital Status: Not on file   Intimate Partner Violence:     Fear of Current or Ex-Partner: Not on file    Emotionally Abused: Not on file    Physically Abused: Not on file    Sexually Abused: Not on file   Housing Stability:     Unable to Pay for Housing in the Last Year: Not on file    Number of Places Lived in the Last Year: Not on file    Unstable Housing in the Last Year: Not on file       Current Outpatient Medications   Medication Sig Dispense Refill    pantoprazole (PROTONIX) 40 MG tablet TAKE 1 TABLET BY MOUTH EVERY DAY BEFORE BREAKFAST 90 tablet 3    ondansetron (ZOFRAN) 4 MG tablet Take 1 tablet by mouth 3 times daily as needed for Nausea or Vomiting 30 tablet 0    atorvastatin (LIPITOR) 20 MG tablet TAKE 1 TABLET BY MOUTH EVERY DAY 30 tablet 5    levocetirizine (XYZAL) 5 MG tablet TAKE 1 TABLET BY MOUTH EVERY DAY AT NIGHT 30 tablet 5    levothyroxine (SYNTHROID) 50 MCG tablet TAKE 1 TABLET BY MOUTH EVERY DAY 30 tablet 11    COMBIVENT RESPIMAT  MCG/ACT AERS inhaler INHALE 1 PUFF EVERY 4 HOURS AS NEEDED FOR WHEEZING 1 each 5    fluticasone (FLONASE) 50 MCG/ACT nasal spray SPRAY 2 SPRAYS INTO EACH NOSTRIL EVERY DAY 2 each 5    ondansetron (ZOFRAN ODT) 4 MG disintegrating tablet Place 1 tablet under the tongue every 8 hours as needed for Nausea or Vomiting 21 tablet 0    propranolol (INDERAL) 40 MG tablet TAKE 1 TABLET BY MOUTH THREE TIMES A  tablet 3     No current facility-administered medications for this visit. Allergies   Allergen Reactions    Codeine Nausea And Vomiting, Swelling and Rash       Review of Systems   Constitutional: Negative for activity change, appetite change, fatigue, fever and unexpected weight change. HENT: Negative for trouble swallowing. Respiratory: Negative for cough, choking and shortness of breath. Cardiovascular: Negative for chest pain. Gastrointestinal: Negative for abdominal distention, abdominal pain, anal bleeding, blood in stool, constipation, diarrhea, nausea, rectal pain and vomiting. Allergic/Immunologic: Negative for food allergies. All other systems reviewed and are negative. Objective:     /80 (Site: Left Upper Arm)   Pulse 64   Ht 5' (1.524 m)   Wt 167 lb (75.8 kg)   SpO2 99%   BMI 32.61 kg/m²     Physical Exam  Vitals reviewed. Constitutional:       General: She is not in acute distress. Appearance: She is well-developed. HENT:      Head: Normocephalic and atraumatic. Right Ear: External ear normal.      Left Ear: External ear normal.      Nose: Nose normal.      Comments: Mask on     Mouth/Throat:      Comments: Mask on  Eyes:      General: No scleral icterus. Right eye: No discharge. Left eye: No discharge. Conjunctiva/sclera: Conjunctivae normal.      Pupils: Pupils are equal, round, and reactive to light. Cardiovascular:      Rate and Rhythm: Normal rate and regular rhythm. Heart sounds: Normal heart sounds. No murmur heard. Pulmonary:      Effort: Pulmonary effort is normal. No respiratory distress. Breath sounds: Normal breath sounds. No wheezing or rales. Abdominal:      General: Bowel sounds are normal. There is no distension. Palpations: Abdomen is soft. There is no mass.       Tenderness: There is no abdominal tenderness. There is no guarding or rebound. Musculoskeletal:         General: Normal range of motion. Cervical back: Normal range of motion and neck supple. Skin:     General: Skin is warm and dry. Coloration: Skin is not pale. Neurological:      Mental Status: She is alert and oriented to person, place, and time.    Psychiatric:         Behavior: Behavior normal.

## 2022-04-27 NOTE — PATIENT INSTRUCTIONS
Patient Education        Gastroesophageal Reflux Disease (GERD): Care Instructions  Overview     Gastroesophageal reflux disease (GERD) is the backward flow of stomach acid into the esophagus. The esophagus is the tube that leads from your throat to your stomach. A one-way valve prevents the stomach acid from backing up into this tube. But when you have GERD, this valve does not close tightly enough. This can also cause pain and swelling in your esophagus. (This is calledesophagitis.)  If you have mild GERD symptoms including heartburn, you may be able to control the problem with antacids or over-the-counter medicine. You can also make lifestyle changes to help reduce your symptoms. These include changing yourdiet and eating habits, such as not eating late at night and losing weight. Follow-up care is a key part of your treatment and safety. Be sure to make and go to all appointments, and call your doctor if you are having problems. It's also a good idea to know your test results and keep alist of the medicines you take. How can you care for yourself at home?  Take your medicines exactly as prescribed. Call your doctor if you think you are having a problem with your medicine.  Your doctor may recommend over-the-counter medicine. For mild or occasional indigestion, antacids, such as Tums, Gaviscon, Mylanta, or Maalox, may help. Your doctor also may recommend over-the-counter acid reducers, such as famotidine (Pepcid AC), cimetidine (Tagamet HB), or omeprazole (Prilosec). Read and follow all instructions on the label. If you use these medicines often, talk with your doctor.  Change your eating habits. ? It's best to eat several small meals instead of two or three large meals. ? After you eat, wait 2 to 3 hours before you lie down. ? Avoid foods that make your symptoms worse.  These may include chocolate, mint, alcohol, pepper, spicy foods, high-fat foods, or drinks with caffeine in them, such as tea, coffee, jennifer, or energy drinks. If your symptoms are worse after you eat a certain food, you may want to stop eating it to see if your symptoms get better.  Do not smoke or chew tobacco. Smoking can make GERD worse. If you need help quitting, talk to your doctor about stop-smoking programs and medicines. These can increase your chances of quitting for good.  If you have GERD symptoms at night, raise the head of your bed 6 to 8 inches by putting the frame on blocks or placing a foam wedge under the head of your mattress. (Adding extra pillows does not work.)   Do not wear tight clothing around your middle.  Lose weight if you need to. Losing just 5 to 10 pounds can help. When should you call for help? Call your doctor now or seek immediate medical care if:     You have new or different belly pain.      Your stools are black and tarlike or have streaks of blood. Watch closely for changes in your health, and be sure to contact your doctor if:     Your symptoms have not improved after 2 days.      Food seems to catch in your throat or chest.   Where can you learn more? Go to https://[employer]arn[employer]. org and sign in to your [employer] account. Enter W369 in the [employer] box to learn more about \"Gastroesophageal Reflux Disease (GERD): Care Instructions. \"     If you do not have an account, please click on the \"Sign Up Now\" link. Current as of: September 8, 2021               Content Version: 13.2  © 4200-5146 Healthwise, Incorporated. Care instructions adapted under license by [facility] ([facility]). If you have questions about a medical condition or this instruction, always ask your healthcare professional. [place] [address] any warranty or liability for your use of this information.

## 2022-05-11 ENCOUNTER — HOSPITAL ENCOUNTER (OUTPATIENT)
Dept: NEUROLOGY | Age: 47
Discharge: HOME | End: 2022-05-11
Payer: MEDICAID

## 2022-05-11 DIAGNOSIS — G56.03 CARPAL TUNNEL SYNDROME, BILATERAL: ICD-10-CM

## 2022-05-11 PROCEDURE — 95911 NRV CNDJ TEST 9-10 STUDIES: CPT | Performed by: PSYCHIATRY & NEUROLOGY

## 2022-05-11 PROCEDURE — 95886 MUSC TEST DONE W/N TEST COMP: CPT | Performed by: PSYCHIATRY & NEUROLOGY

## 2022-05-11 PROCEDURE — 95886 MUSC TEST DONE W/N TEST COMP: CPT

## 2022-05-11 PROCEDURE — 95911 NRV CNDJ TEST 9-10 STUDIES: CPT

## 2022-05-12 ENCOUNTER — OFFICE VISIT (OUTPATIENT)
Dept: NEUROSURGERY | Age: 47
End: 2022-05-12
Payer: MEDICAID

## 2022-05-12 VITALS
DIASTOLIC BLOOD PRESSURE: 82 MMHG | WEIGHT: 170 LBS | HEIGHT: 60 IN | HEART RATE: 57 BPM | BODY MASS INDEX: 33.38 KG/M2 | SYSTOLIC BLOOD PRESSURE: 118 MMHG | OXYGEN SATURATION: 99 %

## 2022-05-12 DIAGNOSIS — G56.01 RIGHT CARPAL TUNNEL SYNDROME: Primary | ICD-10-CM

## 2022-05-12 DIAGNOSIS — G56.02 LEFT CARPAL TUNNEL SYNDROME: ICD-10-CM

## 2022-05-12 PROCEDURE — G8417 CALC BMI ABV UP PARAM F/U: HCPCS | Performed by: NEUROLOGICAL SURGERY

## 2022-05-12 PROCEDURE — 1036F TOBACCO NON-USER: CPT | Performed by: NEUROLOGICAL SURGERY

## 2022-05-12 PROCEDURE — 99203 OFFICE O/P NEW LOW 30 MIN: CPT | Performed by: NEUROLOGICAL SURGERY

## 2022-05-12 PROCEDURE — G8427 DOCREV CUR MEDS BY ELIG CLIN: HCPCS | Performed by: NEUROLOGICAL SURGERY

## 2022-05-18 ENCOUNTER — TELEPHONE (OUTPATIENT)
Dept: NEUROSURGERY | Age: 47
End: 2022-05-18

## 2022-05-18 NOTE — TELEPHONE ENCOUNTER
Chanda Simons came in the office today about her US Extremity test that was ordered stating that it was cancelled and that [id number] [facility] did not perform this type of testing . I called Ultrasound and spoke with Kezia and she stated that patient was called and appointment was cancelled and they do not perform that type of testing, and she apologized it for the inconvience. I called [facility] and they did not do the testing either. I asked Dr. Mehrdad Mckee what he wanted me to do and asked me to check with [facility] and I did and they do not do the test either. I called patient to let her know that no place in [place] does the testing, and that she had an appointment with Dr. Mehrdad Mckee on the 25th of May and see what Dr. Mehrdad Mckee says at that time. Patient voiced understanding.

## 2022-05-23 NOTE — TELEPHONE ENCOUNTER
Smitha Fontenot MD  You 4 days ago     BF    We can talk about whether she wants to get an MRI instead, I guess.  I'm kind of stuck with what to do for her. Message text      Isaac Valencia MD 4 days ago     MW    Any advice before the upcoming appt?     Routing comment

## 2022-05-25 ENCOUNTER — OFFICE VISIT (OUTPATIENT)
Dept: NEUROSURGERY | Age: 47
End: 2022-05-25
Payer: MEDICAID

## 2022-05-25 VITALS
DIASTOLIC BLOOD PRESSURE: 69 MMHG | HEIGHT: 60 IN | HEART RATE: 60 BPM | SYSTOLIC BLOOD PRESSURE: 114 MMHG | BODY MASS INDEX: 34.32 KG/M2 | OXYGEN SATURATION: 98 % | WEIGHT: 174.8 LBS

## 2022-05-25 DIAGNOSIS — G56.01 RIGHT CARPAL TUNNEL SYNDROME: Primary | ICD-10-CM

## 2022-05-25 PROCEDURE — G8417 CALC BMI ABV UP PARAM F/U: HCPCS | Performed by: NEUROLOGICAL SURGERY

## 2022-05-25 PROCEDURE — G8427 DOCREV CUR MEDS BY ELIG CLIN: HCPCS | Performed by: NEUROLOGICAL SURGERY

## 2022-05-25 PROCEDURE — 99213 OFFICE O/P EST LOW 20 MIN: CPT | Performed by: NEUROLOGICAL SURGERY

## 2022-05-25 PROCEDURE — 1036F TOBACCO NON-USER: CPT | Performed by: NEUROLOGICAL SURGERY

## 2022-05-25 NOTE — PROGRESS NOTES
NEUROSURGERY FOLLOW UP      Chief Complaint:   Chief Complaint   Patient presents with    Follow-up    Numbness     more right hand         Interval Update:    Planned follow up for right carpal tunnel syndrome. She continues to describe numbness in her right hand that is worse at night and also worsened by activity and driving. She has undergone an EMG/NCS at [facility] that was interpreted as normal.  We attempted to get an ultrasound of her wrist prior to this visit however it was not available locally. HPI:     The patient is a 56 y.o. right-handed female who presents for neurosurgical evaluation of carpal tunnel syndrome. She complains of numbness and paresthesias in her hands (R>L) that will frequently awaken her at night. She also complains of numbness in her hands (again R>L) that is worsened by activity. Driving and housework tend to particularly exacerbate her symptoms. She also reports that she occasionally drops objects unintentionally. She has tried wearing wrist splints and believes that these do decrease the numbness in her hands but she has significant AM stiffness in her wrist after wearing them. She has recently undergone an EMG/NCS with Dr. Enoc Pelletier that was interpreted as normal.  She reports that the numbness is worst in her middle and ring fingers on the right hand.      She does have a history of chronic neck pain and had a cervical spine MRI done ~5 years ago that was essentially normal.  She does not describe a clear radicular pattern to her pain and her neck symptoms haven't really changed in the past 5 years. ROS:    Constitutional: Negative. HENT: Negative. Eyes: Negative. Respiratory: Negative. Cardiovascular: Negative. Gastrointestinal: Negative. Genitourinary: Negative. Musculoskeletal: Positive for myalgias. Skin: Negative. Neurological: Positive for tingling. Endo/Heme/Allergies: Negative. Psychiatric/Behavioral: Negative. Review of systems was obtained by the medical assistant and reviewed by myself. Objective:    /69   Pulse 60   Ht 5' (1.524 m)   Wt 174 lb 12.8 oz (79.3 kg)   SpO2 98%   BMI 34.14 kg/m²         Physical Exam:    General: alert, cooperative, no distress  Cardiorespiratory: unlabored breathing      Neurologic Exam:    Mental Status: Alert, oriented, thought content appropriate  Cranial Nerves: PERRL, EOMI, symmetric facies, tongue midline  Motor: 5/5 throughout BUE/BLE  Somatosensory: Decreased to light touch and pinprick in the thumb, index, middle and radial-half of the ring finger. Normal light touch and pinprick in the little finger and ulnar-half of the ring finger. Provocative tests: Tinel's positive at the right wrist and elbow, negative at the left wrist and elbow. Assessment and Plan:    56 y.o. F returns for follow up to discuss ongoing symptoms of right carpal tunnel syndrome. Her symptom pattern and physical exam findings are fairly classic, however her nerve conduction study is normal.  She reports that she has attempted to wear a wrist brace on her right hand but this was purchased over-the-counter and she does not believe it is specific for carpal tunnel syndrome. Given the classic nature of her symptoms, I do feel she would likely benefit from surgery, however she is reluctant to proceed at present. I recommended that we obtain an MRI scan of the right wrist to assess for anatomic compression of the median nerve and I have also provided her a prescription for a carpal-tunnel specific wrist brace. I will plan to see her again in 1 month to review her MRI and assess her symptoms after bracing.         Electronically signed by Arin Espinosa MD on 5/25/2022 at 8:53 AM

## 2022-05-26 DIAGNOSIS — G56.01 RIGHT CARPAL TUNNEL SYNDROME: Primary | ICD-10-CM

## 2022-06-03 RX ORDER — CELECOXIB 200 MG/1
CAPSULE ORAL
Qty: 60 CAPSULE | Refills: 3 | Status: SHIPPED | OUTPATIENT
Start: 2022-06-03 | End: 2022-08-23 | Stop reason: ALTCHOICE

## 2022-06-27 ENCOUNTER — OFFICE VISIT (OUTPATIENT)
Dept: NEUROSURGERY | Age: 47
End: 2022-06-27
Payer: MEDICAID

## 2022-06-27 VITALS
WEIGHT: 174 LBS | HEART RATE: 78 BPM | BODY MASS INDEX: 34.16 KG/M2 | DIASTOLIC BLOOD PRESSURE: 82 MMHG | SYSTOLIC BLOOD PRESSURE: 117 MMHG | HEIGHT: 60 IN

## 2022-06-27 DIAGNOSIS — G56.01 RIGHT CARPAL TUNNEL SYNDROME: Primary | ICD-10-CM

## 2022-06-27 DIAGNOSIS — Z01.818 PRE-OP TESTING: Primary | ICD-10-CM

## 2022-06-27 PROCEDURE — G8417 CALC BMI ABV UP PARAM F/U: HCPCS | Performed by: NEUROLOGICAL SURGERY

## 2022-06-27 PROCEDURE — 1036F TOBACCO NON-USER: CPT | Performed by: NEUROLOGICAL SURGERY

## 2022-06-27 PROCEDURE — 99214 OFFICE O/P EST MOD 30 MIN: CPT | Performed by: NEUROLOGICAL SURGERY

## 2022-06-27 PROCEDURE — G8427 DOCREV CUR MEDS BY ELIG CLIN: HCPCS | Performed by: NEUROLOGICAL SURGERY

## 2022-06-27 NOTE — H&P (VIEW-ONLY)
[facility] Neurosurgery  History and Physical        Chief Complaint   Patient presents with    Follow-up     Patient is here for right carpal tunnel syndrome and states that it is the same since the last visit. Insurance denied her MRI. Patient states that her right hand is weak but she is more concerned with the numbness and tingling. HISTORY OF PRESENT ILLNESS:      Interval Update:  Patient returns for planned follow-up. Her MRI of the right wrist was denied by her insurance. She has been wearing a wrist splint without any improvement in her symptoms. She continues to complain of classic carpal tunnel symptoms on the right, including numbness, paresthesias and pain in the thumb, index and middle fingers. She endorses weakness in her right hand. HPI:  The patient is a 56 y.o. right-handed female who presents for neurosurgical evaluation of carpal tunnel syndrome. She complains of numbness and paresthesias in her hands (R>L) that will frequently awaken her at night. She also complains of numbness in her hands (again R>L) that is worsened by activity. Driving and housework tend to particularly exacerbate her symptoms. She also reports that she occasionally drops objects unintentionally. She has tried wearing wrist splints and believes that these do decrease the numbness in her hands but she has significant AM stiffness in her wrist after wearing them. She has recently undergone an EMG/NCS with Dr. Loc Hector that was interpreted as normal.  She reports that the numbness is worst in her middle and ring fingers on the right hand.      She does have a history of chronic neck pain and had a cervical spine MRI done ~5 years ago that was essentially normal.  She does not describe a clear radicular pattern to her pain and her neck symptoms haven't really changed in the past 5 years.       MEDICAL HISTORY:             Past Medical History:   Diagnosis Date    Anxiety     Asthma     born with asthmatic bronchitis    CPAP (continuous positive airway pressure) dependence     6cm to 16cm    Degenerative disk disease     Depression     mood disorders    Hemorrhoid 11/2015    Hyperlipidemia     Hypoglycemia     Hypothyroid     Left ureteral stone 5/25/2016    OAB (overactive bladder)     Obstructive sleep apnea     AHI: 37.9 per PSG, 10/2019    Obstructive sleep apnea 4/30/2020    Renal stone 5/25/2016       Past Surgical History:   Procedure Laterality Date    BREAST BIOPSY Left 2018    benign calcification    CHOLECYSTECTOMY  1995    COLONOSCOPY  04/29/2015    Dr Avilez-Internal hemorrhoids, repeat at age 49    COLONOSCOPY N/A 03/23/2022    Dr Erinn Bolaños, [place],  Int hemorrhoids Gr 1, 5 year recall    ENDOMETRIAL ABLATION  11/04/2015    Dr. Cervantes Purpmiesha  11/16/2015    Hemorrhoidectomy & closed lateral internal sphincterotomy    HYSTEROSCOPY  11/04/2015    Dr. Devaughn Johnson    POLYPECTOMY  11/04/2015    Dr. Michaels Lower  14 years ago    UPPER GASTROINTESTINAL ENDOSCOPY N/A 03/23/2022    Dr Erinn Bolaños, W 47 Fr dil, (-) int Metaplasia    UPPER GASTROINTESTINAL ENDOSCOPY  03/23/2022    Dr Erinn Bolaños, Rogelio Mangle 47 Fr bougie dilation         Current Outpatient Medications:     celecoxib (CELEBREX) 200 MG capsule, TAKE 1 CAPSULE BY MOUTH EVERY DAY, Disp: 60 capsule, Rfl: 3    pantoprazole (PROTONIX) 40 MG tablet, TAKE 1 TABLET BY MOUTH EVERY DAY BEFORE BREAKFAST, Disp: 90 tablet, Rfl: 3    ondansetron (ZOFRAN) 4 MG tablet, Take 1 tablet by mouth 3 times daily as needed for Nausea or Vomiting, Disp: 30 tablet, Rfl: 0    atorvastatin (LIPITOR) 20 MG tablet, TAKE 1 TABLET BY MOUTH EVERY DAY, Disp: 30 tablet, Rfl: 5    levocetirizine (XYZAL) 5 MG tablet, TAKE 1 TABLET BY MOUTH EVERY DAY AT NIGHT, Disp: 30 tablet, Rfl: 5    levothyroxine (SYNTHROID) 50 MCG tablet, TAKE 1 TABLET BY MOUTH EVERY DAY, Disp: 30 tablet, Rfl: 11    COMBIVENT RESPIMAT  MCG/ACT tongue  Neck- symmetric, trachea midline, no jugular vein distension  Respiration- chest wall symmetric, normal effort without use of accessory muscles  Musculoskeletal  no significant wasting of muscles noted, no bony deformities, symmetric bulk  Extremities- no clubbing, cyanosis or edema  Skin  warm, dry, and intact. No rash,erythema, or pallor. Psychiatric  mood, affect, and behavior appear normal.       NEUROLOGIC EXAM:    MENTAL STATUS: Alert, oriented, thought content appropriate    CRANIAL NERVES: PERRL, EOMI, symmetric facies, tongue midline    MOTOR:     Right Upper Extremity:    Deltoid: 5/5  Biceps: 5/5  Triceps: 5/5  Wrist Extension: 5/5  Finger Abduction: 5/5  APB: 5-/5    Left Upper Extremity:    Deltoid: 5/5  Biceps: 5/5  Triceps: 5/5  Wrist Extension: 5/5  Finger Abduction: 5/5  APB: 5/5    Right Lower Extremity:    Hip Flexion: 5/5  Knee Extension: 5/5  Ankle Plantarflexion: 5/5  Ankle Dorsiflexion: 5/5      Left Lower Extremity:    Hip Flexion: 5/5  Knee Extension: 5/5  Ankle Plantarflexion: 5/5  Ankle Dorsiflexion: 5/5      SOMATOSENSORY:     Right Upper Extremity: Decreased to light touch and pinprick in the thumb, middle and index finger and the radial-1/2 of the right finger  Left Upper Extremity: normal light touch sensation  Right Lower Extremity: normal light touch sensation  Left Lower Extremity: normal light touch sensation      REFLEXES:    Biceps: 2+  Patella: 2+    Griffin's: Negative      COORDINATION and GAIT:    Gait: Normal      PROVOCATIVE TESTS:    Tinel's: Positive at the right wrist, negative at the left wrist            ASSESSMENT AND PLAN:  56 y.o. F returns for follow up to discuss ongoing symptoms of right carpal tunnel syndrome.   Her symptom pattern and physical exam findings are classic for carpal tunnel syndrome, however her nerve conduction study is normal.  We have attempted to get imaging to further support the diagnosis, however ultrasound is not available locally and and MRI was denied by insurance. I explained that given the classic nature of her symptoms, I do believe that she would benefit from surgery. I further explained that studies have been done following patients with carpal tunnel syndrome and normal nerve conduction studies and ~90% of patients in that situation still report satisfactory outcomes from surgery. The carpal tunnel release procedure was explained to the patient in detail, including alternatives to surgery and risks of nerve injury, bleeding, infection and poor wound healing. All questions were answered to the patient's stated satisfaction and they requested that we proceed with surgery.           Yang Pérez MD

## 2022-06-27 NOTE — PROGRESS NOTES
[facility] Neurosurgery  Office Visit        Chief Complaint   Patient presents with    Follow-up     Patient is here for right carpal tunnel syndrome and states that it is the same since the last visit. Insurance denied her MRI. Patient states that her right hand is weak but she is more concerned with the numbness and tingling. HISTORY OF PRESENT ILLNESS:      Interval Update:  Patient returns for planned follow-up. Her MRI of the right wrist was denied by her insurance. She has been wearing a wrist splint without any improvement in her symptoms. She continues to complain of classic carpal tunnel symptoms on the right, including numbness, paresthesias and pain in the thumb, index and middle fingers. She endorses weakness in her right hand. HPI:  The patient is a 56 y.o. right-handed female who presents for neurosurgical evaluation of carpal tunnel syndrome. She complains of numbness and paresthesias in her hands (R>L) that will frequently awaken her at night. She also complains of numbness in her hands (again R>L) that is worsened by activity. Driving and housework tend to particularly exacerbate her symptoms. She also reports that she occasionally drops objects unintentionally. She has tried wearing wrist splints and believes that these do decrease the numbness in her hands but she has significant AM stiffness in her wrist after wearing them. She has recently undergone an EMG/NCS with Dr. Jonathan Keating that was interpreted as normal.  She reports that the numbness is worst in her middle and ring fingers on the right hand.      She does have a history of chronic neck pain and had a cervical spine MRI done ~5 years ago that was essentially normal.  She does not describe a clear radicular pattern to her pain and her neck symptoms haven't really changed in the past 5 years.       MEDICAL HISTORY:             Past Medical History:   Diagnosis Date    Anxiety     Asthma     born with asthmatic bronchitis  CPAP (continuous positive airway pressure) dependence     6cm to 16cm    Degenerative disk disease     Depression     mood disorders    Hemorrhoid 11/2015    Hyperlipidemia     Hypoglycemia     Hypothyroid     Left ureteral stone 5/25/2016    OAB (overactive bladder)     Obstructive sleep apnea     AHI: 37.9 per PSG, 10/2019    Obstructive sleep apnea 4/30/2020    Renal stone 5/25/2016       Past Surgical History:   Procedure Laterality Date    BREAST BIOPSY Left 2018    benign calcification    CHOLECYSTECTOMY  1995    COLONOSCOPY  04/29/2015    Dr Avilez-Internal hemorrhoids, repeat at age 49    COLONOSCOPY N/A 03/23/2022    Dr Lory Duane, [place],  Int hemorrhoids Gr 1, 5 year recall    ENDOMETRIAL ABLATION  11/04/2015    Dr. Camara Ace  11/16/2015    Hemorrhoidectomy & closed lateral internal sphincterotomy    HYSTEROSCOPY  11/04/2015    Dr. Lydia Sims    POLYPECTOMY  11/04/2015    Dr. Jean-Paul Jean  14 years ago    UPPER GASTROINTESTINAL ENDOSCOPY N/A 03/23/2022    Dr Lory Duane, W 47 Fr dil, (-) int Metaplasia    UPPER GASTROINTESTINAL ENDOSCOPY  03/23/2022    Dr Lory Duane, [place] Jude 47 Fr bougie dilation         Current Outpatient Medications:     celecoxib (CELEBREX) 200 MG capsule, TAKE 1 CAPSULE BY MOUTH EVERY DAY, Disp: 60 capsule, Rfl: 3    pantoprazole (PROTONIX) 40 MG tablet, TAKE 1 TABLET BY MOUTH EVERY DAY BEFORE BREAKFAST, Disp: 90 tablet, Rfl: 3    ondansetron (ZOFRAN) 4 MG tablet, Take 1 tablet by mouth 3 times daily as needed for Nausea or Vomiting, Disp: 30 tablet, Rfl: 0    atorvastatin (LIPITOR) 20 MG tablet, TAKE 1 TABLET BY MOUTH EVERY DAY, Disp: 30 tablet, Rfl: 5    levocetirizine (XYZAL) 5 MG tablet, TAKE 1 TABLET BY MOUTH EVERY DAY AT NIGHT, Disp: 30 tablet, Rfl: 5    levothyroxine (SYNTHROID) 50 MCG tablet, TAKE 1 TABLET BY MOUTH EVERY DAY, Disp: 30 tablet, Rfl: 11    COMBIVENT RESPIMAT  MCG/ACT AERS inhaler, INHALE 1 PUFF EVERY 4 HOURS AS NEEDED FOR WHEEZING, Disp: 1 each, Rfl: 5    fluticasone (FLONASE) 50 MCG/ACT nasal spray, SPRAY 2 SPRAYS INTO EACH NOSTRIL EVERY DAY, Disp: 2 each, Rfl: 5    ondansetron (ZOFRAN ODT) 4 MG disintegrating tablet, Place 1 tablet under the tongue every 8 hours as needed for Nausea or Vomiting, Disp: 21 tablet, Rfl: 0    propranolol (INDERAL) 40 MG tablet, TAKE 1 TABLET BY MOUTH THREE TIMES A DAY, Disp: 270 tablet, Rfl: 3    Allergies:  Codeine    Social History:   Social History     Tobacco Use   Smoking Status Former Smoker    Packs/day: 0.25    Years: 27.00    Pack years: 6.75    Quit date: 2009    Years since quittin.1   Smokeless Tobacco Former User   Tobacco Comment    Not employed     Social History     Substance and Sexual Activity   Alcohol Use Yes    Alcohol/week: 0.0 standard drinks    Comment: rare         Family History:   Family History   Problem Relation Age of Onset    High Blood Pressure Mother     Other Mother         early alzheimers    High Blood Pressure Father     Diabetes Paternal Grandfather     Breast Cancer Paternal Aunt 29    Colon Cancer Neg Hx     Colon Polyps Neg Hx        REVIEW OF SYSTEMS:    Constitutional: Negative. HENT: Negative. Eyes: Negative. Respiratory: Negative. Cardiovascular: Negative. Gastrointestinal: Negative. Genitourinary: Negative. Musculoskeletal: Negative. Skin: Negative. Neurological: Positive for tingling and weakness. Endo/Heme/Allergies: Negative. Psychiatric/Behavioral: Negative. Review of Systems was obtained by the medical assistant and reviewed by myself. PHYSICAL EXAM:    Vitals:    22 0858   BP: 117/82   Pulse: 78       Constitutional: Appears well-developed and well-nourished.    Eyes - conjunctiva normal.  Pupils react to light  Ear, nose,throat -Normal pinna and tragus, No scars, or lesions over external nose or ears, no obvious atrophy of tongue  Neck- symmetric, trachea midline, no jugular vein distension  Respiration- chest wall symmetric, normal effort without use of accessory muscles  Musculoskeletal - no significant wasting of muscles noted, no bony deformities, symmetric bulk  Extremities- no clubbing, cyanosis or edema  Skin - warm, dry, and intact. No rash,erythema, or pallor. Psychiatric - mood, affect, and behavior appear normal.       NEUROLOGIC EXAM:    MENTAL STATUS: Alert, oriented, thought content appropriate    CRANIAL NERVES: PERRL, EOMI, symmetric facies, tongue midline    MOTOR:     Right Upper Extremity:    Deltoid: 5/5  Biceps: 5/5  Triceps: 5/5  Wrist Extension: 5/5  Finger Abduction: 5/5  APB: 5-/5    Left Upper Extremity:    Deltoid: 5/5  Biceps: 5/5  Triceps: 5/5  Wrist Extension: 5/5  Finger Abduction: 5/5  APB: 5/5    Right Lower Extremity:    Hip Flexion: 5/5  Knee Extension: 5/5  Ankle Plantarflexion: 5/5  Ankle Dorsiflexion: 5/5      Left Lower Extremity:    Hip Flexion: 5/5  Knee Extension: 5/5  Ankle Plantarflexion: 5/5  Ankle Dorsiflexion: 5/5      SOMATOSENSORY:     Right Upper Extremity: Decreased to light touch and pinprick in the thumb, middle and index finger and the radial-1/2 of the right finger  Left Upper Extremity: normal light touch sensation  Right Lower Extremity: normal light touch sensation  Left Lower Extremity: normal light touch sensation      REFLEXES:    Biceps: 2+  Patella: 2+    Griffin's: Negative      COORDINATION and GAIT:    Gait: Normal      PROVOCATIVE TESTS:    Tinel's: Positive at the right wrist, negative at the left wrist            ASSESSMENT AND PLAN:  56 y.o. F returns for follow up to discuss ongoing symptoms of right carpal tunnel syndrome.   Her symptom pattern and physical exam findings are classic for carpal tunnel syndrome, however her nerve conduction study is normal.  We have attempted to get imaging to further support the diagnosis, however ultrasound is not available locally and and MRI was denied by insurance. I explained that given the classic nature of her symptoms, I do believe that she would benefit from surgery. I further explained that studies have been done following patients with carpal tunnel syndrome and normal nerve conduction studies and ~90% of patients in that situation still report satisfactory outcomes from surgery. The carpal tunnel release procedure was explained to the patient in detail, including alternatives to surgery and risks of nerve injury, bleeding, infection and poor wound healing. All questions were answered to the patient's stated satisfaction and they requested that we proceed with surgery.           Wilma Soria MD

## 2022-06-27 NOTE — H&P
[facility] Neurosurgery  History and Physical        Chief Complaint   Patient presents with    Follow-up     Patient is here for right carpal tunnel syndrome and states that it is the same since the last visit. Insurance denied her MRI. Patient states that her right hand is weak but she is more concerned with the numbness and tingling. HISTORY OF PRESENT ILLNESS:      Interval Update:  Patient returns for planned follow-up. Her MRI of the right wrist was denied by her insurance. She has been wearing a wrist splint without any improvement in her symptoms. She continues to complain of classic carpal tunnel symptoms on the right, including numbness, paresthesias and pain in the thumb, index and middle fingers. She endorses weakness in her right hand. HPI:  The patient is a 56 y.o. right-handed female who presents for neurosurgical evaluation of carpal tunnel syndrome. She complains of numbness and paresthesias in her hands (R>L) that will frequently awaken her at night. She also complains of numbness in her hands (again R>L) that is worsened by activity. Driving and housework tend to particularly exacerbate her symptoms. She also reports that she occasionally drops objects unintentionally. She has tried wearing wrist splints and believes that these do decrease the numbness in her hands but she has significant AM stiffness in her wrist after wearing them. She has recently undergone an EMG/NCS with Dr. Violet Moody that was interpreted as normal.  She reports that the numbness is worst in her middle and ring fingers on the right hand.      She does have a history of chronic neck pain and had a cervical spine MRI done ~5 years ago that was essentially normal.  She does not describe a clear radicular pattern to her pain and her neck symptoms haven't really changed in the past 5 years.       MEDICAL HISTORY:             Past Medical History:   Diagnosis Date    Anxiety     Asthma     born with asthmatic bronchitis    CPAP (continuous positive airway pressure) dependence     6cm to 16cm    Degenerative disk disease     Depression     mood disorders    Hemorrhoid 11/2015    Hyperlipidemia     Hypoglycemia     Hypothyroid     Left ureteral stone 5/25/2016    OAB (overactive bladder)     Obstructive sleep apnea     AHI: 37.9 per PSG, 10/2019    Obstructive sleep apnea 4/30/2020    Renal stone 5/25/2016       Past Surgical History:   Procedure Laterality Date    BREAST BIOPSY Left 2018    benign calcification    CHOLECYSTECTOMY  1995    COLONOSCOPY  04/29/2015    Dr Avilez-Internal hemorrhoids, repeat at age 49    COLONOSCOPY N/A 03/23/2022    Dr Carolyn Holley, [place],  Int hemorrhoids Gr 1, 5 year recall    ENDOMETRIAL ABLATION  11/04/2015    Dr. Suly Castro  11/16/2015    Hemorrhoidectomy & closed lateral internal sphincterotomy    HYSTEROSCOPY  11/04/2015    Dr. Sandee Storey    POLYPECTOMY  11/04/2015    Dr. Stefan Diaz  14 years ago    UPPER GASTROINTESTINAL ENDOSCOPY N/A 03/23/2022    Dr Carolyn Holley, W 47 Fr dil, (-) int Metaplasia    UPPER GASTROINTESTINAL ENDOSCOPY  03/23/2022    Dr Carolyn Holley, Atha Earing 47 Fr bougie dilation         Current Outpatient Medications:     celecoxib (CELEBREX) 200 MG capsule, TAKE 1 CAPSULE BY MOUTH EVERY DAY, Disp: 60 capsule, Rfl: 3    pantoprazole (PROTONIX) 40 MG tablet, TAKE 1 TABLET BY MOUTH EVERY DAY BEFORE BREAKFAST, Disp: 90 tablet, Rfl: 3    ondansetron (ZOFRAN) 4 MG tablet, Take 1 tablet by mouth 3 times daily as needed for Nausea or Vomiting, Disp: 30 tablet, Rfl: 0    atorvastatin (LIPITOR) 20 MG tablet, TAKE 1 TABLET BY MOUTH EVERY DAY, Disp: 30 tablet, Rfl: 5    levocetirizine (XYZAL) 5 MG tablet, TAKE 1 TABLET BY MOUTH EVERY DAY AT NIGHT, Disp: 30 tablet, Rfl: 5    levothyroxine (SYNTHROID) 50 MCG tablet, TAKE 1 TABLET BY MOUTH EVERY DAY, Disp: 30 tablet, Rfl: 11    COMBIVENT RESPIMAT  MCG/ACT AERS inhaler, INHALE 1 PUFF EVERY 4 HOURS AS NEEDED FOR WHEEZING, Disp: 1 each, Rfl: 5    fluticasone (FLONASE) 50 MCG/ACT nasal spray, SPRAY 2 SPRAYS INTO EACH NOSTRIL EVERY DAY, Disp: 2 each, Rfl: 5    ondansetron (ZOFRAN ODT) 4 MG disintegrating tablet, Place 1 tablet under the tongue every 8 hours as needed for Nausea or Vomiting, Disp: 21 tablet, Rfl: 0    propranolol (INDERAL) 40 MG tablet, TAKE 1 TABLET BY MOUTH THREE TIMES A DAY, Disp: 270 tablet, Rfl: 3    Allergies:  Codeine    Social History:   Social History     Tobacco Use   Smoking Status Former Smoker    Packs/day: 0.25    Years: 27.00    Pack years: 6.75    Quit date: 2009    Years since quittin.1   Smokeless Tobacco Former User   Tobacco Comment    Not employed     Social History     Substance and Sexual Activity   Alcohol Use Yes    Alcohol/week: 0.0 standard drinks    Comment: rare         Family History:   Family History   Problem Relation Age of Onset    High Blood Pressure Mother     Other Mother         early alzheimers    High Blood Pressure Father     Diabetes Paternal Grandfather     Breast Cancer Paternal Aunt 29    Colon Cancer Neg Hx     Colon Polyps Neg Hx        REVIEW OF SYSTEMS:    Constitutional: Negative. HENT: Negative. Eyes: Negative. Respiratory: Negative. Cardiovascular: Negative. Gastrointestinal: Negative. Genitourinary: Negative. Musculoskeletal: Negative. Skin: Negative. Neurological: Positive for tingling and weakness. Endo/Heme/Allergies: Negative. Psychiatric/Behavioral: Negative. Review of Systems was obtained by the medical assistant and reviewed by myself. PHYSICAL EXAM:    Vitals:    22 0858   BP: 117/82   Pulse: 78       Constitutional: Appears well-developed and well-nourished.    Eyes - conjunctiva normal.  Pupils react to light  Ear, nose,throat -Normal pinna and tragus, No scars, or lesions over external nose or ears, no obvious atrophy of tongue  Neck- symmetric, trachea midline, no jugular vein distension  Respiration- chest wall symmetric, normal effort without use of accessory muscles  Musculoskeletal - no significant wasting of muscles noted, no bony deformities, symmetric bulk  Extremities- no clubbing, cyanosis or edema  Skin - warm, dry, and intact. No rash,erythema, or pallor. Psychiatric - mood, affect, and behavior appear normal.       NEUROLOGIC EXAM:    MENTAL STATUS: Alert, oriented, thought content appropriate    CRANIAL NERVES: PERRL, EOMI, symmetric facies, tongue midline    MOTOR:     Right Upper Extremity:    Deltoid: 5/5  Biceps: 5/5  Triceps: 5/5  Wrist Extension: 5/5  Finger Abduction: 5/5  APB: 5-/5    Left Upper Extremity:    Deltoid: 5/5  Biceps: 5/5  Triceps: 5/5  Wrist Extension: 5/5  Finger Abduction: 5/5  APB: 5/5    Right Lower Extremity:    Hip Flexion: 5/5  Knee Extension: 5/5  Ankle Plantarflexion: 5/5  Ankle Dorsiflexion: 5/5      Left Lower Extremity:    Hip Flexion: 5/5  Knee Extension: 5/5  Ankle Plantarflexion: 5/5  Ankle Dorsiflexion: 5/5      SOMATOSENSORY:     Right Upper Extremity: Decreased to light touch and pinprick in the thumb, middle and index finger and the radial-1/2 of the right finger  Left Upper Extremity: normal light touch sensation  Right Lower Extremity: normal light touch sensation  Left Lower Extremity: normal light touch sensation      REFLEXES:    Biceps: 2+  Patella: 2+    Griffin's: Negative      COORDINATION and GAIT:    Gait: Normal      PROVOCATIVE TESTS:    Tinel's: Positive at the right wrist, negative at the left wrist            ASSESSMENT AND PLAN:  56 y.o. F returns for follow up to discuss ongoing symptoms of right carpal tunnel syndrome.   Her symptom pattern and physical exam findings are classic for carpal tunnel syndrome, however her nerve conduction study is normal.  We have attempted to get imaging to further support the diagnosis, however ultrasound is not available locally and and MRI was denied by insurance. I explained that given the classic nature of her symptoms, I do believe that she would benefit from surgery. I further explained that studies have been done following patients with carpal tunnel syndrome and normal nerve conduction studies and ~90% of patients in that situation still report satisfactory outcomes from surgery. The carpal tunnel release procedure was explained to the patient in detail, including alternatives to surgery and risks of nerve injury, bleeding, infection and poor wound healing. All questions were answered to the patient's stated satisfaction and they requested that we proceed with surgery.           Shareen Hodgkin, MD

## 2022-06-30 ENCOUNTER — HOSPITAL ENCOUNTER (OUTPATIENT)
Dept: GENERAL RADIOLOGY | Age: 47
Discharge: HOME | End: 2022-06-30
Payer: MEDICAID

## 2022-06-30 ENCOUNTER — HOSPITAL ENCOUNTER (OUTPATIENT)
Dept: NON INVASIVE DIAGNOSTICS | Age: 47
Discharge: HOME | End: 2022-06-30
Payer: MEDICAID

## 2022-06-30 DIAGNOSIS — Z01.818 PRE-OP TESTING: ICD-10-CM

## 2022-06-30 DIAGNOSIS — Z01.818 PRE-OP TESTING: Primary | ICD-10-CM

## 2022-06-30 LAB
ALBUMIN SERPL-MCNC: 3.6 G/DL (ref 3.5–5.2)
ALP BLD-CCNC: 84 U/L (ref 35–104)
ALT SERPL-CCNC: 17 U/L (ref 5–33)
ANION GAP SERPL CALCULATED.3IONS-SCNC: 10 MMOL/L (ref 7–19)
APTT: 27.6 SEC (ref 26–36.2)
AST SERPL-CCNC: 16 U/L (ref 5–32)
BASOPHILS ABSOLUTE: 0.1 K/UL (ref 0–0.2)
BASOPHILS RELATIVE PERCENT: 0.6 % (ref 0–1)
BILIRUB SERPL-MCNC: <0.2 MG/DL (ref 0.2–1.2)
BILIRUBIN URINE: NEGATIVE
BLOOD, URINE: NEGATIVE
BUN BLDV-MCNC: 5 MG/DL (ref 6–20)
CALCIUM SERPL-MCNC: 9.1 MG/DL (ref 8.6–10)
CHLORIDE BLD-SCNC: 99 MMOL/L (ref 98–111)
CLARITY: ABNORMAL
CO2: 30 MMOL/L (ref 22–29)
COLOR: YELLOW
CREAT SERPL-MCNC: 0.7 MG/DL (ref 0.5–0.9)
EKG P AXIS: 39 DEGREES
EKG P-R INTERVAL: 106 MS
EKG Q-T INTERVAL: 428 MS
EKG QRS DURATION: 82 MS
EKG QTC CALCULATION (BAZETT): 416 MS
EKG T AXIS: 62 DEGREES
EOSINOPHILS ABSOLUTE: 0.1 K/UL (ref 0–0.6)
EOSINOPHILS RELATIVE PERCENT: 1.2 % (ref 0–5)
GFR AFRICAN AMERICAN: >59
GFR NON-AFRICAN AMERICAN: >60
GLUCOSE BLD-MCNC: 88 MG/DL (ref 74–109)
GLUCOSE URINE: NEGATIVE MG/DL
GONADOTROPIN, CHORIONIC (HCG) QUANT: 0.1 MIU/ML (ref 0–5.3)
HCT VFR BLD CALC: 40.3 % (ref 37–47)
HEMOGLOBIN: 12.8 G/DL (ref 12–16)
IMMATURE GRANULOCYTES #: 0 K/UL
INR BLD: 0.93 (ref 0.88–1.18)
KETONES, URINE: ABNORMAL MG/DL
LEUKOCYTE ESTERASE, URINE: NEGATIVE
LYMPHOCYTES ABSOLUTE: 2.8 K/UL (ref 1.1–4.5)
LYMPHOCYTES RELATIVE PERCENT: 31.6 % (ref 20–40)
MCH RBC QN AUTO: 29.9 PG (ref 27–31)
MCHC RBC AUTO-ENTMCNC: 31.8 G/DL (ref 33–37)
MCV RBC AUTO: 94.2 FL (ref 81–99)
MONOCYTES ABSOLUTE: 0.4 K/UL (ref 0–0.9)
MONOCYTES RELATIVE PERCENT: 4.9 % (ref 0–10)
NEUTROPHILS ABSOLUTE: 5.5 K/UL (ref 1.5–7.5)
NEUTROPHILS RELATIVE PERCENT: 61.6 % (ref 50–65)
NITRITE, URINE: NEGATIVE
PDW BLD-RTO: 12.9 % (ref 11.5–14.5)
PH UA: 5.5 (ref 5–8)
PLATELET # BLD: 294 K/UL (ref 130–400)
PMV BLD AUTO: 11.8 FL (ref 9.4–12.3)
POTASSIUM SERPL-SCNC: 3.4 MMOL/L (ref 3.5–5)
PROTEIN UA: NEGATIVE MG/DL
PROTHROMBIN TIME: 12.3 SEC (ref 12–14.6)
RBC # BLD: 4.28 M/UL (ref 4.2–5.4)
SODIUM BLD-SCNC: 139 MMOL/L (ref 136–145)
SPECIFIC GRAVITY UA: 1.02 (ref 1–1.03)
TOTAL PROTEIN: 6.7 G/DL (ref 6.6–8.7)
UROBILINOGEN, URINE: 0.2 E.U./DL
WBC # BLD: 8.8 K/UL (ref 4.8–10.8)

## 2022-06-30 PROCEDURE — 93005 ELECTROCARDIOGRAM TRACING: CPT | Performed by: STUDENT IN AN ORGANIZED HEALTH CARE EDUCATION/TRAINING PROGRAM

## 2022-06-30 PROCEDURE — 71046 X-RAY EXAM CHEST 2 VIEWS: CPT | Performed by: RADIOLOGY

## 2022-06-30 PROCEDURE — 71046 X-RAY EXAM CHEST 2 VIEWS: CPT

## 2022-07-05 ENCOUNTER — ANESTHESIA EVENT (OUTPATIENT)
Dept: OPERATING ROOM | Age: 47
End: 2022-07-05

## 2022-07-07 ENCOUNTER — TELEPHONE (OUTPATIENT)
Dept: NEUROSURGERY | Age: 47
End: 2022-07-07

## 2022-07-07 NOTE — TELEPHONE ENCOUNTER
Per peer to peer call with Dr Shivam Foster @ [facility] and Dr Wellington grewal/[facility] patient's RT Carpal Tunnel Release procedure is now approved and we will receive a fax notification to reflect this approval. complains of pain/discomfort

## 2022-07-08 ENCOUNTER — ANESTHESIA (OUTPATIENT)
Dept: OPERATING ROOM | Age: 47
End: 2022-07-08

## 2022-07-08 ENCOUNTER — HOSPITAL ENCOUNTER (OUTPATIENT)
Age: 47
Setting detail: OUTPATIENT SURGERY
Discharge: HOME | End: 2022-07-08
Attending: NEUROLOGICAL SURGERY | Admitting: NEUROLOGICAL SURGERY
Payer: MEDICAID

## 2022-07-08 VITALS
BODY MASS INDEX: 32.85 KG/M2 | RESPIRATION RATE: 18 BRPM | HEIGHT: 61 IN | OXYGEN SATURATION: 97 % | WEIGHT: 174 LBS | HEART RATE: 67 BPM | TEMPERATURE: 97.7 F | SYSTOLIC BLOOD PRESSURE: 107 MMHG | DIASTOLIC BLOOD PRESSURE: 62 MMHG

## 2022-07-08 DIAGNOSIS — G56.01 RIGHT CARPAL TUNNEL SYNDROME: Primary | ICD-10-CM

## 2022-07-08 PROCEDURE — G8916 PT W IV AB GIVEN ON TIME: HCPCS

## 2022-07-08 PROCEDURE — G8907 PT DOC NO EVENTS ON DISCHARG: HCPCS

## 2022-07-08 PROCEDURE — 64721 CARPAL TUNNEL SURGERY: CPT

## 2022-07-08 PROCEDURE — 64721 CARPAL TUNNEL SURGERY: CPT | Performed by: NEUROLOGICAL SURGERY

## 2022-07-08 RX ORDER — MEPERIDINE HYDROCHLORIDE 25 MG/ML
12.5 INJECTION INTRAMUSCULAR; INTRAVENOUS; SUBCUTANEOUS EVERY 5 MIN PRN
Status: CANCELLED | OUTPATIENT
Start: 2022-07-08

## 2022-07-08 RX ORDER — PROPOFOL 10 MG/ML
INJECTION, EMULSION INTRAVENOUS PRN
Status: DISCONTINUED | OUTPATIENT
Start: 2022-07-08 | End: 2022-07-08 | Stop reason: SDUPTHER

## 2022-07-08 RX ORDER — SODIUM CHLORIDE 0.9 % (FLUSH) 0.9 %
5-40 SYRINGE (ML) INJECTION EVERY 12 HOURS SCHEDULED
Status: CANCELLED | OUTPATIENT
Start: 2022-07-08

## 2022-07-08 RX ORDER — SODIUM CHLORIDE, SODIUM LACTATE, POTASSIUM CHLORIDE, CALCIUM CHLORIDE 600; 310; 30; 20 MG/100ML; MG/100ML; MG/100ML; MG/100ML
INJECTION, SOLUTION INTRAVENOUS CONTINUOUS
Status: DISCONTINUED | OUTPATIENT
Start: 2022-07-08 | End: 2022-07-08 | Stop reason: HOSPADM

## 2022-07-08 RX ORDER — DEXAMETHASONE SODIUM PHOSPHATE 4 MG/ML
INJECTION, SOLUTION INTRA-ARTICULAR; INTRALESIONAL; INTRAMUSCULAR; INTRAVENOUS; SOFT TISSUE PRN
Status: DISCONTINUED | OUTPATIENT
Start: 2022-07-08 | End: 2022-07-08 | Stop reason: SDUPTHER

## 2022-07-08 RX ORDER — CEFAZOLIN SODIUM 1 G/3ML
INJECTION, POWDER, FOR SOLUTION INTRAMUSCULAR; INTRAVENOUS PRN
Status: DISCONTINUED | OUTPATIENT
Start: 2022-07-08 | End: 2022-07-08 | Stop reason: SDUPTHER

## 2022-07-08 RX ORDER — DIPHENHYDRAMINE HYDROCHLORIDE 50 MG/ML
12.5 INJECTION INTRAMUSCULAR; INTRAVENOUS
Status: CANCELLED | OUTPATIENT
Start: 2022-07-08 | End: 2022-07-08

## 2022-07-08 RX ORDER — MIDAZOLAM HYDROCHLORIDE 1 MG/ML
INJECTION INTRAMUSCULAR; INTRAVENOUS PRN
Status: DISCONTINUED | OUTPATIENT
Start: 2022-07-08 | End: 2022-07-08 | Stop reason: SDUPTHER

## 2022-07-08 RX ORDER — LIDOCAINE HYDROCHLORIDE 10 MG/ML
1 INJECTION, SOLUTION EPIDURAL; INFILTRATION; INTRACAUDAL; PERINEURAL
Status: DISCONTINUED | OUTPATIENT
Start: 2022-07-08 | End: 2022-07-08 | Stop reason: HOSPADM

## 2022-07-08 RX ORDER — HYDROCODONE BITARTRATE AND ACETAMINOPHEN 5; 325 MG/1; MG/1
1-2 TABLET ORAL EVERY 6 HOURS PRN
Qty: 24 TABLET | Refills: 0 | Status: SHIPPED | OUTPATIENT
Start: 2022-07-08 | End: 2022-07-11

## 2022-07-08 RX ORDER — EPHEDRINE SULFATE 50 MG/ML
INJECTION, SOLUTION INTRAVENOUS PRN
Status: DISCONTINUED | OUTPATIENT
Start: 2022-07-08 | End: 2022-07-08 | Stop reason: SDUPTHER

## 2022-07-08 RX ORDER — LIDOCAINE HYDROCHLORIDE 10 MG/ML
INJECTION, SOLUTION EPIDURAL; INFILTRATION; INTRACAUDAL; PERINEURAL PRN
Status: DISCONTINUED | OUTPATIENT
Start: 2022-07-08 | End: 2022-07-08 | Stop reason: SDUPTHER

## 2022-07-08 RX ORDER — BUPIVACAINE HYDROCHLORIDE AND EPINEPHRINE 2.5; 5 MG/ML; UG/ML
INJECTION, SOLUTION INFILTRATION; PERINEURAL PRN
Status: DISCONTINUED | OUTPATIENT
Start: 2022-07-08 | End: 2022-07-08 | Stop reason: ALTCHOICE

## 2022-07-08 RX ORDER — ONDANSETRON 2 MG/ML
INJECTION INTRAMUSCULAR; INTRAVENOUS PRN
Status: DISCONTINUED | OUTPATIENT
Start: 2022-07-08 | End: 2022-07-08 | Stop reason: SDUPTHER

## 2022-07-08 RX ORDER — FENTANYL CITRATE 50 UG/ML
25 INJECTION, SOLUTION INTRAMUSCULAR; INTRAVENOUS EVERY 5 MIN PRN
Status: CANCELLED | OUTPATIENT
Start: 2022-07-08

## 2022-07-08 RX ORDER — ONDANSETRON 2 MG/ML
4 INJECTION INTRAMUSCULAR; INTRAVENOUS
Status: CANCELLED | OUTPATIENT
Start: 2022-07-08 | End: 2022-07-08

## 2022-07-08 RX ORDER — SODIUM CHLORIDE 0.9 % (FLUSH) 0.9 %
5-40 SYRINGE (ML) INJECTION PRN
Status: CANCELLED | OUTPATIENT
Start: 2022-07-08

## 2022-07-08 RX ORDER — SODIUM CHLORIDE 9 MG/ML
INJECTION, SOLUTION INTRAVENOUS PRN
Status: CANCELLED | OUTPATIENT
Start: 2022-07-08

## 2022-07-08 RX ADMIN — LIDOCAINE HYDROCHLORIDE 30 MG: 10 INJECTION, SOLUTION EPIDURAL; INFILTRATION; INTRACAUDAL; PERINEURAL at 09:17

## 2022-07-08 RX ADMIN — ONDANSETRON 4 MG: 2 INJECTION INTRAMUSCULAR; INTRAVENOUS at 09:23

## 2022-07-08 RX ADMIN — DEXAMETHASONE SODIUM PHOSPHATE 4 MG: 4 INJECTION, SOLUTION INTRA-ARTICULAR; INTRALESIONAL; INTRAMUSCULAR; INTRAVENOUS; SOFT TISSUE at 09:23

## 2022-07-08 RX ADMIN — CEFAZOLIN SODIUM 2000 MG: 1 INJECTION, POWDER, FOR SOLUTION INTRAMUSCULAR; INTRAVENOUS at 09:27

## 2022-07-08 RX ADMIN — PROPOFOL 200 MG: 10 INJECTION, EMULSION INTRAVENOUS at 09:17

## 2022-07-08 RX ADMIN — EPHEDRINE SULFATE 10 MG: 50 INJECTION, SOLUTION INTRAVENOUS at 09:29

## 2022-07-08 RX ADMIN — SODIUM CHLORIDE, SODIUM LACTATE, POTASSIUM CHLORIDE, CALCIUM CHLORIDE: 600; 310; 30; 20 INJECTION, SOLUTION INTRAVENOUS at 07:26

## 2022-07-08 RX ADMIN — MIDAZOLAM HYDROCHLORIDE 2 MG: 1 INJECTION INTRAMUSCULAR; INTRAVENOUS at 09:07

## 2022-07-08 ASSESSMENT — PAIN DESCRIPTION - PAIN TYPE
TYPE: SURGICAL PAIN

## 2022-07-08 ASSESSMENT — PAIN DESCRIPTION - LOCATION
LOCATION: HAND
LOCATION: HAND

## 2022-07-08 ASSESSMENT — PAIN SCALES - WONG BAKER
WONGBAKER_NUMERICALRESPONSE: 0

## 2022-07-08 NOTE — OP NOTE
NEUROSURGICAL OPERATIVE REPORT    DATE OF PROCEDURE:  7/8/2022    ATTENDING SURGEON:  Lilibeth Coronel MD, PhD    PREOPERATIVE DIAGNOSIS:  Right carpal tunnel syndrome. POSTOPERATIVE DIAGNOSIS:  Right carpal tunnel syndrome. PROCEDURE PERFORMED:  Right open carpal tunnel release. INDICATIONS:  Symptomatic right carpal tunnel syndrome. ESTIMATED BLOOD LOSS:  <10 mL    PROCEDURE IN DETAIL:  The patient was identified in the preoperative area, consent for surgery was confirmed, and the site for surgery was marked. The patient was transferred to the  operating room by the Anesthesia team where general anesthesia was begun through via LMA. The right hand and arm was then prepped and draped in the usual aseptic fashion. A team pause was held according to the preformatted script, all were in agreement and the decision was made to proceed with surgery. The incision was planned from the distal palmar crease to the top of the thenar eminence in line with the median border of the ring finger on the right hand. This incision was infiltrated with local anesthetic. The incision was opened sharply with a skin knife and carried down into the palmar fat, self-retaining retractors were placed and the palmaris brevis was divided sharply until we were able to identify the transverse carpal ligament. A 15-blade was then used to divide the transverse carpal ligament in the distal portion. A Penfield dissector was then placed below the ligament to protect the median nerve and the remainder of the ligament was opened proximally with Metzenbaum scissors. Once the ligament had been completely divided, we inspected the nerve and noted that it was well-decompressed and there were no palpable compression proximally or distally. The nerve was noted to be intact. The  self-retaining retractors were removed and bleeding from the palmaris brevis was controlled with bipolar cautery.   Once we had hemostasis, we irrigated the wound, confirmed hemostasis, turned attention to closure. The palmaris was reapproximated with several inverted interrupted 3-0 Vicryl sutures and the skin was then closed with interrupted horizontal-mattress 3-0 Nylon. The wound was then cleaned and dried. A xeroform was then used to cover the sutures and the had was dressed with fluffs, Kerlex and an Ace wrap. Once the site was dressed, the patient was allowed to emerge from anesthesia and was transferred to recovery. All sponge, needle and instrument counts were correct at the end of the procedure and there were no apparent complications.

## 2022-07-08 NOTE — ANESTHESIA POSTPROCEDURE EVALUATION
Department of Anesthesiology  Postprocedure Note    Patient: Rishi Tyler  MRN: [id number]  YOB: 1975  Date of evaluation: 7/8/2022      Procedure Summary     Date: 07/08/22 Room / Location: [address]    Anesthesia Start: 6141 Anesthesia Stop: 1009    Procedure: RIGHT CARPAL TUNNEL RELEASE (Right ) Diagnosis:       Carpal tunnel syndrome of right wrist      (Carpal tunnel syndrome of right wrist [G56.01])    Surgeons: Lizzy Lundberg MD Responsible Provider: JUSTIN Batres CRNA    Anesthesia Type: General ASA Status: 2          Anesthesia Type: General    Radha Phase I:      Radha Phase II:        Anesthesia Post Evaluation    Patient location during evaluation: PACU  Patient participation: complete - patient participated  Level of consciousness: awake  Pain score: 0  Airway patency: patent  Nausea & Vomiting: no nausea and no vomiting  Complications: no  Cardiovascular status: hemodynamically stable  Respiratory status: acceptable, room air and spontaneous ventilation  Hydration status: euvolemic  Comments: Temp 96.8F Moderate Variability

## 2022-07-08 NOTE — ANESTHESIA PRE PROCEDURE
Department of Anesthesiology  Preprocedure Note       Name:  Bambi Llamas   Age:  56 y.o.  :  1975                                          MRN:  [id number]         Date:  2022      Surgeon: Katie Navarro):  Malissa Tovar MD    Procedure: Procedure(s):  RIGHT CARPAL TUNNEL RELEASE    Medications prior to admission:   Prior to Admission medications    Medication Sig Start Date End Date Taking? Authorizing Provider   celecoxib (CELEBREX) 200 MG capsule TAKE 1 CAPSULE BY MOUTH EVERY DAY  Patient not taking: Reported on 2022 6/3/22   Joe Alonso MD   pantoprazole (PROTONIX) 40 MG tablet TAKE 1 TABLET BY MOUTH EVERY DAY BEFORE BREAKFAST 22   Ricke Rubinstein, APRN - NP   ondansetron (ZOFRAN) 4 MG tablet Take 1 tablet by mouth 3 times daily as needed for Nausea or Vomiting 3/22/22   Joe Alonso MD   atorvastatin (LIPITOR) 20 MG tablet TAKE 1 TABLET BY MOUTH EVERY DAY 3/6/22   Joe Alonso MD   levocetirizine (XYZAL) 5 MG tablet TAKE 1 TABLET BY MOUTH EVERY DAY AT NIGHT 3/6/22   Joe Alonso MD   levothyroxine (SYNTHROID) 50 MCG tablet TAKE 1 TABLET BY MOUTH EVERY DAY 12/3/21   Joe Alonso MD   COMBIVENT RESPIMAT  MCG/ACT AERS inhaler INHALE 1 PUFF EVERY 4 HOURS AS NEEDED FOR WHEEZING 10/3/21   JUSTIN Lux   fluticasone (FLONASE) 50 MCG/ACT nasal spray SPRAY 2 SPRAYS INTO EACH NOSTRIL EVERY DAY 21   Joe Alonso MD   ondansetron (ZOFRAN ODT) 4 MG disintegrating tablet Place 1 tablet under the tongue every 8 hours as needed for Nausea or Vomiting 21   Joe Alonso MD   propranolol (INDERAL) 40 MG tablet TAKE 1 TABLET BY MOUTH THREE TIMES A DAY 3/5/21   JUSTIN Lux       Current medications:    No current facility-administered medications for this encounter. Allergies:     Allergies   Allergen Reactions    Codeine Nausea And Vomiting, Swelling and Rash       Problem List:    Patient Active Problem List   Diagnosis Code    External hemorrhoid K64.4    Internal hemorrhoids K64.8    Non morbid obesity due to excess calories E66.09    Psychophysiologic insomnia F51.04    Anxiety F41.9    Hypothyroidism E03.9    Hyperlipidemia E78.5    CPAP (continuous positive airway pressure) dependence Z99.89    Obstructive sleep apnea G47.33    Otalgia of right ear H92.01    Tinnitus aurium, bilateral H93.13    Bilateral carpal tunnel syndrome G56.03    Gastroesophageal reflux disease without esophagitis K21.9       Past Medical History:        Diagnosis Date    Anxiety     Asthma     born with asthmatic bronchitis    CPAP (continuous positive airway pressure) dependence     6cm to 16cm    Degenerative disk disease     Depression     mood disorders    Hemorrhoid 11/2015    Hyperlipidemia     Hypoglycemia     Hypothyroid     Left ureteral stone 5/25/2016    OAB (overactive bladder)     Obstructive sleep apnea     AHI: 37.9 per PSG, 10/2019    Obstructive sleep apnea 4/30/2020    Renal stone 5/25/2016       Past Surgical History:        Procedure Laterality Date    BREAST BIOPSY Left 2018    benign calcification    CHOLECYSTECTOMY  1995    COLONOSCOPY  04/29/2015    Dr Avilez-Internal hemorrhoids, repeat at age 49    COLONOSCOPY N/A 03/23/2022    Dr Pili Candelaria, [place],  Int hemorrhoids Gr 1, 5 year recall    ENDOMETRIAL ABLATION  11/04/2015    Dr. Thom Goldstein  11/16/2015    Hemorrhoidectomy & closed lateral internal sphincterotomy    HYSTEROSCOPY  11/04/2015    Dr. Terrell Stapleton    POLYPECTOMY  11/04/2015    Dr. Roxane Mccann  14 years ago    UPPER GASTROINTESTINAL ENDOSCOPY N/A 03/23/2022    RAGINI Killian 47 Fr dil, (-) int Metaplasia    UPPER GASTROINTESTINAL ENDOSCOPY  03/23/2022    Mali Killian 47 Fr bougie dilation       Social History:    Social History     Tobacco Use    Smoking status: Former Smoker     Packs/day: 0.25     Years: 27.00     Pack years: 6.75     Quit date: 4/23/2009     Years since quittin.2    Smokeless tobacco: Former User    Tobacco comment: Not employed   Substance Use Topics    Alcohol use: Yes     Alcohol/week: 0.0 standard drinks     Comment: rare                                Counseling given: Not Answered  Comment: Not employed      Vital Signs (Current):   Vitals:    22 0714   BP: 130/87   Pulse: 55   Resp: 16   Temp: 98.8 °F (37.1 °C)   SpO2: 97%   Weight: 174 lb (78.9 kg)   Height: 5' 1\" (1.549 m)                                              BP Readings from Last 3 Encounters:   22 130/87   22 117/82   22 114/69       NPO Status:                                                                                 BMI:   Wt Readings from Last 3 Encounters:   22 174 lb (78.9 kg)   22 174 lb (78.9 kg)   22 174 lb 12.8 oz (79.3 kg)     Body mass index is 32.88 kg/m². CBC:   Lab Results   Component Value Date/Time    WBC 8.8 2022 07:20 AM    RBC 4.28 2022 07:20 AM    HGB 12.8 2022 07:20 AM    HCT 40.3 2022 07:20 AM    MCV 94.2 2022 07:20 AM    RDW 12.9 2022 07:20 AM     2022 07:20 AM       CMP:   Lab Results   Component Value Date/Time     2022 07:20 AM    K 3.4 2022 07:20 AM    K 3.0 2022 02:20 PM    CL 99 2022 07:20 AM    CO2 30 2022 07:20 AM    BUN 5 2022 07:20 AM    CREATININE 0.7 2022 07:20 AM    GFRAA >59 2022 07:20 AM    LABGLOM >60 2022 07:20 AM    GLUCOSE 88 2022 07:20 AM    PROT 6.7 2022 07:20 AM    PROT 7.3 10/02/2012 03:30 PM    CALCIUM 9.1 2022 07:20 AM    BILITOT <0.2 2022 07:20 AM    ALKPHOS 84 2022 07:20 AM    AST 16 2022 07:20 AM    ALT 17 2022 07:20 AM       POC Tests: No results for input(s): POCGLU, POCNA, POCK, POCCL, POCBUN, POCHEMO, POCHCT in the last 72 hours.     Coags:   Lab Results   Component Value Date/Time    PROTIME 12.3 2022 07:20 AM    INR 0.93 06/30/2022 07:20 AM    APTT 27.6 06/30/2022 07:20 AM       HCG (If Applicable):   Lab Results   Component Value Date    PREGTESTUR Negative 01/11/2022        ABGs: No results found for: PHART, PO2ART, CHQ4YYW, JFS1WQW, BEART, U3NPXIIC     Type & Screen (If Applicable):  No results found for: LABABO, LABRH    Drug/Infectious Status (If Applicable):  No results found for: HIV, HEPCAB    COVID-19 Screening (If Applicable):   Lab Results   Component Value Date/Time    COVID19 Not Detected 03/21/2022 10:30 AM           Anesthesia Evaluation     History of anesthetic complications: Quit smoking 20 years ago. Airway: Mallampati: II  TM distance: >3 FB   Neck ROM: full  Mouth opening: > = 3 FB   Dental: normal exam         Pulmonary:normal exam  breath sounds clear to auscultation  (+) sleep apnea: on CPAP,  asthma:                            Cardiovascular:  Exercise tolerance: good (>4 METS),   (+) hyperlipidemia        Rhythm: regular  Rate: normal           Beta Blocker:  No for medical reason         Neuro/Psych:   (+) neuromuscular disease:, psychiatric history:            GI/Hepatic/Renal:   (+) GERD: well controlled, renal disease: kidney stones,           Endo/Other:    (+) hypothyroidism::., .                 Abdominal:             Vascular: negative vascular ROS. Other Findings:           Anesthesia Plan      general     ASA 2       Induction: intravenous. MIPS: Postoperative opioids intended and Prophylactic antiemetics administered. Anesthetic plan and risks discussed with patient.                         JUSTIN Peters - AJCOB   7/8/2022

## 2022-07-21 ENCOUNTER — OFFICE VISIT (OUTPATIENT)
Dept: NEUROSURGERY | Age: 47
End: 2022-07-21

## 2022-07-21 VITALS
HEART RATE: 78 BPM | DIASTOLIC BLOOD PRESSURE: 82 MMHG | RESPIRATION RATE: 18 BRPM | HEIGHT: 61 IN | SYSTOLIC BLOOD PRESSURE: 122 MMHG | WEIGHT: 174 LBS | BODY MASS INDEX: 32.85 KG/M2

## 2022-07-21 DIAGNOSIS — G56.01 RIGHT CARPAL TUNNEL SYNDROME: Primary | ICD-10-CM

## 2022-07-21 PROCEDURE — 99024 POSTOP FOLLOW-UP VISIT: CPT | Performed by: NEUROLOGICAL SURGERY

## 2022-07-21 ASSESSMENT — ENCOUNTER SYMPTOMS
GASTROINTESTINAL NEGATIVE: 1
RESPIRATORY NEGATIVE: 1
EYES NEGATIVE: 1

## 2022-07-21 NOTE — PROGRESS NOTES
Review of Systems   Constitutional: Negative. HENT: Negative. Eyes: Negative. Respiratory: Negative. Cardiovascular: Negative. Gastrointestinal: Negative. Genitourinary: Negative. Musculoskeletal: Negative. Skin: Negative. Neurological: Negative. Endo/Heme/Allergies: Negative. Psychiatric/Behavioral: Negative.
advised the patient to apply neosporin ointment to the incision and to cover the wound with a large BandAid dressing during the day and leave it open to air at night. I will plan to see the patient again in two weeks for another wound check.           Electronically signed by Regina Perdomo MD on 7/21/2022 at 9:05 AM

## 2022-08-04 ENCOUNTER — OFFICE VISIT (OUTPATIENT)
Dept: NEUROSURGERY | Age: 47
End: 2022-08-04

## 2022-08-04 VITALS
RESPIRATION RATE: 18 BRPM | BODY MASS INDEX: 32.85 KG/M2 | HEIGHT: 61 IN | SYSTOLIC BLOOD PRESSURE: 122 MMHG | DIASTOLIC BLOOD PRESSURE: 72 MMHG | HEART RATE: 72 BPM | WEIGHT: 174 LBS

## 2022-08-04 DIAGNOSIS — G56.01 RIGHT CARPAL TUNNEL SYNDROME: ICD-10-CM

## 2022-08-04 DIAGNOSIS — T81.49XA POSTOPERATIVE CELLULITIS OF SURGICAL WOUND: Primary | ICD-10-CM

## 2022-08-04 PROCEDURE — 99024 POSTOP FOLLOW-UP VISIT: CPT | Performed by: NEUROLOGICAL SURGERY

## 2022-08-04 RX ORDER — CEPHALEXIN 500 MG/1
500 CAPSULE ORAL 3 TIMES DAILY
Qty: 30 CAPSULE | Refills: 0 | Status: SHIPPED | OUTPATIENT
Start: 2022-08-04 | End: 2022-08-14

## 2022-08-04 ASSESSMENT — ENCOUNTER SYMPTOMS
EYES NEGATIVE: 1
GASTROINTESTINAL NEGATIVE: 1
RESPIRATORY NEGATIVE: 1

## 2022-08-04 NOTE — PROGRESS NOTES
NEUROSURGERY POSTOPERATIVE FOLLOW UP NOTE      Chief Complaint:   Chief Complaint   Patient presents with    Follow-up     Patient is here for follow up after carpal tunnel release. She states that she only has pain at night, denies any numbness or tingling. She does not have any concerns today. Date of Surgery: 7/8/2022    Procedure Performed: Right carpal tunnel release      Interval Update: Second follow-up visit after surgery. She reports bothersome incisional pain. She reports that her carpal tunnel symptoms have improved sine surgery. She has been compliant with her postoperative instructions. HPI: Patient with symptomatic right carpal tunnel syndrome. Her MRI of the right wrist was denied by her insurance. She has been wearing a wrist splint without any improvement in her symptoms. She continues to complain of classic carpal tunnel symptoms on the right, including numbness, paresthesias and pain in the thumb, index and middle fingers. She endorses weakness in her right hand. Objective:    /72   Pulse 72   Resp 18   Ht 5' 1\" (1.549 m)   Wt 174 lb (78.9 kg)   BMI 32.88 kg/m²         Physical Exam:    General: alert, cooperative, no distress  Cardiorespiratory: unlabored breathing  Wound: superficial dehiscence, ~3cm, of the mid-portion of the wound. There is erythema surrounding the wound. No active drainage or purulence. There is granulation tissue in the wound bed. Neurologic Exam:    Mental Status: Alert, oriented, thought content appropriate  Cranial Nerves: PERRL, EOMI, symmetric facies, tongue midline  Motor: Motor exam is symmetrical 5 out of 5 all extremities bilaterally  Somatosensory: normal light touch sensation          Assessment and Plan:  56 y.o. F returns for follow up after right carpal tunnel release on 7/8/2022. She appears to have superficial cellulitis and a superficial dehiscence of her wound edge that is causing significant pain.   Her carpal tunnel symptoms have improved significantly since surgery. I will plan to treat her with a 10d course of Keflex and plan to follow up with her in 1 week for another wound check.         Electronically signed by Wellington Gutierrez MD on 8/4/2022 at 9:11 AM

## 2022-08-04 NOTE — PROGRESS NOTES
Review of Systems   Constitutional: Negative. HENT: Negative. Eyes: Negative. Respiratory: Negative. Cardiovascular: Negative. Gastrointestinal: Negative. Genitourinary: Negative. Musculoskeletal: Negative. Skin: Negative. Neurological:  Positive for weakness. Endo/Heme/Allergies: Negative. Psychiatric/Behavioral: Negative.

## 2022-08-11 ENCOUNTER — OFFICE VISIT (OUTPATIENT)
Dept: NEUROSURGERY | Age: 47
End: 2022-08-11

## 2022-08-11 VITALS
HEART RATE: 75 BPM | WEIGHT: 174 LBS | HEIGHT: 61 IN | DIASTOLIC BLOOD PRESSURE: 78 MMHG | BODY MASS INDEX: 32.85 KG/M2 | SYSTOLIC BLOOD PRESSURE: 122 MMHG | RESPIRATION RATE: 18 BRPM

## 2022-08-11 DIAGNOSIS — T81.49XA POSTOPERATIVE CELLULITIS OF SURGICAL WOUND: Primary | ICD-10-CM

## 2022-08-11 DIAGNOSIS — G56.01 RIGHT CARPAL TUNNEL SYNDROME: ICD-10-CM

## 2022-08-11 PROCEDURE — 99024 POSTOP FOLLOW-UP VISIT: CPT | Performed by: NEUROLOGICAL SURGERY

## 2022-08-11 ASSESSMENT — ENCOUNTER SYMPTOMS
EYES NEGATIVE: 1
RESPIRATORY NEGATIVE: 1
GASTROINTESTINAL NEGATIVE: 1

## 2022-08-11 NOTE — PROGRESS NOTES
NEUROSURGERY POSTOPERATIVE FOLLOW UP NOTE      Chief Complaint:   Chief Complaint   Patient presents with    Follow-up     Patient is here for a follow up on superficial cellulitis and a superficial dehiscence of her wound edge that is causing significant pain after surgery. She states that her incision is looking better, less red and abx are helping. She states that her RT is hurting today due to an accident last night but other than that, she is okay. Date of Surgery: 7/8/2022    Procedure Performed: Right carpal tunnel release      Interval Update: Planned follow-up for wound check. At her last visit she was prescribed Keflex for cellulitis surrounding her wound and some separation of the superficial skin edge. The wound erythema has improved and she endorses minimal pain. Her carpal tunnel symptoms have resolved. HPI: Patient with symptomatic right carpal tunnel syndrome. Her MRI of the right wrist was denied by her insurance. She has been wearing a wrist splint without any improvement in her symptoms. She continues to complain of classic carpal tunnel symptoms on the right, including numbness, paresthesias and pain in the thumb, index and middle fingers. She endorses weakness in her right hand. Objective:    /78   Pulse 75   Resp 18   Ht 5' 1\" (1.549 m)   Wt 174 lb (78.9 kg)   BMI 32.88 kg/m²         Physical Exam:    General: alert, cooperative, no distress  Cardiorespiratory: unlabored breathing  Wound: superficial dehiscence, ~3cm, of the mid-portion of the wound. The erythema surrounding her wound has improved. There is granulation tissue in the base of the wound. Neurologic Exam:    Mental Status: Alert, oriented, thought content appropriate  Cranial Nerves: PERRL, EOMI, symmetric facies, tongue midline  Motor: Motor exam is symmetrical 5 out of 5 all extremities bilaterally  Somatosensory: normal light touch sensation          Assessment and Plan:  56 y.o.  Jean Claude Londono returns for follow up after right carpal tunnel release on 7/8/2022. She is completing a course of antibiotics for wound cellulitis and this appears to be improving. Her carpal tunnel symptoms have resolved. I will plan to see her again in 1 month for another wound check.         Electronically signed by Corina Larson MD on 8/11/2022 at 9:03 AM

## 2022-08-23 ENCOUNTER — OFFICE VISIT (OUTPATIENT)
Dept: PRIMARY CARE CLINIC | Age: 47
End: 2022-08-23
Payer: MEDICAID

## 2022-08-23 VITALS
DIASTOLIC BLOOD PRESSURE: 78 MMHG | TEMPERATURE: 97.4 F | HEIGHT: 61 IN | BODY MASS INDEX: 32.47 KG/M2 | SYSTOLIC BLOOD PRESSURE: 128 MMHG | WEIGHT: 172 LBS | OXYGEN SATURATION: 97 % | RESPIRATION RATE: 20 BRPM | HEART RATE: 64 BPM

## 2022-08-23 DIAGNOSIS — G56.03 CARPAL TUNNEL SYNDROME, BILATERAL: ICD-10-CM

## 2022-08-23 DIAGNOSIS — E78.2 MIXED HYPERLIPIDEMIA: ICD-10-CM

## 2022-08-23 DIAGNOSIS — Z99.89 CPAP (CONTINUOUS POSITIVE AIRWAY PRESSURE) DEPENDENCE: ICD-10-CM

## 2022-08-23 DIAGNOSIS — E66.09 NON MORBID OBESITY DUE TO EXCESS CALORIES: ICD-10-CM

## 2022-08-23 DIAGNOSIS — K21.9 GASTROESOPHAGEAL REFLUX DISEASE WITHOUT ESOPHAGITIS: ICD-10-CM

## 2022-08-23 DIAGNOSIS — F41.9 ANXIETY: Primary | ICD-10-CM

## 2022-08-23 DIAGNOSIS — E03.9 ACQUIRED HYPOTHYROIDISM: ICD-10-CM

## 2022-08-23 DIAGNOSIS — G47.33 OBSTRUCTIVE SLEEP APNEA: ICD-10-CM

## 2022-08-23 PROCEDURE — 99214 OFFICE O/P EST MOD 30 MIN: CPT | Performed by: STUDENT IN AN ORGANIZED HEALTH CARE EDUCATION/TRAINING PROGRAM

## 2022-08-23 SDOH — ECONOMIC STABILITY: FOOD INSECURITY: WITHIN THE PAST 12 MONTHS, YOU WORRIED THAT YOUR FOOD WOULD RUN OUT BEFORE YOU GOT MONEY TO BUY MORE.: NEVER TRUE

## 2022-08-23 SDOH — ECONOMIC STABILITY: FOOD INSECURITY: WITHIN THE PAST 12 MONTHS, THE FOOD YOU BOUGHT JUST DIDN'T LAST AND YOU DIDN'T HAVE MONEY TO GET MORE.: NEVER TRUE

## 2022-08-23 ASSESSMENT — SOCIAL DETERMINANTS OF HEALTH (SDOH): HOW HARD IS IT FOR YOU TO PAY FOR THE VERY BASICS LIKE FOOD, HOUSING, MEDICAL CARE, AND HEATING?: NOT HARD AT ALL

## 2022-08-23 NOTE — PROGRESS NOTES
[facility]  [address] [address]  [address] [address]  Dept: [phone]  Dept Fax: [phone]  Loc: [phone]      Subjective:     Chief Complaint   Patient presents with    Follow-up     Right carpal tunnel release        HPI:  Varun Mnariquez is a 56 y.o. female presenting for    Had carpal tunnel release 07/8. Pt had minor wound infection afterwards but has since healed and is doing better. Initial presenting CT sx better. Pt notes she hasn't been to counseling recently ([facility]). She was frustrated when last counselor left and is unsure if she wants to open up to someone else right now. She desires continuity.  On propanolol for anxiety    ROS:   Reviewed with patient and noted to be negative except that listed in HPI    PMHx:  Past Medical History:   Diagnosis Date    Anxiety     Asthma     born with asthmatic bronchitis    CPAP (continuous positive airway pressure) dependence     6cm to 16cm    Degenerative disk disease     Depression     mood disorders    Hemorrhoid 11/2015    Hyperlipidemia     Hypoglycemia     Hypothyroid     Left ureteral stone 5/25/2016    OAB (overactive bladder)     Obstructive sleep apnea     AHI: 37.9 per PSG, 10/2019    Obstructive sleep apnea 4/30/2020    Renal stone 5/25/2016     Patient Active Problem List   Diagnosis    External hemorrhoid    Internal hemorrhoids    Non morbid obesity due to excess calories    Psychophysiologic insomnia    Anxiety    Hypothyroidism    Hyperlipidemia    CPAP (continuous positive airway pressure) dependence    Obstructive sleep apnea    Otalgia of right ear    Tinnitus aurium, bilateral    Bilateral carpal tunnel syndrome    Gastroesophageal reflux disease without esophagitis       PSHx:  Past Surgical History:   Procedure Laterality Date    BREAST BIOPSY Left 2018    benign calcification    CARPAL TUNNEL RELEASE Right 7/8/2022    RIGHT CARPAL TUNNEL RELEASE performed by Becky Mirza, MD at [address] [place] [address] Dr    COLONOSCOPY  2015    Dr Avilez-Internal hemorrhoids, repeat at age 49    COLONOSCOPY N/A 2022    Dr Nisha Gonzalez, [place],  Int hemorrhoids Gr 1, 5 year recall    ENDOMETRIAL ABLATION  2015    Dr. Anthony John  2015    Hemorrhoidectomy & closed lateral internal sphincterotomy    HYSTEROSCOPY  2015    Dr. Erendira Polk    POLYPECTOMY  2015    Dr. Remigio Rogers  14 years ago    UPPER GASTROINTESTINAL ENDOSCOPY N/A 2022    Dr Nisha Gonzalez, [address] 47 Fr dil, (-) int Metaplasia    UPPER GASTROINTESTINAL ENDOSCOPY  2022    Dr Nisha Gonzalez, [address] 47 Fr bougie dilation       PFHx:  Family History   Problem Relation Age of Onset    High Blood Pressure Mother     Other Mother         early alzheimers    High Blood Pressure Father     Diabetes Paternal Grandfather     Breast Cancer Paternal Aunt 29    Colon Cancer Neg Hx     Colon Polyps Neg Hx        SocialHx:  Social History     Tobacco Use    Smoking status: Former     Packs/day: 0.25     Years: 27.00     Pack years: 6.75     Types: Cigarettes     Quit date: 2009     Years since quittin.3    Smokeless tobacco: Former    Tobacco comments:     Not employed   Substance Use Topics    Alcohol use: Yes     Alcohol/week: 0.0 standard drinks     Comment: rare       Allergies:   Allergies   Allergen Reactions    Codeine Nausea And Vomiting, Swelling and Rash       Medications:  Current Outpatient Medications   Medication Sig Dispense Refill    pantoprazole (PROTONIX) 40 MG tablet TAKE 1 TABLET BY MOUTH EVERY DAY BEFORE BREAKFAST 90 tablet 3    ondansetron (ZOFRAN) 4 MG tablet Take 1 tablet by mouth 3 times daily as needed for Nausea or Vomiting 30 tablet 0    atorvastatin (LIPITOR) 20 MG tablet TAKE 1 TABLET BY MOUTH EVERY DAY 30 tablet 5    levocetirizine (XYZAL) 5 MG tablet TAKE 1 TABLET BY MOUTH EVERY DAY AT NIGHT 30 tablet 5    levothyroxine (SYNTHROID) 50 MCG tablet TAKE 1 TABLET BY MOUTH EVERY DAY 30 tablet 11    COMBIVENT RESPIMAT  MCG/ACT AERS inhaler INHALE 1 PUFF EVERY 4 HOURS AS NEEDED FOR WHEEZING 1 each 5    ondansetron (ZOFRAN ODT) 4 MG disintegrating tablet Place 1 tablet under the tongue every 8 hours as needed for Nausea or Vomiting 21 tablet 0    propranolol (INDERAL) 40 MG tablet TAKE 1 TABLET BY MOUTH THREE TIMES A  tablet 3     No current facility-administered medications for this visit. Objective:   PE:  /78   Pulse 64   Temp 97.4 °F (36.3 °C) (Temporal)   Resp 20   Ht 5' 1\" (1.549 m)   Wt 172 lb (78 kg)   SpO2 97%   BMI 32.50 kg/m²   Physical Exam  Constitutional:       General: She is not in acute distress. Appearance: Normal appearance. HENT:      Head: Normocephalic. Nose: Nose normal.      Mouth/Throat:      Mouth: Mucous membranes are moist.      Pharynx: Oropharynx is clear. No oropharyngeal exudate or posterior oropharyngeal erythema. Eyes:      General: No scleral icterus. Extraocular Movements: Extraocular movements intact. Conjunctiva/sclera: Conjunctivae normal.   Cardiovascular:      Rate and Rhythm: Normal rate and regular rhythm. Pulses: Normal pulses. Heart sounds: No murmur heard. Pulmonary:      Effort: Pulmonary effort is normal.      Breath sounds: Normal breath sounds. Musculoskeletal:         General: Normal range of motion. Cervical back: Normal range of motion. Skin:     General: Skin is warm and dry. Capillary Refill: Capillary refill takes less than 2 seconds. Comments: R hand volar aspect linear healing scar over hand/wrist interface. Neurological:      General: No focal deficit present. Mental Status: She is alert and oriented to person, place, and time. Psychiatric:         Mood and Affect: Mood normal.         Behavior: Behavior normal.         Thought Content:  Thought content normal.          Assessment & Plan Alpesh Martin was seen today for follow-up. Diagnoses and all orders for this visit:    Anxiety  -Overall recommend pt try re-establishing w/ counselor at [facility]. Could consider alternate provider if needed. Pt to call to reschedule if she decides to do this. Continue propanolol    Gastroesophageal reflux disease without esophagitis  -Continue protonix    Carpal tunnel syndrome, bilateral  -Resolved    Acquired hypothyroidism  -     TSH with Reflex to FT4; Future  -     Comprehensive Metabolic Panel; Future    Obstructive sleep apnea  CPAP (continuous positive airway pressure) dependence    Mixed hyperlipidemia  -     Lipid, Fasting; Future    Non morbid obesity due to excess calories  -     Comprehensive Metabolic Panel; Future  -     Hemoglobin A1C; Future      Return in about 6 months (around 2/23/2023) for Chronic f/u after labs/wellness exam.    All questions were answered. Medications, including possible adverse effects, and instructions were reviewed and  understanding was confirmed. Follow-up recommendations, including when to contact or return to office (ie; if symptoms worsen or fail to improve), were discussed and acknowledged.     Electronically signed by Pricila Rodriguez MD on 8/23/22 at 11:55 AM DO[facility]LET

## 2022-08-30 DIAGNOSIS — J30.2 SEASONAL ALLERGIES: ICD-10-CM

## 2022-08-31 RX ORDER — LEVOCETIRIZINE DIHYDROCHLORIDE 5 MG/1
TABLET, FILM COATED ORAL
Qty: 30 TABLET | Refills: 5 | Status: SHIPPED | OUTPATIENT
Start: 2022-08-31

## 2022-08-31 RX ORDER — ATORVASTATIN CALCIUM 20 MG/1
TABLET, FILM COATED ORAL
Qty: 30 TABLET | Refills: 5 | Status: SHIPPED | OUTPATIENT
Start: 2022-08-31

## 2022-09-12 ENCOUNTER — OFFICE VISIT (OUTPATIENT)
Dept: NEUROSURGERY | Age: 47
End: 2022-09-12

## 2022-09-12 VITALS
WEIGHT: 179 LBS | HEIGHT: 61 IN | BODY MASS INDEX: 33.79 KG/M2 | HEART RATE: 61 BPM | SYSTOLIC BLOOD PRESSURE: 121 MMHG | OXYGEN SATURATION: 100 % | DIASTOLIC BLOOD PRESSURE: 64 MMHG

## 2022-09-12 DIAGNOSIS — Z98.890 S/P CARPAL TUNNEL RELEASE: Primary | ICD-10-CM

## 2022-09-12 PROCEDURE — 99024 POSTOP FOLLOW-UP VISIT: CPT | Performed by: NEUROLOGICAL SURGERY

## 2022-09-12 ASSESSMENT — ENCOUNTER SYMPTOMS
RESPIRATORY NEGATIVE: 1
EYES NEGATIVE: 1
GASTROINTESTINAL NEGATIVE: 1

## 2022-09-12 NOTE — PROGRESS NOTES
NEUROSURGERY POSTOPERATIVE FOLLOW UP NOTE      Chief Complaint:   Chief Complaint   Patient presents with    Follow-up         Date of Surgery: 7/8/2022    Procedure Performed: Right carpal tunnel release      Interval Update: Planned follow-up for wound check. She reports she is doing well. Her incision has fully healed and her carpal tunnel symptoms have resolved. She has been able to resume her normal activities. She denies difficulty with her left hand. HPI: Patient with symptomatic right carpal tunnel syndrome. Her MRI of the right wrist was denied by her insurance. She has been wearing a wrist splint without any improvement in her symptoms. She continues to complain of classic carpal tunnel symptoms on the right, including numbness, paresthesias and pain in the thumb, index and middle fingers. She endorses weakness in her right hand. Objective:    /64   Pulse 61   Ht 5' 1\" (1.549 m)   Wt 179 lb (81.2 kg)   SpO2 100%   BMI 33.82 kg/m²         Physical Exam:    General: alert, cooperative, no distress  Cardiorespiratory: unlabored breathing  Wound: Healed, no dehiscence, erythema, swelling, fluctuance    Neurologic Exam:    Mental Status: Alert, oriented, thought content appropriate  Cranial Nerves: PERRL, EOMI, symmetric facies, tongue midline  Motor: Motor exam is symmetrical 5 out of 5 all extremities bilaterally  Somatosensory: normal light touch sensation          Assessment and Plan:  56 y.o. F returns for follow up after right carpal tunnel release on 7/8/2022. She previously completed a course of antibiotics for wound cellulitis and this has fully resolved and her wound has healed. Her carpal tunnel symptoms have resolved. She denies any carpal tunnel symptoms on the left. She may follow up with me on an as-needed basis with any future questions or concerns.         Electronically signed by Ciro King MD on 9/12/2022 at 8:38 AM

## 2022-10-25 ENCOUNTER — HOSPITAL ENCOUNTER (OUTPATIENT)
Dept: GENERAL RADIOLOGY | Age: 47
Discharge: HOME | End: 2022-10-25
Payer: MEDICAID

## 2022-10-25 ENCOUNTER — OFFICE VISIT (OUTPATIENT)
Dept: PRIMARY CARE CLINIC | Age: 47
End: 2022-10-25
Payer: MEDICAID

## 2022-10-25 VITALS
OXYGEN SATURATION: 97 % | DIASTOLIC BLOOD PRESSURE: 78 MMHG | HEIGHT: 61 IN | TEMPERATURE: 97.4 F | WEIGHT: 171.2 LBS | SYSTOLIC BLOOD PRESSURE: 122 MMHG | HEART RATE: 60 BPM | BODY MASS INDEX: 32.32 KG/M2

## 2022-10-25 DIAGNOSIS — M54.50 ACUTE MIDLINE LOW BACK PAIN WITHOUT SCIATICA: ICD-10-CM

## 2022-10-25 DIAGNOSIS — M54.50 ACUTE MIDLINE LOW BACK PAIN WITHOUT SCIATICA: Primary | ICD-10-CM

## 2022-10-25 PROCEDURE — 99213 OFFICE O/P EST LOW 20 MIN: CPT | Performed by: FAMILY MEDICINE

## 2022-10-25 PROCEDURE — 1036F TOBACCO NON-USER: CPT | Performed by: FAMILY MEDICINE

## 2022-10-25 PROCEDURE — G8417 CALC BMI ABV UP PARAM F/U: HCPCS | Performed by: FAMILY MEDICINE

## 2022-10-25 PROCEDURE — G8427 DOCREV CUR MEDS BY ELIG CLIN: HCPCS | Performed by: FAMILY MEDICINE

## 2022-10-25 PROCEDURE — G8484 FLU IMMUNIZE NO ADMIN: HCPCS | Performed by: FAMILY MEDICINE

## 2022-10-25 PROCEDURE — 72100 X-RAY EXAM L-S SPINE 2/3 VWS: CPT

## 2022-10-25 RX ORDER — CYCLOBENZAPRINE HCL 10 MG
10 TABLET ORAL NIGHTLY PRN
Qty: 10 TABLET | Refills: 0 | Status: SHIPPED | OUTPATIENT
Start: 2022-10-25 | End: 2022-11-04

## 2022-10-25 RX ORDER — KETOROLAC TROMETHAMINE 30 MG/ML
60 INJECTION, SOLUTION INTRAMUSCULAR; INTRAVENOUS ONCE
Status: COMPLETED | OUTPATIENT
Start: 2022-10-25 | End: 2022-10-25

## 2022-10-25 RX ORDER — METHYLPREDNISOLONE 4 MG/1
TABLET ORAL
Qty: 1 KIT | Refills: 0 | Status: SHIPPED | OUTPATIENT
Start: 2022-10-25 | End: 2022-11-08 | Stop reason: ALTCHOICE

## 2022-10-25 RX ORDER — PROMETHAZINE HYDROCHLORIDE 25 MG/ML
6.25 INJECTION, SOLUTION INTRAMUSCULAR; INTRAVENOUS ONCE
Status: COMPLETED | OUTPATIENT
Start: 2022-10-25 | End: 2022-10-25

## 2022-10-25 RX ORDER — CELECOXIB 100 MG/1
200 CAPSULE ORAL 2 TIMES DAILY
Status: SHIPPED | OUTPATIENT
Start: 2022-10-26 | End: 2022-11-05

## 2022-10-25 RX ADMIN — KETOROLAC TROMETHAMINE 60 MG: 30 INJECTION, SOLUTION INTRAMUSCULAR; INTRAVENOUS at 11:11

## 2022-10-25 RX ADMIN — PROMETHAZINE HYDROCHLORIDE 6.25 MG: 25 INJECTION, SOLUTION INTRAMUSCULAR; INTRAVENOUS at 11:13

## 2022-10-25 NOTE — PROGRESS NOTES
[address] [facility]  [address] [address]  [address] [address]  Dept: [phone]  Dept Fax: [phone]  Loc: [phone]      Subjective:     Chief Complaint   Patient presents with    Lower Back Pain     States she woke up with back pain yesterday        HPI:  Itzel Fuentes is a 56 y.o. female presents with acute acute low back pain when she woke up yesterday. She denies any unusual activity that would have triggered back pain. She states that she was under the care of  Dr Lisa Fox before and  was getting injections in her back but insurance stopped paying for it so she stopped going to him. Its been 3 years since she last saw him. Pt is not taking anything for pain except Tylenol. No comfortable position to relieve pain  No numbness or tingling reported. No bowel or bladder incontinence  No recent injury or trauma to the back. No recent radiographic studies on back .      ROS:   Review of Systems    As noted in HPI    PMHx:  Past Medical History:   Diagnosis Date    Anxiety     Asthma     born with asthmatic bronchitis    CPAP (continuous positive airway pressure) dependence     6cm to 16cm    Degenerative disk disease     Depression     mood disorders    Hemorrhoid 11/2015    Hyperlipidemia     Hypoglycemia     Hypothyroid     Left ureteral stone 5/25/2016    OAB (overactive bladder)     Obstructive sleep apnea     AHI: 37.9 per PSG, 10/2019    Obstructive sleep apnea 4/30/2020    Renal stone 5/25/2016     Patient Active Problem List   Diagnosis    External hemorrhoid    Internal hemorrhoids    Non morbid obesity due to excess calories    Psychophysiologic insomnia    Anxiety    Hypothyroidism    Hyperlipidemia    CPAP (continuous positive airway pressure) dependence    Obstructive sleep apnea    Otalgia of right ear    Tinnitus aurium, bilateral    Bilateral carpal tunnel syndrome    Gastroesophageal reflux disease without esophagitis       PSHx:  Past Surgical History:   Procedure Laterality Date    BREAST BIOPSY Left 2018    benign calcification    CARPAL TUNNEL RELEASE Right 2022    RIGHT CARPAL TUNNEL RELEASE performed by Dina Shore MD at [address] [place] [address] Dr    COLONOSCOPY  2015    Dr Avilez-Internal hemorrhoids, repeat at age 49    COLONOSCOPY N/A 2022    Dr Mariana Tavera, [place],  Int hemorrhoids Gr 1, 5 year recall    ENDOMETRIAL ABLATION  2015    Dr. Gandara Slot  2015    Hemorrhoidectomy & closed lateral internal sphincterotomy    HYSTEROSCOPY  2015    Dr. Arron Gonzales    POLYPECTOMY  2015    Dr. Manjinder Sharma  14 years ago    UPPER GASTROINTESTINAL ENDOSCOPY N/A 2022    Dr Mariana Tavera, W 47 Fr dil, (-) int Metaplasia    UPPER GASTROINTESTINAL ENDOSCOPY  2022    Dr Mariana Tavera, Marny Rank 47 Fr bougie dilation       PFHx:  Family History   Problem Relation Age of Onset    High Blood Pressure Mother     Other Mother         early alzheimers    High Blood Pressure Father     Diabetes Paternal Grandfather     Breast Cancer Paternal Aunt 29    Colon Cancer Neg Hx     Colon Polyps Neg Hx        SocialHx:  Social History     Tobacco Use    Smoking status: Former     Packs/day: 0.25     Years: 27.00     Pack years: 6.75     Types: Cigarettes     Quit date: 2009     Years since quittin.5    Smokeless tobacco: Former    Tobacco comments:     Not employed   Substance Use Topics    Alcohol use: Yes     Alcohol/week: 0.0 standard drinks     Comment: rare       Allergies: Allergies   Allergen Reactions    Codeine Nausea And Vomiting, Swelling and Rash       Medications:  Current Outpatient Medications   Medication Sig Dispense Refill    methylPREDNISolone (MEDROL DOSEPACK) 4 MG tablet Take by mouth.  1 kit 0    cyclobenzaprine (FLEXERIL) 10 MG tablet Take 1 tablet by mouth nightly as needed for Muscle spasms 10 tablet 0    atorvastatin (LIPITOR) 20 MG tablet TAKE 1 TABLET BY MOUTH EVERY DAY 30 tablet 5    levocetirizine (XYZAL) 5 MG tablet TAKE 1 TABLET BY MOUTH EVERY DAY AT NIGHT 30 tablet 5    pantoprazole (PROTONIX) 40 MG tablet TAKE 1 TABLET BY MOUTH EVERY DAY BEFORE BREAKFAST 90 tablet 3    levothyroxine (SYNTHROID) 50 MCG tablet TAKE 1 TABLET BY MOUTH EVERY DAY 30 tablet 11    COMBIVENT RESPIMAT  MCG/ACT AERS inhaler INHALE 1 PUFF EVERY 4 HOURS AS NEEDED FOR WHEEZING 1 each 5    ondansetron (ZOFRAN ODT) 4 MG disintegrating tablet Place 1 tablet under the tongue every 8 hours as needed for Nausea or Vomiting 21 tablet 0    propranolol (INDERAL) 40 MG tablet TAKE 1 TABLET BY MOUTH THREE TIMES A  tablet 3     Current Facility-Administered Medications   Medication Dose Route Frequency Provider Last Rate Last Admin    [START ON 10/26/2022] celecoxib (CELEBREX) capsule 200 mg  200 mg Oral BID Roopa Valentin MD           Objective:   PE:  /78   Pulse 60   Temp 97.4 °F (36.3 °C)   Ht 5' 1\" (1.549 m)   Wt 171 lb 3.2 oz (77.7 kg)   SpO2 97%   BMI 32.35 kg/m²   Physical Exam  Vitals and nursing note reviewed. Constitutional:       General: She is in acute distress. Appearance: Diaphoretic: in obvious pain. Comments: antalgic gait   HENT:      Head: Normocephalic. Cardiovascular:      Rate and Rhythm: Regular rhythm. Heart sounds: Normal heart sounds. Pulmonary:      Breath sounds: Normal breath sounds. Musculoskeletal:         General: No swelling. Cervical back: Normal and neck supple. Thoracic back: Normal.      Lumbar back: Spasms and tenderness present. Decreased range of motion. Negative right straight leg raise test.   Neurological:      General: No focal deficit present. Mental Status: She is alert and oriented to person, place, and time. Psychiatric:         Behavior: Behavior normal.          Assessment & Plan   Bebo Ellison was seen today for lower back pain.     Diagnoses and all orders for this visit:    Acute midline low back pain without sciatica  -     XR LUMBAR SPINE (2-3 VIEWS); Future  -     ketorolac (TORADOL) injection 60 mg  -     promethazine (PHENERGAN) injection 6.25 mg  -     methylPREDNISolone (MEDROL DOSEPACK) 4 MG tablet; Take by mouth. -     cyclobenzaprine (FLEXERIL) 10 MG tablet; Take 1 tablet by mouth nightly as needed for Muscle spasms  -     celecoxib (CELEBREX) capsule 200 mg    Low back exercise  Warm moist heat    Return in 2 weeks (on 11/8/2022) for follow-up recent visit. All questions were answered. Medications, including possible adverse effects, and instructions were reviewed and  understanding was confirmed. Follow-up recommendations, including when to contact or return to office (ie; if symptoms worsen or fail to improve), were discussed and acknowledged.     Electronically signed by Elfego Gonzalez MD on 10/25/22 at 9:28 AM DOYLET

## 2022-11-08 ENCOUNTER — OFFICE VISIT (OUTPATIENT)
Dept: PRIMARY CARE CLINIC | Age: 47
End: 2022-11-08
Payer: MEDICAID

## 2022-11-08 VITALS
OXYGEN SATURATION: 96 % | SYSTOLIC BLOOD PRESSURE: 108 MMHG | TEMPERATURE: 97.1 F | WEIGHT: 172.8 LBS | HEIGHT: 61 IN | BODY MASS INDEX: 32.62 KG/M2 | DIASTOLIC BLOOD PRESSURE: 78 MMHG | HEART RATE: 66 BPM

## 2022-11-08 DIAGNOSIS — Z09 FOLLOW-UP EXAM: Primary | ICD-10-CM

## 2022-11-08 DIAGNOSIS — M54.50 ACUTE LOW BACK PAIN WITHOUT SCIATICA, UNSPECIFIED BACK PAIN LATERALITY: ICD-10-CM

## 2022-11-08 PROCEDURE — G8417 CALC BMI ABV UP PARAM F/U: HCPCS | Performed by: FAMILY MEDICINE

## 2022-11-08 PROCEDURE — G8427 DOCREV CUR MEDS BY ELIG CLIN: HCPCS | Performed by: FAMILY MEDICINE

## 2022-11-08 PROCEDURE — 99213 OFFICE O/P EST LOW 20 MIN: CPT | Performed by: FAMILY MEDICINE

## 2022-11-08 PROCEDURE — 1036F TOBACCO NON-USER: CPT | Performed by: FAMILY MEDICINE

## 2022-11-08 PROCEDURE — G8484 FLU IMMUNIZE NO ADMIN: HCPCS | Performed by: FAMILY MEDICINE

## 2022-11-08 ASSESSMENT — ENCOUNTER SYMPTOMS
RESPIRATORY NEGATIVE: 1
GASTROINTESTINAL NEGATIVE: 1

## 2022-11-08 NOTE — PROGRESS NOTES
[address] [facility]  [address] [address]  [address] [address]  Dept: [phone]  Dept Fax: [phone]  Loc: [phone]      Subjective:     Chief Complaint   Patient presents with    2 Week Follow-Up     Patient reports back pain is better        HPI:  Leif Naranjo is a 56 y.o. female presenting for follow-up of low back pain. She states her pain is now gone. ROS:   Review of Systems   Constitutional: Negative. HENT: Negative. Respiratory: Negative. Cardiovascular: Negative. Gastrointestinal: Negative. Genitourinary: Negative. Musculoskeletal: Negative. Neurological: Negative. Psychiatric/Behavioral: Negative.        PMHx:  Past Medical History:   Diagnosis Date    Anxiety     Asthma     born with asthmatic bronchitis    CPAP (continuous positive airway pressure) dependence     6cm to 16cm    Degenerative disk disease     Depression     mood disorders    Hemorrhoid 11/2015    Hyperlipidemia     Hypoglycemia     Hypothyroid     Left ureteral stone 5/25/2016    OAB (overactive bladder)     Obstructive sleep apnea     AHI: 37.9 per PSG, 10/2019    Obstructive sleep apnea 4/30/2020    Renal stone 5/25/2016     Patient Active Problem List   Diagnosis    External hemorrhoid    Internal hemorrhoids    Non morbid obesity due to excess calories    Psychophysiologic insomnia    Anxiety    Hypothyroidism    Hyperlipidemia    CPAP (continuous positive airway pressure) dependence    Obstructive sleep apnea    Otalgia of right ear    Tinnitus aurium, bilateral    Bilateral carpal tunnel syndrome    Gastroesophageal reflux disease without esophagitis       PSHx:  Past Surgical History:   Procedure Laterality Date    BREAST BIOPSY Left 2018    benign calcification    CARPAL TUNNEL RELEASE Right 7/8/2022    RIGHT CARPAL TUNNEL RELEASE performed by Mike Gillis MD at [address] [facility]  04/29/2015     Raghav-Internal hemorrhoids, repeat at age 49    COLONOSCOPY N/A 2022    Dr Gomez Noguera, [place],  Int hemorrhoids Gr 1, 5 year recall    ENDOMETRIAL ABLATION  2015    Dr. Flor Willis  2015    Hemorrhoidectomy & closed lateral internal sphincterotomy    HYSTEROSCOPY  2015    Dr. Maren Ceja    POLYPECTOMY  2015    Dr. Marc Perez  14 years ago    UPPER GASTROINTESTINAL ENDOSCOPY N/A 2022    Dr Gomez Noguera, W 47 Fr dil, (-) int Metaplasia    UPPER GASTROINTESTINAL ENDOSCOPY  2022    Dr Gomez Noguera, Gerald Lung 47 Fr bougie dilation       PFHx:  Family History   Problem Relation Age of Onset    High Blood Pressure Mother     Other Mother         early alzheimers    High Blood Pressure Father     Diabetes Paternal Grandfather     Breast Cancer Paternal Aunt 29    Colon Cancer Neg Hx     Colon Polyps Neg Hx        SocialHx:  Social History     Tobacco Use    Smoking status: Former     Packs/day: 0.25     Years: 27.00     Pack years: 6.75     Types: Cigarettes     Quit date: 2009     Years since quittin.5    Smokeless tobacco: Former    Tobacco comments:     Not employed   Substance Use Topics    Alcohol use: Yes     Alcohol/week: 0.0 standard drinks     Comment: rare       Allergies:   Allergies   Allergen Reactions    Codeine Nausea And Vomiting, Swelling and Rash       Medications:  Current Outpatient Medications   Medication Sig Dispense Refill    atorvastatin (LIPITOR) 20 MG tablet TAKE 1 TABLET BY MOUTH EVERY DAY 30 tablet 5    levocetirizine (XYZAL) 5 MG tablet TAKE 1 TABLET BY MOUTH EVERY DAY AT NIGHT 30 tablet 5    pantoprazole (PROTONIX) 40 MG tablet TAKE 1 TABLET BY MOUTH EVERY DAY BEFORE BREAKFAST 90 tablet 3    levothyroxine (SYNTHROID) 50 MCG tablet TAKE 1 TABLET BY MOUTH EVERY DAY 30 tablet 11    COMBIVENT RESPIMAT  MCG/ACT AERS inhaler INHALE 1 PUFF EVERY 4 HOURS AS NEEDED FOR WHEEZING 1 each 5    ondansetron acknowledged.     Electronically signed by Kenna Villa MD on 11/8/22 at 9:22 AM CST

## 2022-11-28 RX ORDER — LEVOTHYROXINE SODIUM 0.05 MG/1
TABLET ORAL
Qty: 30 TABLET | Refills: 11 | Status: SHIPPED | OUTPATIENT
Start: 2022-11-28

## 2022-11-28 NOTE — TELEPHONE ENCOUNTER
Norma Brood called to request a refill on her medication.       Last office visit : 11/8/2022   Next office visit : 5/8/2023     Requested Prescriptions     Pending Prescriptions Disp Refills    levothyroxine (SYNTHROID) 50 MCG tablet [Pharmacy Med Name: LEVOTHYROXINE 50 MCG TABLET] 30 tablet 11     Sig: TAKE 1 TABLET BY MOUTH EVERY DAY            KALEE Borges

## 2022-12-24 NOTE — PROGRESS NOTES
After obtaining consent, and per orders of DR. Roopa Valentin, injection of Ketorlac 60mg  given in right ventrogluteal and Promethazine 25mg given in left ventrogluteal by Nayeli MAY. Had patient call her ride upstairs and had him go get a wheelchair to wheel patient down to imaging for her x-ray. Patient signed consent scanned into patients chart. [id number]: ||12/25/2022||10\30\00||False;

## 2023-01-16 ENCOUNTER — OFFICE VISIT (OUTPATIENT)
Dept: PRIMARY CARE CLINIC | Age: 48
End: 2023-01-16
Payer: MEDICAID

## 2023-01-16 VITALS
WEIGHT: 174.6 LBS | BODY MASS INDEX: 32.97 KG/M2 | HEART RATE: 105 BPM | SYSTOLIC BLOOD PRESSURE: 110 MMHG | HEIGHT: 61 IN | DIASTOLIC BLOOD PRESSURE: 78 MMHG | OXYGEN SATURATION: 98 % | TEMPERATURE: 97.9 F

## 2023-01-16 DIAGNOSIS — R05.9 COUGH, UNSPECIFIED TYPE: ICD-10-CM

## 2023-01-16 DIAGNOSIS — J06.9 UPPER RESPIRATORY TRACT INFECTION, UNSPECIFIED TYPE: Primary | ICD-10-CM

## 2023-01-16 DIAGNOSIS — H93.8X1 CONGESTION OF RIGHT EAR: ICD-10-CM

## 2023-01-16 LAB
INFLUENZA A ANTIBODY: NEGATIVE
INFLUENZA B ANTIBODY: NEGATIVE

## 2023-01-16 PROCEDURE — 99213 OFFICE O/P EST LOW 20 MIN: CPT | Performed by: FAMILY MEDICINE

## 2023-01-16 PROCEDURE — G8427 DOCREV CUR MEDS BY ELIG CLIN: HCPCS | Performed by: FAMILY MEDICINE

## 2023-01-16 PROCEDURE — G8484 FLU IMMUNIZE NO ADMIN: HCPCS | Performed by: FAMILY MEDICINE

## 2023-01-16 PROCEDURE — 87804 INFLUENZA ASSAY W/OPTIC: CPT | Performed by: FAMILY MEDICINE

## 2023-01-16 PROCEDURE — G8417 CALC BMI ABV UP PARAM F/U: HCPCS | Performed by: FAMILY MEDICINE

## 2023-01-16 PROCEDURE — 1036F TOBACCO NON-USER: CPT | Performed by: FAMILY MEDICINE

## 2023-01-16 ASSESSMENT — ENCOUNTER SYMPTOMS
SORE THROAT: 0
CHEST TIGHTNESS: 0
RHINORRHEA: 1
COUGH: 1
GASTROINTESTINAL NEGATIVE: 1

## 2023-01-16 NOTE — PROGRESS NOTES
[address] [facility]  [address] [address] [address] [address]  Dept: [phone]  Dept Fax: [phone]  Loc: [phone]      Subjective:     Chief Complaint   Patient presents with    Congestion     Symptoms begin yesterday     Otalgia     Right ear pain     Cough       HPI:  Geronimo Wilkinson is a 56 y.o. female presents today with 1 day hx of above symptoms. No fever reported      ROS:   Review of Systems   Constitutional:  Negative for chills and fever. HENT:  Positive for congestion, ear pain, postnasal drip and rhinorrhea. Negative for sore throat. Respiratory:  Positive for cough. Negative for chest tightness. Cardiovascular: Negative. Gastrointestinal: Negative. Genitourinary: Negative. Musculoskeletal: Negative. Neurological: Negative.       PMHx:  Past Medical History:   Diagnosis Date    Anxiety     Asthma     born with asthmatic bronchitis    CPAP (continuous positive airway pressure) dependence     6cm to 16cm    Degenerative disk disease     Depression     mood disorders    Hemorrhoid 11/2015    Hyperlipidemia     Hypoglycemia     Hypothyroid     Left ureteral stone 5/25/2016    OAB (overactive bladder)     Obstructive sleep apnea     AHI: 37.9 per PSG, 10/2019    Obstructive sleep apnea 4/30/2020    Renal stone 5/25/2016     Patient Active Problem List   Diagnosis    External hemorrhoid    Internal hemorrhoids    Non morbid obesity due to excess calories    Psychophysiologic insomnia    Anxiety    Hypothyroidism    Hyperlipidemia    CPAP (continuous positive airway pressure) dependence    Obstructive sleep apnea    Otalgia of right ear    Tinnitus aurium, bilateral    Bilateral carpal tunnel syndrome    Gastroesophageal reflux disease without esophagitis       PSHx:  Past Surgical History:   Procedure Laterality Date    BREAST BIOPSY Left 2018    benign calcification    CARPAL TUNNEL RELEASE Right 7/8/2022    RIGHT CARPAL TUNNEL RELEASE performed by Elsi Alfaro MD at [address] [facility] [place]  2015    Dr Avilez-Internal hemorrhoids, repeat at age 49    COLONOSCOPY N/A 2022    Dr Ciro [place], [place],  Int hemorrhoids Gr 1, 5 year recall    ENDOMETRIAL ABLATION  2015    Dr. Braga Clipper  2015    Hemorrhoidectomy & closed lateral internal sphincterotomy    HYSTEROSCOPY  2015    Dr. Cade Dee    POLYPECTOMY  2015    Dr. Annetta Anton  14 years ago    UPPER GASTROINTESTINAL ENDOSCOPY N/A 2022    Dr Ciro Lake, W 47 Fr dil, (-) int Metaplasia    UPPER GASTROINTESTINAL ENDOSCOPY  2022    Dr Ciro Lake, [address] Fr bougie dilation       PFHx:  Family History   Problem Relation Age of Onset    High Blood Pressure Mother     Other Mother         early alzheimers    High Blood Pressure Father     Diabetes Paternal Grandfather     Breast Cancer Paternal Aunt 29    Colon Cancer Neg Hx     Colon Polyps Neg Hx        SocialHx:  Social History     Tobacco Use    Smoking status: Former     Packs/day: 0.25     Years: 27.00     Pack years: 6.75     Types: Cigarettes     Quit date: 2009     Years since quittin.7    Smokeless tobacco: Former    Tobacco comments:     Not employed   Substance Use Topics    Alcohol use: Yes     Alcohol/week: 0.0 standard drinks     Comment: rare       Allergies:   Allergies   Allergen Reactions    Codeine Nausea And Vomiting, Swelling and Rash       Medications:  Current Outpatient Medications   Medication Sig Dispense Refill    Brompheniramine-Phenylephrine 4-10 MG TABS Take 1 tablet by mouth 2 times daily 20 tablet 0    levothyroxine (SYNTHROID) 50 MCG tablet TAKE 1 TABLET BY MOUTH EVERY DAY 30 tablet 11    atorvastatin (LIPITOR) 20 MG tablet TAKE 1 TABLET BY MOUTH EVERY DAY 30 tablet 5    levocetirizine (XYZAL) 5 MG tablet TAKE 1 TABLET BY MOUTH EVERY DAY AT NIGHT 30 tablet 5    pantoprazole (PROTONIX) 40 MG tablet TAKE 1 TABLET BY MOUTH EVERY DAY BEFORE BREAKFAST 90 tablet 3    COMBIVENT RESPIMAT  MCG/ACT AERS inhaler INHALE 1 PUFF EVERY 4 HOURS AS NEEDED FOR WHEEZING 1 each 5    ondansetron (ZOFRAN ODT) 4 MG disintegrating tablet Place 1 tablet under the tongue every 8 hours as needed for Nausea or Vomiting 21 tablet 0     No current facility-administered medications for this visit. Objective:   PE:  /78   Pulse (!) 105   Temp 97.9 °F (36.6 °C)   Ht 5' 1\" (1.549 m)   Wt 174 lb 9.6 oz (79.2 kg)   SpO2 98%   BMI 32.99 kg/m²   Physical Exam  Vitals and nursing note reviewed. Constitutional:       Appearance: She is ill-appearing (mildly). HENT:      Head: Normocephalic. Right Ear: Tympanic membrane, ear canal and external ear normal.      Left Ear: Tympanic membrane, ear canal and external ear normal.      Nose: Congestion and rhinorrhea present. Mouth/Throat:      Mouth: Mucous membranes are moist.      Comments: PND  Eyes:      General: No scleral icterus. Conjunctiva/sclera: Conjunctivae normal.      Pupils: Pupils are equal, round, and reactive to light. Cardiovascular:      Rate and Rhythm: Regular rhythm. Heart sounds: Normal heart sounds. Pulmonary:      Effort: Pulmonary effort is normal.      Breath sounds: Normal breath sounds. No wheezing, rhonchi or rales. Abdominal:      Palpations: Abdomen is soft. Tenderness: There is no abdominal tenderness. Skin:     Findings: No rash. Neurological:      General: No focal deficit present. Mental Status: She is alert and oriented to person, place, and time. Assessment & Plan   Carlos Cantor was seen today for congestion, otalgia and cough.     Diagnoses and all orders for this visit:    Upper respiratory tract infection, unspecified type    Cough, unspecified type  -     POCT Influenza A/B  -     Brompheniramine-Phenylephrine 4-10 MG TABS; Take 1 tablet by mouth 2 times daily    Congestion of right ear  -     POCT Influenza A/B  -     Brompheniramine-Phenylephrine 4-10 MG TABS; Take 1 tablet by mouth 2 times daily    Reassured of the viral nature of the illness  No need for abx at this time  Symptomatic treatment only for fever,, cough and cold  Start decongestant & cough medicine - Rx sent pt's preferred pharmacy  Increase oral fluid intake - warm liquids preferably  Okay to take extra Vit C or airborne  Warm saline gargles  REST  Continue to practice universal precaution, hand washing,  use and wearing well fitted masks  Consider flu vaccine if not yet received. Call with new or worsening symptoms     Return for follow up as needed. All questions were answered. Medications, including possible adverse effects, and instructions were reviewed and  understanding was confirmed. Follow-up recommendations, including when to contact or return to office (ie; if symptoms worsen or fail to improve), were discussed and acknowledged.     Electronically signed by Francisco Groves MD on 1/16/23 at 8:20 AM CST

## 2023-01-19 ENCOUNTER — TELEPHONE (OUTPATIENT)
Dept: PRIMARY CARE CLINIC | Age: 48
End: 2023-01-19

## 2023-01-19 RX ORDER — GUAIFENESIN 600 MG/1
600 TABLET, EXTENDED RELEASE ORAL 2 TIMES DAILY
Qty: 20 TABLET | Refills: 0 | Status: SHIPPED | OUTPATIENT
Start: 2023-01-19 | End: 2023-01-29

## 2023-01-19 NOTE — TELEPHONE ENCOUNTER
[facility] pharmacy sent notification that Brohist D was on product back order and requested medication to be changed. Please advise?

## 2023-01-31 ENCOUNTER — HOSPITAL ENCOUNTER (OUTPATIENT)
Dept: WOMENS IMAGING | Age: 48
Discharge: HOME | End: 2023-01-31
Payer: MEDICAID

## 2023-01-31 DIAGNOSIS — Z12.31 VISIT FOR SCREENING MAMMOGRAM: ICD-10-CM

## 2023-01-31 PROCEDURE — 77063 BREAST TOMOSYNTHESIS BI: CPT | Performed by: RADIOLOGY

## 2023-01-31 PROCEDURE — 77067 SCR MAMMO BI INCL CAD: CPT | Performed by: RADIOLOGY

## 2023-01-31 PROCEDURE — 77063 BREAST TOMOSYNTHESIS BI: CPT

## 2023-02-09 ENCOUNTER — OFFICE VISIT (OUTPATIENT)
Dept: SURGERY | Age: 48
End: 2023-02-09

## 2023-02-09 VITALS
OXYGEN SATURATION: 96 % | BODY MASS INDEX: 33.15 KG/M2 | HEART RATE: 86 BPM | HEIGHT: 61 IN | WEIGHT: 175.6 LBS | TEMPERATURE: 98.3 F

## 2023-02-09 DIAGNOSIS — Z12.31 VISIT FOR SCREENING MAMMOGRAM: Primary | ICD-10-CM

## 2023-02-09 NOTE — PROGRESS NOTES
HPI:  Kaylynn Rico is in for follow-up breast check. She has not noticed any changes in her breasts. Her imaging was reviewed and is noted below. Patient name Pelon Mccullough   Date of birth 1975   Physician   Digital bilateral screening mammogram with tomosynthesis, dated   2023 12:31 PM   TECHNIQUE: The digital mammographic exam of both breast in   craniocaudal, mediolateral and mediolateral oblique views along with   R2 CAD was obtained. Nick  of both breasts were obtained in   craniocaudal, mediolateral and mediolateral oblique medial views. HISTORY: This is a 56 year-old female  7, Para 6 AB 1 for   screening mammogram. Patient has a history of left breast biopsy in   2018 for benign calcification. Patient has maternal aunt with family   history of breast cancer. Patient has no complaints. Reference: Prior mammogram from 2022, 2021 and 2020   are available. Breast composition: Breast composition B: There are scattered areas of   fibroglandular density. Findings: The digital mammographic examination of both breasts in   craniocaudal, medial lateral and medial lateral oblique view along   with R2 CAD demonstrates both breasts to BE mildly heterogeneously   dense due to fibroglandular stromal abdomen. There is a biopsy marker   seen in the left breast from prior biopsy. . There is no evidence of   any dendritic  mass, cluster microcalcification or architectural   distortion. The retromammary fat appears to be normal.       Impression   Unchanged from prior mammography. No radiographic evidence of   malignancy changes. We would recommend annual follow up with   tomosynthesis unless otherwise clinically indicated. Final assessment: ACR: Bi -RADS - 2. BREAST EXAM:  On examination, she has fibrocystic changes throughout both breasts, no dominant masses, no skin or nipple changes and no axillary adenopathy.   I see nothing suspicious for breast cancer. ASSESSMENT:      ICD-10-CM    1. Visit for screening mammogram  Z12.31 MAYURI DIGITAL SCREEN W OR WO CAD BILATERAL                    PLAN:  I will plan to see her back in 1 year for physical exam and mammograms. She will contact me if anything significant changes.

## 2023-03-03 DIAGNOSIS — J30.2 OTHER SEASONAL ALLERGIC RHINITIS: ICD-10-CM

## 2023-03-03 RX ORDER — LEVOCETIRIZINE DIHYDROCHLORIDE 5 MG/1
TABLET, FILM COATED ORAL
Qty: 30 TABLET | Refills: 5 | OUTPATIENT
Start: 2023-03-03

## 2023-03-03 RX ORDER — ATORVASTATIN CALCIUM 20 MG/1
TABLET, FILM COATED ORAL
Qty: 30 TABLET | Refills: 5 | OUTPATIENT
Start: 2023-03-03

## 2023-03-08 ENCOUNTER — OFFICE VISIT (OUTPATIENT)
Dept: GASTROENTEROLOGY | Age: 48
End: 2023-03-08
Payer: MEDICAID

## 2023-03-08 VITALS
DIASTOLIC BLOOD PRESSURE: 70 MMHG | HEART RATE: 95 BPM | SYSTOLIC BLOOD PRESSURE: 139 MMHG | BODY MASS INDEX: 33.96 KG/M2 | OXYGEN SATURATION: 98 % | WEIGHT: 173 LBS | HEIGHT: 60 IN

## 2023-03-08 DIAGNOSIS — R13.10 DYSPHAGIA, UNSPECIFIED TYPE: ICD-10-CM

## 2023-03-08 DIAGNOSIS — K21.9 CHRONIC GERD: Primary | ICD-10-CM

## 2023-03-08 PROCEDURE — G8484 FLU IMMUNIZE NO ADMIN: HCPCS | Performed by: NURSE PRACTITIONER

## 2023-03-08 PROCEDURE — G8417 CALC BMI ABV UP PARAM F/U: HCPCS | Performed by: NURSE PRACTITIONER

## 2023-03-08 PROCEDURE — 99214 OFFICE O/P EST MOD 30 MIN: CPT | Performed by: NURSE PRACTITIONER

## 2023-03-08 PROCEDURE — 1036F TOBACCO NON-USER: CPT | Performed by: NURSE PRACTITIONER

## 2023-03-08 PROCEDURE — G8427 DOCREV CUR MEDS BY ELIG CLIN: HCPCS | Performed by: NURSE PRACTITIONER

## 2023-03-08 RX ORDER — PANTOPRAZOLE SODIUM 40 MG/1
TABLET, DELAYED RELEASE ORAL
Qty: 90 TABLET | Refills: 3 | Status: SHIPPED | OUTPATIENT
Start: 2023-03-08

## 2023-03-08 ASSESSMENT — ENCOUNTER SYMPTOMS
CONSTIPATION: 0
ANAL BLEEDING: 0
CHOKING: 0
TROUBLE SWALLOWING: 1
ABDOMINAL DISTENTION: 0
RECTAL PAIN: 0
SHORTNESS OF BREATH: 0
BLOOD IN STOOL: 0
DIARRHEA: 0
VOMITING: 0
ABDOMINAL PAIN: 0
NAUSEA: 0
COUGH: 0

## 2023-03-08 NOTE — PROGRESS NOTES
Subjective:     Patient ID: Irena Stevens is a 53 y.o. female  PCP: Jason Cook MD  Referring Provider: No ref. provider found    HPI  Patient presents to the office today with the following complaints: Follow-up      Pt seen today for follow up. Hx chronic GERD. Currently taking Protonix 40 mg daily. She has c/o issues swallowing, meats mostly. She will have to regurgitate at times. Symptoms began about 4 months ago. Last EGD 3/2022 - W 54 Fr dil, (-) int Metaplasia  Last Colonoscopy 3/2022 - [facility],  Int hemorrhoids Gr 1, 5 year recall  Denies any family hx colon cancer or colon polyps    Assessment:     1. Chronic GERD    2. Dysphagia, unspecified type            Plan:   - Continue Protonix 40 mg daily, refills provided   - Follow up yearly or sooner if needed  - Schedule EGD  Nothing to eat or drink after midnight. No driving for 24 hours after procedure. Bring a  to procedure. No aspirin, NSAIDs, fish oil 5 days before procedure. I have discussed the benefits, alternatives, and risks (including bleeding, perforation and death)  for pursuing Endoscopy (EGD/Colonscopy/EUS/ERCP) with the patient and they are willing to continue. We also discussed the need for anesthesia, IV access, proper dietary changes, medication changes if necessary, and need for bowel prep (if ordered) prior to their Endoscopic procedure. They are aware they must have someone accompany them to their scheduled procedure to drive them home - they agree to the above and are willing to continue. Orders  No orders of the defined types were placed in this encounter.     Medications  Orders Placed This Encounter   Medications    pantoprazole (PROTONIX) 40 MG tablet     Sig: TAKE 1 TABLET BY MOUTH EVERY DAY BEFORE BREAKFAST     Dispense:  90 tablet     Refill:  3         Patient History:     Past Medical History:   Diagnosis Date    Anxiety     Asthma     born with asthmatic bronchitis    CPAP (continuous positive airway pressure) dependence     6cm to 16cm    Degenerative disk disease     Depression     mood disorders    Hemorrhoid 2015    Hyperlipidemia     Hypoglycemia     Hypothyroid     Left ureteral stone 2016    OAB (overactive bladder)     Obstructive sleep apnea     AHI: 37.9 per PSG, 10/2019    Obstructive sleep apnea 2020    Renal stone 2016       Past Surgical History:   Procedure Laterality Date    BREAST BIOPSY Left 2018    benign calcification    CARPAL TUNNEL RELEASE Right 2022    RIGHT CARPAL TUNNEL RELEASE performed by Savanna Reid MD at [address] [facility]  2015    Dr Avilez-Internal hemorrhoids, repeat at age 49    COLONOSCOPY N/A 2022    Dr Nathaly Pinedo, [place],  Int hemorrhoids Gr 1, 5 year recall    ENDOMETRIAL ABLATION  2015    Dr. Acosta Zayas  2015    Hemorrhoidectomy & closed lateral internal sphincterotomy    HYSTEROSCOPY  2015    Dr. Oskar Tapia    POLYPECTOMY  2015    Dr. En Chase  14 years ago    UPPER GASTROINTESTINAL ENDOSCOPY N/A 2022    Dr Nathaly Pinedo, W 47 Fr dil, (-) int Metaplasia    UPPER GASTROINTESTINAL ENDOSCOPY  2022    Dr Nathaly Pinedo, Isabella Chicas 47 Fr bougie dilation       Family History   Problem Relation Age of Onset    High Blood Pressure Mother     Other Mother         early alzheimers    High Blood Pressure Father     Diabetes Paternal Grandfather     Breast Cancer Paternal Aunt 29    Colon Cancer Neg Hx     Colon Polyps Neg Hx        Social History     Socioeconomic History    Marital status:    Tobacco Use    Smoking status: Former     Packs/day: 0.25     Years: 27.00     Pack years: 6.75     Types: Cigarettes     Quit date: 2009     Years since quittin.8    Smokeless tobacco: Former    Tobacco comments:     Not employed   Vaping Use    Vaping Use: Former   Substance and Sexual Activity    Alcohol use:  Yes Alcohol/week: 0.0 standard drinks     Comment: rare    Drug use: No    Sexual activity: Yes     Social Determinants of Health     Financial Resource Strain: Low Risk     Difficulty of Paying Living Expenses: Not hard at all   Food Insecurity: No Food Insecurity    Worried About Running Out of Food in the Last Year: Never true    Ran Out of Food in the Last Year: Never true       Current Outpatient Medications   Medication Sig Dispense Refill    pantoprazole (PROTONIX) 40 MG tablet TAKE 1 TABLET BY MOUTH EVERY DAY BEFORE BREAKFAST 90 tablet 3    levothyroxine (SYNTHROID) 50 MCG tablet TAKE 1 TABLET BY MOUTH EVERY DAY 30 tablet 11    atorvastatin (LIPITOR) 20 MG tablet TAKE 1 TABLET BY MOUTH EVERY DAY 30 tablet 5    levocetirizine (XYZAL) 5 MG tablet TAKE 1 TABLET BY MOUTH EVERY DAY AT NIGHT 30 tablet 5    COMBIVENT RESPIMAT  MCG/ACT AERS inhaler INHALE 1 PUFF EVERY 4 HOURS AS NEEDED FOR WHEEZING 1 each 5    ondansetron (ZOFRAN ODT) 4 MG disintegrating tablet Place 1 tablet under the tongue every 8 hours as needed for Nausea or Vomiting 21 tablet 0    Brompheniramine-Phenylephrine 4-10 MG TABS Take 1 tablet by mouth 2 times daily 20 tablet 0     No current facility-administered medications for this visit. Allergies   Allergen Reactions    Codeine Nausea And Vomiting, Swelling and Rash       Review of Systems   Constitutional:  Negative for activity change, appetite change, fatigue, fever and unexpected weight change. HENT:  Positive for trouble swallowing. Respiratory:  Negative for cough, choking and shortness of breath. Cardiovascular:  Negative for chest pain. Gastrointestinal:  Negative for abdominal distention, abdominal pain, anal bleeding, blood in stool, constipation, diarrhea, nausea, rectal pain and vomiting. Reflux    Allergic/Immunologic: Negative for food allergies. All other systems reviewed and are negative.       Objective:     /70 (Site: Left Upper Arm)   Pulse 95 Ht 5' (1.524 m)   Wt 173 lb (78.5 kg)   SpO2 98%   BMI 33.79 kg/m²     Physical Exam  Vitals reviewed. Constitutional:       General: She is not in acute distress. Appearance: She is well-developed. HENT:      Head: Normocephalic and atraumatic. Right Ear: External ear normal.      Left Ear: External ear normal.      Nose: Nose normal.   Eyes:      General: No scleral icterus. Right eye: No discharge. Left eye: No discharge. Conjunctiva/sclera: Conjunctivae normal.      Pupils: Pupils are equal, round, and reactive to light. Cardiovascular:      Rate and Rhythm: Normal rate and regular rhythm. Heart sounds: Normal heart sounds. No murmur heard. Pulmonary:      Effort: Pulmonary effort is normal. No respiratory distress. Breath sounds: Normal breath sounds. No wheezing or rales. Abdominal:      General: Bowel sounds are normal. There is no distension. Palpations: Abdomen is soft. There is no mass. Tenderness: There is no abdominal tenderness. There is no guarding or rebound. Musculoskeletal:         General: Normal range of motion. Cervical back: Normal range of motion and neck supple. Skin:     General: Skin is warm and dry. Coloration: Skin is not pale. Neurological:      Mental Status: She is alert and oriented to person, place, and time.    Psychiatric:         Behavior: Behavior normal. yes...

## 2023-03-21 ENCOUNTER — ANESTHESIA (OUTPATIENT)
Dept: OPERATING ROOM | Age: 48
End: 2023-03-21

## 2023-03-21 ENCOUNTER — HOSPITAL ENCOUNTER (OUTPATIENT)
Age: 48
Setting detail: OUTPATIENT SURGERY
Discharge: HOME | End: 2023-03-21
Attending: INTERNAL MEDICINE | Admitting: INTERNAL MEDICINE
Payer: MEDICAID

## 2023-03-21 ENCOUNTER — ANESTHESIA EVENT (OUTPATIENT)
Dept: OPERATING ROOM | Age: 48
End: 2023-03-21

## 2023-03-21 ENCOUNTER — APPOINTMENT (OUTPATIENT)
Dept: OPERATING ROOM | Age: 48
End: 2023-03-21

## 2023-03-21 ENCOUNTER — HOSPITAL ENCOUNTER (OUTPATIENT)
Age: 48
Setting detail: SPECIMEN
Discharge: HOME | End: 2023-03-21
Payer: MEDICAID

## 2023-03-21 VITALS
HEIGHT: 60 IN | RESPIRATION RATE: 16 BRPM | HEART RATE: 67 BPM | OXYGEN SATURATION: 98 % | BODY MASS INDEX: 34.95 KG/M2 | TEMPERATURE: 97.1 F | SYSTOLIC BLOOD PRESSURE: 110 MMHG | WEIGHT: 178 LBS | DIASTOLIC BLOOD PRESSURE: 73 MMHG

## 2023-03-21 PROCEDURE — 43239 EGD BIOPSY SINGLE/MULTIPLE: CPT

## 2023-03-21 PROCEDURE — 88305 TISSUE EXAM BY PATHOLOGIST: CPT

## 2023-03-21 PROCEDURE — 43450 DILATE ESOPHAGUS 1/MULT PASS: CPT | Performed by: INTERNAL MEDICINE

## 2023-03-21 PROCEDURE — 43450 DILATE ESOPHAGUS 1/MULT PASS: CPT

## 2023-03-21 PROCEDURE — 43239 EGD BIOPSY SINGLE/MULTIPLE: CPT | Performed by: INTERNAL MEDICINE

## 2023-03-21 RX ORDER — LIDOCAINE HYDROCHLORIDE 10 MG/ML
INJECTION, SOLUTION EPIDURAL; INFILTRATION; INTRACAUDAL; PERINEURAL PRN
Status: DISCONTINUED | OUTPATIENT
Start: 2023-03-21 | End: 2023-03-21 | Stop reason: SDUPTHER

## 2023-03-21 RX ORDER — ONDANSETRON 2 MG/ML
4 INJECTION INTRAMUSCULAR; INTRAVENOUS
Status: CANCELLED | OUTPATIENT
Start: 2023-03-21 | End: 2023-03-22

## 2023-03-21 RX ORDER — SODIUM CHLORIDE, SODIUM LACTATE, POTASSIUM CHLORIDE, CALCIUM CHLORIDE 600; 310; 30; 20 MG/100ML; MG/100ML; MG/100ML; MG/100ML
INJECTION, SOLUTION INTRAVENOUS CONTINUOUS
Status: DISCONTINUED | OUTPATIENT
Start: 2023-03-21 | End: 2023-03-21 | Stop reason: HOSPADM

## 2023-03-21 RX ORDER — PROPOFOL 10 MG/ML
INJECTION, EMULSION INTRAVENOUS PRN
Status: DISCONTINUED | OUTPATIENT
Start: 2023-03-21 | End: 2023-03-21 | Stop reason: SDUPTHER

## 2023-03-21 RX ADMIN — SODIUM CHLORIDE, SODIUM LACTATE, POTASSIUM CHLORIDE, CALCIUM CHLORIDE: 600; 310; 30; 20 INJECTION, SOLUTION INTRAVENOUS at 08:27

## 2023-03-21 RX ADMIN — LIDOCAINE HYDROCHLORIDE 30 MG: 10 INJECTION, SOLUTION EPIDURAL; INFILTRATION; INTRACAUDAL; PERINEURAL at 09:31

## 2023-03-21 RX ADMIN — PROPOFOL 200 MG: 10 INJECTION, EMULSION INTRAVENOUS at 09:31

## 2023-03-21 ASSESSMENT — PAIN - FUNCTIONAL ASSESSMENT: PAIN_FUNCTIONAL_ASSESSMENT: NONE - DENIES PAIN

## 2023-03-21 NOTE — DISCHARGE INSTRUCTIONS
1. Await path results, the patient will be contacted in 7-10 days with biopsy results. 2.  Magic mouthwash 5 ml PO Swish and swallow q3h PRN ONLY IF patient has post-procedural sorethroat or chest pain. 3. Full liquids to soft diet today suzanna discharge from the surgicenter; may advance  diet starting in AM tomorrow. 4. May resume other meds except any ASA/NSAIDs; may use cough drops or lozenges PRN; also OTC/prescription PPI or H2RA PO qday or BID with anti-GERD measures. 5. NO ASA/NSAIDs x 2 weeks  6. OP f/u in 4-6 weeks with Ms. Sarahi Kuhn; will consider an Esophageal manometry later if the patient's dysphagia persists. POST-OP ORDERS: ENDOSCOPY & COLONOSCOPY:    1. Rest today. 2. DO NOT eat or drink until wide awake; eat your usual diet today in moderate amount only. 3. DO NOT drive today. 4. Call physician if you have severe pain, vomiting, fever, rectal bleeding or black bowel movements. 5.  If a biopsy was taken or a polyp removed, you should expect to hear results in about 21 days. If you have heard nothing from your physician by then, call the office for results. 6.  Discharge home when patient awake, vitals signs stable and tolerating liquids. NSAIDS Non-steroidal Anti-Inflammatory  You have been directed by your physician to avoid any NSAID's; the following medications are a list of those to avoid. If you think that you are taking any NSAID's notify your physician.    Over The Counter  Advil                      Motrin  Nuprin                   Ibuprofen  Midol                     Aleve  Naproxen              Orudis  Aspirin                   [place]  Prescriptions and Generics  Cataflam              Relafen  Voltaren               Clinoril  Indocin                 Naproxen  Arthrotec              Lodine  Daypro                 Nalfon  Toradol                Ansaid  Feldene               Meclofenamate  Fenoprofen          Ponstel  Mobic                   Celebrex  Vioxx

## 2023-03-21 NOTE — ANESTHESIA PRE PROCEDURE
G56.03    Gastroesophageal reflux disease without esophagitis K21.9       Past Medical History:        Diagnosis Date    Anxiety     Asthma     born with asthmatic bronchitis    CPAP (continuous positive airway pressure) dependence     6cm to 16cm    Degenerative disk disease     Depression     mood disorders    Hemorrhoid 2015    Hyperlipidemia     Hypoglycemia     Hypothyroid     Left ureteral stone 2016    OAB (overactive bladder)     Obstructive sleep apnea     AHI: 37.9 per PSG, 10/2019    Obstructive sleep apnea 2020    Prolonged emergence from general anesthesia     when pt had gall bladder removed    Renal stone 2016       Past Surgical History:        Procedure Laterality Date    BREAST BIOPSY Left 2018    benign calcification    CARPAL TUNNEL RELEASE Right 2022    RIGHT CARPAL TUNNEL RELEASE performed by Akshat Dyer MD at [address]  2015    Dr Avilez-Internal hemorrhoids, repeat at age 49    COLONOSCOPY N/A 2022    Dr Chanelle Rivera, [place],  Int hemorrhoids Gr 1, 5 year recall    ENDOMETRIAL ABLATION  2015    Dr. Patricia Cervantes  2015    Hemorrhoidectomy & closed lateral internal sphincterotomy    HYSTEROSCOPY  2015    Dr. Vargas Pritchard    POLYPECTOMY  2015    Dr. Mayrcruz Biswas  14 years ago    UPPER GASTROINTESTINAL ENDOSCOPY N/A 2022    Dr Chanelle Rivera, W 47 Fr dil, (-) int Metaplasia    UPPER GASTROINTESTINAL ENDOSCOPY  2022    Dr Chanelle Rivera, Empirical Tam 47 Fr bougie dilation       Social History:    Social History     Tobacco Use    Smoking status: Former     Packs/day: 0.25     Years: 27.00     Pack years: 6.75     Types: Cigarettes     Quit date: 2009     Years since quittin.9    Smokeless tobacco: Former    Tobacco comments:     Not employed   Substance Use Topics    Alcohol use:  Yes     Alcohol/week: 0.0 standard Pt seen after heroine use  Had a steady gait  walked out of the ed  declined further monitoring or care In the ed

## 2023-03-21 NOTE — OP NOTE
Endoscopic Procedure Note    Patient: Arcelia Hannah : 1975  Med Rec#: [id number] Acc#: [id number]     Primary Care Provider Ras Leon MD    Endoscopist: Loc Hector MD, MD    Date of Procedure:  3/21/2023    Procedure:   1. EGD with a Tam bougie dilation of esophagus and cold biopsies    Indications:   1. Chronic GERD    2. Dysphagia, unspecified type     Last EGD 3/2022 - W 54 Fr dil, (-) int Metaplasia  Last Colonoscopy 3/2022 - [facility],  Int hemorrhoids Gr 1, 5 year recall  Denies any family hx colon cancer or colon polyps    Anesthesia:  Sedation was administered by anesthesia who monitored the patient during the procedure. Estimated Blood Loss: minimal    Procedure:   After reviewing the patient's chart and obtaining informed consent, the patient was placed in the left lateral decubitus position. A forward-viewing Olympus endoscope was lubricated and inserted through the mouth into the oropharynx. Under direct visualization, the upper esophagus was intubated. The scope was advanced to the level of the third portion of duodenum. Scope was slowly withdrawn with careful inspection of the mucosal surfaces. The scope was retroflexed for inspection of the gastric fundus and incisura. Findings and maneuvers are listed in impression below. Next, a lubricated Tam weighted Bougie dilator-54 Fr was gently introduced into the patient's mouth and passed into the Esophagus and into the proximal stomach without much resistance and then withdrawn. Repeat EGD was performed to verify dilation and scope tip was passed into the stomach. NO evidence of perforation or excessive bleeding was noted subsequent to the dilation. The patient tolerated the procedure well. The scope was removed. There were no immediate complications. Findings/IMPRESSION:  Esophagus: normal including EG junction at 36 cm.     NO erosions or ulcers or nodules or strictures or webs or rings or mass lesions or extrinsic compression or diverticula noted. An empirical Tam 54 fr bougie dilation was performed and random cold biopsies were taken to check for EoE and NERD. There is no hiatal hernia present. Stomach:  normal.      Duodenum: normal       RECOMMENDATIONS:    1. Await path results, the patient will be contacted in 7-10 days with biopsy results. 2.  Magic mouthwash 5 ml PO Swish and swallow q3h PRN ONLY IF patient has post-procedural sorethroat or chest pain. 3. Full liquids to soft diet today suzanna discharge from the surgicenter; may advance  diet starting in AM tomorrow. 4. May resume other meds except any ASA/NSAIDs; may use cough drops or lozenges PRN; also OTC/prescription PPI or H2RA PO qday or BID with anti-GERD measures. 5. NO ASA/NSAIDs x 2 weeks  6. OP f/u in 4-6 weeks with MsFaith Jerrye Dose; will consider an Esophageal manometry later if the patient's dysphagia persists. The results were discussed with the patient and family. A copy of the images obtained were given to the patient.      (Please note that portions of this note were completed with a voice recognition program. Efforts were made to edit the dictations but occasionally words may be mis-transcribed.)     Kenneth Deleon MD, MD  3/21/2023  9:36 AM

## 2023-03-21 NOTE — ANESTHESIA POSTPROCEDURE EVALUATION
Department of Anesthesiology  Postprocedure Note    Patient: Ronald Jimenez  MRN: [id number]  YOB: 1975  Date of evaluation: 3/21/2023      Procedure Summary     Date: 03/21/23 Room / Location: [facility] 02 / [address]    Anesthesia Start: 6094 Anesthesia Stop: 2280    Procedures:       EGD BIOPSY (Esophagus)      EGD ESOPHAGOGASTRODUODENOSCOPY DILATATION 54 FR Diagnosis:       Chronic GERD      Dysphagia, unspecified type      (Chronic GERD [K21.9])      (Dysphagia, unspecified type [R13.10])    Surgeons: Sirena Lay MD Responsible Provider: JUSTIN Brewer CRNA    Anesthesia Type: General ASA Status: 2          Anesthesia Type: General    Radha Phase I:      Radha Phase II:        Anesthesia Post Evaluation    Patient location during evaluation: bedside  Patient participation: complete - patient participated  Level of consciousness: awake  Pain score: 0  Airway patency: patent  Nausea & Vomiting: no nausea and no vomiting  Complications: no  Cardiovascular status: hemodynamically stable  Respiratory status: acceptable, room air and spontaneous ventilation  Hydration status: euvolemic

## 2023-03-21 NOTE — H&P
EVERY DAY 11/28/22   Roopa Valentin MD   atorvastatin (LIPITOR) 20 MG tablet TAKE 1 TABLET BY MOUTH EVERY DAY 8/31/22   Jama Carrillo MD   levocetirizine (XYZAL) 5 MG tablet TAKE 1 TABLET BY MOUTH EVERY DAY AT NIGHT 8/31/22   Jama Carrillo MD   COMBIVENT RESPIMAT  MCG/ACT AERS inhaler INHALE 1 PUFF EVERY 4 HOURS AS NEEDED FOR WHEEZING 10/3/21   JUSTIN Acevedo   ondansetron (ZOFRAN ODT) 4 MG disintegrating tablet Place 1 tablet under the tongue every 8 hours as needed for Nausea or Vomiting 9/22/21   Jama Carrillo MD       Past Medical History:  Past Medical History:   Diagnosis Date    Anxiety     Asthma     born with asthmatic bronchitis    CPAP (continuous positive airway pressure) dependence     6cm to 16cm    Degenerative disk disease     Depression     mood disorders    Hemorrhoid 11/2015    Hyperlipidemia     Hypoglycemia     Hypothyroid     Left ureteral stone 05/25/2016    OAB (overactive bladder)     Obstructive sleep apnea     AHI: 37.9 per PSG, 10/2019    Obstructive sleep apnea 04/30/2020    Prolonged emergence from general anesthesia     when pt had gall bladder removed    Renal stone 05/25/2016       Past Surgical History:  Past Surgical History:   Procedure Laterality Date    BREAST BIOPSY Left 2018    benign calcification    CARPAL TUNNEL RELEASE Right 7/8/2022    RIGHT CARPAL TUNNEL RELEASE performed by Katerina Javier MD at [address] [facility]  04/29/2015    Dr Avilez-Internal hemorrhoids, repeat at age 49    COLONOSCOPY N/A 03/23/2022    Dr Michael Parker, [place],  Int hemorrhoids Gr 1, 5 year recall    ENDOMETRIAL ABLATION  11/04/2015    Dr. Hendrix Men  11/16/2015    Hemorrhoidectomy & closed lateral internal sphincterotomy    HYSTEROSCOPY  11/04/2015    Dr. Uriarte Eng    POLYPECTOMY  11/04/2015    Dr. Preethi Pearson  14 years ago    UPPER GASTROINTESTINAL ENDOSCOPY N/A 03/23/2022    Dr Michael Parker, W 54 Fr dil, (-) int Metaplasia    UPPER GASTROINTESTINAL ENDOSCOPY  2022    Duke Koch Cherawdarryl 47 Fr bougie dilation       Social History:  Social History     Tobacco Use    Smoking status: Former     Packs/day: 0.25     Years: 27.00     Pack years: 6.75     Types: Cigarettes     Quit date: 2009     Years since quittin.9    Smokeless tobacco: Former    Tobacco comments:     Not employed   Vaping Use    Vaping Use: Former   Substance Use Topics    Alcohol use: Yes     Alcohol/week: 0.0 standard drinks     Comment: rare    Drug use: No       Vital Signs:   Vitals:    23 0820   BP: 129/78   Pulse: 75   Resp: 16   Temp: 98.2 °F (36.8 °C)   SpO2: 95%        Physical Exam:  Cardiac:  [x]WNL  []Comments:  Pulmonary:  [x]WNL   []Comments:  Neuro/Mental Status:  [x]WNL  []Comments:  Abdominal:  [x]WNL    []Comments:  Other:   []WNL  []Comments:    Informed Consent:  The risks and benefits of the procedure have been discussed with either the patient or if they cannot consent, their representative. Assessment:  Patient examined and appropriate for planned sedation and procedure. Plan:  Proceed with planned sedation and procedure as above.          Luis Miguel Heredia MD

## 2023-04-25 ENCOUNTER — OFFICE VISIT (OUTPATIENT)
Dept: GASTROENTEROLOGY | Age: 48
End: 2023-04-25
Payer: MEDICAID

## 2023-04-25 VITALS
HEIGHT: 60 IN | HEART RATE: 63 BPM | DIASTOLIC BLOOD PRESSURE: 77 MMHG | BODY MASS INDEX: 33.96 KG/M2 | SYSTOLIC BLOOD PRESSURE: 130 MMHG | WEIGHT: 173 LBS | OXYGEN SATURATION: 98 %

## 2023-04-25 DIAGNOSIS — K21.00 GASTROESOPHAGEAL REFLUX DISEASE WITH ESOPHAGITIS WITHOUT HEMORRHAGE: ICD-10-CM

## 2023-04-25 DIAGNOSIS — K21.9 CHRONIC GERD: Primary | ICD-10-CM

## 2023-04-25 PROCEDURE — G8427 DOCREV CUR MEDS BY ELIG CLIN: HCPCS | Performed by: NURSE PRACTITIONER

## 2023-04-25 PROCEDURE — 1036F TOBACCO NON-USER: CPT | Performed by: NURSE PRACTITIONER

## 2023-04-25 PROCEDURE — 99213 OFFICE O/P EST LOW 20 MIN: CPT | Performed by: NURSE PRACTITIONER

## 2023-04-25 PROCEDURE — G8417 CALC BMI ABV UP PARAM F/U: HCPCS | Performed by: NURSE PRACTITIONER

## 2023-04-25 ASSESSMENT — ENCOUNTER SYMPTOMS
TROUBLE SWALLOWING: 0
NAUSEA: 0
CONSTIPATION: 0
ABDOMINAL PAIN: 0
ANAL BLEEDING: 0
RECTAL PAIN: 0
VOMITING: 0
COUGH: 0
CHOKING: 0
SHORTNESS OF BREATH: 0
ABDOMINAL DISTENTION: 0
BLOOD IN STOOL: 0
DIARRHEA: 0

## 2023-04-25 NOTE — PROGRESS NOTES
Allergen Reactions    Codeine Nausea And Vomiting, Swelling and Rash       Review of Systems   Constitutional:  Negative for activity change, appetite change, fatigue, fever and unexpected weight change. HENT:  Negative for trouble swallowing. Respiratory:  Negative for cough, choking and shortness of breath. Cardiovascular:  Negative for chest pain. Gastrointestinal:  Negative for abdominal distention, abdominal pain, anal bleeding, blood in stool, constipation, diarrhea, nausea, rectal pain and vomiting. Reflux - controlled   Allergic/Immunologic: Negative for food allergies. All other systems reviewed and are negative. Objective:     /77 (Site: Left Upper Arm)   Pulse 63   Ht 5' (1.524 m)   Wt 173 lb (78.5 kg)   SpO2 98%   BMI 33.79 kg/m²     Physical Exam  Vitals reviewed. Constitutional:       General: She is not in acute distress. Appearance: She is well-developed. Eyes:      General:         Right eye: No discharge. Left eye: No discharge. Cardiovascular:      Rate and Rhythm: Normal rate. Pulmonary:      Effort: Pulmonary effort is normal. No respiratory distress. Abdominal:      General: There is no distension. Palpations: Abdomen is soft. Musculoskeletal:         General: Normal range of motion. Cervical back: Normal range of motion. Skin:     General: Skin is warm and dry. Neurological:      Mental Status: She is alert and oriented to person, place, and time.    Psychiatric:         Behavior: Behavior normal.

## 2023-04-25 NOTE — PATIENT INSTRUCTIONS
Patient Education        Gastroesophageal Reflux Disease (GERD): Care Instructions  Overview     Gastroesophageal reflux disease (GERD) is the backward flow of stomach acid into the esophagus. The esophagus is the tube that leads from your throat to your stomach. A one-way valve prevents the stomach acid from backing up into this tube. But when you have GERD, this valve does not close tightly enough. This can also cause pain and swelling in your esophagus. (This is called esophagitis.)  If you have mild GERD symptoms including heartburn, you may be able to control the problem with antacids or over-the-counter medicine. You can also make lifestyle changes to help reduce your symptoms. These include changing your diet and eating habits, such as not eating late at night and losing weight. Follow-up care is a key part of your treatment and safety. Be sure to make and go to all appointments, and call your doctor if you are having problems. It's also a good idea to know your test results and keep a list of the medicines you take. How can you care for yourself at home? Take your medicines exactly as prescribed. Call your doctor if you think you are having a problem with your medicine. Your doctor may recommend over-the-counter medicine. For mild or occasional indigestion, antacids, such as Tums, Gaviscon, Mylanta, or Maalox, may help. Your doctor also may recommend over-the-counter acid reducers, such as famotidine (Pepcid AC), cimetidine (Tagamet HB), or omeprazole (Prilosec). Read and follow all instructions on the label. If you use these medicines often, talk with your doctor. Change your eating habits. It's best to eat several small meals instead of two or three large meals. After you eat, wait 2 to 3 hours before you lie down. Avoid foods that make your symptoms worse.  These may include chocolate, mint, alcohol, pepper, spicy foods, high-fat foods, or drinks with caffeine in them, such as tea, coffee, jennifer,

## 2023-05-08 ENCOUNTER — OFFICE VISIT (OUTPATIENT)
Dept: PRIMARY CARE CLINIC | Age: 48
End: 2023-05-08
Payer: MEDICAID

## 2023-05-08 ENCOUNTER — TELEPHONE (OUTPATIENT)
Dept: NEUROSURGERY | Age: 48
End: 2023-05-08

## 2023-05-08 VITALS
SYSTOLIC BLOOD PRESSURE: 126 MMHG | OXYGEN SATURATION: 97 % | WEIGHT: 177.6 LBS | TEMPERATURE: 97.4 F | HEART RATE: 78 BPM | BODY MASS INDEX: 34.87 KG/M2 | HEIGHT: 60 IN | DIASTOLIC BLOOD PRESSURE: 74 MMHG

## 2023-05-08 DIAGNOSIS — E03.9 ACQUIRED HYPOTHYROIDISM: ICD-10-CM

## 2023-05-08 DIAGNOSIS — Z76.0 MEDICATION REFILL: ICD-10-CM

## 2023-05-08 DIAGNOSIS — G89.29 CHRONIC RIGHT-SIDED LOW BACK PAIN, UNSPECIFIED WHETHER SCIATICA PRESENT: Primary | ICD-10-CM

## 2023-05-08 DIAGNOSIS — N39.46 MIXED INCONTINENCE: ICD-10-CM

## 2023-05-08 DIAGNOSIS — E78.2 MIXED HYPERLIPIDEMIA: ICD-10-CM

## 2023-05-08 DIAGNOSIS — Z01.89 ROUTINE LAB DRAW: ICD-10-CM

## 2023-05-08 DIAGNOSIS — M54.50 CHRONIC RIGHT-SIDED LOW BACK PAIN, UNSPECIFIED WHETHER SCIATICA PRESENT: Primary | ICD-10-CM

## 2023-05-08 DIAGNOSIS — N32.81 OVERACTIVE BLADDER: ICD-10-CM

## 2023-05-08 DIAGNOSIS — J30.2 SEASONAL ALLERGIES: ICD-10-CM

## 2023-05-08 PROCEDURE — 1036F TOBACCO NON-USER: CPT | Performed by: FAMILY MEDICINE

## 2023-05-08 PROCEDURE — G8427 DOCREV CUR MEDS BY ELIG CLIN: HCPCS | Performed by: FAMILY MEDICINE

## 2023-05-08 PROCEDURE — G8417 CALC BMI ABV UP PARAM F/U: HCPCS | Performed by: FAMILY MEDICINE

## 2023-05-08 PROCEDURE — 99214 OFFICE O/P EST MOD 30 MIN: CPT | Performed by: FAMILY MEDICINE

## 2023-05-08 RX ORDER — LEVOCETIRIZINE DIHYDROCHLORIDE 5 MG/1
5 TABLET, FILM COATED ORAL NIGHTLY
Qty: 30 TABLET | Refills: 5 | Status: SHIPPED | OUTPATIENT
Start: 2023-05-08

## 2023-05-08 RX ORDER — ATORVASTATIN CALCIUM 20 MG/1
20 TABLET, FILM COATED ORAL DAILY
Qty: 30 TABLET | Refills: 5 | Status: SHIPPED | OUTPATIENT
Start: 2023-05-08

## 2023-05-08 RX ORDER — OXYBUTYNIN CHLORIDE 10 MG/1
10 TABLET, EXTENDED RELEASE ORAL DAILY
Qty: 30 TABLET | Refills: 0 | Status: SHIPPED | OUTPATIENT
Start: 2023-05-08

## 2023-05-08 SDOH — ECONOMIC STABILITY: INCOME INSECURITY: HOW HARD IS IT FOR YOU TO PAY FOR THE VERY BASICS LIKE FOOD, HOUSING, MEDICAL CARE, AND HEATING?: NOT HARD AT ALL

## 2023-05-08 SDOH — ECONOMIC STABILITY: FOOD INSECURITY: WITHIN THE PAST 12 MONTHS, YOU WORRIED THAT YOUR FOOD WOULD RUN OUT BEFORE YOU GOT MONEY TO BUY MORE.: NEVER TRUE

## 2023-05-08 SDOH — ECONOMIC STABILITY: HOUSING INSECURITY
IN THE LAST 12 MONTHS, WAS THERE A TIME WHEN YOU DID NOT HAVE A STEADY PLACE TO SLEEP OR SLEPT IN A SHELTER (INCLUDING NOW)?: NO

## 2023-05-08 SDOH — ECONOMIC STABILITY: FOOD INSECURITY: WITHIN THE PAST 12 MONTHS, THE FOOD YOU BOUGHT JUST DIDN'T LAST AND YOU DIDN'T HAVE MONEY TO GET MORE.: NEVER TRUE

## 2023-05-08 ASSESSMENT — ENCOUNTER SYMPTOMS
GASTROINTESTINAL NEGATIVE: 1
BACK PAIN: 1
RESPIRATORY NEGATIVE: 1
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
## 2023-05-08 NOTE — TELEPHONE ENCOUNTER
Received Np referral but patient is already establishing. Called and schedule appt with patient. Pt APRYL.

## 2023-05-10 ENCOUNTER — OFFICE VISIT (OUTPATIENT)
Dept: NEUROSURGERY | Age: 48
End: 2023-05-10
Payer: MEDICAID

## 2023-05-10 VITALS
SYSTOLIC BLOOD PRESSURE: 122 MMHG | WEIGHT: 177 LBS | BODY MASS INDEX: 34.75 KG/M2 | HEIGHT: 60 IN | RESPIRATION RATE: 18 BRPM | DIASTOLIC BLOOD PRESSURE: 85 MMHG | HEART RATE: 80 BPM

## 2023-05-10 DIAGNOSIS — G89.29 CHRONIC MIDLINE LOW BACK PAIN WITH RIGHT-SIDED SCIATICA: Primary | ICD-10-CM

## 2023-05-10 DIAGNOSIS — M54.16 LUMBAR RADICULOPATHY: ICD-10-CM

## 2023-05-10 DIAGNOSIS — M54.41 CHRONIC MIDLINE LOW BACK PAIN WITH RIGHT-SIDED SCIATICA: Primary | ICD-10-CM

## 2023-05-10 PROCEDURE — G8417 CALC BMI ABV UP PARAM F/U: HCPCS | Performed by: NEUROLOGICAL SURGERY

## 2023-05-10 PROCEDURE — 99213 OFFICE O/P EST LOW 20 MIN: CPT | Performed by: NEUROLOGICAL SURGERY

## 2023-05-10 PROCEDURE — 1036F TOBACCO NON-USER: CPT | Performed by: NEUROLOGICAL SURGERY

## 2023-05-10 PROCEDURE — G8427 DOCREV CUR MEDS BY ELIG CLIN: HCPCS | Performed by: NEUROLOGICAL SURGERY

## 2023-05-10 ASSESSMENT — ENCOUNTER SYMPTOMS
GASTROINTESTINAL NEGATIVE: 1
EYES NEGATIVE: 1
RESPIRATORY NEGATIVE: 1
BACK PAIN: 1

## 2023-05-10 NOTE — PROGRESS NOTES
Review of Systems   Constitutional: Negative. HENT: Negative. Eyes: Negative. Respiratory: Negative. Cardiovascular: Negative. Gastrointestinal: Negative. Genitourinary: Negative. Musculoskeletal:  Positive for back pain, joint pain and myalgias. Skin: Negative. Neurological:  Positive for dizziness, tingling, weakness and headaches. Endo/Heme/Allergies: Negative. Psychiatric/Behavioral: Negative.
Date    BREAST BIOPSY Left 2018    benign calcification    CARPAL TUNNEL RELEASE Right 07/08/2022    RIGHT CARPAL TUNNEL RELEASE performed by Nicolette Harris MD at [address] [place] [address] Dr    COLONOSCOPY  04/29/2015    Dr Avilez-Internal hemorrhoids, repeat at age 49    COLONOSCOPY N/A 03/23/2022    Dr Domingo Metcalf, [place],  Int hemorrhoids Gr 1, 5 year recall    ENDOMETRIAL ABLATION  11/04/2015    Dr. Coye Prader  11/16/2015    Hemorrhoidectomy & closed lateral internal sphincterotomy    HYSTEROSCOPY  11/04/2015    Dr. Corley Osmin    POLYPECTOMY  11/04/2015    Dr. Emile Plascencia  14 years ago    UPPER GASTROINTESTINAL ENDOSCOPY N/A 03/23/2022    Dr Domingo Metcalf, W 47 Fr dil, (-) int Metaplasia    UPPER GASTROINTESTINAL ENDOSCOPY  03/23/2022    Dr Domingo Metcalf, [facility] 47 Fr bougie dilation    UPPER GASTROINTESTINAL ENDOSCOPY N/A 03/21/2023    Dr Pat Pink weighted Bougie dilator-54 Fr    UPPER GASTROINTESTINAL ENDOSCOPY  03/21/2023    Dr Domingo Metcalf,  W 47 fr dil, (+)GERD,         Current Outpatient Medications:     atorvastatin (LIPITOR) 20 MG tablet, Take 1 tablet by mouth daily, Disp: 30 tablet, Rfl: 5    levocetirizine (XYZAL) 5 MG tablet, Take 1 tablet by mouth nightly, Disp: 30 tablet, Rfl: 5    oxybutynin (DITROPAN XL) 10 MG extended release tablet, Take 1 tablet by mouth daily, Disp: 30 tablet, Rfl: 0    pantoprazole (PROTONIX) 40 MG tablet, TAKE 1 TABLET BY MOUTH EVERY DAY BEFORE BREAKFAST, Disp: 90 tablet, Rfl: 3    levothyroxine (SYNTHROID) 50 MCG tablet, TAKE 1 TABLET BY MOUTH EVERY DAY, Disp: 30 tablet, Rfl: 11    COMBIVENT RESPIMAT  MCG/ACT AERS inhaler, INHALE 1 PUFF EVERY 4 HOURS AS NEEDED FOR WHEEZING, Disp: 1 each, Rfl: 5    ondansetron (ZOFRAN ODT) 4 MG disintegrating tablet, Place 1 tablet under the tongue every 8 hours as needed for Nausea or Vomiting, Disp: 21 tablet, Rfl: 0    Allergies:  Codeine    Social History:   Social

## 2023-06-05 DIAGNOSIS — Z01.89 ROUTINE LAB DRAW: ICD-10-CM

## 2023-06-05 DIAGNOSIS — E78.2 MIXED HYPERLIPIDEMIA: ICD-10-CM

## 2023-06-05 DIAGNOSIS — E03.9 ACQUIRED HYPOTHYROIDISM: ICD-10-CM

## 2023-06-05 LAB
25(OH)D3 SERPL-MCNC: 15.7 NG/ML
ALBUMIN SERPL-MCNC: 4 G/DL (ref 3.5–5.2)
ALP SERPL-CCNC: 78 U/L (ref 35–104)
ALT SERPL-CCNC: 16 U/L (ref 5–33)
ANION GAP SERPL CALCULATED.3IONS-SCNC: 13 MMOL/L (ref 7–19)
AST SERPL-CCNC: 16 U/L (ref 5–32)
BASOPHILS # BLD: 0 K/UL (ref 0–0.2)
BASOPHILS NFR BLD: 0.3 % (ref 0–1)
BILIRUB SERPL-MCNC: 0.3 MG/DL (ref 0.2–1.2)
BUN SERPL-MCNC: 10 MG/DL (ref 6–20)
CALCIUM SERPL-MCNC: 9.5 MG/DL (ref 8.6–10)
CHLORIDE SERPL-SCNC: 104 MMOL/L (ref 98–111)
CHOLEST SERPL-MCNC: 157 MG/DL (ref 160–199)
CO2 SERPL-SCNC: 26 MMOL/L (ref 22–29)
CREAT SERPL-MCNC: 0.8 MG/DL (ref 0.5–0.9)
EOSINOPHIL # BLD: 0.1 K/UL (ref 0–0.6)
EOSINOPHIL NFR BLD: 1.1 % (ref 0–5)
ERYTHROCYTE [DISTWIDTH] IN BLOOD BY AUTOMATED COUNT: 13.2 % (ref 11.5–14.5)
GLUCOSE SERPL-MCNC: 98 MG/DL (ref 74–109)
HCT VFR BLD AUTO: 41.8 % (ref 37–47)
HDLC SERPL-MCNC: 42 MG/DL (ref 65–121)
HGB BLD-MCNC: 13.5 G/DL (ref 12–16)
IMM GRANULOCYTES # BLD: 0 K/UL
LDLC SERPL CALC-MCNC: 85 MG/DL
LYMPHOCYTES # BLD: 3.1 K/UL (ref 1.1–4.5)
LYMPHOCYTES NFR BLD: 31.3 % (ref 20–40)
MCH RBC QN AUTO: 29.6 PG (ref 27–31)
MCHC RBC AUTO-ENTMCNC: 32.3 G/DL (ref 33–37)
MCV RBC AUTO: 91.7 FL (ref 81–99)
MONOCYTES # BLD: 0.4 K/UL (ref 0–0.9)
MONOCYTES NFR BLD: 4.3 % (ref 0–10)
NEUTROPHILS # BLD: 6.1 K/UL (ref 1.5–7.5)
NEUTS SEG NFR BLD: 62.7 % (ref 50–65)
PLATELET # BLD AUTO: 289 K/UL (ref 130–400)
PMV BLD AUTO: 11.2 FL (ref 9.4–12.3)
POTASSIUM SERPL-SCNC: 4.5 MMOL/L (ref 3.5–5)
PROT SERPL-MCNC: 7 G/DL (ref 6.6–8.7)
RBC # BLD AUTO: 4.56 M/UL (ref 4.2–5.4)
SODIUM SERPL-SCNC: 143 MMOL/L (ref 136–145)
TRIGL SERPL-MCNC: 148 MG/DL (ref 0–149)
TSH SERPL DL<=0.005 MIU/L-ACNC: 1.57 UIU/ML (ref 0.35–5.5)
WBC # BLD AUTO: 9.8 K/UL (ref 4.8–10.8)

## 2023-06-08 ENCOUNTER — OFFICE VISIT (OUTPATIENT)
Dept: PRIMARY CARE CLINIC | Age: 48
End: 2023-06-08
Payer: MEDICAID

## 2023-06-08 VITALS
TEMPERATURE: 97.2 F | SYSTOLIC BLOOD PRESSURE: 122 MMHG | WEIGHT: 173.8 LBS | DIASTOLIC BLOOD PRESSURE: 84 MMHG | OXYGEN SATURATION: 97 % | BODY MASS INDEX: 34.12 KG/M2 | HEART RATE: 70 BPM | HEIGHT: 60 IN

## 2023-06-08 DIAGNOSIS — N39.46 MIXED INCONTINENCE: ICD-10-CM

## 2023-06-08 DIAGNOSIS — N32.81 OVERACTIVE BLADDER: ICD-10-CM

## 2023-06-08 DIAGNOSIS — Z76.0 MEDICATION REFILL: Primary | ICD-10-CM

## 2023-06-08 PROCEDURE — 99213 OFFICE O/P EST LOW 20 MIN: CPT | Performed by: FAMILY MEDICINE

## 2023-06-08 PROCEDURE — 1036F TOBACCO NON-USER: CPT | Performed by: FAMILY MEDICINE

## 2023-06-08 PROCEDURE — G8417 CALC BMI ABV UP PARAM F/U: HCPCS | Performed by: FAMILY MEDICINE

## 2023-06-08 PROCEDURE — G8427 DOCREV CUR MEDS BY ELIG CLIN: HCPCS | Performed by: FAMILY MEDICINE

## 2023-06-08 RX ORDER — OXYBUTYNIN CHLORIDE 10 MG/1
10 TABLET, EXTENDED RELEASE ORAL DAILY
Qty: 90 TABLET | Refills: 1 | Status: SHIPPED | OUTPATIENT
Start: 2023-06-08

## 2023-06-08 ASSESSMENT — PATIENT HEALTH QUESTIONNAIRE - PHQ9
SUM OF ALL RESPONSES TO PHQ9 QUESTIONS 1 & 2: 0
SUM OF ALL RESPONSES TO PHQ QUESTIONS 1-9: 0
2. FEELING DOWN, DEPRESSED OR HOPELESS: 0
SUM OF ALL RESPONSES TO PHQ QUESTIONS 1-9: 0
SUM OF ALL RESPONSES TO PHQ QUESTIONS 1-9: 0
1. LITTLE INTEREST OR PLEASURE IN DOING THINGS: 0
SUM OF ALL RESPONSES TO PHQ QUESTIONS 1-9: 0

## 2023-06-08 ASSESSMENT — ENCOUNTER SYMPTOMS
RESPIRATORY NEGATIVE: 1
GASTROINTESTINAL NEGATIVE: 1
BACK PAIN: 1

## 2023-06-08 NOTE — PROGRESS NOTES
back: Neck supple. Lumbar back: Decreased range of motion. Skin:     General: Skin is warm. Neurological:      General: No focal deficit present. Mental Status: She is alert and oriented to person, place, and time. Psychiatric:         Mood and Affect: Mood normal.         Behavior: Behavior normal.          Assessment & Plan   Teddy Kwan was seen today for follow-up. Diagnoses and all orders for this visit:    Medication refill    Mixed incontinence  -     oxybutynin (DITROPAN XL) 10 MG extended release tablet; Take 1 tablet by mouth daily    Overactive bladder  -     oxybutynin (DITROPAN XL) 10 MG extended release tablet; Take 1 tablet by mouth daily      Continue present care and management  Continue all maintenance medications  Encouraged to continue healthy lifestyle change  Continue attempts to lose weight  Engage in regular exercise daily -30 minutes a day as tolerated  Stay well hydrated - drink at least 64 oz of fluid a day  Eat 6 servings of fruit and vegetables daily  Keep scheduled follow-up appt with me and other providers/specialists  Call with new concerns   Return in about 5 months (around 10/23/2023) for routine follow-up with me. All questions were answered. Medications, including possible adverse effects, and instructions were reviewed and  understanding was confirmed. Follow-up recommendations, including when to contact or return to office (ie; if symptoms worsen or fail to improve), were discussed and acknowledged.     Electronically signed by Bret Gomez MD on 6/8/23 at 8:48 AM CDT

## 2023-06-20 ENCOUNTER — HOSPITAL ENCOUNTER (OUTPATIENT)
Dept: MRI IMAGING | Age: 48
Discharge: HOME | End: 2023-06-20
Payer: MEDICAID

## 2023-06-20 DIAGNOSIS — M54.41 CHRONIC MIDLINE LOW BACK PAIN WITH RIGHT-SIDED SCIATICA: ICD-10-CM

## 2023-06-20 DIAGNOSIS — G89.29 CHRONIC MIDLINE LOW BACK PAIN WITH RIGHT-SIDED SCIATICA: ICD-10-CM

## 2023-06-20 DIAGNOSIS — M54.16 LUMBAR RADICULOPATHY: ICD-10-CM

## 2023-06-20 PROCEDURE — 72148 MRI LUMBAR SPINE W/O DYE: CPT

## 2023-06-22 ENCOUNTER — OFFICE VISIT (OUTPATIENT)
Dept: NEUROSURGERY | Age: 48
End: 2023-06-22
Payer: MEDICAID

## 2023-06-22 VITALS
HEIGHT: 60 IN | RESPIRATION RATE: 18 BRPM | HEART RATE: 77 BPM | BODY MASS INDEX: 33.96 KG/M2 | SYSTOLIC BLOOD PRESSURE: 122 MMHG | DIASTOLIC BLOOD PRESSURE: 82 MMHG | WEIGHT: 173 LBS

## 2023-06-22 DIAGNOSIS — G89.29 CHRONIC MIDLINE LOW BACK PAIN WITH RIGHT-SIDED SCIATICA: Primary | ICD-10-CM

## 2023-06-22 DIAGNOSIS — M54.41 CHRONIC MIDLINE LOW BACK PAIN WITH RIGHT-SIDED SCIATICA: Primary | ICD-10-CM

## 2023-06-22 PROCEDURE — 99214 OFFICE O/P EST MOD 30 MIN: CPT | Performed by: NEUROLOGICAL SURGERY

## 2023-06-22 PROCEDURE — G8427 DOCREV CUR MEDS BY ELIG CLIN: HCPCS | Performed by: NEUROLOGICAL SURGERY

## 2023-06-22 PROCEDURE — 1036F TOBACCO NON-USER: CPT | Performed by: NEUROLOGICAL SURGERY

## 2023-06-22 PROCEDURE — G8417 CALC BMI ABV UP PARAM F/U: HCPCS | Performed by: NEUROLOGICAL SURGERY

## 2023-06-22 ASSESSMENT — ENCOUNTER SYMPTOMS
EYES NEGATIVE: 1
BACK PAIN: 1
RESPIRATORY NEGATIVE: 1
GASTROINTESTINAL NEGATIVE: 1

## 2023-06-22 NOTE — PROGRESS NOTES
Review of Systems   Constitutional: Negative. HENT: Negative. Eyes: Negative. Respiratory: Negative. Cardiovascular: Negative. Gastrointestinal: Negative. Genitourinary: Negative. Musculoskeletal:  Positive for back pain, joint pain and myalgias. Skin: Negative. Neurological: Negative. Endo/Heme/Allergies: Negative. Psychiatric/Behavioral: Negative.
distress  Cardiorespiratory: unlabored breathing      Neurologic Exam:    Mental Status: Alert, oriented, thought content appropriate  Cranial Nerves: PERRL, EOMI, symmetric facies, tongue midline  Motor: Motor exam is symmetrical 5 out of 5 all extremities bilaterally  Somatosensory: normal light touch sensation      Imaging:    MRI lumbar spine reviewed. Alignment is anatomic. There is modest DDD at L4/5. There is no significant spinal canal or foraminal stenosis at any level in the lumbar spine. Assessment and Plan:    53 y.o. F returns for follow-up and MRI review with ongoing complaints of lower back pain and right leg pain. She has only modest DDD on her MRI scan without any evidence of significant neural element compression. I do not believe she is likely to benefit from surgery. I recommended that she re-establish care with Dr. Vanda Cuba in pain management. We also discussed weight loss as a means to improve her back pain and overall health. She may follow up with me on an as-needed basis.       Electronically signed by Sheeba Lo MD on 6/22/2023 at 8:14 AM

## 2023-08-28 ENCOUNTER — OFFICE VISIT (OUTPATIENT)
Dept: PRIMARY CARE CLINIC | Age: 48
End: 2023-08-28
Payer: MEDICAID

## 2023-08-28 VITALS
BODY MASS INDEX: 34.47 KG/M2 | OXYGEN SATURATION: 98 % | HEIGHT: 60 IN | HEART RATE: 79 BPM | WEIGHT: 175.6 LBS | SYSTOLIC BLOOD PRESSURE: 122 MMHG | TEMPERATURE: 98.6 F | DIASTOLIC BLOOD PRESSURE: 80 MMHG

## 2023-08-28 DIAGNOSIS — E03.9 ACQUIRED HYPOTHYROIDISM: ICD-10-CM

## 2023-08-28 DIAGNOSIS — M54.50 CHRONIC MIDLINE LOW BACK PAIN WITHOUT SCIATICA: Primary | ICD-10-CM

## 2023-08-28 DIAGNOSIS — L60.0 INGROWN NAIL OF SECOND TOE OF RIGHT FOOT: ICD-10-CM

## 2023-08-28 DIAGNOSIS — M51.36 DEGENERATIVE DISC DISEASE, LUMBAR: ICD-10-CM

## 2023-08-28 DIAGNOSIS — F41.8 SITUATIONAL ANXIETY: ICD-10-CM

## 2023-08-28 DIAGNOSIS — G89.29 CHRONIC MIDLINE LOW BACK PAIN WITHOUT SCIATICA: Primary | ICD-10-CM

## 2023-08-28 PROCEDURE — G8427 DOCREV CUR MEDS BY ELIG CLIN: HCPCS | Performed by: FAMILY MEDICINE

## 2023-08-28 PROCEDURE — 1036F TOBACCO NON-USER: CPT | Performed by: FAMILY MEDICINE

## 2023-08-28 PROCEDURE — 99214 OFFICE O/P EST MOD 30 MIN: CPT | Performed by: FAMILY MEDICINE

## 2023-08-28 PROCEDURE — G8417 CALC BMI ABV UP PARAM F/U: HCPCS | Performed by: FAMILY MEDICINE

## 2023-08-28 RX ORDER — BUSPIRONE HYDROCHLORIDE 5 MG/1
5 TABLET ORAL 2 TIMES DAILY
Qty: 60 TABLET | Refills: 0 | Status: SHIPPED | OUTPATIENT
Start: 2023-08-28 | End: 2023-09-27

## 2023-08-28 RX ORDER — LEVOTHYROXINE SODIUM 0.05 MG/1
50 TABLET ORAL DAILY
Qty: 30 TABLET | Refills: 5 | Status: SHIPPED | OUTPATIENT
Start: 2023-08-28

## 2023-08-28 ASSESSMENT — ENCOUNTER SYMPTOMS
GASTROINTESTINAL NEGATIVE: 1
BACK PAIN: 1
RESPIRATORY NEGATIVE: 1

## 2023-08-28 NOTE — PROGRESS NOTES
sounds. No murmur heard. Pulmonary:      Effort: Pulmonary effort is normal.      Breath sounds: Normal breath sounds. Abdominal:      General: Bowel sounds are normal.      Palpations: Abdomen is soft. Tenderness: There is no abdominal tenderness. Musculoskeletal:         General: No swelling or tenderness. Cervical back: Neck supple. Lumbar back: Decreased range of motion. Skin:     General: Skin is warm. Neurological:      General: No focal deficit present. Mental Status: She is alert and oriented to person, place, and time. Psychiatric:         Mood and Affect: Mood normal.         Behavior: Behavior normal.          Assessment & Plan   Alexis Celaya was seen today for weight loss, toe pain and anxiety. Diagnoses and all orders for this visit:    Chronic midline low back pain without sciatica    Degenerative disc disease, lumbar    Situational anxiety  -     busPIRone (BUSPAR) 5 MG tablet; Take 1 tablet by mouth 2 times daily    Ingrown nail of second toe of right foot    BMI 34.0-34.9,adult    Acquired hypothyroidism  -     levothyroxine (SYNTHROID) 50 MCG tablet; Take 1 tablet by mouth daily      Continue present care and management  Continue all maintenance medications  Agree with weight loss recommendation  Encouraged healthy lifestyle change  Engage in regular exercise daily -30 minutes a day as tolerated  Stay well and hydrated - drink at least 64 oz of fluid a day  Eat 6 servings of fruit and vegetables daily  Keep scheduled follow-up appt with me and other providers/specialists  Call with new concerns     Return in about 14 weeks (around 12/4/2023) for routine follow-up with me. All questions were answered. Medications, including possible adverse effects, and instructions were reviewed and  understanding was confirmed.    Follow-up recommendations, including when to contact or return to office (ie; if symptoms worsen or fail to improve), were discussed and

## 2023-09-01 DIAGNOSIS — F41.8 SITUATIONAL ANXIETY: ICD-10-CM

## 2023-09-07 RX ORDER — BUSPIRONE HYDROCHLORIDE 5 MG/1
TABLET ORAL
Qty: 60 TABLET | Refills: 0 | OUTPATIENT
Start: 2023-09-07

## 2023-10-28 DIAGNOSIS — E78.2 MIXED HYPERLIPIDEMIA: ICD-10-CM

## 2023-10-28 DIAGNOSIS — J30.2 SEASONAL ALLERGIES: ICD-10-CM

## 2023-10-30 RX ORDER — LEVOCETIRIZINE DIHYDROCHLORIDE 5 MG/1
5 TABLET, FILM COATED ORAL NIGHTLY
Qty: 30 TABLET | Refills: 1 | Status: SHIPPED | OUTPATIENT
Start: 2023-10-30

## 2023-10-30 RX ORDER — ATORVASTATIN CALCIUM 20 MG/1
20 TABLET, FILM COATED ORAL DAILY
Qty: 30 TABLET | Refills: 1 | Status: SHIPPED | OUTPATIENT
Start: 2023-10-30

## 2023-10-30 NOTE — TELEPHONE ENCOUNTER
Brennanino Yazanton called to request a refill on her medication.       Last office visit : 8/28/2023   Next office visit : 12/4/2023     Requested Prescriptions     Pending Prescriptions Disp Refills    levocetirizine (XYZAL) 5 MG tablet [Pharmacy Med Name: LEVOCETIRIZINE 5 MG TABLET] 30 tablet 5     Sig: TAKE 1 TABLET BY MOUTH NIGHTLY    atorvastatin (LIPITOR) 20 MG tablet [Pharmacy Med Name: ATORVASTATIN 20 MG TABLET] 30 tablet 5     Sig: TAKE 1 TABLET BY MOUTH DAILY            Mikell Nissen, LPN

## 2023-12-03 DIAGNOSIS — N39.46 MIXED INCONTINENCE: ICD-10-CM

## 2023-12-03 DIAGNOSIS — N32.81 OVERACTIVE BLADDER: ICD-10-CM

## 2023-12-04 ENCOUNTER — OFFICE VISIT (OUTPATIENT)
Dept: PRIMARY CARE CLINIC | Age: 48
End: 2023-12-04
Payer: MEDICAID

## 2023-12-04 VITALS
OXYGEN SATURATION: 99 % | HEART RATE: 69 BPM | TEMPERATURE: 98.1 F | WEIGHT: 176 LBS | SYSTOLIC BLOOD PRESSURE: 124 MMHG | DIASTOLIC BLOOD PRESSURE: 80 MMHG | HEIGHT: 60 IN | BODY MASS INDEX: 34.55 KG/M2

## 2023-12-04 DIAGNOSIS — E78.2 MIXED HYPERLIPIDEMIA: ICD-10-CM

## 2023-12-04 DIAGNOSIS — G47.33 OSA (OBSTRUCTIVE SLEEP APNEA): ICD-10-CM

## 2023-12-04 DIAGNOSIS — J30.2 SEASONAL ALLERGIES: ICD-10-CM

## 2023-12-04 DIAGNOSIS — N32.81 OVERACTIVE BLADDER: ICD-10-CM

## 2023-12-04 DIAGNOSIS — F33.8 SEASONAL AFFECTIVE DISORDER (HCC): ICD-10-CM

## 2023-12-04 DIAGNOSIS — N39.46 MIXED INCONTINENCE: ICD-10-CM

## 2023-12-04 DIAGNOSIS — Z76.0 MEDICATION REFILL: Primary | ICD-10-CM

## 2023-12-04 PROCEDURE — 99214 OFFICE O/P EST MOD 30 MIN: CPT | Performed by: FAMILY MEDICINE

## 2023-12-04 PROCEDURE — G8427 DOCREV CUR MEDS BY ELIG CLIN: HCPCS | Performed by: FAMILY MEDICINE

## 2023-12-04 PROCEDURE — 1036F TOBACCO NON-USER: CPT | Performed by: FAMILY MEDICINE

## 2023-12-04 PROCEDURE — G8417 CALC BMI ABV UP PARAM F/U: HCPCS | Performed by: FAMILY MEDICINE

## 2023-12-04 PROCEDURE — G8484 FLU IMMUNIZE NO ADMIN: HCPCS | Performed by: FAMILY MEDICINE

## 2023-12-04 RX ORDER — ATORVASTATIN CALCIUM 20 MG/1
20 TABLET, FILM COATED ORAL DAILY
Qty: 90 TABLET | Refills: 1 | Status: SHIPPED | OUTPATIENT
Start: 2023-12-04

## 2023-12-04 RX ORDER — LEVOCETIRIZINE DIHYDROCHLORIDE 5 MG/1
5 TABLET, FILM COATED ORAL NIGHTLY
Qty: 30 TABLET | Refills: 1 | Status: SHIPPED | OUTPATIENT
Start: 2023-12-04

## 2023-12-04 RX ORDER — OXYBUTYNIN CHLORIDE 10 MG/1
10 TABLET, EXTENDED RELEASE ORAL DAILY
Qty: 90 TABLET | Refills: 1 | OUTPATIENT
Start: 2023-12-04

## 2023-12-04 RX ORDER — OXYBUTYNIN CHLORIDE 10 MG/1
10 TABLET, EXTENDED RELEASE ORAL DAILY
Qty: 90 TABLET | Refills: 1 | Status: SHIPPED | OUTPATIENT
Start: 2023-12-04

## 2023-12-04 ASSESSMENT — ENCOUNTER SYMPTOMS
BACK PAIN: 1
GASTROINTESTINAL NEGATIVE: 1
RESPIRATORY NEGATIVE: 1

## 2023-12-04 NOTE — PROGRESS NOTES
[facility] PRIMARY [facility]ARE  [facility],[address]  [place] [address]  Dept: [phone]  Dept Fax: [phone]  Loc: [phone]      Subjective:     Chief Complaint   Patient presents with    Follow-up       HPI:  Queta Ahn is a 53 y.o. female presents today for her routine follow-up visit. PMHx reviewed and updated  Chronic meds reviewed. Pt requesting refills  Pt is frustrated that she is unable to lose weight. She states that she exercises regularly and eat once a day only. She has not been tested for insulin resistance  She is due for a repeat lipid check  She has FATIMAH and states that she uses her cpap regularly  She also admits that she has seasonal affective disorder. ROS:   Review of Systems   Constitutional: Negative. HENT: Negative. Respiratory: Negative. Cardiovascular: Negative. Gastrointestinal: Negative. GERD   Endocrine: Negative. Genitourinary:  Positive for frequency (nocturia  - improved on Ditropan) and urgency (improved on Ditropan). Negative for difficulty urinating and dysuria. Stress and urge incontinence   Musculoskeletal:  Positive for arthralgias (hip pain) and back pain. Allergic/Immunologic: Positive for environmental allergies. Neurological: Negative. Hematological: Negative. Psychiatric/Behavioral:  Positive for dysphoric mood (seasonal affective disorder - worse this time of the year).         PMHx:  Past Medical History:   Diagnosis Date    Anxiety     Asthma     born with asthmatic bronchitis    CPAP (continuous positive airway pressure) dependence     6cm to 16cm    Degenerative disk disease     Depression     mood disorders    Hemorrhoid 11/2015    Hyperlipidemia     Hypoglycemia     Hypothyroid     Left ureteral stone 05/25/2016    OAB (overactive bladder)     Obstructive sleep apnea     AHI: 37.9 per PSG, 10/2019    Obstructive sleep apnea 04/30/2020    Prolonged emergence from general

## 2023-12-05 ENCOUNTER — TELEPHONE (OUTPATIENT)
Dept: PRIMARY CARE CLINIC | Age: 48
End: 2023-12-05

## 2023-12-05 DIAGNOSIS — E78.2 MIXED HYPERLIPIDEMIA: ICD-10-CM

## 2023-12-05 LAB
CHOLEST SERPL-MCNC: 156 MG/DL (ref 160–199)
HDLC SERPL-MCNC: 52 MG/DL (ref 65–121)
INSULIN SERPL-ACNC: 15.3 UIU/ML (ref 2.6–24.9)
LDLC SERPL CALC-MCNC: 90 MG/DL
TRIGL SERPL-MCNC: 70 MG/DL (ref 0–149)

## 2023-12-05 NOTE — TELEPHONE ENCOUNTER
----- Message from Anisa Suarez MD sent at 12/5/2023  2:18 PM CST -----  Blood work looks good. Cholesterol is not quite at goal HDL is low.  Exercise daily  Insulin level is normal -  She is not insulin resistant

## 2024-01-12 ENCOUNTER — HOSPITAL ENCOUNTER (EMERGENCY)
Age: 49
Discharge: HOME | End: 2024-01-12
Payer: MEDICAID

## 2024-01-12 ENCOUNTER — APPOINTMENT (OUTPATIENT)
Dept: GENERAL RADIOLOGY | Age: 49
End: 2024-01-12
Payer: MEDICAID

## 2024-01-12 VITALS
OXYGEN SATURATION: 99 % | HEART RATE: 77 BPM | DIASTOLIC BLOOD PRESSURE: 53 MMHG | BODY MASS INDEX: 34.37 KG/M2 | WEIGHT: 176 LBS | TEMPERATURE: 98 F | SYSTOLIC BLOOD PRESSURE: 122 MMHG | RESPIRATION RATE: 16 BRPM

## 2024-01-12 DIAGNOSIS — S62.355A CLOSED NONDISPLACED FRACTURE OF SHAFT OF FOURTH METACARPAL BONE OF LEFT HAND, INITIAL ENCOUNTER: Primary | ICD-10-CM

## 2024-01-12 PROCEDURE — 29125 APPL SHORT ARM SPLINT STATIC: CPT

## 2024-01-12 PROCEDURE — 73130 X-RAY EXAM OF HAND: CPT

## 2024-01-12 PROCEDURE — 73110 X-RAY EXAM OF WRIST: CPT

## 2024-01-12 PROCEDURE — 99283 EMERGENCY DEPT VISIT LOW MDM: CPT

## 2024-01-12 RX ORDER — HYDROCODONE BITARTRATE AND ACETAMINOPHEN 5; 325 MG/1; MG/1
1 TABLET ORAL EVERY 8 HOURS PRN
Qty: 6 TABLET | Refills: 0 | Status: SHIPPED | OUTPATIENT
Start: 2024-01-12 | End: 2024-01-15

## 2024-01-12 ASSESSMENT — ENCOUNTER SYMPTOMS
COUGH: 0
EYE PAIN: 0
ABDOMINAL DISTENTION: 0
COLOR CHANGE: 0
NAUSEA: 0
SORE THROAT: 0
RHINORRHEA: 0
EYE DISCHARGE: 0
APNEA: 0
ABDOMINAL PAIN: 0
PHOTOPHOBIA: 0
SHORTNESS OF BREATH: 0
BACK PAIN: 0

## 2024-01-12 NOTE — ED PROVIDER NOTES
findings:        Interpretation per the Radiologist below, if available at the time of this note:    XR HAND LEFT (MIN 3 VIEWS)   Final Result       1. Mildly displaced fracture, fourth metacarpal.                   .           ______________________________________    Electronically signed by: CYNTHIA TREJO D.O.   Date:     01/12/2024   Time:    14:05       XR WRIST LEFT (MIN 3 VIEWS)   Final Result       Mildly displaced fracture of the fourth metacarpal.  Soft tissue swelling.               ______________________________________    Electronically signed by: IVY BARRAGAN M.D.   Date:     01/12/2024   Time:    14:06           LABS:  Labs Reviewed - No data to display    All other labs were within normal range or notreturned as of this dictation.    RE-ASSESSMENT          EMERGENCY DEPARTMENT COURSE and DIFFERENTIAL DIAGNOSIS/MDM:   Vitals:    Vitals:    01/12/24 1330   BP: (!) 151/85   Pulse: 84   Resp: 16   Temp: 97.7 °F (36.5 °C)   SpO2: 98%   Weight: 79.8 kg (176 lb)         MDM  Plan for discharge with pain control splint in place discussed case with Dr Robles with ortho plan for close follow up. Discussed return precautions based on physical exam she should do well.     PROCEDURES:    Splint Application    Date/Time: 1/12/2024 2:51 PM    Performed by: Luis Fernando Richards PA  Authorized by: Luis Fernando Richards PA    Consent:     Consent obtained:  Verbal    Consent given by:  Patient    Risks discussed:  Discoloration, numbness, pain and swelling  Universal protocol:     Procedure explained and questions answered to patient or proxy's satisfaction: yes      Patient identity confirmed:  Verbally with patient  Pre-procedure details:     Distal neurologic exam:  Normal    Distal perfusion: distal pulses strong and brisk capillary refill    Procedure details:     Location:  Hand    Hand location:  L hand    Strapping: yes      Cast type:  Short arm    Splint type:  Ulnar gutter    Supplies:  Cotton padding, elastic

## 2024-02-01 ENCOUNTER — HOSPITAL ENCOUNTER (OUTPATIENT)
Dept: WOMENS IMAGING | Age: 49
Discharge: HOME | End: 2024-02-01
Payer: MEDICAID

## 2024-02-01 DIAGNOSIS — Z12.31 VISIT FOR SCREENING MAMMOGRAM: ICD-10-CM

## 2024-02-01 PROCEDURE — 77063 BREAST TOMOSYNTHESIS BI: CPT

## 2024-02-06 NOTE — PROGRESS NOTES
date: 2008     Years since quittin.8    Smokeless tobacco: Former   Substance Use Topics    Alcohol use: Yes     Alcohol/week: 0.0 standard drinks of alcohol     Comment: rare          ROS  review of system reviewed and positive for the above all other systems noted to be negative    Mammogram  EXAM: BILATERAL DIGITAL SCREENING MAMMOGRAM.     TECHNIQUE: Bilateral low dose digital CC and MLO images were performed with and without tomosynthesis.CAD was also performed and reviewed.     HISTORY: Routine screening mammogram.     COMPARISON: Prior exams are reviewed     FINDINGS:  There are scattered areas of fibroglandular density.     There are no focal suspicious masses, calcifications, or areas of architectural distortion.  Biopsy clip is present in the left breast.     IMPRESSION:  BI-RADS 2 - Benign.     RECOMMENDATION: Annual screening mammography in 1 year Bilateral     Physical Exam  Blood pressure 124/70, height 1.524 m (5'), weight 79.8 kg (176 lb), not currently breastfeeding.     Constitutional:  This is a 48 y.o.female that appears to be in no acute distress.  She is pleasant and answers questions appropriately.    Breast:  On examination to her breast, patient has no dominant palpable masses.  She has fibrocystic changes throughout both breast.  There is no appreciable skin dimpling.  There is no axillary adenopathy or supraclavicular adenopathy appreciable.      Impression  Benign fibrocystic changes    Plan  We will see her back next year for yearly exam and mammography        15 minutes was spent during this exam with face-to-face counseling, review of data and physical exam

## 2024-02-07 ENCOUNTER — OFFICE VISIT (OUTPATIENT)
Dept: SURGERY | Age: 49
End: 2024-02-07

## 2024-02-07 VITALS
DIASTOLIC BLOOD PRESSURE: 70 MMHG | HEIGHT: 60 IN | WEIGHT: 176 LBS | BODY MASS INDEX: 34.55 KG/M2 | SYSTOLIC BLOOD PRESSURE: 124 MMHG

## 2024-02-07 DIAGNOSIS — Z12.31 VISIT FOR SCREENING MAMMOGRAM: Primary | ICD-10-CM

## 2024-02-18 DIAGNOSIS — J30.2 SEASONAL ALLERGIES: ICD-10-CM

## 2024-02-19 RX ORDER — LEVOCETIRIZINE DIHYDROCHLORIDE 5 MG/1
5 TABLET, FILM COATED ORAL NIGHTLY
Qty: 30 TABLET | Refills: 0 | Status: SHIPPED | OUTPATIENT
Start: 2024-02-19

## 2024-02-19 NOTE — TELEPHONE ENCOUNTER
Alondra Pickard called to request a refill on her medication.      Last office visit : 12/4/2023   Next office visit : 3/4/2024     Requested Prescriptions     Pending Prescriptions Disp Refills    levocetirizine (XYZAL) 5 MG tablet [Pharmacy Med Name: LEVOCETIRIZINE 5 MG TABLET] 30 tablet 1     Sig: TAKE 1 TABLET BY MOUTH EVERY DAY AT NIGHT            Natalie Maciel LPN

## 2024-03-04 ENCOUNTER — OFFICE VISIT (OUTPATIENT)
Dept: PRIMARY CARE CLINIC | Age: 49
End: 2024-03-04
Payer: MEDICAID

## 2024-03-04 VITALS
WEIGHT: 178 LBS | TEMPERATURE: 97.6 F | OXYGEN SATURATION: 99 % | BODY MASS INDEX: 34.95 KG/M2 | HEART RATE: 73 BPM | SYSTOLIC BLOOD PRESSURE: 122 MMHG | DIASTOLIC BLOOD PRESSURE: 74 MMHG | HEIGHT: 60 IN

## 2024-03-04 DIAGNOSIS — F41.8 SITUATIONAL ANXIETY: ICD-10-CM

## 2024-03-04 DIAGNOSIS — J30.2 SEASONAL ALLERGIES: ICD-10-CM

## 2024-03-04 DIAGNOSIS — N39.46 MIXED INCONTINENCE: ICD-10-CM

## 2024-03-04 DIAGNOSIS — N32.81 OVERACTIVE BLADDER: ICD-10-CM

## 2024-03-04 DIAGNOSIS — F32.1 CURRENT MODERATE EPISODE OF MAJOR DEPRESSIVE DISORDER WITHOUT PRIOR EPISODE (HCC): ICD-10-CM

## 2024-03-04 DIAGNOSIS — Z00.00 ENCOUNTER FOR WELL ADULT EXAM WITHOUT ABNORMAL FINDINGS: Primary | ICD-10-CM

## 2024-03-04 PROCEDURE — G8484 FLU IMMUNIZE NO ADMIN: HCPCS | Performed by: FAMILY MEDICINE

## 2024-03-04 PROCEDURE — 99396 PREV VISIT EST AGE 40-64: CPT | Performed by: FAMILY MEDICINE

## 2024-03-04 RX ORDER — PANTOPRAZOLE SODIUM 40 MG/1
TABLET, DELAYED RELEASE ORAL
Qty: 90 TABLET | Refills: 3 | Status: SHIPPED | OUTPATIENT
Start: 2024-03-04

## 2024-03-04 RX ORDER — OXYBUTYNIN CHLORIDE 10 MG/1
10 TABLET, EXTENDED RELEASE ORAL DAILY
Qty: 30 TABLET | Refills: 0 | Status: SHIPPED | OUTPATIENT
Start: 2024-03-04

## 2024-03-04 RX ORDER — BUSPIRONE HYDROCHLORIDE 5 MG/1
5 TABLET ORAL 3 TIMES DAILY
COMMUNITY
End: 2024-03-04 | Stop reason: DRUGHIGH

## 2024-03-04 RX ORDER — BUSPIRONE HYDROCHLORIDE 5 MG/1
5 TABLET ORAL 2 TIMES DAILY
Qty: 60 TABLET | Refills: 0 | OUTPATIENT
Start: 2024-03-04

## 2024-03-04 RX ORDER — LEVOCETIRIZINE DIHYDROCHLORIDE 5 MG/1
5 TABLET, FILM COATED ORAL NIGHTLY
Qty: 30 TABLET | Refills: 0 | Status: SHIPPED | OUTPATIENT
Start: 2024-03-04

## 2024-03-04 RX ORDER — BUSPIRONE HYDROCHLORIDE 10 MG/1
10 TABLET ORAL 2 TIMES DAILY
Qty: 60 TABLET | Refills: 0 | Status: SHIPPED | OUTPATIENT
Start: 2024-03-04 | End: 2024-04-03

## 2024-03-04 ASSESSMENT — ENCOUNTER SYMPTOMS
RESPIRATORY NEGATIVE: 1
GASTROINTESTINAL NEGATIVE: 1
BACK PAIN: 1

## 2024-03-04 ASSESSMENT — PATIENT HEALTH QUESTIONNAIRE - PHQ9
7. TROUBLE CONCENTRATING ON THINGS, SUCH AS READING THE NEWSPAPER OR WATCHING TELEVISION: 0
6. FEELING BAD ABOUT YOURSELF - OR THAT YOU ARE A FAILURE OR HAVE LET YOURSELF OR YOUR FAMILY DOWN: 0
SUM OF ALL RESPONSES TO PHQ QUESTIONS 1-9: 0
5. POOR APPETITE OR OVEREATING: 0
2. FEELING DOWN, DEPRESSED OR HOPELESS: 0
8. MOVING OR SPEAKING SO SLOWLY THAT OTHER PEOPLE COULD HAVE NOTICED. OR THE OPPOSITE, BEING SO FIGETY OR RESTLESS THAT YOU HAVE BEEN MOVING AROUND A LOT MORE THAN USUAL: 0
SUM OF ALL RESPONSES TO PHQ QUESTIONS 1-9: 0
SUM OF ALL RESPONSES TO PHQ9 QUESTIONS 1 & 2: 0
1. LITTLE INTEREST OR PLEASURE IN DOING THINGS: 0
3. TROUBLE FALLING OR STAYING ASLEEP: 0
SUM OF ALL RESPONSES TO PHQ QUESTIONS 1-9: 0
SUM OF ALL RESPONSES TO PHQ QUESTIONS 1-9: 0
4. FEELING TIRED OR HAVING LITTLE ENERGY: 0
9. THOUGHTS THAT YOU WOULD BE BETTER OFF DEAD, OR OF HURTING YOURSELF: 0
10. IF YOU CHECKED OFF ANY PROBLEMS, HOW DIFFICULT HAVE THESE PROBLEMS MADE IT FOR YOU TO DO YOUR WORK, TAKE CARE OF THINGS AT HOME, OR GET ALONG WITH OTHER PEOPLE: 0

## 2024-03-04 NOTE — PROGRESS NOTES
[facility]  [address]  [address]  [place] [address]  Dept: [phone]  Dept Fax: [phone]  Loc: [phone]      Subjective:     Chief Complaint   Patient presents with    Annual Exam    Anxiety     States recent medication prescribed is not working        HPI:  Alondra Pickard is a 49 y.o. female presents today for her annual physical. No recent hospitalization, UC or ER visit.   Chronic meds reviewed - no changes reported except that she feels that her Buspar is not working as well for he anxiety. She states that she has been extremely anxious lately. She states that they may be evicted from the house they are renting. She is worried that she may have to find a job and work again. She has not worked in several years. She has a supportive  and family. She states that she lived a sheltered life since childhood and she has become very dependent on her  for everything. She states that she has gone to therapy most of her life but she has changed from one therapist to another, she did not feel that she benefited from any of it.    ROS:   Review of Systems   Constitutional: Negative.    HENT: Negative.     Respiratory: Negative.     Cardiovascular: Negative.    Gastrointestinal: Negative.         GERD   Endocrine: Negative.    Genitourinary:  Positive for frequency (nocturia  - improved on Ditropan) and urgency (improved on Ditropan). Negative for difficulty urinating and dysuria.        Stress and urge incontinence   Musculoskeletal:  Positive for arthralgias (hip pain) and back pain.   Allergic/Immunologic: Positive for environmental allergies.   Neurological: Negative.    Hematological: Negative.    Psychiatric/Behavioral:  Positive for dysphoric mood. Negative for self-injury and suicidal ideas. The patient is nervous/anxious.        PMHx:  Past Medical History:   Diagnosis Date    Anxiety     Asthma     born with asthmatic bronchitis

## 2024-03-04 NOTE — PROGRESS NOTES
Well Adult Note  Name: Alondra Pickard Today’s Date: 3/4/2024   MRN: [id number] Sex: Female   Age: 49 y.o. Ethnicity: Non- / Non    : 1975 Race: White (non-)      Alondra Pickard is here for well adult exam.  History:  See above note    Review of Systems  See above note    Allergies   Allergen Reactions    Codeine Nausea And Vomiting, Swelling and Rash         Prior to Visit Medications    Medication Sig Taking? Authorizing Provider   busPIRone (BUSPAR) 10 MG tablet Take 1 tablet by mouth 2 times daily Yes Roopa Valentin MD   levocetirizine (XYZAL) 5 MG tablet TAKE 1 TABLET BY MOUTH EVERY DAY AT NIGHT Yes Roopa Valentin MD   atorvastatin (LIPITOR) 20 MG tablet Take 1 tablet by mouth daily Yes Roopa Valentin MD   oxybutynin (DITROPAN XL) 10 MG extended release tablet Take 1 tablet by mouth daily Yes Roopa Valentin MD   levothyroxine (SYNTHROID) 50 MCG tablet Take 1 tablet by mouth daily Yes Roopa Valentin MD   pantoprazole (PROTONIX) 40 MG tablet TAKE 1 TABLET BY MOUTH EVERY DAY BEFORE BREAKFAST Yes Amarilis Ragland APRN - NP   COMBIVENT RESPIMAT  MCG/ACT AERS inhaler INHALE 1 PUFF EVERY 4 HOURS AS NEEDED FOR WHEEZING Yes Heide Varela APRN   ondansetron (ZOFRAN ODT) 4 MG disintegrating tablet Place 1 tablet under the tongue every 8 hours as needed for Nausea or Vomiting Yes Robert Pandey MD         Past Medical History:   Diagnosis Date    Anxiety     Asthma     born with asthmatic bronchitis    CPAP (continuous positive airway pressure) dependence     6cm to 16cm    Degenerative disk disease     Depression     mood disorders    Hemorrhoid 2015    Hyperlipidemia     Hypoglycemia     Hypothyroid     Left ureteral stone 2016    OAB (overactive bladder)     Obstructive sleep apnea     AHI: 37.9 per PSG, 10/2019    Obstructive sleep apnea 2020    Prolonged emergence from general anesthesia     when pt had gall bladder removed    Renal stone 2016

## 2024-03-04 NOTE — PATIENT INSTRUCTIONS
to name a person to make decisions about your care if you are not able to. Most people ask a close friend or family member. Talk to this person about the kinds of treatments you want and those that you do not want. Make sure this person understands your wishes. A medical power of  may be called something else in your state.  These legal papers are simple to change. Tell your doctor what you want to change, and ask him or her to make a note in your medical file. Give your family updated copies of the papers.  Where can you learn more?  Go to https://www.[employer].net/patientEd and enter P184 to learn more about \"Advance Care Planning: Care Instructions.\"  Current as of: February 26, 2023               Content Version: 13.9  © 2006-2023 [employer].   Care instructions adapted under license by [employer]. If you have questions about a medical condition or this instruction, always ask your healthcare professional. [employer] disclaims any warranty or liability for your use of this information.

## 2024-03-04 NOTE — TELEPHONE ENCOUNTER
Alondra Pickard called to request a refill on her medication.      Last office visit : 3/4/2024   Next office visit : 4/15/2024     Requested Prescriptions     Pending Prescriptions Disp Refills    oxyBUTYnin (DITROPAN-XL) 10 MG extended release tablet [Pharmacy Med Name: OXYBUTYNIN CL ER 10 MG TABLET] 90 tablet 1     Sig: TAKE 1 TABLET BY MOUTH EVERY DAY    busPIRone (BUSPAR) 5 MG tablet [Pharmacy Med Name: BUSPIRONE HCL 5 MG TABLET] 60 tablet 0     Sig: TAKE 1 TABLET BY MOUTH TWICE A DAY    levocetirizine (XYZAL) 5 MG tablet [Pharmacy Med Name: LEVOCETIRIZINE 5 MG TABLET] 30 tablet 0     Sig: TAKE 1 TABLET BY MOUTH EVERY DAY AT NIGHT            Natalie Maceil LPN

## 2024-03-06 ENCOUNTER — TELEPHONE (OUTPATIENT)
Dept: PSYCHIATRY | Age: 49
End: 2024-03-06

## 2024-03-06 NOTE — TELEPHONE ENCOUNTER
Received referral from patient's PCP office. Called patient to schedule them with [facility]. Appointment scheduled with JUSTIN Montgomery on 3/7/23 @ 9AM.

## 2024-03-07 ENCOUNTER — OFFICE VISIT (OUTPATIENT)
Dept: PSYCHIATRY | Age: 49
End: 2024-03-07

## 2024-03-07 ENCOUNTER — TELEPHONE (OUTPATIENT)
Dept: PSYCHIATRY | Age: 49
End: 2024-03-07

## 2024-03-07 VITALS
SYSTOLIC BLOOD PRESSURE: 143 MMHG | DIASTOLIC BLOOD PRESSURE: 86 MMHG | BODY MASS INDEX: 34.83 KG/M2 | TEMPERATURE: 97.8 F | WEIGHT: 177.4 LBS | OXYGEN SATURATION: 97 % | HEART RATE: 65 BPM | HEIGHT: 60 IN

## 2024-03-07 DIAGNOSIS — F41.0 GENERALIZED ANXIETY DISORDER WITH PANIC ATTACKS: ICD-10-CM

## 2024-03-07 DIAGNOSIS — F39 UNSPECIFIED MOOD (AFFECTIVE) DISORDER (HCC): ICD-10-CM

## 2024-03-07 DIAGNOSIS — F12.90 CANNABIS USE DISORDER: ICD-10-CM

## 2024-03-07 DIAGNOSIS — G47.01 INSOMNIA DUE TO MEDICAL CONDITION: ICD-10-CM

## 2024-03-07 DIAGNOSIS — F39 UNSPECIFIED MOOD (AFFECTIVE) DISORDER (HCC): Primary | ICD-10-CM

## 2024-03-07 DIAGNOSIS — E55.9 VITAMIN D DEFICIENCY: ICD-10-CM

## 2024-03-07 DIAGNOSIS — F41.1 GENERALIZED ANXIETY DISORDER WITH PANIC ATTACKS: ICD-10-CM

## 2024-03-07 DIAGNOSIS — F43.12 CHRONIC POST-TRAUMATIC STRESS DISORDER (PTSD): ICD-10-CM

## 2024-03-07 LAB
25(OH)D3 SERPL-MCNC: 13.7 NG/ML
VIT B12 SERPL-MCNC: 406 PG/ML (ref 211–946)

## 2024-03-07 PROCEDURE — 90792 PSYCH DIAG EVAL W/MED SRVCS: CPT

## 2024-03-07 PROCEDURE — 1036F TOBACCO NON-USER: CPT

## 2024-03-07 RX ORDER — CHOLECALCIFEROL (VITAMIN D3) 125 MCG
5 CAPSULE ORAL NIGHTLY
Qty: 30 TABLET | Refills: 0 | Status: SHIPPED | OUTPATIENT
Start: 2024-03-07

## 2024-03-07 RX ORDER — ERGOCALCIFEROL 1.25 MG/1
50000 CAPSULE ORAL WEEKLY
Qty: 4 CAPSULE | Refills: 2 | Status: SHIPPED | OUTPATIENT
Start: 2024-03-07

## 2024-03-07 RX ORDER — LAMOTRIGINE 25 MG/1
TABLET ORAL
Qty: 78 TABLET | Refills: 0 | Status: SHIPPED | OUTPATIENT
Start: 2024-03-07 | End: 2024-04-06

## 2024-03-07 RX ORDER — TRAZODONE HYDROCHLORIDE 50 MG/1
50-100 TABLET ORAL NIGHTLY
Qty: 60 TABLET | Refills: 0 | Status: SHIPPED | OUTPATIENT
Start: 2024-03-07

## 2024-03-07 RX ORDER — HYDROXYZINE HYDROCHLORIDE 25 MG/1
25 TABLET, FILM COATED ORAL 3 TIMES DAILY PRN
Qty: 90 TABLET | Refills: 0 | Status: SHIPPED | OUTPATIENT
Start: 2024-03-07

## 2024-03-07 ASSESSMENT — COLUMBIA-SUICIDE SEVERITY RATING SCALE - C-SSRS
1. WITHIN THE PAST MONTH, HAVE YOU WISHED YOU WERE DEAD OR WISHED YOU COULD GO TO SLEEP AND NOT WAKE UP?: YES
2. HAVE YOU ACTUALLY HAD ANY THOUGHTS OF KILLING YOURSELF?: NO
6. HAVE YOU EVER DONE ANYTHING, STARTED TO DO ANYTHING, OR PREPARED TO DO ANYTHING TO END YOUR LIFE?: NO

## 2024-03-07 ASSESSMENT — PATIENT HEALTH QUESTIONNAIRE - PHQ9
3. TROUBLE FALLING OR STAYING ASLEEP: 1
2. FEELING DOWN, DEPRESSED OR HOPELESS: 3
10. IF YOU CHECKED OFF ANY PROBLEMS, HOW DIFFICULT HAVE THESE PROBLEMS MADE IT FOR YOU TO DO YOUR WORK, TAKE CARE OF THINGS AT HOME, OR GET ALONG WITH OTHER PEOPLE: 2
6. FEELING BAD ABOUT YOURSELF - OR THAT YOU ARE A FAILURE OR HAVE LET YOURSELF OR YOUR FAMILY DOWN: 1
SUM OF ALL RESPONSES TO PHQ QUESTIONS 1-9: 15
4. FEELING TIRED OR HAVING LITTLE ENERGY: 2
SUM OF ALL RESPONSES TO PHQ9 QUESTIONS 1 & 2: 5
5. POOR APPETITE OR OVEREATING: 1
8. MOVING OR SPEAKING SO SLOWLY THAT OTHER PEOPLE COULD HAVE NOTICED. OR THE OPPOSITE, BEING SO FIGETY OR RESTLESS THAT YOU HAVE BEEN MOVING AROUND A LOT MORE THAN USUAL: 1
SUM OF ALL RESPONSES TO PHQ QUESTIONS 1-9: 12
SUM OF ALL RESPONSES TO PHQ QUESTIONS 1-9: 15
SUM OF ALL RESPONSES TO PHQ QUESTIONS 1-9: 15
7. TROUBLE CONCENTRATING ON THINGS, SUCH AS READING THE NEWSPAPER OR WATCHING TELEVISION: 1
9. THOUGHTS THAT YOU WOULD BE BETTER OFF DEAD, OR OF HURTING YOURSELF: 3
1. LITTLE INTEREST OR PLEASURE IN DOING THINGS: 2

## 2024-03-07 NOTE — TELEPHONE ENCOUNTER
CWON consult received. Patient with a LLE traumatic wound that had healed but since reopened. Patient was being seen at our [facility] and was discharged on 5/13. Today her wound is stable and measures 0.5 cm x 0.5 cm x 0.2 cm. There are no outward s/s of infection. The patient request Therahoney as this is what they had been using prior.  This is an appropriate topical treatment so we can begin this today with daily dressing changes.  Please see orders. CWON provided wound care and dressing change and pt tolerated well. Size F Tubigrip was applied to help manage any swelling.      Update given to primary RN..   Per direction of PORFIRIO ANDERSON,  Pt informed that her Vit D level is very low and she sent in a RX to her pharmacy for Vit D 50,000 units take one once weekly  and after several months level will be rechecked.  She was informed that her Vit B12 level was normal- no need for supplement.  Pt verbalized understanding and plans to follow.

## 2024-03-07 NOTE — PROGRESS NOTES
tablet, R-0Normal  -     Vitamin D 25 Hydroxy; Future  -     Vitamin B12; Future  2. Generalized anxiety disorder with panic attacks  -     Chasidy Monge LP[facility], Counseling, [place]  -     hydrOXYzine HCl (ATARAX) 25 MG tablet; Take 1 tablet by mouth 3 times daily as needed for Anxiety, Disp-90 tablet, R-0Normal  3. Chronic post-traumatic stress disorder (PTSD)  -     Chasidy Monge [facility], Counseling, [place]  4. Insomnia due to medical condition  -     Chasidy Monge [facility], Counsel[facility], [place]  -     traZODone (DESYREL) 50 MG tablet; Take 1-2 tablets by mouth nightly, Disp-60 tablet, R-0Normal  -     melatonin 5 MG TABS tablet; Take 1 tablet by mouth nightly, Disp-30 tablet, R-0Normal  5. Cannabis use disorder    Barby Reich, JUSTIN - CNP    This dictation was generated by voice recognition computer software.  Although all attempts are made to edit the dictation for accuracy, there may be errors in the transcription that are not intended.To get out of the house much due to

## 2024-03-07 NOTE — TELEPHONE ENCOUNTER
Pt was in office when this appt was scheduled from a referral    Scheduled with Chasidy Madrigal for 04/29/24 @ 10:00.    Electronically signed by Giovana Juares MA on 3/7/2024 at 2:23 PM

## 2024-03-15 ENCOUNTER — TELEPHONE (OUTPATIENT)
Dept: PSYCHIATRY | Age: 49
End: 2024-03-15

## 2024-03-15 NOTE — TELEPHONE ENCOUNTER
Spoke to patient regarding her bill. Let patient know we have sent her charges over to the insurance. Patient stated she didn't want to stop coming as the office has help her. Provided education regarding financial assistance. Mailed a financial application to the patient.

## 2024-03-31 DIAGNOSIS — F41.8 SITUATIONAL ANXIETY: ICD-10-CM

## 2024-04-01 DIAGNOSIS — F41.0 GENERALIZED ANXIETY DISORDER WITH PANIC ATTACKS: ICD-10-CM

## 2024-04-01 DIAGNOSIS — E78.2 MIXED HYPERLIPIDEMIA: ICD-10-CM

## 2024-04-01 DIAGNOSIS — F41.1 GENERALIZED ANXIETY DISORDER WITH PANIC ATTACKS: ICD-10-CM

## 2024-04-01 DIAGNOSIS — F39 UNSPECIFIED MOOD (AFFECTIVE) DISORDER (HCC): ICD-10-CM

## 2024-04-01 DIAGNOSIS — J30.2 SEASONAL ALLERGIES: ICD-10-CM

## 2024-04-01 RX ORDER — LEVOCETIRIZINE DIHYDROCHLORIDE 5 MG/1
5 TABLET, FILM COATED ORAL NIGHTLY
Qty: 30 TABLET | Refills: 0 | Status: SHIPPED | OUTPATIENT
Start: 2024-04-01

## 2024-04-01 RX ORDER — ATORVASTATIN CALCIUM 20 MG/1
20 TABLET, FILM COATED ORAL DAILY
Qty: 90 TABLET | Refills: 1 | OUTPATIENT
Start: 2024-04-01

## 2024-04-01 RX ORDER — BUSPIRONE HYDROCHLORIDE 10 MG/1
10 TABLET ORAL 2 TIMES DAILY
Qty: 60 TABLET | Refills: 1 | Status: SHIPPED | OUTPATIENT
Start: 2024-04-01

## 2024-04-01 NOTE — TELEPHONE ENCOUNTER
Alondra Pickard called to request a refill on her medication.      Last office visit : 3/4/2024   Next office visit : 4/15/2024     Requested Prescriptions     Pending Prescriptions Disp Refills    busPIRone (BUSPAR) 10 MG tablet [Pharmacy Med Name: BUSPIRONE HCL 10 MG TABLET] 60 tablet 0     Sig: TAKE 1 TABLET BY MOUTH TWICE A DAY            Jennie Marti MA

## 2024-04-01 NOTE — TELEPHONE ENCOUNTER
Alondra Pickard called to request a refill on her medication.      Last office visit : 3/4/2024   Next office visit : 4/15/2024     Requested Prescriptions     Pending Prescriptions Disp Refills    levocetirizine (XYZAL) 5 MG tablet [Pharmacy Med Name: LEVOCETIRIZINE 5 MG TABLET] 30 tablet 0     Sig: TAKE 1 TABLET BY MOUTH EVERY DAY AT NIGHT    atorvastatin (LIPITOR) 20 MG tablet [Pharmacy Med Name: ATORVASTATIN 20 MG TABLET] 90 tablet 1     Sig: TAKE 1 TABLET BY MOUTH EVERY DAY            Natalie Maciel LPN

## 2024-04-01 NOTE — TELEPHONE ENCOUNTER
PT IS TAKING 100 MG       Pharmacy sent a request to refill pt's medication       Last office visit : 3/7/2024 PORFIRIO ANDERSON  Next office visit : 4/11/2024 S Damir ANDERSON    Requested Prescriptions     Pending Prescriptions Disp Refills    lamoTRIgine (LAMICTAL) 25 MG tablet [Pharmacy Med Name: LAMOTRIGINE 25 MG TABLET] 78 tablet 0     Sig: TAKE 1 TABLET BY MOUTH DAILY FOR 7 DAYS, THEN 2 TABLETS DAILY FOR 7 DAYS, THEN 3 TABLETS DAILY FOR 7 DAYS, THEN 4 TABLETS DAILY FOR 9 DAYS.    hydrOXYzine HCl (ATARAX) 25 MG tablet [Pharmacy Med Name: HYDROXYZINE HCL 25 MG TABLET] 90 tablet 0     Sig: TAKE 1 TABLET BY MOUTH THREE TIMES A DAY AS NEEDED FOR ANXIETY            Marcela [facility]        PSYCHIATRIC EVALUATION     Date of Service:  3/7/24          Chief Complaint   Patient presents with    New Patient         HISTORY OF PRESENT ILLNESS  The patient is a 49 y.o.   female who is here for psychiatric evaluation due to continual complaints of anxiety.     Today patient states, \" \"my anxiety has gotten really bad lately\".     Patient seen in office today, wearing casual clothing, appropriate for season.  She is alert and oriented to person, place, time, and situation.  She is visibly anxious, restless and fidgety in her seat, bouncing leg continuously throughout interview, looks away from provider through a lot of interview, but is cooperative, redirectable and pleasant.  He reports history of anxiety, mood disorder, depression, PTSD, insomnia, obstructive sleep apnea, hypothyroidism.  Patient reports she has suffered from anxiety from younger age, reports mood disturbance since she was younger, however reports she had therapy multiple times throughout her life, never feels that it was helpful.  Last time was about a year ago, was going to [place] therapy for about 6 months, however kept getting switched around to different therapists, so she stopped going.  She reports over the last several years her anxiety has

## 2024-04-02 RX ORDER — LAMOTRIGINE 100 MG/1
100 TABLET ORAL DAILY
Qty: 30 TABLET | Refills: 0 | Status: SHIPPED | OUTPATIENT
Start: 2024-04-02

## 2024-04-02 RX ORDER — HYDROXYZINE HYDROCHLORIDE 25 MG/1
25 TABLET, FILM COATED ORAL 3 TIMES DAILY PRN
Qty: 90 TABLET | Refills: 0 | Status: SHIPPED | OUTPATIENT
Start: 2024-04-02

## 2024-04-03 ENCOUNTER — TELEPHONE (OUTPATIENT)
Dept: PSYCHIATRY | Age: 49
End: 2024-04-03

## 2024-04-03 DIAGNOSIS — G47.01 INSOMNIA DUE TO MEDICAL CONDITION: ICD-10-CM

## 2024-04-03 RX ORDER — TRAZODONE HYDROCHLORIDE 50 MG/1
50-100 TABLET ORAL NIGHTLY
Qty: 60 TABLET | Refills: 0 | Status: SHIPPED | OUTPATIENT
Start: 2024-04-03

## 2024-04-03 NOTE — TELEPHONE ENCOUNTER
Called and reschedule appt with pt for 4/11 @ 10:30 AM due to Barby ANDERSON being out of the office that day     Appt rescheduled for 4/8 @ 9 AM      Electronically signed by Marcela Collazo on 4/3/2024 at 4:27 PM

## 2024-04-03 NOTE — TELEPHONE ENCOUNTER
Pharmacy sent a request to refill pt's medication       Last office visit : 3/7/2024 S Damir ANDERSON  Next office visit : 4/11/2024 S Damir ANDERSON    Requested Prescriptions     Pending Prescriptions Disp Refills    traZODone (DESYREL) 50 MG tablet [Pharmacy Med Name: TRAZODONE 50 MG TABLET] 60 tablet 0     Sig: TAKE 1 TO 2 TABLETS BY MOUTH NIGHTLY            Marcela Collazo        PSYCHIATRIC EVALUATION     Date of Service:  3/7/24          Chief Complaint   Patient presents with    New Patient         HISTORY OF PRESENT ILLNESS  The patient is a 49 y.o.   female who is here for psychiatric evaluation due to continual complaints of anxiety.     Today patient states, \" \"my anxiety has gotten really bad lately\".     Patient seen in office today, wearing casual clothing, appropriate for season.  She is alert and oriented to person, place, time, and situation.  She is visibly anxious, restless and fidgety in her seat, bouncing leg continuously throughout interview, looks away from provider through a lot of interview, but is cooperative, redirectable and pleasant.  He reports history of anxiety, mood disorder, depression, PTSD, insomnia, obstructive sleep apnea, hypothyroidism.  Patient reports she has suffered from anxiety from younger age, reports mood disturbance since she was younger, however reports she had therapy multiple times throughout her life, never feels that it was helpful.  Last time was about a year ago, was going to [place] therapy for about 6 months, however kept getting switched around to different therapists, so she stopped going.  She reports over the last several years her anxiety has worsened, to the point she is no longer working, she states \"it is hard for me to even go out in public, I hate leaving the house, I cannot hardly without my  with me except maybe to the doctor's visit\".  She reports that she and her  live in a trailer park, trail is owned by her in-laws, and she

## 2024-04-03 NOTE — TELEPHONE ENCOUNTER
Called and let pt know that her script for trazodone was sent to her pharmacy     Electronically signed by Marcela Collazo on 4/3/2024 at 11:22 AM

## 2024-04-05 ENCOUNTER — TELEPHONE (OUTPATIENT)
Dept: PSYCHIATRY | Age: 49
End: 2024-04-05

## 2024-04-05 NOTE — TELEPHONE ENCOUNTER
Called and confirmed appt with pt for 4/8 @ 9 AM    Electronically signed by Marcela Collazo on 4/5/2024 at 11:13 AM

## 2024-04-08 ENCOUNTER — OFFICE VISIT (OUTPATIENT)
Dept: PSYCHIATRY | Age: 49
End: 2024-04-08
Payer: MEDICAID

## 2024-04-08 VITALS
DIASTOLIC BLOOD PRESSURE: 81 MMHG | OXYGEN SATURATION: 97 % | SYSTOLIC BLOOD PRESSURE: 123 MMHG | BODY MASS INDEX: 34.55 KG/M2 | HEIGHT: 60 IN | HEART RATE: 81 BPM | WEIGHT: 176 LBS | TEMPERATURE: 97 F

## 2024-04-08 DIAGNOSIS — F43.12 CHRONIC POST-TRAUMATIC STRESS DISORDER (PTSD): ICD-10-CM

## 2024-04-08 DIAGNOSIS — F41.0 GENERALIZED ANXIETY DISORDER WITH PANIC ATTACKS: ICD-10-CM

## 2024-04-08 DIAGNOSIS — G47.01 INSOMNIA DUE TO MEDICAL CONDITION: ICD-10-CM

## 2024-04-08 DIAGNOSIS — F39 UNSPECIFIED MOOD (AFFECTIVE) DISORDER (HCC): Primary | ICD-10-CM

## 2024-04-08 DIAGNOSIS — F12.90 CANNABIS USE DISORDER: ICD-10-CM

## 2024-04-08 DIAGNOSIS — F41.1 GENERALIZED ANXIETY DISORDER WITH PANIC ATTACKS: ICD-10-CM

## 2024-04-08 PROCEDURE — G8427 DOCREV CUR MEDS BY ELIG CLIN: HCPCS

## 2024-04-08 PROCEDURE — 1036F TOBACCO NON-USER: CPT

## 2024-04-08 PROCEDURE — 99214 OFFICE O/P EST MOD 30 MIN: CPT

## 2024-04-08 PROCEDURE — G8417 CALC BMI ABV UP PARAM F/U: HCPCS

## 2024-04-08 RX ORDER — PRAZOSIN HYDROCHLORIDE 1 MG/1
1 CAPSULE ORAL NIGHTLY
Qty: 30 CAPSULE | Refills: 0 | Status: SHIPPED | OUTPATIENT
Start: 2024-04-08

## 2024-04-08 ASSESSMENT — PATIENT HEALTH QUESTIONNAIRE - PHQ9
SUM OF ALL RESPONSES TO PHQ QUESTIONS 1-9: 14
10. IF YOU CHECKED OFF ANY PROBLEMS, HOW DIFFICULT HAVE THESE PROBLEMS MADE IT FOR YOU TO DO YOUR WORK, TAKE CARE OF THINGS AT HOME, OR GET ALONG WITH OTHER PEOPLE: SOMEWHAT DIFFICULT
5. POOR APPETITE OR OVEREATING: MORE THAN HALF THE DAYS
1. LITTLE INTEREST OR PLEASURE IN DOING THINGS: MORE THAN HALF THE DAYS
SUM OF ALL RESPONSES TO PHQ9 QUESTIONS 1 & 2: 4
SUM OF ALL RESPONSES TO PHQ QUESTIONS 1-9: 14
9. THOUGHTS THAT YOU WOULD BE BETTER OFF DEAD, OR OF HURTING YOURSELF: NOT AT ALL
SUM OF ALL RESPONSES TO PHQ QUESTIONS 1-9: 14
7. TROUBLE CONCENTRATING ON THINGS, SUCH AS READING THE NEWSPAPER OR WATCHING TELEVISION: SEVERAL DAYS
3. TROUBLE FALLING OR STAYING ASLEEP: MORE THAN HALF THE DAYS
8. MOVING OR SPEAKING SO SLOWLY THAT OTHER PEOPLE COULD HAVE NOTICED. OR THE OPPOSITE, BEING SO FIGETY OR RESTLESS THAT YOU HAVE BEEN MOVING AROUND A LOT MORE THAN USUAL: MORE THAN HALF THE DAYS
2. FEELING DOWN, DEPRESSED OR HOPELESS: MORE THAN HALF THE DAYS
4. FEELING TIRED OR HAVING LITTLE ENERGY: SEVERAL DAYS
SUM OF ALL RESPONSES TO PHQ QUESTIONS 1-9: 14
6. FEELING BAD ABOUT YOURSELF - OR THAT YOU ARE A FAILURE OR HAVE LET YOURSELF OR YOUR FAMILY DOWN: MORE THAN HALF THE DAYS

## 2024-04-08 NOTE — PROGRESS NOTES
4/8/24        Progress Note    Alondra Pickard 1975      Chief Complaint   Patient presents with    Medication Check         Subjective:    Patient is a 49 y.o. female diagnosed with unspecified mood disorder, BENY with panic attacks, chronic PTSD, insomnia due to medical condition, cannabis use disorder, and presents today for follow-up.  Last seen in clinic on 3/7/2024  and prior records were reviewed.    Last visit: \"my anxiety has gotten really bad lately\".    Patient seen in office today, wearing casual clothing, appropriate for season.  She is alert and oriented to person, place, time, and situation.  She is visibly anxious, restless and fidgety in her seat, bouncing leg continuously throughout interview, looks away from provider through a lot of interview, but is cooperative, redirectable and pleasant.  She reports history of anxiety, mood disorder, depression, PTSD, insomnia, obstructive sleep apnea, hypothyroidism.  Patient reports she has suffered from anxiety from younger age, reports mood disturbance since she was younger, however reports she had therapy multiple times throughout her life, never feels that it was helpful.  Last time was about a year ago, was going to [place] therapy for about 6 months, however kept getting switched around to different therapists, so she stopped going.  She reports over the last several years her anxiety has worsened, to the point she is no longer working, she states \"it is hard for me to even go out in public, I hate leaving the house, I cannot hardly without my  with me except maybe to the doctor's visit\".  She reports that she and her  live in a trailer park, trailer is owned by her in-laws, and she reports she and her  just pay rent for the actual lot.  She reports a lot was sold about 2 years ago, and since that time the new owners have been raising rent, reports is getting to the point they cannot afford it.  She is very concerned she is

## 2024-04-11 NOTE — PROGRESS NOTES
[facility]  Office Visit        Chief Complaint   Patient presents with    New Patient     Carpal Tunnel Syndrome       HISTORY OF PRESENT ILLNESS:      The patient is a 56 y.o. right-handed female who presents for neurosurgical evaluation of carpal tunnel syndrome. She complains of numbness and paresthesias in her hands (R>L) that will frequently awaken her at night. She also complains of numbness in her hands (again R>L) that is worsened by activity. Driving and housework tend to particularly exacerbate her symptoms. She also reports that she occasionally drops objects unintentionally. She has tried wearing wrist splints and believes that these do decrease the numbness in her hands but she has significant AM stiffness in her wrist after wearing them. She has recently undergone an EMG/NCS with Dr. Darlyn Perez that was interpreted as normal.  She reports that the numbness is worst in her middle and ring fingers on the right hand. She does have a history of chronic neck pain and had a cervical spine MRI done ~5 years ago that was essentially normal.  She does not describe a clear radicular pattern to her pain and her neck symptoms haven't really changed in the past 5 years.       MEDICAL HISTORY:             Past Medical History:   Diagnosis Date    Anxiety     Asthma     born with asthmatic bronchitis    CPAP (continuous positive airway pressure) dependence     6cm to 16cm    Degenerative disk disease     Depression     mood disorders    Hemorrhoid 11/2015    Hyperlipidemia     Hypoglycemia     Hypothyroid     Left ureteral stone 5/25/2016    OAB (overactive bladder)     Obstructive sleep apnea     AHI: 37.9 per PSG, 10/2019    Obstructive sleep apnea 4/30/2020    Renal stone 5/25/2016       Past Surgical History:   Procedure Laterality Date    BREAST BIOPSY Left 2018    benign calcification    CHOLECYSTECTOMY  1995    COLONOSCOPY  04/29/2015    Dr Avilez-Internal hemorrhoids, repeat at age 49    COLONOSCOPY N/A 2022    Dr Khris Wahl, [place],  Int hemorrhoids Gr 1, 5 year recall    ENDOMETRIAL ABLATION  2015    Dr. Herrera Bis  2015    Hemorrhoidectomy & closed lateral internal sphincterotomy    HYSTEROSCOPY  2015    Dr. Mike Monroy    POLYPECTOMY  2015    Dr. Mini Taylor  14 years ago    UPPER GASTROINTESTINAL ENDOSCOPY N/A 2022    Dr Khris Wahl, [address] 47 Fr dil, (-) int Metaplasia    UPPER GASTROINTESTINAL ENDOSCOPY  2022    Dr Khris Wahl, Nayana Patel 47 Fr bougie dilation         Current Outpatient Medications:     pantoprazole (PROTONIX) 40 MG tablet, TAKE 1 TABLET BY MOUTH EVERY DAY BEFORE BREAKFAST, Disp: 90 tablet, Rfl: 3    ondansetron (ZOFRAN) 4 MG tablet, Take 1 tablet by mouth 3 times daily as needed for Nausea or Vomiting, Disp: 30 tablet, Rfl: 0    atorvastatin (LIPITOR) 20 MG tablet, TAKE 1 TABLET BY MOUTH EVERY DAY, Disp: 30 tablet, Rfl: 5    levocetirizine (XYZAL) 5 MG tablet, TAKE 1 TABLET BY MOUTH EVERY DAY AT NIGHT, Disp: 30 tablet, Rfl: 5    levothyroxine (SYNTHROID) 50 MCG tablet, TAKE 1 TABLET BY MOUTH EVERY DAY, Disp: 30 tablet, Rfl: 11    COMBIVENT RESPIMAT  MCG/ACT AERS inhaler, INHALE 1 PUFF EVERY 4 HOURS AS NEEDED FOR WHEEZING, Disp: 1 each, Rfl: 5    fluticasone (FLONASE) 50 MCG/ACT nasal spray, SPRAY 2 SPRAYS INTO EACH NOSTRIL EVERY DAY, Disp: 2 each, Rfl: 5    ondansetron (ZOFRAN ODT) 4 MG disintegrating tablet, Place 1 tablet under the tongue every 8 hours as needed for Nausea or Vomiting, Disp: 21 tablet, Rfl: 0    propranolol (INDERAL) 40 MG tablet, TAKE 1 TABLET BY MOUTH THREE TIMES A DAY, Disp: 270 tablet, Rfl: 3    Allergies:  Codeine    Social History:   Social History     Tobacco Use   Smoking Status Former Smoker    Packs/day: 0.25    Years: 27.00    Pack years: 6.75    Quit date: 2009    Years since quittin.0   Smokeless Tobacco Former User Tobacco Comment    Not employed     Social History     Substance and Sexual Activity   Alcohol Use Yes    Alcohol/week: 0.0 standard drinks    Comment: rare         Family History:   Family History   Problem Relation Age of Onset    High Blood Pressure Mother     Other Mother         early alzheimers    High Blood Pressure Father     Diabetes Paternal Grandfather     Breast Cancer Paternal Aunt 29    Colon Cancer Neg Hx     Colon Polyps Neg Hx        REVIEW OF SYSTEMS:    Constitutional: Negative. HENT: Negative. Eyes: Negative. Respiratory: Negative. Cardiovascular: Negative. Gastrointestinal: Negative. Genitourinary: Negative. Musculoskeletal: Negative. Skin: Negative. Neurological: Negative. Endo/Heme/Allergies: Bruises/bleeds easily. Psychiatric/Behavioral: Negative. Review of Systems was obtained by the medical assistant and reviewed by myself. PHYSICAL EXAM:    Vitals:    05/12/22 1013   BP: 118/82   Pulse: 57   SpO2: 99%       Constitutional: Appears well-developed and well-nourished. Eyes - conjunctiva normal.  Pupils react to light  Ear, nose,throat -Normal pinna and tragus, No scars, or lesions over external nose or ears, no obvious atrophy of tongue  Neck- symmetric, trachea midline, no jugular vein distension  Respiration- chest wall symmetric, normal effort without use of accessory muscles  Musculoskeletal - no significant wasting of muscles noted, no bony deformities, symmetric bulk  Extremities- no clubbing, cyanosis or edema  Skin - warm, dry, and intact. No rash,erythema, or pallor.   Psychiatric - mood, affect, and behavior appear normal.       NEUROLOGIC EXAM:    MENTAL STATUS: Alert, oriented, thought content appropriate    CRANIAL NERVES: PERRL, EOMI, symmetric facies, tongue midline    MOTOR:     Right Upper Extremity:    Deltoid: 5/5  Biceps: 5/5  Triceps: 5/5  Wrist Extension: 5/5  Finger Abduction: 5/5  APB: 5/5    Left Upper Extremity:    Deltoid: 5/5  Biceps: 5/5  Triceps: 5/5  Wrist Extension: 5/5  Finger Abduction: 5/5  APB: 5/5    Right Lower Extremity:    Hip Flexion: 5/5  Knee Extension: 5/5  Ankle Plantarflexion: 5/5  Ankle Dorsiflexion: 5/5      Left Lower Extremity:    Hip Flexion: 5/5  Knee Extension: 5/5  Ankle Plantarflexion: 5/5  Ankle Dorsiflexion: 5/5      SOMATOSENSORY:     Right Upper Extremity: Decreased to light touch in the middle and ring fingers. Decreased to pinprick in all fingers. Left Upper Extremity: normal pin prick sensation and normal light touch sensation  Right Lower Extremity: normal light touch sensation  Left Lower Extremity: normal light touch sensation      REFLEXES:    Biceps: 2+  Patella: 2+      Griffin's: Negative      COORDINATION and GAIT:    Gait: Normal      PROVOCATIVE TESTS:    Tinel's: Negative at the wrist and elbow bilaterally  Phalen's: Positive on the right after ~30 seconds      DATA:    Lab Results   Component Value Date    WBC 8.7 01/11/2022    HGB 12.3 01/11/2022    HCT 38.3 01/11/2022    MCV 91.4 01/11/2022     01/11/2022     Lab Results   Component Value Date     01/11/2022    K 3.0 (L) 01/11/2022     01/11/2022    CO2 33 (H) 01/11/2022    BUN 5 (L) 01/11/2022    CREATININE 0.5 01/11/2022    GLUCOSE 99 01/11/2022    CALCIUM 8.6 01/11/2022    PROT 6.5 (L) 01/11/2022    LABALBU 3.6 01/11/2022    BILITOT <0.2 01/11/2022    ALKPHOS 69 01/11/2022    AST 19 01/11/2022    ALT 21 01/11/2022    LABGLOM >60 01/11/2022    GFRAA >59 01/11/2022    GLOB 3.1 05/12/2017     Lab Results   Component Value Date    INR 0.87 11/05/2015    INR 0.89 (L) 10/02/2012    PROTIME 11.6 (L) 11/05/2015    PROTIME 11.7 10/02/2012           ASSESSMENT AND PLAN:  This is a 56 y.o. female who presents for neurosurgical evaluation of carpal tunnel syndrome, R>L.   She has symptoms that are consistent with carpal tunnel syndrome, however her nerve conduction study is normal.  Her pattern of numbness in the right hand is also somewhat atypical with the most profound sensory loss in the ring and middle fingers and loss of pinprick sensation in all fingers (including the little finger). Her Tinel's test is also negative at the right (and left) wrist.  I explained that there are patients with clinical carpal tunnel syndrome and normal nerve conduction studies who do benefit from surgery, however those patients almost always have a positive Tinel's test at the right wrist.  In her case, I feel more diagnostic studies are warranted and recommended that we obtain an ultrasound of the right wrist to investigate for anatomic compression of the median nerve in the carpal tunnel. If the ultrasound is unrevealing, she may benefit from a repeat cervical spine MRI or formal evaluation by neurology.             Elliott Hines MD no

## 2024-04-15 ENCOUNTER — OFFICE VISIT (OUTPATIENT)
Dept: PRIMARY CARE CLINIC | Age: 49
End: 2024-04-15

## 2024-04-15 VITALS
HEART RATE: 72 BPM | DIASTOLIC BLOOD PRESSURE: 78 MMHG | HEIGHT: 60 IN | BODY MASS INDEX: 34.75 KG/M2 | SYSTOLIC BLOOD PRESSURE: 124 MMHG | TEMPERATURE: 97.9 F | WEIGHT: 177 LBS | OXYGEN SATURATION: 97 %

## 2024-04-15 DIAGNOSIS — Z00.01 ENCOUNTER FOR GENERAL ADULT MEDICAL EXAMINATION WITH ABNORMAL FINDINGS: Primary | ICD-10-CM

## 2024-04-15 DIAGNOSIS — E55.9 VITAMIN D DEFICIENCY: ICD-10-CM

## 2024-04-15 DIAGNOSIS — Z12.4 SCREENING FOR CERVICAL CANCER: ICD-10-CM

## 2024-04-15 DIAGNOSIS — E03.9 ACQUIRED HYPOTHYROIDISM: ICD-10-CM

## 2024-04-15 DIAGNOSIS — E78.2 MIXED HYPERLIPIDEMIA: ICD-10-CM

## 2024-04-15 DIAGNOSIS — Z76.0 MEDICATION REFILL: ICD-10-CM

## 2024-04-15 DIAGNOSIS — Z71.89 ACP (ADVANCE CARE PLANNING): ICD-10-CM

## 2024-04-15 DIAGNOSIS — M25.551 PAIN OF RIGHT HIP: ICD-10-CM

## 2024-04-15 PROBLEM — H92.01 OTALGIA OF RIGHT EAR: Status: RESOLVED | Noted: 2021-04-01 | Resolved: 2024-04-15

## 2024-04-15 RX ORDER — ONDANSETRON 4 MG/1
4 TABLET, ORALLY DISINTEGRATING ORAL EVERY 8 HOURS PRN
Qty: 21 TABLET | Refills: 0 | Status: SHIPPED | OUTPATIENT
Start: 2024-04-15

## 2024-04-15 ASSESSMENT — ENCOUNTER SYMPTOMS
WHEEZING: 0
SHORTNESS OF BREATH: 0
BACK PAIN: 0
EYE PAIN: 0
DIARRHEA: 0
VOMITING: 0
TROUBLE SWALLOWING: 0
ABDOMINAL PAIN: 0
CHEST TIGHTNESS: 0
NAUSEA: 1
SORE THROAT: 0
CONSTIPATION: 0
COUGH: 0

## 2024-04-15 NOTE — PROGRESS NOTES
[facility] Adult Note  Name: Alondra Pickard Today’s Date: 4/15/2024   MRN: [id number] Sex: Female   Age: 49 y.o. Ethnicity: Non- / Non    : 1975 Race: White (non-)      Alondra Pickard is here for well adult exam.  History:  Pt presents today for her annual physical and routine preventative visit.   PMHx reviewed. No significant change. Family and social history also reviewed. No recent ER or UC visit. No recent hospitalization.   Chronic medications reviewed, updated and reconciled  Pt does not smoke, drink ETOH or use recreational drugs.  She  is up to date on her immunizations.   She is due for screening preventative labs   Overall, she states that she feels well and healthy.     Review of Systems   Constitutional:  Negative for appetite change, chills, fatigue and fever.   HENT:  Negative for congestion, ear pain, hearing loss, nosebleeds, sore throat and trouble swallowing.    Eyes:  Negative for pain and visual disturbance.   Respiratory:  Negative for cough, chest tightness, shortness of breath and wheezing.    Cardiovascular:  Negative for chest pain, palpitations and leg swelling.   Gastrointestinal:  Positive for nausea (occasional). Negative for abdominal pain, constipation, diarrhea and vomiting.   Endocrine: Negative for cold intolerance, heat intolerance, polydipsia, polyphagia and polyuria.   Genitourinary:  Negative for difficulty urinating, dysuria, frequency, hematuria and urgency.   Musculoskeletal:  Positive for arthralgias (R hip . R ankle). Negative for back pain, gait problem, joint swelling, myalgias, neck pain and neck stiffness.   Skin:  Negative for pallor and rash.   Allergic/Immunologic: Negative for environmental allergies and food allergies.   Neurological:  Negative for dizziness, weakness and numbness.   Hematological:  Negative for adenopathy. Does not bruise/bleed easily.   Psychiatric/Behavioral:  Negative for agitation, behavioral problems, decreased

## 2024-04-15 NOTE — PATIENT INSTRUCTIONS
Give your family updated copies of the papers.  Where can you learn more?  Go to https://[email or web address].net/patientEd and enter P184 to learn more about \"Advance Care Planning: Care Instructions.\"  Current as of: October 24, 2023               Content Version: 14.0  © 0011-1725 [employer].   Care instructions adapted under license by [employer]. If you have questions about a medical condition or this instruction, always ask your healthcare professional. [employer] disclaims any warranty or liability for your use of this information.           A Healthy Lifestyle: Care Instructions  A healthy lifestyle can help you feel good, have more energy, and stay at a weight that's healthy for you. You can share a healthy lifestyle with your friends and family. And you can do it on your own.    Eat meals with your friends or family. You could try cooking together.    Plan activities with other people. Go for a walk with a friend, try a free online fitness class, or join a sports league.    Eat a variety of healthy foods. These include fruits, vegetables, whole grains, low-fat dairy, and lean protein.    Choose healthy portions of food. You can use the Nutrition Facts label on food packages as a guide.    Eat more fruits and vegetables. You could add vegetables to sandwiches or add fruit to cereal.    Drink water when you are thirsty. Limit soda, juice, and sports drinks.    Try to exercise most days. Aim for at least 2½ hours of exercise each week.    Keep moving. Work in the garden or take your dog on a walk. Use the stairs instead of the elevator.    If you use tobacco or nicotine, try to quit. Ask your doctor about programs and medicines to help you quit.    Limit alcohol. Men should have no more than 2 drinks a day. Women should have no more than 1. For some people, no alcohol is the best choice.  Follow-up care is a key part of your treatment and safety. Be sure to make and go to all

## 2024-04-17 ENCOUNTER — TELEPHONE (OUTPATIENT)
Dept: PRIMARY CARE CLINIC | Age: 49
End: 2024-04-17

## 2024-04-17 NOTE — TELEPHONE ENCOUNTER
----- Message from Roopa Valentin MD sent at 4/16/2024  4:12 PM CDT -----  Pap smear detect abnormal cells before they become cancerous. Your pap smear came back NORMAL.    The new guidelines recommend repeat pap smear:  Age 21-29 - every 3 years  Age 30-65 - every 3 years alone OR every 5 years with pap/HPV co test  Older then 65 - not necessary if you've had adequate prior screenings with normal results and are NOT at high risk for cervical cancer

## 2024-04-18 ENCOUNTER — HOSPITAL ENCOUNTER (OUTPATIENT)
Dept: GENERAL RADIOLOGY | Age: 49
Discharge: HOME | End: 2024-04-18
Payer: MEDICAID

## 2024-04-18 DIAGNOSIS — Z00.01 ENCOUNTER FOR GENERAL ADULT MEDICAL EXAMINATION WITH ABNORMAL FINDINGS: ICD-10-CM

## 2024-04-18 DIAGNOSIS — E55.9 VITAMIN D DEFICIENCY: ICD-10-CM

## 2024-04-18 DIAGNOSIS — E03.9 ACQUIRED HYPOTHYROIDISM: ICD-10-CM

## 2024-04-18 DIAGNOSIS — E78.2 MIXED HYPERLIPIDEMIA: ICD-10-CM

## 2024-04-18 DIAGNOSIS — M25.551 PAIN OF RIGHT HIP: ICD-10-CM

## 2024-04-18 LAB
25(OH)D3 SERPL-MCNC: 34 NG/ML
ALBUMIN SERPL-MCNC: 4.1 G/DL (ref 3.5–5.2)
ALP SERPL-CCNC: 90 U/L (ref 35–104)
ALT SERPL-CCNC: 18 U/L (ref 5–33)
ANION GAP SERPL CALCULATED.3IONS-SCNC: 10 MMOL/L (ref 7–19)
AST SERPL-CCNC: 15 U/L (ref 5–32)
BASOPHILS # BLD: 0 K/UL (ref 0–0.2)
BASOPHILS NFR BLD: 0.3 % (ref 0–1)
BILIRUB SERPL-MCNC: <0.2 MG/DL (ref 0.2–1.2)
BUN SERPL-MCNC: 9 MG/DL (ref 6–20)
CALCIUM SERPL-MCNC: 9.4 MG/DL (ref 8.6–10)
CHLORIDE SERPL-SCNC: 101 MMOL/L (ref 98–111)
CHOLEST SERPL-MCNC: 161 MG/DL (ref 160–199)
CO2 SERPL-SCNC: 27 MMOL/L (ref 22–29)
CREAT SERPL-MCNC: 0.9 MG/DL (ref 0.5–0.9)
EOSINOPHIL # BLD: 0.1 K/UL (ref 0–0.6)
EOSINOPHIL NFR BLD: 1.3 % (ref 0–5)
ERYTHROCYTE [DISTWIDTH] IN BLOOD BY AUTOMATED COUNT: 12.5 % (ref 11.5–14.5)
GLUCOSE SERPL-MCNC: 83 MG/DL (ref 74–109)
HCT VFR BLD AUTO: 39.8 % (ref 37–47)
HDLC SERPL-MCNC: 39 MG/DL (ref 65–121)
HGB BLD-MCNC: 12.9 G/DL (ref 12–16)
IMM GRANULOCYTES # BLD: 0 K/UL
LDLC SERPL CALC-MCNC: 87 MG/DL
LYMPHOCYTES # BLD: 3.3 K/UL (ref 1.1–4.5)
LYMPHOCYTES NFR BLD: 35.8 % (ref 20–40)
MCH RBC QN AUTO: 29.6 PG (ref 27–31)
MCHC RBC AUTO-ENTMCNC: 32.4 G/DL (ref 33–37)
MCV RBC AUTO: 91.3 FL (ref 81–99)
MONOCYTES # BLD: 0.5 K/UL (ref 0–0.9)
MONOCYTES NFR BLD: 5.5 % (ref 0–10)
NEUTROPHILS # BLD: 5.2 K/UL (ref 1.5–7.5)
NEUTS SEG NFR BLD: 56.9 % (ref 50–65)
PLATELET # BLD AUTO: 334 K/UL (ref 130–400)
PMV BLD AUTO: 10.4 FL (ref 9.4–12.3)
POTASSIUM SERPL-SCNC: 4.1 MMOL/L (ref 3.5–5)
PROT SERPL-MCNC: 7.1 G/DL (ref 6.6–8.7)
RBC # BLD AUTO: 4.36 M/UL (ref 4.2–5.4)
SODIUM SERPL-SCNC: 138 MMOL/L (ref 136–145)
TRIGL SERPL-MCNC: 173 MG/DL (ref 0–149)
TSH SERPL DL<=0.005 MIU/L-ACNC: 2.75 UIU/ML (ref 0.27–4.2)
WBC # BLD AUTO: 9.2 K/UL (ref 4.8–10.8)

## 2024-04-18 PROCEDURE — 73521 X-RAY EXAM HIPS BI 2 VIEWS: CPT

## 2024-04-23 ENCOUNTER — OFFICE VISIT (OUTPATIENT)
Dept: PRIMARY CARE CLINIC | Age: 49
End: 2024-04-23
Payer: MEDICAID

## 2024-04-23 ENCOUNTER — HOSPITAL ENCOUNTER (OUTPATIENT)
Dept: GENERAL RADIOLOGY | Age: 49
Discharge: HOME | End: 2024-04-23
Payer: MEDICAID

## 2024-04-23 VITALS
TEMPERATURE: 97.7 F | WEIGHT: 177.4 LBS | OXYGEN SATURATION: 97 % | DIASTOLIC BLOOD PRESSURE: 78 MMHG | SYSTOLIC BLOOD PRESSURE: 124 MMHG | HEART RATE: 83 BPM | BODY MASS INDEX: 34.83 KG/M2 | HEIGHT: 60 IN

## 2024-04-23 DIAGNOSIS — G47.01 INSOMNIA DUE TO MEDICAL CONDITION: ICD-10-CM

## 2024-04-23 DIAGNOSIS — F41.0 GENERALIZED ANXIETY DISORDER WITH PANIC ATTACKS: ICD-10-CM

## 2024-04-23 DIAGNOSIS — F41.1 GENERALIZED ANXIETY DISORDER WITH PANIC ATTACKS: ICD-10-CM

## 2024-04-23 DIAGNOSIS — M16.0 PRIMARY OSTEOARTHRITIS OF BOTH HIPS: ICD-10-CM

## 2024-04-23 DIAGNOSIS — E78.1 HYPERTRIGLYCERIDEMIA: ICD-10-CM

## 2024-04-23 DIAGNOSIS — M79.671 FOOT PAIN, RIGHT: ICD-10-CM

## 2024-04-23 DIAGNOSIS — F43.12 CHRONIC POST-TRAUMATIC STRESS DISORDER (PTSD): ICD-10-CM

## 2024-04-23 DIAGNOSIS — F39 UNSPECIFIED MOOD (AFFECTIVE) DISORDER (HCC): ICD-10-CM

## 2024-04-23 DIAGNOSIS — M79.671 FOOT PAIN, RIGHT: Primary | ICD-10-CM

## 2024-04-23 PROCEDURE — 99213 OFFICE O/P EST LOW 20 MIN: CPT | Performed by: FAMILY MEDICINE

## 2024-04-23 PROCEDURE — 1036F TOBACCO NON-USER: CPT | Performed by: FAMILY MEDICINE

## 2024-04-23 PROCEDURE — G8427 DOCREV CUR MEDS BY ELIG CLIN: HCPCS | Performed by: FAMILY MEDICINE

## 2024-04-23 PROCEDURE — G8417 CALC BMI ABV UP PARAM F/U: HCPCS | Performed by: FAMILY MEDICINE

## 2024-04-23 PROCEDURE — 73620 X-RAY EXAM OF FOOT: CPT

## 2024-04-23 RX ORDER — PRAZOSIN HYDROCHLORIDE 1 MG/1
CAPSULE ORAL
Qty: 30 CAPSULE | Refills: 0 | Status: SHIPPED | OUTPATIENT
Start: 2024-04-23

## 2024-04-23 RX ORDER — LAMOTRIGINE 100 MG/1
100 TABLET ORAL DAILY
Qty: 30 TABLET | Refills: 0 | Status: SHIPPED | OUTPATIENT
Start: 2024-04-23

## 2024-04-23 RX ORDER — HYDROXYZINE HYDROCHLORIDE 25 MG/1
25 TABLET, FILM COATED ORAL 3 TIMES DAILY PRN
Qty: 90 TABLET | Refills: 0 | Status: SHIPPED | OUTPATIENT
Start: 2024-04-23

## 2024-04-23 RX ORDER — TRAZODONE HYDROCHLORIDE 50 MG/1
50-100 TABLET ORAL NIGHTLY
Qty: 60 TABLET | Refills: 0 | Status: SHIPPED | OUTPATIENT
Start: 2024-04-23

## 2024-04-23 NOTE — TELEPHONE ENCOUNTER
06:45 AM     BUN 10 06/05/2023 06:45 AM     CREATININE 0.8 06/05/2023 06:45 AM     GLUCOSE 98 06/05/2023 06:45 AM     CALCIUM 9.5 06/05/2023 06:45 AM            Lab Results   Component Value Date     CHOL 192 02/15/2022            Lab Results   Component Value Date     TRIG 150 (H) 02/15/2022            Lab Results   Component Value Date     HDL 52 (L) 12/05/2023            Lab Results   Component Value Date     LDLCALC 90 12/05/2023      No results found for: \"VLDL\"  No results found for: \"CHOLHDLRATIO\"        Lab Results   Component Value Date     LABA1C 5.3 09/22/2015      No results found for: \"EAG\"        Lab Results   Component Value Date     TSHFT4 1.57 06/05/2023     TSH 2.710 07/02/2019            Lab Results   Component Value Date     VITD25 13.7 (L) 03/07/2024            Lab Results   Component Value Date     MUXGICTD83 406 03/07/2024      No results found for: \"FOLATE\"      Assessment:    1. Unspecified mood (affective) disorder (HCC)    2. Generalized anxiety disorder with panic attacks    3. Chronic post-traumatic stress disorder (PTSD)    4. Insomnia due to medical condition    5. Cannabis use disorder          No evidence of acute suicidality, homicidality or psychosis observed.  Patient is psychiatrically stable     Plan:     1.   Continue   Lamictal 100 mg daily for mood stabilization  Trazodone 50 mg nightly for sleep  Buspirone 10 mg BID for anxiety  Hydroxyzine 25 mg TID PRN for anxiety  Vitamin D 50,000 units weekly for vitamin D deficiency     Start   Prazosin 1 mg nightly for nightmares related to PTSD  The benefits, side effects, including but not limited to this medication may lower blood pressure, and to monitor while taking.  Educated patient to get up slowly going from lying position to standing position if needed  while taking this medication.  Psychotherapy with Chasidy in office, first appointment on 4/29/2024  Discussed switching to doxepin if nightmares continue with trazodone,

## 2024-04-23 NOTE — PROGRESS NOTES
[facility]  [address]  [address]  [place] [address]  Dept: [phone]  Dept Fax: [phone]  Loc: [phone]      Subjective:     Chief Complaint   Patient presents with    Foot Pain       HPI:  Alondra Pickard is a 49 y.o. female presents today for evaluation on pain in her R foot that has been bothering her for several day now. No recent trauma the she can remember.  The pain is on top of her R foot going up to her ankle. She states it is tender to touch and she can hardly dorsiflex her foot.     ROS:   Review of Systems    As mentioned in HPI    PMHx:  Past Medical History:   Diagnosis Date    Anxiety     Asthma     born with asthmatic bronchitis    CPAP (continuous positive airway pressure) dependence     6cm to 16cm    Degenerative disk disease     Depression     mood disorders    Hemorrhoid 11/2015    Hyperlipidemia     Hypoglycemia     Hypothyroid     Left ureteral stone 05/25/2016    OAB (overactive bladder)     Obstructive sleep apnea     AHI: 37.9 per PSG, 10/2019    Obstructive sleep apnea 04/30/2020    Prolonged emergence from general anesthesia     when pt had gall bladder removed    Renal stone 05/25/2016     Patient Active Problem List   Diagnosis    External hemorrhoid    Internal hemorrhoids    Psychophysiologic insomnia    Anxiety    Hypothyroidism    Hyperlipidemia    CPAP (continuous positive airway pressure) dependence    Obstructive sleep apnea    Tinnitus aurium, bilateral    Bilateral carpal tunnel syndrome    Gastroesophageal reflux disease without esophagitis    Vitamin D deficiency       PSHx:  Past Surgical History:   Procedure Laterality Date    BREAST BIOPSY Left 2018    benign calcification    CARPAL TUNNEL RELEASE Right 07/08/2022    RIGHT CARPAL TUNNEL RELEASE performed by Daniel Schaeffer MD at [facility] OR    CHOLECYSTECTOMY  1995    COLONOSCOPY  04/29/2015    Dr Avilez-Internal hemorrhoids, repeat at age 51    COLONOSCOPY

## 2024-04-26 ENCOUNTER — TELEPHONE (OUTPATIENT)
Dept: PSYCHIATRY | Age: 49
End: 2024-04-26

## 2024-04-26 DIAGNOSIS — F41.8 SITUATIONAL ANXIETY: ICD-10-CM

## 2024-04-26 RX ORDER — BUSPIRONE HYDROCHLORIDE 10 MG/1
10 TABLET ORAL 2 TIMES DAILY
Qty: 60 TABLET | Refills: 1 | OUTPATIENT
Start: 2024-04-26

## 2024-04-29 ENCOUNTER — TELEPHONE (OUTPATIENT)
Dept: PSYCHIATRY | Age: 49
End: 2024-04-29

## 2024-04-29 ENCOUNTER — TELEPHONE (OUTPATIENT)
Dept: PRIMARY CARE CLINIC | Age: 49
End: 2024-04-29

## 2024-04-29 ENCOUNTER — OFFICE VISIT (OUTPATIENT)
Dept: GASTROENTEROLOGY | Age: 49
End: 2024-04-29
Payer: MEDICAID

## 2024-04-29 VITALS
WEIGHT: 175 LBS | SYSTOLIC BLOOD PRESSURE: 120 MMHG | DIASTOLIC BLOOD PRESSURE: 80 MMHG | HEART RATE: 73 BPM | BODY MASS INDEX: 34.36 KG/M2 | HEIGHT: 60 IN | OXYGEN SATURATION: 97 %

## 2024-04-29 DIAGNOSIS — M72.2 PLANTAR FASCIITIS: Primary | ICD-10-CM

## 2024-04-29 DIAGNOSIS — K21.9 CHRONIC GERD: Primary | ICD-10-CM

## 2024-04-29 DIAGNOSIS — R13.10 DYSPHAGIA, UNSPECIFIED TYPE: ICD-10-CM

## 2024-04-29 DIAGNOSIS — M79.671 FOOT PAIN, RIGHT: ICD-10-CM

## 2024-04-29 PROCEDURE — G8427 DOCREV CUR MEDS BY ELIG CLIN: HCPCS | Performed by: NURSE PRACTITIONER

## 2024-04-29 PROCEDURE — 1036F TOBACCO NON-USER: CPT | Performed by: NURSE PRACTITIONER

## 2024-04-29 PROCEDURE — 99214 OFFICE O/P EST MOD 30 MIN: CPT | Performed by: NURSE PRACTITIONER

## 2024-04-29 PROCEDURE — G8417 CALC BMI ABV UP PARAM F/U: HCPCS | Performed by: NURSE PRACTITIONER

## 2024-04-29 RX ORDER — PANTOPRAZOLE SODIUM 40 MG/1
40 TABLET, DELAYED RELEASE ORAL
Qty: 90 TABLET | Refills: 3 | Status: SHIPPED | OUTPATIENT
Start: 2024-04-29

## 2024-04-29 NOTE — TELEPHONE ENCOUNTER
----- Message from Roopa Valentin MD sent at 4/26/2024  8:10 AM CDT -----  XR foot suggest plantar fasciitis

## 2024-04-29 NOTE — TELEPHONE ENCOUNTER
Patient to return for scheduled office visit on 05- to discuss results after provider returns from out of town.

## 2024-04-29 NOTE — PROGRESS NOTES
Subjective:     Patient ID: Alondra Pickard is a 49 y.o. female  PCP: Roopa Valentin MD  Referring Provider: No ref. provider found    HPI  Patient presents to the office today with the following complaints: Follow-up      Pt seen today for follow up, medication refills.  Hx chronic GERD, currently treated with Protonix 40 mg daily.  She has c/o issues swallowing.  She has sensation of foods getting stuck and sitting in epigastric area.  She will have nausea with this.      Last EGD 3/21/2023 - W 54 fr dil, (+)GERD  Last Colonoscopy 3/23/2022 - HP, Int hemorrhoids Gr 1, 5 year recall   Denies any family hx colon cancer or colon polyps    Assessment:     1. Chronic GERD    2. Dysphagia, unspecified type        Plan:   - Schedule EGD  Nothing to eat or drink after midnight.  No driving for 24 hours after procedure. Bring a  to procedure.  No aspirin, NSAIDs, fish oil 5 days before procedure.  I have discussed the benefits, alternatives, and risks (including bleeding, perforation and death)  for pursuing Endoscopy (EGD/Colonscopy/EUS/ERCP) with the patient and they are willing to continue. We also discussed the need for anesthesia, IV access, proper dietary changes, medication changes if necessary, and need for bowel prep (if ordered) prior to their Endoscopic procedure.  They are aware they must have someone accompany them to their scheduled procedure to drive them home - they agree to the above and are willing to continue.       Orders  No orders of the defined types were placed in this encounter.    Medications  Orders Placed This Encounter   Medications    pantoprazole (PROTONIX) 40 MG tablet     Sig: Take 1 tablet by mouth every morning (before breakfast)     Dispense:  90 tablet     Refill:  3         Patient History:     Past Medical History:   Diagnosis Date    Anxiety     Asthma     born with asthmatic bronchitis    CPAP (continuous positive airway pressure) dependence     6cm to 16cm    Degenerative

## 2024-04-29 NOTE — TELEPHONE ENCOUNTER
----- Message from Roopa Valentin MD sent at 4/18/2024  3:00 PM CDT -----  This pt needs a follow up visit to discuss her test results

## 2024-04-29 NOTE — PATIENT INSTRUCTIONS
Schedule endoscopy    Nothing to eat or drink after midnight., the night before the procedure  You will not be able to drive for 24 hours after the procedure due to sedation. Must have a responsible adult, 18 year or older, to accompany you and drive you home.  No aspirin, ibuprofen, naproxen, fish oil or vitamin E for 5 days before procedure.   Continue current medications.    If you are on blood thinners, clearance from the prescribing physician will be obtained before your procedure is scheduled.     If biopsies are taken during the procedure they will be sent to a pathologist for analysis. You will be notified by mail of the pathology results in 2-3 weeks.     Your physician may also schedule a follow up appointment with the nurse practitioner to discuss pathology, symptoms or to check if you have had any problems related to your procedure. If you prefer not to return to the office after your procedure, please discuss this with your physician on the day of your procedure.

## 2024-05-02 ASSESSMENT — ENCOUNTER SYMPTOMS
SHORTNESS OF BREATH: 0
ABDOMINAL PAIN: 0
DIARRHEA: 0
VOMITING: 0
NAUSEA: 0
CONSTIPATION: 0
RECTAL PAIN: 0
COUGH: 0
CHOKING: 0
BLOOD IN STOOL: 0
TROUBLE SWALLOWING: 1
ABDOMINAL DISTENTION: 0
ANAL BLEEDING: 0

## 2024-05-03 ENCOUNTER — TELEPHONE (OUTPATIENT)
Dept: PSYCHIATRY | Age: 49
End: 2024-05-03

## 2024-05-03 NOTE — TELEPHONE ENCOUNTER
Called pt on 05/03/24 for appointment reminder for 05/06/24. Pt confirmed.       Reminded patient to complete their visit pre-check/digital registration in [facility].

## 2024-05-06 ENCOUNTER — TELEPHONE (OUTPATIENT)
Dept: PSYCHIATRY | Age: 49
End: 2024-05-06

## 2024-05-06 ENCOUNTER — OFFICE VISIT (OUTPATIENT)
Dept: PSYCHIATRY | Age: 49
End: 2024-05-06
Payer: MEDICAID

## 2024-05-06 VITALS
DIASTOLIC BLOOD PRESSURE: 70 MMHG | HEIGHT: 60 IN | OXYGEN SATURATION: 96 % | BODY MASS INDEX: 33.99 KG/M2 | WEIGHT: 173.1 LBS | HEART RATE: 63 BPM | SYSTOLIC BLOOD PRESSURE: 118 MMHG | TEMPERATURE: 97.4 F

## 2024-05-06 DIAGNOSIS — G47.01 INSOMNIA DUE TO MEDICAL CONDITION: ICD-10-CM

## 2024-05-06 DIAGNOSIS — F39 UNSPECIFIED MOOD (AFFECTIVE) DISORDER (HCC): Primary | ICD-10-CM

## 2024-05-06 DIAGNOSIS — F12.90 CANNABIS USE DISORDER: ICD-10-CM

## 2024-05-06 DIAGNOSIS — F41.1 GENERALIZED ANXIETY DISORDER WITH PANIC ATTACKS: ICD-10-CM

## 2024-05-06 DIAGNOSIS — F41.0 GENERALIZED ANXIETY DISORDER WITH PANIC ATTACKS: ICD-10-CM

## 2024-05-06 DIAGNOSIS — F43.12 CHRONIC POST-TRAUMATIC STRESS DISORDER (PTSD): ICD-10-CM

## 2024-05-06 PROCEDURE — G8417 CALC BMI ABV UP PARAM F/U: HCPCS

## 2024-05-06 PROCEDURE — 99214 OFFICE O/P EST MOD 30 MIN: CPT

## 2024-05-06 PROCEDURE — G8428 CUR MEDS NOT DOCUMENT: HCPCS

## 2024-05-06 PROCEDURE — 1036F TOBACCO NON-USER: CPT

## 2024-05-06 RX ORDER — TRAZODONE HYDROCHLORIDE 100 MG/1
100 TABLET ORAL NIGHTLY PRN
Qty: 30 TABLET | Refills: 2 | Status: SHIPPED | OUTPATIENT
Start: 2024-05-06

## 2024-05-06 ASSESSMENT — PATIENT HEALTH QUESTIONNAIRE - PHQ9
SUM OF ALL RESPONSES TO PHQ QUESTIONS 1-9: 10
9. THOUGHTS THAT YOU WOULD BE BETTER OFF DEAD, OR OF HURTING YOURSELF: NOT AT ALL
5. POOR APPETITE OR OVEREATING: MORE THAN HALF THE DAYS
3. TROUBLE FALLING OR STAYING ASLEEP: NOT AT ALL
4. FEELING TIRED OR HAVING LITTLE ENERGY: NOT AT ALL
6. FEELING BAD ABOUT YOURSELF - OR THAT YOU ARE A FAILURE OR HAVE LET YOURSELF OR YOUR FAMILY DOWN: MORE THAN HALF THE DAYS
7. TROUBLE CONCENTRATING ON THINGS, SUCH AS READING THE NEWSPAPER OR WATCHING TELEVISION: MORE THAN HALF THE DAYS
SUM OF ALL RESPONSES TO PHQ QUESTIONS 1-9: 10
10. IF YOU CHECKED OFF ANY PROBLEMS, HOW DIFFICULT HAVE THESE PROBLEMS MADE IT FOR YOU TO DO YOUR WORK, TAKE CARE OF THINGS AT HOME, OR GET ALONG WITH OTHER PEOPLE: SOMEWHAT DIFFICULT
8. MOVING OR SPEAKING SO SLOWLY THAT OTHER PEOPLE COULD HAVE NOTICED. OR THE OPPOSITE, BEING SO FIGETY OR RESTLESS THAT YOU HAVE BEEN MOVING AROUND A LOT MORE THAN USUAL: MORE THAN HALF THE DAYS
SUM OF ALL RESPONSES TO PHQ QUESTIONS 1-9: 10
1. LITTLE INTEREST OR PLEASURE IN DOING THINGS: MORE THAN HALF THE DAYS
SUM OF ALL RESPONSES TO PHQ QUESTIONS 1-9: 10
SUM OF ALL RESPONSES TO PHQ9 QUESTIONS 1 & 2: 2
2. FEELING DOWN, DEPRESSED OR HOPELESS: NOT AT ALL

## 2024-05-06 ASSESSMENT — COLUMBIA-SUICIDE SEVERITY RATING SCALE - C-SSRS
2. HAVE YOU ACTUALLY HAD ANY THOUGHTS OF KILLING YOURSELF?: NO
1. WITHIN THE PAST MONTH, HAVE YOU WISHED YOU WERE DEAD OR WISHED YOU COULD GO TO SLEEP AND NOT WAKE UP?: NO
6. HAVE YOU EVER DONE ANYTHING, STARTED TO DO ANYTHING, OR PREPARED TO DO ANYTHING TO END YOUR LIFE?: NO

## 2024-05-06 NOTE — TELEPHONE ENCOUNTER
Pt was in office when this appt was scheduled.  Scheduled with Lucretia Olivarez for 05/30/24 @ 1:00.    Electronically signed by Giovana Juares MA on 5/6/2024 at 8:32 AM

## 2024-05-06 NOTE — PROGRESS NOTES
5/6/24        Progress Note    Alondra Pickard 1975      Chief Complaint   Patient presents with    Medication Check         Subjective:    Patient is a 49 y.o. female diagnosed with unspecified mood disorder, BENY with panic attacks, chronic PTSD, insomnia due to medical condition, cannabis use disorder, and presents today for follow-up.  Last seen in clinic on 4/8/24  and prior records were reviewed.    Seen in office today.  Somewhat anxious, bouncing leg repeatedly during the interview.  She reports overall her mood and anxiety have been well-controlled, however today she feels nervous due to having to have endoscopy tomorrow, \"I always have a hard time with being put to sleep no matter what it is for, had a bad experience in the past where it was hard for me to wake up\".  We processed her feelings, supportive psychotherapy provided.  We initiated prazosin last time for nightmares related to PTSD, patient reports this has been very helpful.  She is sleeping well, also taking trazodone which has had good benefit.  Reports her  interviewed for a new job, they are waiting to hear this week.  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  She reports compliance with medications, denies any negative or unwanted side effects.  Denies suicidal and homicidal ideations, denies hallucinations, no overt paranoia or delusions appreciated.  Her first psychotherapy appointment was canceled due to provider being out of the office, will get her set up with Lucretia in office for psychotherapy.  We will follow-up in 2 months.      Absent  suicidal ideation.    Reports compliance with medications as good .     Sleep: better, prazosin helping with nightmares    Caffeine use: cup and half of coffee every morning, about 2 glasses of tea a day, eats

## 2024-05-07 ENCOUNTER — ANESTHESIA (OUTPATIENT)
Dept: OPERATING ROOM | Age: 49
End: 2024-05-07

## 2024-05-07 ENCOUNTER — HOSPITAL ENCOUNTER (OUTPATIENT)
Age: 49
Setting detail: OUTPATIENT SURGERY
Discharge: HOME | End: 2024-05-07
Attending: INTERNAL MEDICINE | Admitting: INTERNAL MEDICINE
Payer: MEDICAID

## 2024-05-07 ENCOUNTER — HOSPITAL ENCOUNTER (OUTPATIENT)
Age: 49
Setting detail: SPECIMEN
Discharge: HOME | End: 2024-05-07
Payer: MEDICAID

## 2024-05-07 ENCOUNTER — APPOINTMENT (OUTPATIENT)
Dept: OPERATING ROOM | Age: 49
End: 2024-05-07
Attending: INTERNAL MEDICINE

## 2024-05-07 ENCOUNTER — ANESTHESIA EVENT (OUTPATIENT)
Dept: OPERATING ROOM | Age: 49
End: 2024-05-07

## 2024-05-07 VITALS
RESPIRATION RATE: 18 BRPM | WEIGHT: 175 LBS | DIASTOLIC BLOOD PRESSURE: 79 MMHG | BODY MASS INDEX: 29.16 KG/M2 | HEART RATE: 62 BPM | OXYGEN SATURATION: 94 % | SYSTOLIC BLOOD PRESSURE: 132 MMHG | HEIGHT: 65 IN | TEMPERATURE: 97.1 F

## 2024-05-07 LAB
CONTROL: NORMAL
PREGNANCY TEST URINE, POC: NORMAL

## 2024-05-07 PROCEDURE — 43239 EGD BIOPSY SINGLE/MULTIPLE: CPT | Performed by: INTERNAL MEDICINE

## 2024-05-07 PROCEDURE — 43450 DILATE ESOPHAGUS 1/MULT PASS: CPT

## 2024-05-07 PROCEDURE — G8907 PT DOC NO EVENTS ON DISCHARG: HCPCS

## 2024-05-07 PROCEDURE — 43239 EGD BIOPSY SINGLE/MULTIPLE: CPT

## 2024-05-07 PROCEDURE — G8918 PT W/O PREOP ORDER IV AB PRO: HCPCS

## 2024-05-07 PROCEDURE — 43450 DILATE ESOPHAGUS 1/MULT PASS: CPT | Performed by: INTERNAL MEDICINE

## 2024-05-07 PROCEDURE — 88305 TISSUE EXAM BY PATHOLOGIST: CPT

## 2024-05-07 RX ORDER — SODIUM CHLORIDE 9 MG/ML
INJECTION, SOLUTION INTRAVENOUS PRN
Status: CANCELLED | OUTPATIENT
Start: 2024-05-07

## 2024-05-07 RX ORDER — METOCLOPRAMIDE HYDROCHLORIDE 5 MG/ML
10 INJECTION INTRAMUSCULAR; INTRAVENOUS
Status: CANCELLED | OUTPATIENT
Start: 2024-05-07 | End: 2024-05-08

## 2024-05-07 RX ORDER — DIPHENHYDRAMINE HYDROCHLORIDE 50 MG/ML
12.5 INJECTION INTRAMUSCULAR; INTRAVENOUS
Status: CANCELLED | OUTPATIENT
Start: 2024-05-07 | End: 2024-05-08

## 2024-05-07 RX ORDER — LIDOCAINE HYDROCHLORIDE 20 MG/ML
INJECTION, SOLUTION EPIDURAL; INFILTRATION; INTRACAUDAL; PERINEURAL PRN
Status: DISCONTINUED | OUTPATIENT
Start: 2024-05-07 | End: 2024-05-07 | Stop reason: SDUPTHER

## 2024-05-07 RX ORDER — SODIUM CHLORIDE 0.9 % (FLUSH) 0.9 %
5-40 SYRINGE (ML) INJECTION PRN
Status: CANCELLED | OUTPATIENT
Start: 2024-05-07

## 2024-05-07 RX ORDER — SODIUM CHLORIDE 0.9 % (FLUSH) 0.9 %
5-40 SYRINGE (ML) INJECTION EVERY 12 HOURS SCHEDULED
Status: CANCELLED | OUTPATIENT
Start: 2024-05-07

## 2024-05-07 RX ORDER — NALOXONE HYDROCHLORIDE 0.4 MG/ML
INJECTION, SOLUTION INTRAMUSCULAR; INTRAVENOUS; SUBCUTANEOUS PRN
Status: CANCELLED | OUTPATIENT
Start: 2024-05-07

## 2024-05-07 RX ORDER — PROPOFOL 10 MG/ML
INJECTION, EMULSION INTRAVENOUS PRN
Status: DISCONTINUED | OUTPATIENT
Start: 2024-05-07 | End: 2024-05-07 | Stop reason: SDUPTHER

## 2024-05-07 RX ORDER — MEPERIDINE HYDROCHLORIDE 25 MG/ML
12.5 INJECTION INTRAMUSCULAR; INTRAVENOUS; SUBCUTANEOUS EVERY 5 MIN PRN
Status: CANCELLED | OUTPATIENT
Start: 2024-05-07

## 2024-05-07 RX ORDER — SODIUM CHLORIDE, SODIUM LACTATE, POTASSIUM CHLORIDE, CALCIUM CHLORIDE 600; 310; 30; 20 MG/100ML; MG/100ML; MG/100ML; MG/100ML
INJECTION, SOLUTION INTRAVENOUS CONTINUOUS
Status: DISCONTINUED | OUTPATIENT
Start: 2024-05-07 | End: 2024-05-07 | Stop reason: HOSPADM

## 2024-05-07 RX ADMIN — SODIUM CHLORIDE, SODIUM LACTATE, POTASSIUM CHLORIDE, CALCIUM CHLORIDE: 600; 310; 30; 20 INJECTION, SOLUTION INTRAVENOUS at 10:50

## 2024-05-07 RX ADMIN — PROPOFOL 100 MG: 10 INJECTION, EMULSION INTRAVENOUS at 11:26

## 2024-05-07 RX ADMIN — PROPOFOL 50 MG: 10 INJECTION, EMULSION INTRAVENOUS at 11:37

## 2024-05-07 RX ADMIN — PROPOFOL 100 MG: 10 INJECTION, EMULSION INTRAVENOUS at 11:30

## 2024-05-07 RX ADMIN — PROPOFOL 20 MG: 10 INJECTION, EMULSION INTRAVENOUS at 11:40

## 2024-05-07 RX ADMIN — LIDOCAINE HYDROCHLORIDE 100 MG: 20 INJECTION, SOLUTION EPIDURAL; INFILTRATION; INTRACAUDAL; PERINEURAL at 11:26

## 2024-05-07 ASSESSMENT — PAIN - FUNCTIONAL ASSESSMENT: PAIN_FUNCTIONAL_ASSESSMENT: NONE - DENIES PAIN

## 2024-05-07 NOTE — ANESTHESIA PRE PROCEDURE
Department of Anesthesiology  Preprocedure Note       Name:  Alondra Pickard   Age:  49 y.o.  :  1975                                          MRN:  [id number]         Date:  2024      Surgeon: Surgeon(s):  Alma Granados MD    Procedure: Procedure(s):  ESOPHAGOGASTRODUODENOSCOPY BIOPSY    Medications prior to admission:   Prior to Admission medications    Medication Sig Start Date End Date Taking? Authorizing Provider   traZODone (DESYREL) 100 MG tablet Take 1 tablet by mouth nightly as needed for Sleep 24   Barby Reich APRN - CNP   pantoprazole (PROTONIX) 40 MG tablet Take 1 tablet by mouth every morning (before breakfast) 24   Amarilis Ragland APRN - NP   lamoTRIgine (LAMICTAL) 100 MG tablet TAKE 1 TABLET BY MOUTH EVERY DAY 24   Samina Blanton MD   hydrOXYzine HCl (ATARAX) 25 MG tablet TAKE 1 TABLET BY MOUTH THREE TIMES A DAY AS NEEDED FOR ANXIETY 24   Samina Blanton MD   prazosin (MINIPRESS) 1 MG capsule TAKE 1 CAPSULE BY MOUTH EVERY DAY AT NIGHT 24   Samina Blanton MD   ondansetron (ZOFRAN ODT) 4 MG disintegrating tablet Place 1 tablet under the tongue every 8 hours as needed for Nausea or Vomiting 4/15/24   Roopa Valentin MD   busPIRone (BUSPAR) 10 MG tablet TAKE 1 TABLET BY MOUTH TWICE A DAY 24   Roopa Valentin MD   levocetirizine (XYZAL) 5 MG tablet TAKE 1 TABLET BY MOUTH EVERY DAY AT NIGHT 24   Roopa Valentin MD   vitamin D (ERGOCALCIFEROL) 1.25 MG (08920 UT) CAPS capsule Take 1 capsule by mouth once a week 3/7/24   Barby Reich APRN - CNP   oxyBUTYnin (DITROPAN-XL) 10 MG extended release tablet TAKE 1 TABLET BY MOUTH EVERY DAY 3/4/24   Roopa Valentin MD   atorvastatin (LIPITOR) 20 MG tablet Take 1 tablet by mouth daily 23   Roopa Valentin MD   levothyroxine (SYNTHROID) 50 MCG tablet Take 1 tablet by mouth daily 23   Roopa Valentin MD   COMBIVENT RESPIMAT  MCG/ACT AERS inhaler INHALE 1 PUFF

## 2024-05-07 NOTE — H&P
Patient Name: Alondra Pickard  : 1975  MRN: [id number]  DATE: 24    Allergies:   Allergies   Allergen Reactions    Codeine Nausea And Vomiting, Swelling and Rash        ENDOSCOPY  History and Physical    Procedure:    [] Diagnostic Colonoscopy       [] Screening Colonoscopy  [x] EGD      [] ERCP      [] EUS       [] Other    [x] Previous office notes/History and Physical reviewed from the patients chart. Please see EMR for further details of HPI. I have examined the patient's status immediately prior to the procedure and:      Indications/HPI:     1. Chronic GERD    2. Dysphagia, unspecified type -with history of improvement post esophageal dilation in    Last EGD 3/21/2023 - W 54 fr dil, (+)GERD    Last Colonoscopy 3/23/2022 - HP, Int hemorrhoids Gr 1, 5 year recall   Denies any family hx colon cancer or colon polyps       []Abdominal Pain   []Cancer- GI/Lung     []Fhx of colon CA/polyps  []History of Polyps  []Barretts            []Melena  []Abnormal Imaging              []Dysphagia              []Persistent Pneumonia   []Anemia                            []Food Impaction        []History of Polyps  [] GI Bleed             []Pulmonary nodule/Mass   []Change in bowel habits []Heartburn/Reflux  []Rectal Bleed (BRBPR)  []Chest Pain - Non Cardiac []Heme (+) Stool []Ulcers  []Constipation  []Hemoptysis  []Varices  []Diarrhea  []Hypoxemia    []Nausea/Vomiting   []Screening   []Crohns/Colitis  []Other:     Anesthesia:   [x] MAC [] Moderate Sedation   [] General   [] None     ROS: 12 pt Review of Symptoms was negative unless mentioned above    Medications:   Prior to Admission medications    Medication Sig Start Date End Date Taking? Authorizing Provider   traZODone (DESYREL) 100 MG tablet Take 1 tablet by mouth nightly as needed for Sleep 24   Barby Reich, APRN - CNP   pantoprazole (PROTONIX) 40 MG tablet Take 1 tablet by mouth every morning (before breakfast) 24   Amarilis Ragland,

## 2024-05-07 NOTE — DISCHARGE INSTRUCTIONS
1.  Await path results, the patient will be contacted in 7-10 days with biopsy results.   2.  Magic mouthwash 5 ml PO Swish and swallow q3h PRN ONLY IF patient has post-procedural sorethroat or chest pain.  3. Full liquids to soft diet today suzanna discharge from the surgicenter; may advance diet starting in AM tomorrow.  4. May resume other meds except any ASA/NSAIDs; may use cough drops or lozenges PRN; also continue meds for GERD with anti-GERD measures.  5. NO ASA/NSAIDs x 2 weeks  6. OP f/u in 6-8 weeks with Ms. Ragland; will consider an Esophageal manometry later if the patient's dysphagia persists.     NSAIDS (Non-steroidal Anti-Inflammatory)    You have been directed by your physician to avoid any NSAID's; the following medications are a list of those to avoid. If you think that you are taking any NSAID's notify your physician.     Over The Counter    Advil                      Motrin  Nuprin                   Ibuprofen  Midol                     Aleve  Naproxen              Orudis  Aspirin                   Haven-Llano    Prescriptions and Generics    Cataflam              Relafen  Voltaren               Clinoril  Indocin                 Naproxen  Arthrotec              Lodine  Daypro                 Nalfon  Toradol                Ansaid  Feldene               Meclofenamate  Fenoprofen          Ponstel  Mobic                   Celebrex    POST-OP ORDERS: ENDOSCOPY :    1. Rest today.    2. DO NOT eat or drink until wide awake; eat your usual diet today in moderate amount only.    3. DO NOT drive today.    4. Call physician if you have severe pain, vomiting, fever, rectal bleeding or black bowel movements.    5.  If a biopsy was taken or a polyp removed, you should expect to hear results in about 7-10 days.  If you have heard nothing from your physician by then, call the office for results.    6.  Discharge home when patient awake, vitals signs stable and tolerating liquids.    7. Call with questions or

## 2024-05-07 NOTE — ANESTHESIA POSTPROCEDURE EVALUATION
Department of Anesthesiology  Postprocedure Note    Patient: Alondra Pickard  MRN: [id number]  YOB: 1975  Date of evaluation: 5/7/2024    Procedure Summary       Date: 05/07/24 Room / Location: [place] [id number] / [facility]    Anesthesia Start: 1123 Anesthesia Stop: 1147    Procedures:       ESOPHAGOGASTRODUODENOSCOPY BIOPSY (Abdomen)      ESOPHAGOGASTRODUODENOSCOPY DILATATION 54 FR EVETTE Diagnosis:       Chronic GERD      Dysphagia, unspecified type      (Chronic GERD [K21.9])      (Dysphagia, unspecified type [R13.10])    Surgeons: Alma Granados MD Responsible Provider: Jaya Juarez APRN - CRNA    Anesthesia Type: MAC ASA Status: 2            Anesthesia Type: No value filed.    Radha Phase I:      Radha Phase II:      Anesthesia Post Evaluation    Patient location during evaluation: bedside  Patient participation: complete - patient participated  Level of consciousness: awake  Pain score: 0  Airway patency: patent  Nausea & Vomiting: no nausea and no vomiting  Cardiovascular status: blood pressure returned to baseline  Respiratory status: acceptable  Hydration status: stable  Pain management: adequate    No notable events documented.

## 2024-05-07 NOTE — OP NOTE
Endoscopic Procedure Note    Patient: Alondra Pickard : 1975  Med Rec#: [id number] Acc#: [id number]     Primary Care Provider Roopa Valentin MD    Endoscopist: Alma Granados MD, MD    Date of Procedure:  2024    Procedure:   EGD with    A Tam bougie dilation of esophagus and  Cold biopsies    Indications:     1. Chronic GERD    2. Dysphagia, unspecified type -with history of improvement post esophageal dilation in    Last EGD 3/21/2023 - W 54 fr dil, (+)GERD    Anesthesia:  Sedation was administered by anesthesia who monitored the patient during the procedure.    Estimated Blood Loss: minimal    Procedure:   After reviewing the patient's chart and obtaining informed consent, the patient was placed in the left lateral decubitus position.  A forward-viewing Olympus endoscope was lubricated and inserted through the mouth into the oropharynx. Under direct visualization, the upper esophagus was intubated. The scope was advanced to the level of the third portion of duodenum. Scope was slowly withdrawn with careful inspection of the mucosal surfaces. The scope was retroflexed for inspection of the gastric fundus and incisura. Findings and maneuvers are listed in impression below.     Next, a lubricated Tam weighted Bougie dilator-54 Fr was gently introduced into the patient's mouth and passed into the Esophagus and into the proximal stomach without much resistance and then withdrawn. Repeat EGD was performed to verify dilation and scope tip was passed into the stomach. NO evidence of perforation or excessive bleeding was noted subsequent to the dilation.        The patient tolerated the procedure well. The scope was removed. There were no immediate complications.    Findings/IMPRESSION:  Esophagus: normal  including EG junction at 36 cm.     NO erosions or ulcers or nodules or strictures or webs or rings or mass lesions or extrinsic compression or diverticula noted. An empirical

## 2024-05-17 ENCOUNTER — TELEPHONE (OUTPATIENT)
Dept: PSYCHIATRY | Age: 49
End: 2024-05-17

## 2024-05-17 DIAGNOSIS — E55.9 VITAMIN D DEFICIENCY: ICD-10-CM

## 2024-05-17 RX ORDER — ERGOCALCIFEROL 1.25 MG/1
50000 CAPSULE ORAL WEEKLY
Qty: 4 CAPSULE | Refills: 2 | Status: SHIPPED | OUTPATIENT
Start: 2024-05-17

## 2024-05-17 NOTE — TELEPHONE ENCOUNTER
Pharmacy sent a request to refill pt's medication       Last office visit : 5/6/2024 S Damir ANDERSON  Next office visit : 7/8/2024 S Damir ANDERSON    Requested Prescriptions     Pending Prescriptions Disp Refills    vitamin D (ERGOCALCIFEROL) 1.25 MG (72791 UT) CAPS capsule [Pharmacy Med Name: VITAMIN D2 1.25MG(50,000 UNIT)] 4 capsule 2     Sig: TAKE 1 CAPSULE BY MOUTH ONE TIME PER WEEK            Shamrudy Collazo  .      5/6/24                                         Progress Note     Alondra Pickard 1975                            Chief Complaint   Patient presents with    Medication Check            Subjective:    Patient is a 49 y.o. female diagnosed with unspecified mood disorder, BENY with panic attacks, chronic PTSD, insomnia due to medical condition, cannabis use disorder, and presents today for follow-up.  Last seen in clinic on 4/8/24  and prior records were reviewed.     Seen in office today.  Somewhat anxious, bouncing leg repeatedly during the interview.  She reports overall her mood and anxiety have been well-controlled, however today she feels nervous due to having to have endoscopy tomorrow, \"I always have a hard time with being put to sleep no matter what it is for, had a bad experience in the past where it was hard for me to wake up\".  We processed her feelings, supportive psychotherapy provided.  We initiated prazosin last time for nightmares related to PTSD, patient reports this has been very helpful.  She is sleeping well, also taking trazodone which has had good benefit.  Reports her  interviewed for a new job, they are waiting to hear this week.  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  She reports compliance with medications, denies any negative or unwanted side effects.  Denies suicidal and homicidal

## 2024-05-17 NOTE — TELEPHONE ENCOUNTER
Called and let pt know that her script for vitamin d was sent to her pharmacy     Electronically signed by Marcela Collazo on 5/17/2024 at 11:35 AM

## 2024-05-18 SDOH — ECONOMIC STABILITY: TRANSPORTATION INSECURITY
IN THE PAST 12 MONTHS, HAS LACK OF TRANSPORTATION KEPT YOU FROM MEETINGS, WORK, OR FROM GETTING THINGS NEEDED FOR DAILY LIVING?: PATIENT DECLINED

## 2024-05-18 SDOH — ECONOMIC STABILITY: FOOD INSECURITY: WITHIN THE PAST 12 MONTHS, YOU WORRIED THAT YOUR FOOD WOULD RUN OUT BEFORE YOU GOT MONEY TO BUY MORE.: SOMETIMES TRUE

## 2024-05-18 SDOH — ECONOMIC STABILITY: INCOME INSECURITY: HOW HARD IS IT FOR YOU TO PAY FOR THE VERY BASICS LIKE FOOD, HOUSING, MEDICAL CARE, AND HEATING?: SOMEWHAT HARD

## 2024-05-18 SDOH — ECONOMIC STABILITY: FOOD INSECURITY: WITHIN THE PAST 12 MONTHS, THE FOOD YOU BOUGHT JUST DIDN'T LAST AND YOU DIDN'T HAVE MONEY TO GET MORE.: PATIENT DECLINED

## 2024-05-19 DIAGNOSIS — J30.2 SEASONAL ALLERGIES: ICD-10-CM

## 2024-05-20 RX ORDER — LEVOCETIRIZINE DIHYDROCHLORIDE 5 MG/1
5 TABLET, FILM COATED ORAL NIGHTLY
Qty: 30 TABLET | Refills: 1 | Status: SHIPPED | OUTPATIENT
Start: 2024-05-20

## 2024-05-20 NOTE — TELEPHONE ENCOUNTER
Alondra Pickard called to request a refill on her medication.      Last office visit : 4/23/2024   Next office visit : 5/21/2024     Requested Prescriptions     Pending Prescriptions Disp Refills    levocetirizine (XYZAL) 5 MG tablet [Pharmacy Med Name: LEVOCETIRIZINE 5 MG TABLET] 30 tablet 1     Sig: TAKE 1 TABLET BY MOUTH EVERY DAY AT NIGHT            Jennie Marti MA

## 2024-05-21 ENCOUNTER — OFFICE VISIT (OUTPATIENT)
Dept: PRIMARY CARE CLINIC | Age: 49
End: 2024-05-21
Payer: MEDICAID

## 2024-05-21 VITALS
TEMPERATURE: 97.4 F | HEIGHT: 65 IN | DIASTOLIC BLOOD PRESSURE: 78 MMHG | BODY MASS INDEX: 28.49 KG/M2 | HEART RATE: 60 BPM | WEIGHT: 171 LBS | OXYGEN SATURATION: 98 % | SYSTOLIC BLOOD PRESSURE: 124 MMHG

## 2024-05-21 DIAGNOSIS — N32.81 OVERACTIVE BLADDER: ICD-10-CM

## 2024-05-21 DIAGNOSIS — F43.9 SITUATIONAL STRESS: Primary | ICD-10-CM

## 2024-05-21 DIAGNOSIS — N39.46 URGE AND STRESS INCONTINENCE: ICD-10-CM

## 2024-05-21 PROCEDURE — G8427 DOCREV CUR MEDS BY ELIG CLIN: HCPCS | Performed by: FAMILY MEDICINE

## 2024-05-21 PROCEDURE — G8417 CALC BMI ABV UP PARAM F/U: HCPCS | Performed by: FAMILY MEDICINE

## 2024-05-21 PROCEDURE — 1036F TOBACCO NON-USER: CPT | Performed by: FAMILY MEDICINE

## 2024-05-21 PROCEDURE — 99213 OFFICE O/P EST LOW 20 MIN: CPT | Performed by: FAMILY MEDICINE

## 2024-05-21 ASSESSMENT — ENCOUNTER SYMPTOMS: GASTROINTESTINAL NEGATIVE: 1

## 2024-05-21 NOTE — PROGRESS NOTES
[facility]  [address]  [address]  [place] [address]  Dept: [phone]  Dept Fax: [phone]  Loc: [phone]      Subjective:     Chief Complaint   Patient presents with    Follow-up       HPI:  Alondra Pickard is a 49 y.o. female presents today for routine follow-up.   She states that she is dealing with a lot of stress lately  She c/o worsening urge and stress incontinence. She is already taking Oxybutynin. Pt states it does not seem to be helping anymore    ROS:   Review of Systems   HENT: Negative.     Cardiovascular: Negative.    Gastrointestinal: Negative.    Genitourinary:  Positive for frequency.        Urge and stress incontinence   Musculoskeletal:  Positive for arthralgias.        R leg in a boot   Neurological: Negative.    Hematological: Negative.    Psychiatric/Behavioral: Negative.         PMHx:  Past Medical History:   Diagnosis Date    Anxiety     Asthma     born with asthmatic bronchitis    CPAP (continuous positive airway pressure) dependence     6cm to 16cm    Degenerative disk disease     Depression     mood disorders    Hemorrhoid 11/2015    Hyperlipidemia     Hypoglycemia     Hypothyroid     Left ureteral stone 05/25/2016    OAB (overactive bladder)     Obstructive sleep apnea     AHI: 37.9 per PS[facility], 10/2019    Obstructive sleep apnea 04/30/2020    Prolonged emergence from general anesthesia     when pt had gall bladder removed    Renal stone 05/25/2016     Patient Active Problem List   Diagnosis    External hemorrhoid    Internal hemorrhoids    Psychophysiologic insomnia    Anxiety    Hypothyroidism    Hypertriglyceridemia    CPAP (continuous positive airway pressure) dependence    Obstructive sleep apnea    Tinnitus aurium, bilateral    Bilateral carpal tunnel syndrome    Gastroesophageal reflux disease without esophagitis    Vitamin D deficiency    Primary osteoarthritis of both hips       PSHx:  Past Surgical History:   Procedure

## 2024-05-23 ENCOUNTER — APPOINTMENT (OUTPATIENT)
Dept: CT IMAGING | Age: 49
End: 2024-05-23
Payer: MEDICAID

## 2024-05-23 ENCOUNTER — HOSPITAL ENCOUNTER (EMERGENCY)
Age: 49
Discharge: HOME | End: 2024-05-23
Attending: EMERGENCY MEDICINE
Payer: MEDICAID

## 2024-05-23 VITALS
HEART RATE: 56 BPM | DIASTOLIC BLOOD PRESSURE: 76 MMHG | SYSTOLIC BLOOD PRESSURE: 114 MMHG | RESPIRATION RATE: 13 BRPM | OXYGEN SATURATION: 95 % | TEMPERATURE: 98.1 F

## 2024-05-23 DIAGNOSIS — N39.0 URINARY TRACT INFECTION WITHOUT HEMATURIA, SITE UNSPECIFIED: ICD-10-CM

## 2024-05-23 DIAGNOSIS — E03.9 ACQUIRED HYPOTHYROIDISM: ICD-10-CM

## 2024-05-23 DIAGNOSIS — R10.9 FLANK PAIN: Primary | ICD-10-CM

## 2024-05-23 LAB
ALBUMIN SERPL-MCNC: 4.2 G/DL (ref 3.5–5.2)
ALP SERPL-CCNC: 85 U/L (ref 35–104)
ALT SERPL-CCNC: 14 U/L (ref 5–33)
ANION GAP SERPL CALCULATED.3IONS-SCNC: 10 MMOL/L (ref 7–19)
AST SERPL-CCNC: 16 U/L (ref 5–32)
BACTERIA URNS QL MICRO: ABNORMAL /HPF
BASOPHILS # BLD: 0 K/UL (ref 0–0.2)
BASOPHILS NFR BLD: 0.3 % (ref 0–1)
BILIRUB SERPL-MCNC: 0.2 MG/DL (ref 0.2–1.2)
BILIRUB UR QL STRIP: NEGATIVE
BUN SERPL-MCNC: 9 MG/DL (ref 6–20)
CALCIUM SERPL-MCNC: 9.1 MG/DL (ref 8.6–10)
CHLORIDE SERPL-SCNC: 100 MMOL/L (ref 98–111)
CLARITY UR: CLEAR
CO2 SERPL-SCNC: 28 MMOL/L (ref 22–29)
COLOR UR: YELLOW
CREAT SERPL-MCNC: 0.8 MG/DL (ref 0.5–0.9)
CRYSTALS URNS MICRO: ABNORMAL /HPF
EOSINOPHIL # BLD: 0.1 K/UL (ref 0–0.6)
EOSINOPHIL NFR BLD: 0.8 % (ref 0–5)
EPI CELLS #/AREA URNS AUTO: 3 /HPF (ref 0–5)
ERYTHROCYTE [DISTWIDTH] IN BLOOD BY AUTOMATED COUNT: 12.2 % (ref 11.5–14.5)
GLUCOSE SERPL-MCNC: 101 MG/DL (ref 74–109)
GLUCOSE UR STRIP.AUTO-MCNC: NEGATIVE MG/DL
HCG SERPL QL: NEGATIVE
HCT VFR BLD AUTO: 42 % (ref 37–47)
HGB BLD-MCNC: 13.4 G/DL (ref 12–16)
HGB UR STRIP.AUTO-MCNC: NEGATIVE MG/L
HYALINE CASTS #/AREA URNS AUTO: 1 /HPF (ref 0–8)
IMM GRANULOCYTES # BLD: 0 K/UL
KETONES UR STRIP.AUTO-MCNC: NEGATIVE MG/DL
LEUKOCYTE ESTERASE UR QL STRIP.AUTO: ABNORMAL
LIPASE SERPL-CCNC: 22 U/L (ref 13–60)
LYMPHOCYTES # BLD: 3.4 K/UL (ref 1.1–4.5)
LYMPHOCYTES NFR BLD: 33.7 % (ref 20–40)
MCH RBC QN AUTO: 28.7 PG (ref 27–31)
MCHC RBC AUTO-ENTMCNC: 31.9 G/DL (ref 33–37)
MCV RBC AUTO: 89.9 FL (ref 81–99)
MONOCYTES # BLD: 0.4 K/UL (ref 0–0.9)
MONOCYTES NFR BLD: 4 % (ref 0–10)
NEUTROPHILS # BLD: 6.2 K/UL (ref 1.5–7.5)
NEUTS SEG NFR BLD: 61 % (ref 50–65)
NITRITE UR QL STRIP.AUTO: NEGATIVE
PH UR STRIP.AUTO: 6 [PH] (ref 5–8)
PLATELET # BLD AUTO: 307 K/UL (ref 130–400)
PMV BLD AUTO: 9.5 FL (ref 9.4–12.3)
POTASSIUM SERPL-SCNC: 4.6 MMOL/L (ref 3.5–5)
PROT SERPL-MCNC: 7.8 G/DL (ref 6.6–8.7)
PROT UR STRIP.AUTO-MCNC: NEGATIVE MG/DL
RBC # BLD AUTO: 4.67 M/UL (ref 4.2–5.4)
RBC #/AREA URNS AUTO: 2 /HPF (ref 0–4)
SODIUM SERPL-SCNC: 138 MMOL/L (ref 136–145)
SP GR UR STRIP.AUTO: 1 (ref 1–1.03)
UROBILINOGEN UR STRIP.AUTO-MCNC: 0.2 E.U./DL
WBC # BLD AUTO: 10.1 K/UL (ref 4.8–10.8)
WBC #/AREA URNS AUTO: 7 /HPF (ref 0–5)

## 2024-05-23 PROCEDURE — 6360000002 HC RX W HCPCS: Performed by: EMERGENCY MEDICINE

## 2024-05-23 PROCEDURE — 84703 CHORIONIC GONADOTROPIN ASSAY: CPT

## 2024-05-23 PROCEDURE — 80053 COMPREHEN METABOLIC PANEL: CPT

## 2024-05-23 PROCEDURE — 36415 COLL VENOUS BLD VENIPUNCTURE: CPT

## 2024-05-23 PROCEDURE — 99284 EMERGENCY DEPT VISIT MOD MDM: CPT

## 2024-05-23 PROCEDURE — 81001 URINALYSIS AUTO W/SCOPE: CPT

## 2024-05-23 PROCEDURE — 96374 THER/PROPH/DIAG INJ IV PUSH: CPT

## 2024-05-23 PROCEDURE — 2580000003 HC RX 258: Performed by: EMERGENCY MEDICINE

## 2024-05-23 PROCEDURE — 74150 CT ABDOMEN W/O CONTRAST: CPT

## 2024-05-23 PROCEDURE — 96375 TX/PRO/DX INJ NEW DRUG ADDON: CPT

## 2024-05-23 PROCEDURE — 85025 COMPLETE CBC W/AUTO DIFF WBC: CPT

## 2024-05-23 PROCEDURE — 87086 URINE CULTURE/COLONY COUNT: CPT

## 2024-05-23 PROCEDURE — 83690 ASSAY OF LIPASE: CPT

## 2024-05-23 RX ORDER — 0.9 % SODIUM CHLORIDE 0.9 %
1000 INTRAVENOUS SOLUTION INTRAVENOUS ONCE
Status: COMPLETED | OUTPATIENT
Start: 2024-05-23 | End: 2024-05-23

## 2024-05-23 RX ORDER — CEPHALEXIN 500 MG/1
500 CAPSULE ORAL 3 TIMES DAILY
Qty: 21 CAPSULE | Refills: 0 | Status: SHIPPED | OUTPATIENT
Start: 2024-05-23 | End: 2024-05-30

## 2024-05-23 RX ORDER — MORPHINE SULFATE 4 MG/ML
4 INJECTION, SOLUTION INTRAMUSCULAR; INTRAVENOUS ONCE
Status: COMPLETED | OUTPATIENT
Start: 2024-05-23 | End: 2024-05-23

## 2024-05-23 RX ORDER — KETOROLAC TROMETHAMINE 30 MG/ML
30 INJECTION, SOLUTION INTRAMUSCULAR; INTRAVENOUS ONCE
Status: COMPLETED | OUTPATIENT
Start: 2024-05-23 | End: 2024-05-23

## 2024-05-23 RX ORDER — LEVOTHYROXINE SODIUM 0.05 MG/1
50 TABLET ORAL DAILY
Qty: 30 TABLET | Refills: 5 | Status: SHIPPED | OUTPATIENT
Start: 2024-05-23

## 2024-05-23 RX ORDER — ONDANSETRON 2 MG/ML
4 INJECTION INTRAMUSCULAR; INTRAVENOUS ONCE
Status: COMPLETED | OUTPATIENT
Start: 2024-05-23 | End: 2024-05-23

## 2024-05-23 RX ADMIN — MORPHINE SULFATE 4 MG: 4 INJECTION, SOLUTION INTRAMUSCULAR; INTRAVENOUS at 12:24

## 2024-05-23 RX ADMIN — SODIUM CHLORIDE 1000 ML: 9 INJECTION, SOLUTION INTRAVENOUS at 12:20

## 2024-05-23 RX ADMIN — WATER 1000 MG: 1 INJECTION INTRAMUSCULAR; INTRAVENOUS; SUBCUTANEOUS at 15:22

## 2024-05-23 RX ADMIN — KETOROLAC TROMETHAMINE 30 MG: 30 INJECTION, SOLUTION INTRAMUSCULAR at 13:30

## 2024-05-23 RX ADMIN — ONDANSETRON 4 MG: 2 INJECTION INTRAMUSCULAR; INTRAVENOUS at 12:24

## 2024-05-23 ASSESSMENT — ENCOUNTER SYMPTOMS
SORE THROAT: 0
SHORTNESS OF BREATH: 0
NAUSEA: 0
VOMITING: 0
DIARRHEA: 0
RHINORRHEA: 0
BACK PAIN: 0
ABDOMINAL PAIN: 0

## 2024-05-23 ASSESSMENT — PAIN SCALES - GENERAL
PAINLEVEL_OUTOF10: 10
PAINLEVEL_OUTOF10: 10

## 2024-05-23 ASSESSMENT — PAIN DESCRIPTION - LOCATION
LOCATION: FLANK
LOCATION: FLANK

## 2024-05-23 ASSESSMENT — PAIN DESCRIPTION - ORIENTATION
ORIENTATION: LEFT;RIGHT
ORIENTATION: RIGHT;LEFT

## 2024-05-23 NOTE — TELEPHONE ENCOUNTER
Alondra Pickard called to request a refill on her medication.      Last office visit : 5/21/2024   Next office visit : 7/17/2024     Requested Prescriptions     Pending Prescriptions Disp Refills    levothyroxine (SYNTHROID) 50 MCG tablet [Pharmacy Med Name: LEVOTHYROXINE 50 MCG TABLET] 30 tablet 5     Sig: TAKE 1 TABLET BY MOUTH EVERY DAY            Jennie Marti MA

## 2024-05-23 NOTE — ED NOTES
Spoke with  regarding lipase not resulted.  Tech states that they will check on it and have it resulted soon.

## 2024-05-25 LAB — BACTERIA UR CULT: NORMAL

## 2024-05-27 DIAGNOSIS — F41.8 SITUATIONAL ANXIETY: ICD-10-CM

## 2024-05-28 RX ORDER — BUSPIRONE HYDROCHLORIDE 10 MG/1
10 TABLET ORAL 2 TIMES DAILY
Qty: 60 TABLET | Refills: 1 | Status: SHIPPED | OUTPATIENT
Start: 2024-05-28

## 2024-05-29 ENCOUNTER — TELEPHONE (OUTPATIENT)
Dept: PSYCHIATRY | Age: 49
End: 2024-05-29

## 2024-05-29 NOTE — TELEPHONE ENCOUNTER
Called pt on 05/29/24 for appointment reminder for 05/30/24. Pt confirmed   ?   Reminded patient to complete their visit pre-check/digital registration in [facility].

## 2024-05-30 ENCOUNTER — TELEPHONE (OUTPATIENT)
Dept: PSYCHIATRY | Age: 49
End: 2024-05-30

## 2024-05-30 ENCOUNTER — OFFICE VISIT (OUTPATIENT)
Dept: PSYCHIATRY | Age: 49
End: 2024-05-30

## 2024-05-30 DIAGNOSIS — F39 MOOD DISORDER (HCC): ICD-10-CM

## 2024-05-30 DIAGNOSIS — G47.01 INSOMNIA DUE TO MEDICAL CONDITION: Primary | ICD-10-CM

## 2024-05-30 DIAGNOSIS — F41.0 GENERALIZED ANXIETY DISORDER WITH PANIC ATTACKS: Primary | ICD-10-CM

## 2024-05-30 DIAGNOSIS — F41.1 GENERALIZED ANXIETY DISORDER WITH PANIC ATTACKS: Primary | ICD-10-CM

## 2024-05-30 DIAGNOSIS — F43.10 PTSD (POST-TRAUMATIC STRESS DISORDER): ICD-10-CM

## 2024-05-30 RX ORDER — DOXEPIN HYDROCHLORIDE 25 MG/1
25 CAPSULE ORAL NIGHTLY
Qty: 30 CAPSULE | Refills: 0 | Status: SHIPPED | OUTPATIENT
Start: 2024-05-30

## 2024-05-30 ASSESSMENT — PATIENT HEALTH QUESTIONNAIRE - PHQ9
10. IF YOU CHECKED OFF ANY PROBLEMS, HOW DIFFICULT HAVE THESE PROBLEMS MADE IT FOR YOU TO DO YOUR WORK, TAKE CARE OF THINGS AT HOME, OR GET ALONG WITH OTHER PEOPLE: VERY DIFFICULT
2. FEELING DOWN, DEPRESSED OR HOPELESS: SEVERAL DAYS
SUM OF ALL RESPONSES TO PHQ9 QUESTIONS 1 & 2: 3
3. TROUBLE FALLING OR STAYING ASLEEP: SEVERAL DAYS
SUM OF ALL RESPONSES TO PHQ QUESTIONS 1-9: 15
8. MOVING OR SPEAKING SO SLOWLY THAT OTHER PEOPLE COULD HAVE NOTICED. OR THE OPPOSITE, BEING SO FIGETY OR RESTLESS THAT YOU HAVE BEEN MOVING AROUND A LOT MORE THAN USUAL: NEARLY EVERY DAY
6. FEELING BAD ABOUT YOURSELF - OR THAT YOU ARE A FAILURE OR HAVE LET YOURSELF OR YOUR FAMILY DOWN: MORE THAN HALF THE DAYS
1. LITTLE INTEREST OR PLEASURE IN DOING THINGS: MORE THAN HALF THE DAYS
SUM OF ALL RESPONSES TO PHQ QUESTIONS 1-9: 15
9. THOUGHTS THAT YOU WOULD BE BETTER OFF DEAD, OR OF HURTING YOURSELF: NOT AT ALL
7. TROUBLE CONCENTRATING ON THINGS, SUCH AS READING THE NEWSPAPER OR WATCHING TELEVISION: NEARLY EVERY DAY
SUM OF ALL RESPONSES TO PHQ QUESTIONS 1-9: 15
5. POOR APPETITE OR OVEREATING: MORE THAN HALF THE DAYS
SUM OF ALL RESPONSES TO PHQ QUESTIONS 1-9: 15
4. FEELING TIRED OR HAVING LITTLE ENERGY: SEVERAL DAYS

## 2024-05-30 ASSESSMENT — ANXIETY QUESTIONNAIRES
3. WORRYING TOO MUCH ABOUT DIFFERENT THINGS: NEARLY EVERY DAY
6. BECOMING EASILY ANNOYED OR IRRITABLE: NEARLY EVERY DAY
5. BEING SO RESTLESS THAT IT IS HARD TO SIT STILL: MORE THAN HALF THE DAYS
2. NOT BEING ABLE TO STOP OR CONTROL WORRYING: NEARLY EVERY DAY
4. TROUBLE RELAXING: NEARLY EVERY DAY
GAD7 TOTAL SCORE: 20
1. FEELING NERVOUS, ANXIOUS, OR ON EDGE: NEARLY EVERY DAY
7. FEELING AFRAID AS IF SOMETHING AWFUL MIGHT HAPPEN: NEARLY EVERY DAY
IF YOU CHECKED OFF ANY PROBLEMS ON THIS QUESTIONNAIRE, HOW DIFFICULT HAVE THESE PROBLEMS MADE IT FOR YOU TO DO YOUR WORK, TAKE CARE OF THINGS AT HOME, OR GET ALONG WITH OTHER PEOPLE: VERY DIFFICULT

## 2024-05-30 NOTE — PROGRESS NOTES
Initial Session Note  Lucretia Olivarez, [facility]   5/30/2024    Patient location:[facility] location: [facility]      Total time time spent: 50 minutes  This is patient's FIRST Therapy appointment.  Reason for Consult:  depression, anxiety, and stress  Referring Provider: Barby Reich, APRN - CNP  [address]  [address]  [place] [address]    Pt provided informed consent for the behavioral health program. Discussed with patient model of service to include the limits of confidentiality (i.e. abuse reporting, suicide intervention, etc.) and short-term intervention focused approach.  Discussed no show and late cancellation policy. Pt indicated understanding.      Alondra Pickard ,a 49 y.o. female, for initial evaluation visit.    Reason:    Client was referred for counseling from med management provider, Barby Reich.  Client reported having a long history of trauma which she was not comfortable talking fully about yet.  Client reported she deals with ongoing triggers, flashbacks and nightmares due to this trauma and is very reluctant leaving the house by herself. Client refused to talk more openly about her reported trauma. Client reported having \"trust issues.\"  Client reports becoming kumar and irritable very easily and quickly, experiences crying episodes, has lost weight due to poor appetite, feels poorly motivated, and deals with a lot of guilt for not working.  Client reported her  will be having arm surgery soon and she will be needing foot surgery.  Client reported feeling anxious due to financial hardships, and is also dealing with grief issues due to her aunt passing away earlier this month.  Client reported having frequent panic attack symptoms that occur \"out of the blue\" and will begin to have intense chest pain, shortness of breath, shaking, dizziness/lightheaded, and feeling \"crazy.\"  Client also reported smoking

## 2024-05-30 NOTE — PATIENT INSTRUCTIONS
Call the National Suicide Prevention Lifeline (Lifeline) at 1-454-303-YDUO (0663), or text the Crisis Text Line (text HELLO to 424659). Both services are free and available 24 hours a day, seven days a week. All calls are confidential.     The Veterans Crisis Line connects Service members and Veterans in crisis, as well as their family members and friends, with qualified Department of ’s Affairs (VA) responders through a confidential toll-free hotline, online chat, or text messaging service. Dial 1-280.765.8289 and Press 1 to talk to someone or send a text message to 224439 to connect with a VA responder.

## 2024-05-30 NOTE — TELEPHONE ENCOUNTER
Per direction of PORFIRIO ANDERSON, pt contacted-when pt asked if she take hydroxazine at night she stated that she takes one around 5 pm.  She denied starting any other new medications.  She was informed to stop the Trazodone-call if muscle cramps do not resolve.  Pt informed that the APRN sent in a RX for Doxepin 25 mg take one at nightly which should help with sleep.  Pt verbalized understanding of the above info and plans to follow.

## 2024-06-02 DIAGNOSIS — F43.12 CHRONIC POST-TRAUMATIC STRESS DISORDER (PTSD): ICD-10-CM

## 2024-06-03 ENCOUNTER — OFFICE VISIT (OUTPATIENT)
Dept: UROLOGY | Age: 49
End: 2024-06-03
Payer: MEDICAID

## 2024-06-03 VITALS — WEIGHT: 170.4 LBS | HEIGHT: 65 IN | TEMPERATURE: 98.2 F | BODY MASS INDEX: 28.39 KG/M2

## 2024-06-03 DIAGNOSIS — N32.81 OVERACTIVE BLADDER: ICD-10-CM

## 2024-06-03 DIAGNOSIS — N20.0 LEFT NEPHROLITHIASIS: ICD-10-CM

## 2024-06-03 DIAGNOSIS — N39.46 MIXED STRESS AND URGE URINARY INCONTINENCE: Primary | ICD-10-CM

## 2024-06-03 DIAGNOSIS — R10.9 FLANK PAIN: ICD-10-CM

## 2024-06-03 LAB
BACTERIA URINE, POC: 0
BILIRUBIN URINE: 0 MG/DL
BLOOD, URINE: POSITIVE
CASTS URINE, POC: 0
CLARITY: CLEAR
COLOR: YELLOW
CRYSTALS URINE, POC: 0
EPI CELLS URINE, POC: NORMAL
GLUCOSE URINE: NORMAL
KETONES, URINE: NEGATIVE
LEUKOCYTE EST, POC: NORMAL
NITRITE, URINE: NEGATIVE
PH UA: 7 (ref 4.5–8)
POST VOID RESIDUAL (PVR): 40 ML
PROTEIN UA: NEGATIVE
RBC URINE, POC: 2
SPECIFIC GRAVITY UA: 1.02 (ref 1–1.03)
UROBILINOGEN, URINE: NORMAL
WBC URINE, POC: 27
YEAST URINE, POC: 0

## 2024-06-03 PROCEDURE — 51798 US URINE CAPACITY MEASURE: CPT | Performed by: NURSE PRACTITIONER

## 2024-06-03 PROCEDURE — G8427 DOCREV CUR MEDS BY ELIG CLIN: HCPCS | Performed by: NURSE PRACTITIONER

## 2024-06-03 PROCEDURE — 99204 OFFICE O/P NEW MOD 45 MIN: CPT | Performed by: NURSE PRACTITIONER

## 2024-06-03 PROCEDURE — 81001 URINALYSIS AUTO W/SCOPE: CPT | Performed by: NURSE PRACTITIONER

## 2024-06-03 PROCEDURE — G8417 CALC BMI ABV UP PARAM F/U: HCPCS | Performed by: NURSE PRACTITIONER

## 2024-06-03 PROCEDURE — 1036F TOBACCO NON-USER: CPT | Performed by: NURSE PRACTITIONER

## 2024-06-03 RX ORDER — CYCLOBENZAPRINE HCL 5 MG
5 TABLET ORAL 3 TIMES DAILY PRN
Qty: 30 TABLET | Refills: 0 | Status: SHIPPED | OUTPATIENT
Start: 2024-06-03 | End: 2024-06-13

## 2024-06-03 RX ORDER — PRAZOSIN HYDROCHLORIDE 1 MG/1
1 CAPSULE ORAL NIGHTLY
Qty: 30 CAPSULE | Refills: 0 | Status: SHIPPED | OUTPATIENT
Start: 2024-06-03

## 2024-06-03 ASSESSMENT — ENCOUNTER SYMPTOMS
VOMITING: 0
ABDOMINAL DISTENTION: 0
ABDOMINAL PAIN: 0
NAUSEA: 0
BACK PAIN: 0

## 2024-06-03 NOTE — TELEPHONE ENCOUNTER
Pharmacy sent a request to refill pt's medication       Last office visit : 5/30/2024 S Damir ANDERSON  Next office visit : 6/17/2024 S Damir ANDERSON    Requested Prescriptions     Pending Prescriptions Disp Refills    prazosin (MINIPRESS) 1 MG capsule [Pharmacy Med Name: PRAZOSIN 1 MG CAPSULE] 30 capsule 0     Sig: TAKE 1 CAPSULE BY MOUTH EVERY NIGHT            Marcela Collazo        5/6/24                                         Progress Note     Alondra Pickard 1975                            Chief Complaint   Patient presents with    Medication Check            Subjective:    Patient is a 49 y.o. female diagnosed with unspecified mood disorder, BENY with panic attacks, chronic PTSD, insomnia due to medical condition, cannabis use disorder, and presents today for follow-up.  Last seen in clinic on 4/8/24  and prior records were reviewed.     Seen in office today.  Somewhat anxious, bouncing leg repeatedly during the interview.  She reports overall her mood and anxiety have been well-controlled, however today she feels nervous due to having to have endoscopy tomorrow, \"I always have a hard time with being put to sleep no matter what it is for, had a bad experience in the past where it was hard for me to wake up\".  We processed her feelings, supportive psychotherapy provided.  We initiated prazosin last time for nightmares related to PTSD, patient reports this has been very helpful.  She is sleeping well, also taking trazodone which has had good benefit.  Reports her  interviewed for a new job, they are waiting to hear this week.  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  She reports compliance with medications, denies any negative or unwanted side effects.  Denies suicidal and homicidal ideations, denies hallucinations, no overt

## 2024-06-03 NOTE — PROGRESS NOTES
tablet; Refill: 0  - POCT Urinalysis Dipstick w/ Micro (Auto)    Addendum  4.  Left nephrolithiasis  Nonobstructing 2 mm left lower pole.  Too small to be seen on KUB at this time.  Can follow-up with KUB in approximately 1 year to see if this is visible for ESWL.    Orders Placed This Encounter   Procedures    POCT Urinalysis Dipstick w/ Micro (Auto)    PA Measure, post-void residual, US, non-imaging     PVR 40ml        Return in about 2 months (around 8/3/2024).    All information inputted into the note by the MA to include chief complaint, past medical history, past surgical history, medications, allergies, social and family history and review of systems has been reviewed and updated as needed by me.    EMR Dragon/transcription disclaimer: Much of this documentt is electronic  transcription/translation of spoken language to printed text. The  electronic translation of spoken language may be erroneous, or at times,  nonsensical words or phrases may be inadvertently transcribed. Although I  have reviewed the document for such errors, some may still exist.

## 2024-06-03 NOTE — TELEPHONE ENCOUNTER
Called and let pt know that her script for prazosin was sent to her pharmacy     Electronically signed by Marcela Collazo on 6/3/2024 at 3:07 PM

## 2024-06-05 DIAGNOSIS — A49.3 NONGONOCOCCAL URETHRITIS DUE TO UREAPLASMA UREALYTICUM: ICD-10-CM

## 2024-06-05 DIAGNOSIS — N39.46 MIXED INCONTINENCE: ICD-10-CM

## 2024-06-05 DIAGNOSIS — N76.0 BACTERIAL VAGINOSIS: Primary | ICD-10-CM

## 2024-06-05 DIAGNOSIS — B96.89 BACTERIAL VAGINOSIS: Primary | ICD-10-CM

## 2024-06-05 DIAGNOSIS — N32.81 OVERACTIVE BLADDER: ICD-10-CM

## 2024-06-05 DIAGNOSIS — N34.1 NONGONOCOCCAL URETHRITIS DUE TO UREAPLASMA UREALYTICUM: ICD-10-CM

## 2024-06-05 RX ORDER — OXYBUTYNIN CHLORIDE 10 MG/1
10 TABLET, EXTENDED RELEASE ORAL DAILY
Qty: 90 TABLET | Refills: 3 | Status: SHIPPED | OUTPATIENT
Start: 2024-06-05

## 2024-06-05 RX ORDER — METRONIDAZOLE 500 MG/1
500 TABLET ORAL 2 TIMES DAILY
Qty: 14 TABLET | Refills: 0 | Status: SHIPPED | OUTPATIENT
Start: 2024-06-05 | End: 2024-06-12

## 2024-06-05 RX ORDER — DOXYCYCLINE HYCLATE 100 MG
100 TABLET ORAL 2 TIMES DAILY
Qty: 14 TABLET | Refills: 0 | Status: SHIPPED | OUTPATIENT
Start: 2024-06-05 | End: 2024-06-12

## 2024-06-05 NOTE — PROGRESS NOTES
Please let patient know Diatherix positive for bacterial vaginosis and Ureaplasma.  I sent in doxycycline and Flagyl.  Continue on her oxybutynin 10 mg we will send in refills today after treatment if she still continues to have symptoms please notify the office.

## 2024-06-06 ENCOUNTER — TELEPHONE (OUTPATIENT)
Dept: UROLOGY | Age: 49
End: 2024-06-06

## 2024-06-06 NOTE — TELEPHONE ENCOUNTER
Spoke to patient about Diatherix result and let her know it has come back positive for bacterial vaginosis and Ureaplasma and to start doxycycline and Flagyl. Also told her to continue on her oxybutynin 10 mg we will send in refills today and after treatment if she still continues to have symptoms please notify the office.Patient voiced understanding.

## 2024-06-08 DIAGNOSIS — F41.1 GENERALIZED ANXIETY DISORDER WITH PANIC ATTACKS: ICD-10-CM

## 2024-06-08 DIAGNOSIS — F39 UNSPECIFIED MOOD (AFFECTIVE) DISORDER (HCC): ICD-10-CM

## 2024-06-08 DIAGNOSIS — G47.01 INSOMNIA DUE TO MEDICAL CONDITION: ICD-10-CM

## 2024-06-08 DIAGNOSIS — F43.12 CHRONIC POST-TRAUMATIC STRESS DISORDER (PTSD): ICD-10-CM

## 2024-06-08 DIAGNOSIS — F41.8 SITUATIONAL ANXIETY: ICD-10-CM

## 2024-06-08 DIAGNOSIS — F41.0 GENERALIZED ANXIETY DISORDER WITH PANIC ATTACKS: ICD-10-CM

## 2024-06-08 DIAGNOSIS — E78.2 MIXED HYPERLIPIDEMIA: ICD-10-CM

## 2024-06-10 ENCOUNTER — TELEPHONE (OUTPATIENT)
Dept: PSYCHIATRY | Age: 49
End: 2024-06-10

## 2024-06-10 DIAGNOSIS — G47.01 INSOMNIA DUE TO MEDICAL CONDITION: ICD-10-CM

## 2024-06-10 RX ORDER — ATORVASTATIN CALCIUM 20 MG/1
20 TABLET, FILM COATED ORAL DAILY
Qty: 30 TABLET | Refills: 1 | Status: SHIPPED | OUTPATIENT
Start: 2024-06-10

## 2024-06-10 RX ORDER — TRAZODONE HYDROCHLORIDE 50 MG/1
50-100 TABLET ORAL NIGHTLY
Qty: 60 TABLET | Refills: 0 | OUTPATIENT
Start: 2024-06-10

## 2024-06-10 RX ORDER — DOXEPIN HYDROCHLORIDE 25 MG/1
25 CAPSULE ORAL NIGHTLY
Qty: 30 CAPSULE | Refills: 0 | Status: SHIPPED | OUTPATIENT
Start: 2024-06-10

## 2024-06-10 RX ORDER — PRAZOSIN HYDROCHLORIDE 1 MG/1
CAPSULE ORAL
Qty: 30 CAPSULE | Refills: 0 | OUTPATIENT
Start: 2024-06-10

## 2024-06-10 RX ORDER — DOXEPIN HYDROCHLORIDE 25 MG/1
CAPSULE ORAL
Qty: 30 CAPSULE | Refills: 0 | OUTPATIENT
Start: 2024-06-10

## 2024-06-10 RX ORDER — BUSPIRONE HYDROCHLORIDE 5 MG/1
5 TABLET ORAL 2 TIMES DAILY
Qty: 60 TABLET | Refills: 0 | OUTPATIENT
Start: 2024-06-10

## 2024-06-10 RX ORDER — LAMOTRIGINE 100 MG/1
100 TABLET ORAL DAILY
Qty: 30 TABLET | Refills: 0 | Status: SHIPPED | OUTPATIENT
Start: 2024-06-10

## 2024-06-10 RX ORDER — HYDROXYZINE HYDROCHLORIDE 25 MG/1
25 TABLET, FILM COATED ORAL 3 TIMES DAILY PRN
Qty: 90 TABLET | Refills: 0 | Status: SHIPPED | OUTPATIENT
Start: 2024-06-10

## 2024-06-10 NOTE — TELEPHONE ENCOUNTER
Alondra Pickard called to request a refill on her medication.      Last office visit : 5/21/2024   Next office visit : 7/17/2024     Requested Prescriptions     Signed Prescriptions Disp Refills    atorvastatin (LIPITOR) 20 MG tablet 30 tablet 1     Sig: TAKE 1 TABLET BY MOUTH EVERY DAY     Authorizing Provider: DAVID BOSTON     Ordering User: NATALIE MACIEL     Refused Prescriptions Disp Refills    busPIRone (BUSPAR) 5 MG tablet [Pharmacy Med Name: BUSPIRONE HCL 5 MG TABLET] 60 tablet 0     Sig: TAKE 1 TABLET BY MOUTH TWICE A DAY     Refused By: NATALIE MACIEL     Reason for Refusal: Duplicate Request            Natalie Maciel LPN

## 2024-06-10 NOTE — TELEPHONE ENCOUNTER
kept getting switched around to different therapists, so she stopped going.  She reports over the last several years her anxiety has worsened, to the point she is no longer working, she states \"it is hard for me to even go out in public, I hate leaving the house, I cannot hardly without my  with me except maybe to the doctor's visit\".  She reports that she and her  live [place], trailer is owned by her in-laws, and she reports she and her  just pay rent for the actual lot.  She reports a lot was sold about 2 years ago, and since that time the new owners have been raising rent, reports is getting to the point they cannot afford it.  She is very concerned she is going to have to go back to work, but does not feel that she can do that, actually starts crying during interview when she talks about the possibility of having to go back to work.  She reports she has been having increased panic attacks, reporting that they have been happening at home as well as when she is out of the house.  She reports without working, she mostly sits at home throughout the day, watching movies or reading using an alisia on her phone with books, she reports her daughter and daughter-in-law do call her multiple times to check on her.  Patient reports severe cannabis use, smoking and vaping with THC, every day, multiple times a day.  Patient has history of obstructive sleep apnea, has CPAP at home, reports noncompliant with it, only wearing it at most 1-2 times a week, stating \"I cannot cuddle with my  within on, and it is really important to me\".    Today during interview, patient endorses mild to moderate feelings of depression, moderate to severe feelings of anxiety, increased panic attacks, loss of interest, low motivation, increased irritability, agitation, mood swings, racing thoughts, poor focus and concentration, increased sadness, increased crying episodes, inability to work due to anxiety,

## 2024-06-10 NOTE — TELEPHONE ENCOUNTER
Called and let pt know that her scripts for hydroxyzine,lamotrigine, and doxepin was sent to her pharmacy     Electronically signed by Marcela Collazo on 6/10/2024 at 2:26 PM

## 2024-06-14 ENCOUNTER — TELEPHONE (OUTPATIENT)
Dept: PSYCHIATRY | Age: 49
End: 2024-06-14

## 2024-06-14 DIAGNOSIS — N32.81 OVERACTIVE BLADDER: ICD-10-CM

## 2024-06-14 DIAGNOSIS — E78.2 MIXED HYPERLIPIDEMIA: ICD-10-CM

## 2024-06-14 DIAGNOSIS — N39.46 MIXED INCONTINENCE: ICD-10-CM

## 2024-06-14 RX ORDER — OXYBUTYNIN CHLORIDE 10 MG/1
10 TABLET, EXTENDED RELEASE ORAL DAILY
Qty: 90 TABLET | Refills: 1 | OUTPATIENT
Start: 2024-06-14

## 2024-06-14 RX ORDER — ATORVASTATIN CALCIUM 20 MG/1
20 TABLET, FILM COATED ORAL DAILY
Qty: 90 TABLET | Refills: 1 | OUTPATIENT
Start: 2024-06-14

## 2024-06-14 NOTE — TELEPHONE ENCOUNTER
Called and confirmed appt with pt for 6/17 @ 1 PM    Electronically signed by Marcela Collazo on 6/14/2024 at 9:43 AM

## 2024-06-14 NOTE — TELEPHONE ENCOUNTER
Alondra Pickard called to request a refill on her medication.      Last office visit : 5/21/2024   Next office visit : 7/17/2024     Requested Prescriptions     Pending Prescriptions Disp Refills    oxyBUTYnin (DITROPAN-XL) 10 MG extended release tablet [Pharmacy Med Name: OXYBUTYNIN CL ER 10 MG TABLET] 90 tablet 1     Sig: TAKE 1 TABLET BY MOUTH EVERY DAY    atorvastatin (LIPITOR) 20 MG tablet [Pharmacy Med Name: ATORVASTATIN 20 MG TABLET] 90 tablet 1     Sig: TAKE 1 TABLET BY MOUTH EVERY DAY            Natalie Maciel LPN

## 2024-06-17 ENCOUNTER — OFFICE VISIT (OUTPATIENT)
Dept: PSYCHIATRY | Age: 49
End: 2024-06-17
Payer: MEDICAID

## 2024-06-17 DIAGNOSIS — F41.1 GENERALIZED ANXIETY DISORDER: ICD-10-CM

## 2024-06-17 DIAGNOSIS — F43.10 POST TRAUMATIC STRESS DISORDER: ICD-10-CM

## 2024-06-17 DIAGNOSIS — F39 UNSPECIFIED MOOD (AFFECTIVE) DISORDER (HCC): Primary | ICD-10-CM

## 2024-06-17 PROCEDURE — 90834 PSYTX W PT 45 MINUTES: CPT | Performed by: COUNSELOR

## 2024-06-17 ASSESSMENT — ANXIETY QUESTIONNAIRES
IF YOU CHECKED OFF ANY PROBLEMS ON THIS QUESTIONNAIRE, HOW DIFFICULT HAVE THESE PROBLEMS MADE IT FOR YOU TO DO YOUR WORK, TAKE CARE OF THINGS AT HOME, OR GET ALONG WITH OTHER PEOPLE: VERY DIFFICULT
5. BEING SO RESTLESS THAT IT IS HARD TO SIT STILL: MORE THAN HALF THE DAYS
3. WORRYING TOO MUCH ABOUT DIFFERENT THINGS: NEARLY EVERY DAY
6. BECOMING EASILY ANNOYED OR IRRITABLE: NEARLY EVERY DAY
4. TROUBLE RELAXING: NEARLY EVERY DAY
GAD7 TOTAL SCORE: 20
7. FEELING AFRAID AS IF SOMETHING AWFUL MIGHT HAPPEN: NEARLY EVERY DAY
1. FEELING NERVOUS, ANXIOUS, OR ON EDGE: NEARLY EVERY DAY
2. NOT BEING ABLE TO STOP OR CONTROL WORRYING: NEARLY EVERY DAY

## 2024-06-17 ASSESSMENT — ENCOUNTER SYMPTOMS
RECTAL PAIN: 0
NAUSEA: 0
COUGH: 0
ABDOMINAL DISTENTION: 0
VOMITING: 0
CONSTIPATION: 0
DIARRHEA: 0
ABDOMINAL PAIN: 0
ANAL BLEEDING: 0
TROUBLE SWALLOWING: 1
CHOKING: 0
SHORTNESS OF BREATH: 0
BLOOD IN STOOL: 0

## 2024-06-17 ASSESSMENT — PATIENT HEALTH QUESTIONNAIRE - PHQ9
4. FEELING TIRED OR HAVING LITTLE ENERGY: MORE THAN HALF THE DAYS
8. MOVING OR SPEAKING SO SLOWLY THAT OTHER PEOPLE COULD HAVE NOTICED. OR THE OPPOSITE, BEING SO FIGETY OR RESTLESS THAT YOU HAVE BEEN MOVING AROUND A LOT MORE THAN USUAL: SEVERAL DAYS
SUM OF ALL RESPONSES TO PHQ QUESTIONS 1-9: 13
7. TROUBLE CONCENTRATING ON THINGS, SUCH AS READING THE NEWSPAPER OR WATCHING TELEVISION: MORE THAN HALF THE DAYS
9. THOUGHTS THAT YOU WOULD BE BETTER OFF DEAD, OR OF HURTING YOURSELF: NOT AT ALL
SUM OF ALL RESPONSES TO PHQ QUESTIONS 1-9: 13
5. POOR APPETITE OR OVEREATING: MORE THAN HALF THE DAYS
10. IF YOU CHECKED OFF ANY PROBLEMS, HOW DIFFICULT HAVE THESE PROBLEMS MADE IT FOR YOU TO DO YOUR WORK, TAKE CARE OF THINGS AT HOME, OR GET ALONG WITH OTHER PEOPLE: SOMEWHAT DIFFICULT
SUM OF ALL RESPONSES TO PHQ9 QUESTIONS 1 & 2: 3
6. FEELING BAD ABOUT YOURSELF - OR THAT YOU ARE A FAILURE OR HAVE LET YOURSELF OR YOUR FAMILY DOWN: MORE THAN HALF THE DAYS
2. FEELING DOWN, DEPRESSED OR HOPELESS: SEVERAL DAYS
SUM OF ALL RESPONSES TO PHQ QUESTIONS 1-9: 13
1. LITTLE INTEREST OR PLEASURE IN DOING THINGS: MORE THAN HALF THE DAYS
SUM OF ALL RESPONSES TO PHQ QUESTIONS 1-9: 13
3. TROUBLE FALLING OR STAYING ASLEEP: SEVERAL DAYS

## 2024-06-17 NOTE — PATIENT INSTRUCTIONS
Call the National Suicide Prevention Lifeline (Lifeline) at 7-964-675-SAXZ (2007), or text the Crisis Text Line (text HELLO to 877185). Both services are free and available 24 hours a day, seven days a week. All calls are confidential.     The Veterans Crisis Line connects Service members and Veterans in crisis, as well as their family members and friends, with qualified Department of ’s Affairs (VA) responders through a confidential toll-free hotline, online chat, or text messaging service. Dial [phone] and Press 1 to talk to someone or send a text message to 039256 to connect with a VA responder.

## 2024-06-17 NOTE — PROGRESS NOTES
Therapy Progress Note  Lucretia Sher [facility]   6/17/2024    Patient location  :[facility]  [facility] location : [facility]      Total time spent: 55 minutes  This is patient's second  Therapy appointment.  Reason for Consult:  depression, anxiety, and stress  Referring Provider: No referring provider defined for this encounter.      Alondra Pickard ,a 49 y.o. female, for follow up visit.     Pt provided informed consent for the behavioral health program. Discussed with patient model of service to include the limits of confidentiality (i.e. abuse reporting, suicide intervention, etc.) and short-term intervention focused approach.  Discussed no show and late cancellation policy.  Pt indicated understanding.    S:  Patient discussed having chronic anxiety due to ongoing financial stressors and difficulty paying bills each month.  Patient discussed living in an unsafe neighborhood and sees people wander through her backyard all the time and that she lives in a \"meth infested\" mobile home community.  Patient discussed feeling \"like a burden\" on her  that she is not in a position to work and help financially support the household.  Patient discussed having on going problems regulating her anger and other unpleasant feelings, experiences frequent hypervigilance, intrusive thoughts, daily anxiety, and racing thoughts.  Patient discussed having an unstable upbringing and how her poor early childhood experiences have impacted her mental health today.    Pt reported she  would like to continue the appointments twice a month and verbalized she  would call sooner if needed.   Pt denies Suicidal Ideations, Homicidal Ideation, Auditory Hallucinations, Visual Hallucinations, Tactical Hallucinations.    Assessments:  Assessments:  PHQ-9 Score:       6/17/2024     1:10 PM 5/30/2024     1:13 PM 5/6/2024     8:05 AM   PHQ-9    Little interest or pleasure in doing things 2 2 2   Feeling

## 2024-06-18 ENCOUNTER — OFFICE VISIT (OUTPATIENT)
Dept: GASTROENTEROLOGY | Age: 49
End: 2024-06-18
Payer: MEDICAID

## 2024-06-18 VITALS
HEART RATE: 77 BPM | SYSTOLIC BLOOD PRESSURE: 120 MMHG | BODY MASS INDEX: 33.38 KG/M2 | WEIGHT: 170 LBS | OXYGEN SATURATION: 98 % | HEIGHT: 60 IN | DIASTOLIC BLOOD PRESSURE: 80 MMHG

## 2024-06-18 DIAGNOSIS — R13.10 DYSPHAGIA, UNSPECIFIED TYPE: ICD-10-CM

## 2024-06-18 DIAGNOSIS — K21.9 CHRONIC GERD: Primary | ICD-10-CM

## 2024-06-18 PROCEDURE — G8417 CALC BMI ABV UP PARAM F/U: HCPCS | Performed by: NURSE PRACTITIONER

## 2024-06-18 PROCEDURE — 1036F TOBACCO NON-USER: CPT | Performed by: NURSE PRACTITIONER

## 2024-06-18 PROCEDURE — G8427 DOCREV CUR MEDS BY ELIG CLIN: HCPCS | Performed by: NURSE PRACTITIONER

## 2024-06-18 PROCEDURE — 99214 OFFICE O/P EST MOD 30 MIN: CPT | Performed by: NURSE PRACTITIONER

## 2024-06-18 RX ORDER — PANTOPRAZOLE SODIUM 40 MG/1
40 TABLET, DELAYED RELEASE ORAL 2 TIMES DAILY
Qty: 60 TABLET | Refills: 3 | Status: SHIPPED | OUTPATIENT
Start: 2024-06-18

## 2024-06-18 NOTE — PROGRESS NOTES
vitamin D (ERGOCALCIFEROL) 1.25 MG (34929 UT) CAPS capsule TAKE 1 CAPSULE BY MOUTH ONE TIME PER WEEK 4 capsule 2    ondansetron (ZOFRAN ODT) 4 MG disintegrating tablet Place 1 tablet under the tongue every 8 hours as needed for Nausea or Vomiting 21 tablet 0    COMBIVENT RESPIMAT  MCG/ACT AERS inhaler INHALE 1 PUFF EVERY 4 HOURS AS NEEDED FOR WHEEZING 1 each 5     No current facility-administered medications for this visit.       Allergies   Allergen Reactions    Codeine Nausea And Vomiting, Swelling and Rash       Review of Systems   Constitutional:  Negative for activity change, appetite change, fatigue, fever and unexpected weight change.   HENT:  Positive for trouble swallowing.    Respiratory:  Negative for cough, choking and shortness of breath.    Cardiovascular:  Negative for chest pain.   Gastrointestinal:  Negative for abdominal distention, abdominal pain, anal bleeding, blood in stool, constipation, diarrhea, nausea, rectal pain and vomiting.        Reflux    Allergic/Immunologic: Negative for food allergies.   All other systems reviewed and are negative.        Objective:     /80 (Site: Left Upper Arm)   Pulse 77   Ht 1.524 m (5')   Wt 77.1 kg (170 lb)   SpO2 98%   BMI 33.20 kg/m²     Physical Exam  Constitutional:       Appearance: Normal appearance.   HENT:      Head: Normocephalic.      Right Ear: External ear normal.      Left Ear: External ear normal.      Nose: Nose normal.   Eyes:      General:         Right eye: No discharge.         Left eye: No discharge.      Extraocular Movements: Extraocular movements intact.      Conjunctiva/sclera: Conjunctivae normal.   Cardiovascular:      Rate and Rhythm: Normal rate.   Pulmonary:      Effort: Pulmonary effort is normal. No respiratory distress.   Abdominal:      General: There is no distension.   Musculoskeletal:         General: Normal range of motion.      Cervical back: Normal range of motion.   Skin:     General: Skin is warm and

## 2024-06-18 NOTE — PATIENT INSTRUCTIONS
instead of two or three large meals.  After you eat, wait 2 to 3 hours before you lie down. Snacking close to bedtime can make your symptoms worse.  Avoid foods that make your symptoms worse. These may include chocolate, mint, alcohol, pepper, spicy foods, high-fat foods, or drinks with caffeine in them, such as tea, coffee, jennifer, or energy drinks. If your symptoms are worse after you eat a certain food, you may want to stop eating it to see if your symptoms get better.  Try to quit smoking or chewing tobacco, or cut back as much as you can. If you need help quitting, talk to your doctor about quit-tobacco programs and medicines. These can increase your chances of quitting for good.  If you have GERD symptoms while trying to sleep, raise the head of your bed 6 to 8 inches by putting the frame on blocks or placing a foam wedge under the head of your mattress. (Adding extra pillows does not work.)  Do not wear tight clothing around your middle.  When should you call for help?   Call 911  anytime you think you may need emergency care. For example, call if:    You passed out (lost consciousness).   Call your doctor now or seek immediate medical care if:    You have new or worse belly pain.     Your stools are black and tarlike or have streaks of blood.     You vomit blood.   Watch closely for changes in your health, and be sure to contact your doctor if:    Your symptoms have not improved after 2 weeks.     Food seems to catch in your throat or chest.   Where can you learn more?  Go to https://[email or web address].net/patientEd and enter T927 to learn more about \"Gastroesophageal Reflux Disease (GERD): Care Instructions.\"  Current as of: October 19, 2023  Content Version: 14.1  © 6592-1059 [employer].   Care instructions adapted under license by [employer]. If you have questions about a medical condition or this instruction, always ask your healthcare professional. [employer] disclaims any

## 2024-06-24 ENCOUNTER — TELEPHONE (OUTPATIENT)
Dept: PSYCHIATRY | Age: 49
End: 2024-06-24

## 2024-06-24 DIAGNOSIS — G47.01 INSOMNIA DUE TO MEDICAL CONDITION: ICD-10-CM

## 2024-06-24 DIAGNOSIS — N32.81 OVERACTIVE BLADDER: ICD-10-CM

## 2024-06-24 DIAGNOSIS — N39.46 MIXED INCONTINENCE: ICD-10-CM

## 2024-06-24 RX ORDER — DOXEPIN HYDROCHLORIDE 25 MG/1
25 CAPSULE ORAL NIGHTLY
Qty: 30 CAPSULE | Refills: 0 | OUTPATIENT
Start: 2024-06-24

## 2024-06-24 RX ORDER — OXYBUTYNIN CHLORIDE 10 MG/1
10 TABLET, EXTENDED RELEASE ORAL DAILY
Qty: 90 TABLET | Refills: 3 | OUTPATIENT
Start: 2024-06-24

## 2024-06-24 NOTE — TELEPHONE ENCOUNTER
Pt called to cancel/reschedule appt for 07/08/24 with Barby Reich because she has another appt that day and will not be able to make the appt.    Rescheduled for 07/10/24 @ 1:30.    Electronically signed by Giovana Juares MA on 6/24/2024 at 9:27 AM

## 2024-06-29 DIAGNOSIS — F43.12 CHRONIC POST-TRAUMATIC STRESS DISORDER (PTSD): ICD-10-CM

## 2024-07-01 RX ORDER — PRAZOSIN HYDROCHLORIDE 1 MG/1
CAPSULE ORAL
Qty: 30 CAPSULE | Refills: 0 | Status: SHIPPED | OUTPATIENT
Start: 2024-07-01

## 2024-07-01 NOTE — TELEPHONE ENCOUNTER
Pharmacy sent a request to refill pt's medication       Last office visit : 6/17/2024 S Damir ANDERSON  Next office visit : 7/10/2024 S Damir ANDERSON    Requested Prescriptions     Pending Prescriptions Disp Refills    prazosin (MINIPRESS) 1 MG capsule [Pharmacy Med Name: PRAZOSIN 1 MG CAPSULE] 30 capsule 0     Sig: TAKE 1 CAPSULE BY MOUTH EVERY DAY AT NIGHT            Marcela Collazo        5/6/24                                         Progress Note     Alondra Pickard 1975                            Chief Complaint   Patient presents with    Medication Check            Subjective:    Patient is a 49 y.o. female diagnosed with unspecified mood disorder, BENY with panic attacks, chronic PTSD, insomnia due to medical condition, cannabis use disorder, and presents today for follow-up.  Last seen in clinic on 4/8/24  and prior records were reviewed.     Seen in office today.  Somewhat anxious, bouncing leg repeatedly during the interview.  She reports overall her mood and anxiety have been well-controlled, however today she feels nervous due to having to have endoscopy tomorrow, \"I always have a hard time with being put to sleep no matter what it is for, had a bad experience in the past where it was hard for me to wake up\".  We processed her feelings, supportive psychotherapy provided.  We initiated prazosin last time for nightmares related to PTSD, patient reports this has been very helpful.  She is sleeping well, also taking trazodone which has had good benefit.  Reports her  interviewed for a new job, they are waiting to hear this week.  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  She reports compliance with medications, denies any negative or unwanted side effects.  Denies suicidal and homicidal ideations, denies hallucinations, no

## 2024-07-08 ENCOUNTER — TELEPHONE (OUTPATIENT)
Dept: PSYCHIATRY | Age: 49
End: 2024-07-08

## 2024-07-08 NOTE — TELEPHONE ENCOUNTER
Called and confirmed appt with pt for 7/10 @ 1:30 PM    Electronically signed by Marcela Collazo on 7/8/2024 at 2:42 PM

## 2024-07-09 DIAGNOSIS — F41.1 GENERALIZED ANXIETY DISORDER WITH PANIC ATTACKS: ICD-10-CM

## 2024-07-09 DIAGNOSIS — F41.0 GENERALIZED ANXIETY DISORDER WITH PANIC ATTACKS: ICD-10-CM

## 2024-07-09 DIAGNOSIS — F39 UNSPECIFIED MOOD (AFFECTIVE) DISORDER (HCC): ICD-10-CM

## 2024-07-09 RX ORDER — HYDROXYZINE HYDROCHLORIDE 25 MG/1
25 TABLET, FILM COATED ORAL 3 TIMES DAILY PRN
Qty: 90 TABLET | Refills: 0 | Status: SHIPPED | OUTPATIENT
Start: 2024-07-09

## 2024-07-09 RX ORDER — LAMOTRIGINE 100 MG/1
100 TABLET ORAL DAILY
Qty: 30 TABLET | Refills: 0 | Status: SHIPPED | OUTPATIENT
Start: 2024-07-09

## 2024-07-09 NOTE — TELEPHONE ENCOUNTER
Additional comments: Begin therapy, discussed sleep hygiene, discussed the use of coping skills and relaxation strategies to manage symptoms.            10.Over 50% of the total visit time of   30  minutes was spent on counseling and/or coordination of care of:                         1. Unspecified mood (affective) disorder (HCC)    2. Generalized anxiety disorder with panic attacks    3. Chronic post-traumatic stress disorder (PTSD)    4. Insomnia due to medical condition                           Psychotherapy Topics: mood/medication effectiveness family and financial     JUSTIN Delarosa - CNP

## 2024-07-10 ENCOUNTER — OFFICE VISIT (OUTPATIENT)
Dept: PSYCHIATRY | Age: 49
End: 2024-07-10
Payer: MEDICAID

## 2024-07-10 VITALS
HEART RATE: 75 BPM | SYSTOLIC BLOOD PRESSURE: 127 MMHG | BODY MASS INDEX: 34.3 KG/M2 | DIASTOLIC BLOOD PRESSURE: 80 MMHG | WEIGHT: 174.7 LBS | RESPIRATION RATE: 16 BRPM | OXYGEN SATURATION: 97 % | TEMPERATURE: 98 F | HEIGHT: 60 IN

## 2024-07-10 DIAGNOSIS — F41.1 GENERALIZED ANXIETY DISORDER WITH PANIC ATTACKS: ICD-10-CM

## 2024-07-10 DIAGNOSIS — F12.90 CANNABIS USE DISORDER: ICD-10-CM

## 2024-07-10 DIAGNOSIS — F39 UNSPECIFIED MOOD (AFFECTIVE) DISORDER (HCC): Primary | ICD-10-CM

## 2024-07-10 DIAGNOSIS — G47.01 INSOMNIA DUE TO MEDICAL CONDITION: ICD-10-CM

## 2024-07-10 DIAGNOSIS — F41.0 GENERALIZED ANXIETY DISORDER WITH PANIC ATTACKS: ICD-10-CM

## 2024-07-10 DIAGNOSIS — F43.12 CHRONIC POST-TRAUMATIC STRESS DISORDER (PTSD): ICD-10-CM

## 2024-07-10 PROCEDURE — G8428 CUR MEDS NOT DOCUMENT: HCPCS

## 2024-07-10 PROCEDURE — 99214 OFFICE O/P EST MOD 30 MIN: CPT

## 2024-07-10 PROCEDURE — 1036F TOBACCO NON-USER: CPT

## 2024-07-10 PROCEDURE — G8417 CALC BMI ABV UP PARAM F/U: HCPCS

## 2024-07-10 RX ORDER — DOXEPIN HYDROCHLORIDE 50 MG/1
50 CAPSULE ORAL NIGHTLY
Qty: 30 CAPSULE | Refills: 2 | Status: SHIPPED | OUTPATIENT
Start: 2024-07-10 | End: 2024-10-08

## 2024-07-10 RX ORDER — UREA 10 %
5 LOTION (ML) TOPICAL NIGHTLY
Qty: 30 TABLET | Refills: 2 | Status: SHIPPED | OUTPATIENT
Start: 2024-07-10

## 2024-07-10 ASSESSMENT — PATIENT HEALTH QUESTIONNAIRE - PHQ9
3. TROUBLE FALLING OR STAYING ASLEEP: NEARLY EVERY DAY
1. LITTLE INTEREST OR PLEASURE IN DOING THINGS: SEVERAL DAYS
2. FEELING DOWN, DEPRESSED OR HOPELESS: SEVERAL DAYS
SUM OF ALL RESPONSES TO PHQ QUESTIONS 1-9: 16
SUM OF ALL RESPONSES TO PHQ QUESTIONS 1-9: 16
6. FEELING BAD ABOUT YOURSELF - OR THAT YOU ARE A FAILURE OR HAVE LET YOURSELF OR YOUR FAMILY DOWN: SEVERAL DAYS
5. POOR APPETITE OR OVEREATING: NEARLY EVERY DAY
SUM OF ALL RESPONSES TO PHQ9 QUESTIONS 1 & 2: 2
7. TROUBLE CONCENTRATING ON THINGS, SUCH AS READING THE NEWSPAPER OR WATCHING TELEVISION: NEARLY EVERY DAY
4. FEELING TIRED OR HAVING LITTLE ENERGY: MORE THAN HALF THE DAYS
10. IF YOU CHECKED OFF ANY PROBLEMS, HOW DIFFICULT HAVE THESE PROBLEMS MADE IT FOR YOU TO DO YOUR WORK, TAKE CARE OF THINGS AT HOME, OR GET ALONG WITH OTHER PEOPLE: VERY DIFFICULT
SUM OF ALL RESPONSES TO PHQ QUESTIONS 1-9: 16
9. THOUGHTS THAT YOU WOULD BE BETTER OFF DEAD, OR OF HURTING YOURSELF: NOT AT ALL
SUM OF ALL RESPONSES TO PHQ QUESTIONS 1-9: 16
8. MOVING OR SPEAKING SO SLOWLY THAT OTHER PEOPLE COULD HAVE NOTICED. OR THE OPPOSITE, BEING SO FIGETY OR RESTLESS THAT YOU HAVE BEEN MOVING AROUND A LOT MORE THAN USUAL: MORE THAN HALF THE DAYS

## 2024-07-10 ASSESSMENT — COLUMBIA-SUICIDE SEVERITY RATING SCALE - C-SSRS
6. HAVE YOU EVER DONE ANYTHING, STARTED TO DO ANYTHING, OR PREPARED TO DO ANYTHING TO END YOUR LIFE?: NO
2. HAVE YOU ACTUALLY HAD ANY THOUGHTS OF KILLING YOURSELF?: NO
1. WITHIN THE PAST MONTH, HAVE YOU WISHED YOU WERE DEAD OR WISHED YOU COULD GO TO SLEEP AND NOT WAKE UP?: NO

## 2024-07-10 NOTE — PROGRESS NOTES
°C)   Resp 16   Ht 1.524 m (5')   Wt 79.2 kg (174 lb 11.2 oz)   SpO2 97%   BMI 34.12 kg/m²       Review of Systems - 14 point review:  Negative     Constitutional: (fevers, chills, night sweats, wt loss/gain, change in appetite, fatigue, somnolence)    HEENT: (ear pain or discharge, hearing loss, ear ringing, sinus pressure, nosebleed, nasal discharge, sore throat, oral sores, tooth pain, bleeding gums, hoarse voice, neck pain)      Cardiovascular: (HTN, chest pain, elevated cholesterol/lipids, palpitations, leg swelling, leg pain with walking)    Respiratory: (cough, wheezing, snoring, SOB with activity (dyspnea), SOB while lying flat (orthopnea), awakening with severe SOB (paroxysmal nocturnal dyspnea))    Gastrointestinal: (NVD, constipation, abdominal pain, bright red stools, black tarry stools, stool incontinence)     Genitourinary:  (pelvic pain, burning or frequency of urination, urinary urgency, blood in urine incomplete bladder emptying, urinary incontinence, STD; MEN: testicular pain or swelling, erectile dysfunction; WOMEN: LMP, heavy menstrual bleeding (menorrhagia), irregular periods, postmenopausal bleeding, menstrual pain (dymenorrhea, vaginal discharge)    Musculoskeletal: (bone pain/fracture, joint pain or swelling, musle pain)    Integumentary: (rashes, acne, non-healing sores, itching, breast lumps, breast pain, nipple discharge, hair loss)    Neurologic: (HA, muscle weakness, paresthesias (numbness, coldness, crawling or prickling), memory loss, seizure, dizziness)    Psychiatric:  (anxiety, sadness, irritability/anger, insomnia, suicidality)    Endocrine: (heat or cold intolerance, excessive thirst (polydipsia), excessive hunger (polyphagia))    Immune/Allergic: (hives, seasonal or environmental allergies, HIV exposure)    Hematologic/Lymphatic: (lymph node enlargement, easy bleeding or bruising)    History obtained via chart review and patient    PCP is Roopa Valentin MD       Current

## 2024-07-15 ENCOUNTER — OFFICE VISIT (OUTPATIENT)
Dept: PRIMARY CARE CLINIC | Age: 49
End: 2024-07-15
Payer: MEDICAID

## 2024-07-15 VITALS
OXYGEN SATURATION: 96 % | HEART RATE: 84 BPM | DIASTOLIC BLOOD PRESSURE: 78 MMHG | HEIGHT: 60 IN | SYSTOLIC BLOOD PRESSURE: 126 MMHG | TEMPERATURE: 98.3 F | WEIGHT: 176.4 LBS | BODY MASS INDEX: 34.63 KG/M2

## 2024-07-15 DIAGNOSIS — J01.00 ACUTE MAXILLARY SINUSITIS, RECURRENCE NOT SPECIFIED: Primary | ICD-10-CM

## 2024-07-15 DIAGNOSIS — H69.91 DYSFUNCTION OF RIGHT EUSTACHIAN TUBE: ICD-10-CM

## 2024-07-15 DIAGNOSIS — F17.200 SMOKER: ICD-10-CM

## 2024-07-15 DIAGNOSIS — Z76.0 MEDICATION REFILL: ICD-10-CM

## 2024-07-15 PROCEDURE — 1036F TOBACCO NON-USER: CPT | Performed by: FAMILY MEDICINE

## 2024-07-15 PROCEDURE — G8417 CALC BMI ABV UP PARAM F/U: HCPCS | Performed by: FAMILY MEDICINE

## 2024-07-15 PROCEDURE — 99213 OFFICE O/P EST LOW 20 MIN: CPT | Performed by: FAMILY MEDICINE

## 2024-07-15 PROCEDURE — G8427 DOCREV CUR MEDS BY ELIG CLIN: HCPCS | Performed by: FAMILY MEDICINE

## 2024-07-15 RX ORDER — AMOXICILLIN AND CLAVULANATE POTASSIUM 250; 62.5 MG/5ML; MG/5ML
250 POWDER, FOR SUSPENSION ORAL 2 TIMES DAILY
Qty: 20 EACH | Refills: 0 | Status: SHIPPED | OUTPATIENT
Start: 2024-07-15 | End: 2024-07-25

## 2024-07-15 RX ORDER — ONDANSETRON 4 MG/1
4 TABLET, ORALLY DISINTEGRATING ORAL EVERY 8 HOURS PRN
Qty: 21 TABLET | Refills: 0 | Status: SHIPPED | OUTPATIENT
Start: 2024-07-15

## 2024-07-15 ASSESSMENT — ENCOUNTER SYMPTOMS
GASTROINTESTINAL NEGATIVE: 1
SINUS PRESSURE: 1
SINUS PAIN: 1
RESPIRATORY NEGATIVE: 1

## 2024-07-15 NOTE — PROGRESS NOTES
medications for this visit.       Objective:   PE:  /78   Pulse 84   Temp 98.3 °F (36.8 °C) (Temporal)   Ht 1.524 m (5')   Wt 80 kg (176 lb 6.4 oz)   SpO2 96%   BMI 34.45 kg/m²   Physical Exam  Vitals and nursing note reviewed.   Constitutional:       Appearance: Normal appearance. She is not ill-appearing.   HENT:      Head: Normocephalic.      Right Ear: Tympanic membrane, ear canal and external ear normal.      Left Ear: Tympanic membrane and ear canal normal.      Nose:      Comments: R nasal mucosa markedly erythematous and inflamed  Cardiovascular:      Rate and Rhythm: Normal rate and regular rhythm.      Pulses: Normal pulses.      Heart sounds: Normal heart sounds. No murmur heard.  Pulmonary:      Effort: Pulmonary effort is normal.      Breath sounds: Normal breath sounds.   Abdominal:      Tenderness: There is no abdominal tenderness.   Lymphadenopathy:      Cervical: No cervical adenopathy.   Skin:     General: Skin is warm and dry.   Neurological:      General: No focal deficit present.      Mental Status: She is alert and oriented to person, place, and time.   Psychiatric:         Behavior: Behavior normal.            Assessment & Plan   1. Acute maxillary sinusitis, recurrence not specified  -     amoxicillin-clavulanate (AUGMENTIN) 250-62.5 MG/5ML suspension; Take 5 mLs by mouth 2 times daily for 10 days, Disp-20 each, R-0Normal  2. Dysfunction of right eustachian tube  3. Smoker  4. Medication refill  -     ondansetron (ZOFRAN ODT) 4 MG disintegrating tablet; Place 1 tablet under the tongue every 8 hours as needed for Nausea or Vomiting, Disp-21 tablet, R-0Normal     Continue present care and management  Continue all maintenance medications  Instructed on proper use of nasal steroid spray  Encouraged to continue healthy lifestyle change  D/C smoking  Engage in regular exercise daily -30 minutes a day as tolerated  Stay well and hydrated - drink at least 64 oz of fluid a day  Eat 6

## 2024-07-16 ENCOUNTER — TELEPHONE (OUTPATIENT)
Dept: PSYCHIATRY | Age: 49
End: 2024-07-16

## 2024-07-16 DIAGNOSIS — J30.2 SEASONAL ALLERGIES: ICD-10-CM

## 2024-07-16 RX ORDER — LEVOCETIRIZINE DIHYDROCHLORIDE 5 MG/1
5 TABLET, FILM COATED ORAL NIGHTLY
Qty: 30 TABLET | Refills: 1 | Status: SHIPPED | OUTPATIENT
Start: 2024-07-16

## 2024-07-16 NOTE — TELEPHONE ENCOUNTER
Called and confirmed appt with pt for 7/17 @ 9 AM      Electronically signed by Marcela Collazo on 7/16/2024 at 11:27 AM

## 2024-07-16 NOTE — TELEPHONE ENCOUNTER
Alondra Pickard called to request a refill on her medication.      Last office visit : 7/15/2024   Next office visit : 7/29/2024     Requested Prescriptions     Pending Prescriptions Disp Refills    levocetirizine (XYZAL) 5 MG tablet [Pharmacy Med Name: LEVOCETIRIZINE 5 MG TABLET] 30 tablet 1     Sig: TAKE 1 TABLET BY MOUTH EVERY DAY AT NIGHT            Jennie Marti MA

## 2024-07-17 ENCOUNTER — OFFICE VISIT (OUTPATIENT)
Dept: PSYCHIATRY | Age: 49
End: 2024-07-17

## 2024-07-17 DIAGNOSIS — F41.0 GENERALIZED ANXIETY DISORDER WITH PANIC ATTACKS: ICD-10-CM

## 2024-07-17 DIAGNOSIS — F39 UNSPECIFIED MOOD (AFFECTIVE) DISORDER (HCC): Primary | ICD-10-CM

## 2024-07-17 DIAGNOSIS — F43.10 PTSD (POST-TRAUMATIC STRESS DISORDER): ICD-10-CM

## 2024-07-17 DIAGNOSIS — F41.1 GENERALIZED ANXIETY DISORDER WITH PANIC ATTACKS: ICD-10-CM

## 2024-07-17 RX ORDER — PANTOPRAZOLE SODIUM 40 MG/1
40 TABLET, DELAYED RELEASE ORAL 2 TIMES DAILY
Qty: 60 TABLET | Refills: 3 | Status: SHIPPED | OUTPATIENT
Start: 2024-07-17

## 2024-07-17 ASSESSMENT — ANXIETY QUESTIONNAIRES
1. FEELING NERVOUS, ANXIOUS, OR ON EDGE: NEARLY EVERY DAY
7. FEELING AFRAID AS IF SOMETHING AWFUL MIGHT HAPPEN: MORE THAN HALF THE DAYS
5. BEING SO RESTLESS THAT IT IS HARD TO SIT STILL: MORE THAN HALF THE DAYS
2. NOT BEING ABLE TO STOP OR CONTROL WORRYING: NEARLY EVERY DAY
4. TROUBLE RELAXING: MORE THAN HALF THE DAYS
IF YOU CHECKED OFF ANY PROBLEMS ON THIS QUESTIONNAIRE, HOW DIFFICULT HAVE THESE PROBLEMS MADE IT FOR YOU TO DO YOUR WORK, TAKE CARE OF THINGS AT HOME, OR GET ALONG WITH OTHER PEOPLE: VERY DIFFICULT
6. BECOMING EASILY ANNOYED OR IRRITABLE: NEARLY EVERY DAY
3. WORRYING TOO MUCH ABOUT DIFFERENT THINGS: NEARLY EVERY DAY
GAD7 TOTAL SCORE: 18

## 2024-07-17 ASSESSMENT — PATIENT HEALTH QUESTIONNAIRE - PHQ9
8. MOVING OR SPEAKING SO SLOWLY THAT OTHER PEOPLE COULD HAVE NOTICED. OR THE OPPOSITE, BEING SO FIGETY OR RESTLESS THAT YOU HAVE BEEN MOVING AROUND A LOT MORE THAN USUAL: MORE THAN HALF THE DAYS
1. LITTLE INTEREST OR PLEASURE IN DOING THINGS: MORE THAN HALF THE DAYS
SUM OF ALL RESPONSES TO PHQ QUESTIONS 1-9: 15
SUM OF ALL RESPONSES TO PHQ9 QUESTIONS 1 & 2: 4
7. TROUBLE CONCENTRATING ON THINGS, SUCH AS READING THE NEWSPAPER OR WATCHING TELEVISION: MORE THAN HALF THE DAYS
2. FEELING DOWN, DEPRESSED OR HOPELESS: MORE THAN HALF THE DAYS
3. TROUBLE FALLING OR STAYING ASLEEP: MORE THAN HALF THE DAYS
5. POOR APPETITE OR OVEREATING: MORE THAN HALF THE DAYS
SUM OF ALL RESPONSES TO PHQ QUESTIONS 1-9: 15
9. THOUGHTS THAT YOU WOULD BE BETTER OFF DEAD, OR OF HURTING YOURSELF: NOT AT ALL
4. FEELING TIRED OR HAVING LITTLE ENERGY: MORE THAN HALF THE DAYS
10. IF YOU CHECKED OFF ANY PROBLEMS, HOW DIFFICULT HAVE THESE PROBLEMS MADE IT FOR YOU TO DO YOUR WORK, TAKE CARE OF THINGS AT HOME, OR GET ALONG WITH OTHER PEOPLE: SOMEWHAT DIFFICULT
SUM OF ALL RESPONSES TO PHQ QUESTIONS 1-9: 15
6. FEELING BAD ABOUT YOURSELF - OR THAT YOU ARE A FAILURE OR HAVE LET YOURSELF OR YOUR FAMILY DOWN: SEVERAL DAYS
SUM OF ALL RESPONSES TO PHQ QUESTIONS 1-9: 15

## 2024-07-17 NOTE — PATIENT INSTRUCTIONS
Call the National Suicide Prevention Lifeline (Lifeline) at 5-707-098-EPVP (8864), or text the Crisis Text Line (text HELLO to 722432). Both services are free and available 24 hours a day, seven days a week. All calls are confidential.     The Veterans Crisis Line connects Service members and Veterans in crisis, as well as their family members and friends, with qualified Department of ’s Affairs (VA) responders through a confidential toll-free hotline, online chat, or text messaging service. Dial [phone] and Press 1 to talk to someone or send a text message to 791621 to connect with a VA responder.

## 2024-07-17 NOTE — PROGRESS NOTES
Anxiety     Asthma     born with asthmatic bronchitis    CPAP (continuous positive airway pressure) dependence     6cm to 16cm    Degenerative disk disease     Depression     mood disorders    Hemorrhoid 11/2015    Hyperlipidemia     Hypoglycemia     Hypothyroid     Left ureteral stone 05/25/2016    OAB (overactive bladder)     Obstructive sleep apnea     AHI: 37.9 per PS[facility], 10/2019    Obstructive sleep apnea 04/30/2020    Prolonged emergence from general anesthesia     when pt had gall bladder removed    Renal stone 05/25/2016     Problems with primary support group, Occupational problems, Economic problems, and Other psychosocial and environmental problems    Plan:  1. Continue medication management  2. CBT to target cognitive distortions  3. Discuss therapeutic goals  Able to voice improvement in overall mood and behaviors.    Pt interventions:  Therapist posed open-ended questions to facilitate reciprocal communication. Therapist also provided reflective listening/validation, support and encouragement.  Supportive techniques and Identified maladaptive thoughts and meanings associated with them.  Problem solving: managing anxiety in public places    Patient will reschedule first available.      Over 50% of the total visit time of visit was spent on counseling and/or coordination of care     Lucretia Olivarez [facility]

## 2024-07-18 DIAGNOSIS — Z76.0 MEDICATION REFILL: Primary | ICD-10-CM

## 2024-07-18 DIAGNOSIS — E78.2 MIXED HYPERLIPIDEMIA: ICD-10-CM

## 2024-07-18 NOTE — TELEPHONE ENCOUNTER
Alondra Pickard called to request a refill on her medication.      Last office visit : 7/15/2024   Next office visit : 7/29/2024     Requested Prescriptions     Pending Prescriptions Disp Refills    atorvastatin (LIPITOR) 20 MG tablet [Pharmacy Med Name: ATORVASTATIN 20 MG TABLET] 30 tablet 1     Sig: TAKE 1 TABLET BY MOUTH EVERY DAY            Jennie Marti MA

## 2024-07-19 RX ORDER — ATORVASTATIN CALCIUM 20 MG/1
20 TABLET, FILM COATED ORAL DAILY
Qty: 30 TABLET | Refills: 1 | Status: SHIPPED | OUTPATIENT
Start: 2024-07-19

## 2024-07-22 DIAGNOSIS — F41.8 SITUATIONAL ANXIETY: ICD-10-CM

## 2024-07-23 RX ORDER — BUSPIRONE HYDROCHLORIDE 10 MG/1
10 TABLET ORAL 2 TIMES DAILY
Qty: 60 TABLET | Refills: 1 | Status: SHIPPED | OUTPATIENT
Start: 2024-07-23

## 2024-07-31 DIAGNOSIS — F43.12 CHRONIC POST-TRAUMATIC STRESS DISORDER (PTSD): ICD-10-CM

## 2024-07-31 RX ORDER — PRAZOSIN HYDROCHLORIDE 1 MG/1
1 CAPSULE ORAL NIGHTLY
Qty: 30 CAPSULE | Refills: 0 | Status: SHIPPED | OUTPATIENT
Start: 2024-07-31

## 2024-07-31 NOTE — TELEPHONE ENCOUNTER
by mouth nightly       Dispense:  30 tablet       Refill:  2                     No orders of the defined types were placed in this encounter.        9. Additional comments: Begin therapy, discussed sleep hygiene, discussed the use of coping skills and relaxation strategies to manage symptoms.            10.Over 50% of the total visit time of   30  minutes was spent on counseling and/or coordination of care of:                         1. Unspecified mood (affective) disorder (HCC)    2. Generalized anxiety disorder with panic attacks    3. Chronic post-traumatic stress disorder (PTSD)    4. Insomnia due to medical condition    5. Cannabis use disorder                              Psychotherapy Topics: mood/medication effectiveness family and financial     JUSTIN Delarosa - CNP

## 2024-08-02 ENCOUNTER — TELEPHONE (OUTPATIENT)
Dept: PSYCHIATRY | Age: 49
End: 2024-08-02

## 2024-08-02 DIAGNOSIS — E55.9 VITAMIN D DEFICIENCY: ICD-10-CM

## 2024-08-02 RX ORDER — ERGOCALCIFEROL 1.25 MG/1
50000 CAPSULE ORAL WEEKLY
Qty: 4 CAPSULE | Refills: 2 | Status: SHIPPED | OUTPATIENT
Start: 2024-08-02

## 2024-08-02 NOTE — TELEPHONE ENCOUNTER
Pharmacy sent a request to refill pt's medication       Last office visit : 7/17/2024 PORFIRIO ANDERSON  Next office visit : 8/19/2024 S Damir ANDERSON    Requested Prescriptions     Pending Prescriptions Disp Refills    vitamin D (ERGOCALCIFEROL) 1.25 MG (93388 UT) CAPS capsule [Pharmacy Med Name: VITAMIN D2 1.25MG(50,000 UNIT)] 4 capsule 2     Sig: TAKE 1 CAPSULE BY MOUTH ONE TIME PER WEEK            Marcela Collazo        7/10/24                                         Progress Note     Alondra Neeraj 1975                            Chief Complaint   Patient presents with    Medication Check            Subjective:    Patient is a 49 y.o. female diagnosed with unspecified mood disorder, BENY with panic attacks, chronic PTSD, insomnia due to medical condition, cannabis use disorder, and presents today for follow-up.  Last seen in clinic on 5/6/24  and prior records were reviewed.     Seen in office today.  Calm and cooperative. Reports her endoscopy went fine, was very nervous about anesthesia, but no complications. Her  had elbow surgery due to tendon repair. She reports she is struggling with sleep. Has appointment with her doctor next week to get new CPAP supplies, has been noncompliant because of broken piece to machine. We discussed adding melatonin to help with sleep, did inform CPAP will be most beneficial due to sleep apnea, patient receptive. Patient has been unable to work due to anxiety for many years,  is off with surgery, stressing about financial situation. She has applied to disability. She has established with Lucretia in office for psychotherapy. Reports compliance with medications, denies side effects. Will follow up in a couple of months.     Absent  suicidal ideation.    Reports compliance with medications as good .      Sleep: better, prazosin helping with nightmares     Caffeine use: cup and half of coffee every morning, about 2 glasses of tea a day, eats chocolate      PREVIOUS MED  Rifampin Pregnancy And Lactation Text: This medication is Pregnancy Category C and it isn't know if it is safe during pregnancy. It is also excreted in breast milk and should not be used if you are breast feeding.

## 2024-08-02 NOTE — TELEPHONE ENCOUNTER
Called and let pt know that her script for vitamin-D was sent to her pharmacy \]    Electronically signed by Marcela Collazo on 8/2/2024 at 12:00 PM

## 2024-08-05 ENCOUNTER — TELEPHONE (OUTPATIENT)
Dept: PSYCHIATRY | Age: 49
End: 2024-08-05

## 2024-08-05 ENCOUNTER — OFFICE VISIT (OUTPATIENT)
Dept: UROLOGY | Age: 49
End: 2024-08-05

## 2024-08-05 VITALS — HEIGHT: 60 IN | WEIGHT: 177.2 LBS | BODY MASS INDEX: 34.79 KG/M2 | TEMPERATURE: 97.6 F

## 2024-08-05 DIAGNOSIS — F41.0 GENERALIZED ANXIETY DISORDER WITH PANIC ATTACKS: ICD-10-CM

## 2024-08-05 DIAGNOSIS — N32.81 OVERACTIVE BLADDER: ICD-10-CM

## 2024-08-05 DIAGNOSIS — F41.1 GENERALIZED ANXIETY DISORDER WITH PANIC ATTACKS: ICD-10-CM

## 2024-08-05 DIAGNOSIS — N39.46 MIXED INCONTINENCE: Primary | ICD-10-CM

## 2024-08-05 DIAGNOSIS — N20.0 LEFT NEPHROLITHIASIS: ICD-10-CM

## 2024-08-05 DIAGNOSIS — F39 UNSPECIFIED MOOD (AFFECTIVE) DISORDER (HCC): ICD-10-CM

## 2024-08-05 LAB
APPEARANCE FLUID: CLEAR
BILIRUBIN, POC: NORMAL
BLOOD URINE, POC: NORMAL
CLARITY, POC: CLEAR
COLOR, POC: YELLOW
GLUCOSE URINE, POC: NORMAL
KETONES, POC: NORMAL
LEUKOCYTE EST, POC: NORMAL
NITRITE, POC: NORMAL
PH, POC: 7
POST VOID RESIDUAL (PVR): 9 ML
PROTEIN, POC: NORMAL
SPECIFIC GRAVITY, POC: 1.01
UROBILINOGEN, POC: 0.2

## 2024-08-05 RX ORDER — OXYBUTYNIN CHLORIDE 10 MG/1
10 TABLET, EXTENDED RELEASE ORAL DAILY
Qty: 90 TABLET | Refills: 3 | Status: SHIPPED | OUTPATIENT
Start: 2024-08-05

## 2024-08-05 RX ORDER — HYDROXYZINE HYDROCHLORIDE 25 MG/1
25 TABLET, FILM COATED ORAL 3 TIMES DAILY PRN
Qty: 90 TABLET | Refills: 0 | Status: SHIPPED | OUTPATIENT
Start: 2024-08-05

## 2024-08-05 RX ORDER — LAMOTRIGINE 100 MG/1
100 TABLET ORAL DAILY
Qty: 30 TABLET | Refills: 0 | Status: SHIPPED | OUTPATIENT
Start: 2024-08-05

## 2024-08-05 NOTE — TELEPHONE ENCOUNTER
Alondra Pickard called to request a refill on her medication.      Last office visit : 7/10/2024 with PORFIRIO ANDERSON  Next office visit : 8/19/2024 with PORFIRIO ANDERSON    Requested Prescriptions     Pending Prescriptions Disp Refills    hydrOXYzine HCl (ATARAX) 25 MG tablet [Pharmacy Med Name: HYDROXYZINE HCL 25 MG TABLET] 90 tablet 0     Sig: TAKE 1 TABLET BY MOUTH THREE TIMES A DAY AS NEEDED FOR ANXIETY    lamoTRIgine (LAMICTAL) 100 MG tablet [Pharmacy Med Name: LAMOTRIGINE 100 MG TABLET] 30 tablet 0     Sig: TAKE 1 TABLET BY MOUTH EVERY DAY            Patricia Kasper RN         7/10/24                                         Progress Note     Alondra Pickard 1975                            Chief Complaint   Patient presents with    Medication Check            Subjective:    Patient is a 49 y.o. female diagnosed with unspecified mood disorder, BENY with panic attacks, chronic PTSD, insomnia due to medical condition, cannabis use disorder, and presents today for follow-up.  Last seen in clinic on 5/6/24  and prior records were reviewed.     Seen in office today.  Calm and cooperative. Reports her endoscopy went fine, was very nervous about anesthesia, but no complications. Her  had elbow surgery due to tendon repair. She reports she is struggling with sleep. Has appointment with her doctor next week to get new CPAP supplies, has been noncompliant because of broken piece to machine. We discussed adding melatonin to help with sleep, did inform CPAP will be most beneficial due to sleep apnea, patient receptive. Patient has been unable to work due to anxiety for many years,  is off with surgery, stressing about financial situation. She has applied to disability. She has established with Lucretia in office for psychotherapy. Reports compliance with medications, denies side effects. Will follow up in a couple of months.     Absent  suicidal ideation.    Reports compliance with medications as good .

## 2024-08-05 NOTE — PROGRESS NOTES
Return in about 1 year (around 8/5/2025).    All information inputted into the note by the MA to include chief complaint, past medical history, past surgical history, medications, allergies, social and family history and review of systems has been reviewed and updated as needed by me.    EMR Dragon/transcription disclaimer: Much of this documentt is electronic  transcription/translation of spoken language to printed text. The  electronic translation of spoken language may be erroneous, or at times,  nonsensical words or phrases may be inadvertently transcribed. Although I  have reviewed the document for such errors, some may still exist.

## 2024-08-06 ASSESSMENT — ENCOUNTER SYMPTOMS
NAUSEA: 0
VOMITING: 0
ABDOMINAL DISTENTION: 0
BACK PAIN: 0
ABDOMINAL PAIN: 0

## 2024-08-16 ENCOUNTER — TELEPHONE (OUTPATIENT)
Dept: PSYCHIATRY | Age: 49
End: 2024-08-16

## 2024-08-16 NOTE — TELEPHONE ENCOUNTER
Called pt on 08/16/24 for an appointment reminder for 08/19/24. Pt confirmed   ?   Reminded patient to complete their visit pre-check/digital registration in [place].

## 2024-08-17 DIAGNOSIS — E78.2 MIXED HYPERLIPIDEMIA: ICD-10-CM

## 2024-08-17 DIAGNOSIS — J30.2 SEASONAL ALLERGIES: ICD-10-CM

## 2024-08-17 DIAGNOSIS — Z76.0 MEDICATION REFILL: ICD-10-CM

## 2024-08-19 ENCOUNTER — OFFICE VISIT (OUTPATIENT)
Dept: PSYCHIATRY | Age: 49
End: 2024-08-19
Payer: MEDICAID

## 2024-08-19 VITALS
DIASTOLIC BLOOD PRESSURE: 70 MMHG | OXYGEN SATURATION: 95 % | WEIGHT: 174.3 LBS | SYSTOLIC BLOOD PRESSURE: 109 MMHG | TEMPERATURE: 98.1 F | HEIGHT: 60 IN | HEART RATE: 81 BPM | BODY MASS INDEX: 34.22 KG/M2

## 2024-08-19 DIAGNOSIS — F41.1 GENERALIZED ANXIETY DISORDER WITH PANIC ATTACKS: ICD-10-CM

## 2024-08-19 DIAGNOSIS — G47.01 INSOMNIA DUE TO MEDICAL CONDITION: ICD-10-CM

## 2024-08-19 DIAGNOSIS — F41.0 GENERALIZED ANXIETY DISORDER WITH PANIC ATTACKS: ICD-10-CM

## 2024-08-19 DIAGNOSIS — F12.90 CANNABIS USE DISORDER: ICD-10-CM

## 2024-08-19 DIAGNOSIS — F43.12 CHRONIC POST-TRAUMATIC STRESS DISORDER (PTSD): ICD-10-CM

## 2024-08-19 DIAGNOSIS — F39 UNSPECIFIED MOOD (AFFECTIVE) DISORDER (HCC): Primary | ICD-10-CM

## 2024-08-19 PROCEDURE — 1036F TOBACCO NON-USER: CPT

## 2024-08-19 PROCEDURE — 99214 OFFICE O/P EST MOD 30 MIN: CPT

## 2024-08-19 PROCEDURE — G8417 CALC BMI ABV UP PARAM F/U: HCPCS

## 2024-08-19 PROCEDURE — G8427 DOCREV CUR MEDS BY ELIG CLIN: HCPCS

## 2024-08-19 RX ORDER — PRAZOSIN HYDROCHLORIDE 1 MG/1
1 CAPSULE ORAL NIGHTLY
Qty: 30 CAPSULE | Refills: 2 | Status: SHIPPED | OUTPATIENT
Start: 2024-08-19

## 2024-08-19 RX ORDER — ATORVASTATIN CALCIUM 20 MG/1
20 TABLET, FILM COATED ORAL DAILY
Qty: 30 TABLET | Refills: 1 | Status: SHIPPED | OUTPATIENT
Start: 2024-08-19

## 2024-08-19 RX ORDER — LAMOTRIGINE 100 MG/1
100 TABLET ORAL DAILY
Qty: 30 TABLET | Refills: 2 | Status: SHIPPED | OUTPATIENT
Start: 2024-08-19

## 2024-08-19 RX ORDER — HYDROXYZINE HYDROCHLORIDE 25 MG/1
25 TABLET, FILM COATED ORAL 3 TIMES DAILY PRN
Qty: 90 TABLET | Refills: 2 | Status: SHIPPED | OUTPATIENT
Start: 2024-08-19

## 2024-08-19 RX ORDER — LEVOCETIRIZINE DIHYDROCHLORIDE 5 MG/1
5 TABLET, FILM COATED ORAL NIGHTLY
Qty: 30 TABLET | Refills: 1 | Status: SHIPPED | OUTPATIENT
Start: 2024-08-19

## 2024-08-19 ASSESSMENT — PATIENT HEALTH QUESTIONNAIRE - PHQ9
SUM OF ALL RESPONSES TO PHQ9 QUESTIONS 1 & 2: 0
SUM OF ALL RESPONSES TO PHQ QUESTIONS 1-9: 9
4. FEELING TIRED OR HAVING LITTLE ENERGY: SEVERAL DAYS
SUM OF ALL RESPONSES TO PHQ QUESTIONS 1-9: 9
10. IF YOU CHECKED OFF ANY PROBLEMS, HOW DIFFICULT HAVE THESE PROBLEMS MADE IT FOR YOU TO DO YOUR WORK, TAKE CARE OF THINGS AT HOME, OR GET ALONG WITH OTHER PEOPLE: SOMEWHAT DIFFICULT
5. POOR APPETITE OR OVEREATING: NEARLY EVERY DAY
7. TROUBLE CONCENTRATING ON THINGS, SUCH AS READING THE NEWSPAPER OR WATCHING TELEVISION: SEVERAL DAYS
3. TROUBLE FALLING OR STAYING ASLEEP: NOT AT ALL
9. THOUGHTS THAT YOU WOULD BE BETTER OFF DEAD, OR OF HURTING YOURSELF: NOT AT ALL
SUM OF ALL RESPONSES TO PHQ QUESTIONS 1-9: 9
2. FEELING DOWN, DEPRESSED OR HOPELESS: NOT AT ALL
SUM OF ALL RESPONSES TO PHQ QUESTIONS 1-9: 9
6. FEELING BAD ABOUT YOURSELF - OR THAT YOU ARE A FAILURE OR HAVE LET YOURSELF OR YOUR FAMILY DOWN: SEVERAL DAYS
8. MOVING OR SPEAKING SO SLOWLY THAT OTHER PEOPLE COULD HAVE NOTICED. OR THE OPPOSITE, BEING SO FIGETY OR RESTLESS THAT YOU HAVE BEEN MOVING AROUND A LOT MORE THAN USUAL: NEARLY EVERY DAY
1. LITTLE INTEREST OR PLEASURE IN DOING THINGS: NOT AT ALL

## 2024-08-19 NOTE — PROGRESS NOTES
8/19/2024        Progress Note    Alondra Pickard 1975      Chief Complaint   Patient presents with    Medication Check         Subjective:    Patient is a 49 y.o. female diagnosed with unspecified mood disorder, BENY with panic attacks, chronic PTSD, insomnia due to medical condition, cannabis use disorder, and presents today for follow-up.  Last seen in clinic on 7/10/24  and prior records were reviewed.    Seen in office today.  Calm and cooperative.  Affect is bright.  Currently taking Lamictal, doxepin, hydroxyzine, prazosin.  She reports compliance with her medications, denies any negative or unwanted side effects.  Feels mood has been stable.  Has had some anxiety, reports this is related more towards her problems with swallowing lately, gets choked easily, has had a few procedures in the past where they have had to stretch her esophagus, however she still having issues, has follow-up with GI today.  She reports she completed physical therapy on her foot, still having some issues, also has follow-up with orthopedic today.  She reports she is sleeping well.  We will follow-up in 3 months.    Absent  suicidal ideation.    Reports compliance with medications as good .     Sleep: better, prazosin helping with nightmares    Caffeine use: cup and half of coffee every morning, about 2 glasses of tea a day, eats chocolate     PREVIOUS MED TRIALS  BuSpar   Adderall  Concerta  Vyvanse  Prozac  Clonidine (bradycardia)  Seroquel (nightmares)  Prazosin  Lamictal  Trazodone  Atarax     SUBSTANCE USE HISTORY  Alcohol: rarely, has a drink every few months  Illicit drug use: denies  Marijuana: smokes marijuana, multiple times a day, also vapes with THC, started using regularly at 26 years old  Tobacco: denies   Vape: vapes with THC      BP: /70   Pulse 81   Temp 98.1 °F (36.7 °C)   Ht 1.524 m (5')   Wt 79.1 kg (174 lb 4.8 oz)   SpO2 95%   BMI 34.04 kg/m²       Review of Systems - 14 point review:  Negative

## 2024-08-20 ENCOUNTER — TELEPHONE (OUTPATIENT)
Dept: PSYCHIATRY | Age: 49
End: 2024-08-20

## 2024-08-20 NOTE — TELEPHONE ENCOUNTER
Called and confirmed appt with pt for 8/21 @ 9 AM    Electronically signed by Marcela Collazo on 8/20/2024 at 9:42 AM

## 2024-08-21 ENCOUNTER — OFFICE VISIT (OUTPATIENT)
Dept: PSYCHIATRY | Age: 49
End: 2024-08-21

## 2024-08-21 DIAGNOSIS — F41.1 GENERALIZED ANXIETY DISORDER: ICD-10-CM

## 2024-08-21 DIAGNOSIS — F43.10 PTSD (POST-TRAUMATIC STRESS DISORDER): ICD-10-CM

## 2024-08-21 DIAGNOSIS — F39 UNSPECIFIED MOOD (AFFECTIVE) DISORDER (HCC): Primary | ICD-10-CM

## 2024-08-21 ASSESSMENT — ANXIETY QUESTIONNAIRES
4. TROUBLE RELAXING: MORE THAN HALF THE DAYS
6. BECOMING EASILY ANNOYED OR IRRITABLE: NEARLY EVERY DAY
1. FEELING NERVOUS, ANXIOUS, OR ON EDGE: NEARLY EVERY DAY
5. BEING SO RESTLESS THAT IT IS HARD TO SIT STILL: MORE THAN HALF THE DAYS
3. WORRYING TOO MUCH ABOUT DIFFERENT THINGS: NEARLY EVERY DAY
7. FEELING AFRAID AS IF SOMETHING AWFUL MIGHT HAPPEN: MORE THAN HALF THE DAYS
GAD7 TOTAL SCORE: 17
2. NOT BEING ABLE TO STOP OR CONTROL WORRYING: MORE THAN HALF THE DAYS

## 2024-08-21 ASSESSMENT — PATIENT HEALTH QUESTIONNAIRE - PHQ9
SUM OF ALL RESPONSES TO PHQ QUESTIONS 1-9: 7
4. FEELING TIRED OR HAVING LITTLE ENERGY: SEVERAL DAYS
1. LITTLE INTEREST OR PLEASURE IN DOING THINGS: SEVERAL DAYS
SUM OF ALL RESPONSES TO PHQ QUESTIONS 1-9: 7
3. TROUBLE FALLING OR STAYING ASLEEP: NOT AT ALL
SUM OF ALL RESPONSES TO PHQ QUESTIONS 1-9: 7
6. FEELING BAD ABOUT YOURSELF - OR THAT YOU ARE A FAILURE OR HAVE LET YOURSELF OR YOUR FAMILY DOWN: SEVERAL DAYS
5. POOR APPETITE OR OVEREATING: NEARLY EVERY DAY
SUM OF ALL RESPONSES TO PHQ QUESTIONS 1-9: 7
10. IF YOU CHECKED OFF ANY PROBLEMS, HOW DIFFICULT HAVE THESE PROBLEMS MADE IT FOR YOU TO DO YOUR WORK, TAKE CARE OF THINGS AT HOME, OR GET ALONG WITH OTHER PEOPLE: VERY DIFFICULT
2. FEELING DOWN, DEPRESSED OR HOPELESS: NOT AT ALL
7. TROUBLE CONCENTRATING ON THINGS, SUCH AS READING THE NEWSPAPER OR WATCHING TELEVISION: SEVERAL DAYS
8. MOVING OR SPEAKING SO SLOWLY THAT OTHER PEOPLE COULD HAVE NOTICED. OR THE OPPOSITE, BEING SO FIGETY OR RESTLESS THAT YOU HAVE BEEN MOVING AROUND A LOT MORE THAN USUAL: NOT AT ALL
9. THOUGHTS THAT YOU WOULD BE BETTER OFF DEAD, OR OF HURTING YOURSELF: NOT AT ALL
SUM OF ALL RESPONSES TO PHQ9 QUESTIONS 1 & 2: 1

## 2024-08-21 NOTE — PROGRESS NOTES
lot about finances, is unable to afford to repair her personal vehicle, and worries about her 's employment.    Pt reported she  would like to continue the monthly appointments and verbalized she  would call sooner if needed.     Pt denies Suicidal Ideations, Homicidal Ideation, Auditory Hallucinations, Visual Hallucinations, Tactical Hallucinations.    Assessments:  Assessments:  PHQ-9 Score:       8/19/2024     8:39 AM 7/17/2024     9:07 AM 7/10/2024     1:37 PM   PHQ-9    Little interest or pleasure in doing things 0 2 1   Feeling down, depressed, or hopeless 0 2 1   Trouble falling or staying asleep, or sleeping too much 0 2 3   Feeling tired or having little energy 1 2 2   Poor appetite or overeating 3 2 3   Feeling bad about yourself - or that you are a failure or have let yourself or your family down 1 1 1   Trouble concentrating on things, such as reading the newspaper or watching television 1 2 3   Moving or speaking so slowly that other people could have noticed. Or the opposite - being so fidgety or restless that you have been moving around a lot more than usual 3 2 2   Thoughts that you would be better off dead, or of hurting yourself in some way 0 0 0   PHQ-2 Score 0 4 2   PHQ-9 Total Score 9 15 16   If you checked off any problems, how difficult have these problems made it for you to do your work, take care of things at home, or get along with other people? 1 1 2         BENY-7 Score:       7/17/2024     9:00 AM 6/17/2024     1:00 PM 5/30/2024     1:00 PM   BENY-7 SCREENING   Feeling nervous, anxious, or on edge Nearly every day Nearly every day Nearly every day   Not being able to stop or control worrying Nearly every day Nearly every day Nearly every day   Worrying too much about different things Nearly every day Nearly every day Nearly every day   Trouble relaxing More than half the days Nearly every day Nearly every day   Being so restless that it is hard to sit still More than half the days

## 2024-09-03 ASSESSMENT — ENCOUNTER SYMPTOMS
ABDOMINAL PAIN: 0
TROUBLE SWALLOWING: 1
VOMITING: 0
RECTAL PAIN: 0
ABDOMINAL DISTENTION: 0
COUGH: 0
NAUSEA: 0
ANAL BLEEDING: 0
DIARRHEA: 0
BLOOD IN STOOL: 0
SHORTNESS OF BREATH: 0
CONSTIPATION: 0

## 2024-09-03 NOTE — PROGRESS NOTES
Subjective:     Patient ID: Alondra Pickard is a 49 y.o. female  PCP: Roopa Valentin MD  Referring Provider: No ref. provider found    HPI  Patient presents to the office today with the following complaints: Follow-up (2 months )      Pt seen today for follow up.  PPI increased to BID at last OV for continued c/o dysphagia, reflux.  Today, pt denies any improvement in symptoms.  She continues to have dysphagia.  Foods are getting stuck, she will have to regurgitate.  She c/o choking.  She is fearful of eating alone.  Dilation was ineffective.         Last EGD 5/7/2024 - W 55 fr dil (+)GERD   Last Colonoscopy 3/23/2022 - HP, Int hemorrhoids Gr 1, 5 year recall   Denies any family hx colon cancer or colon polyps    LAST OV 6/18/2024  Alondra Pickard is here for follow up after EGD on 5/7/2024.  During her last visit, she had c/o chronic GERD, dysphagia.  Today, she reports symptoms have not improved.   Currently treated with Protonix 40 mg daily.  She continues to have issues with dysphagia and foods getting stuck.      OV 4/29/2024  Pt seen today for follow up, medication refills.  Hx chronic GERD, currently treated with Protonix 40 mg daily.  She has c/o issues swallowing.  She has sensation of foods getting stuck and sitting in epigastric area.  She will have nausea with this.      Assessment:     1. Dysphagia, unspecified type    2. Chronic GERD          Plan:   - Continue Pantoprazole 40 mg to BID   - Refer for Esophageal manometry - [facility]   - Call with any questions or concerns       Orders  Orders Placed This Encounter   Procedures    External Referral To Gastroenterology     Referral Priority:   Routine     Referral Type:   Eval and Treat     Referral Reason:   Specialty Services Required     Requested Specialty:   Gastroenterology     Number of Visits Requested:   1     Medications  No orders of the defined types were placed in this encounter.        Patient History:     Past Medical History:   Diagnosis

## 2024-09-04 ENCOUNTER — OFFICE VISIT (OUTPATIENT)
Dept: GASTROENTEROLOGY | Age: 49
End: 2024-09-04
Payer: MEDICAID

## 2024-09-04 VITALS
SYSTOLIC BLOOD PRESSURE: 120 MMHG | BODY MASS INDEX: 30.05 KG/M2 | HEART RATE: 74 BPM | DIASTOLIC BLOOD PRESSURE: 70 MMHG | WEIGHT: 176 LBS | HEIGHT: 64 IN | OXYGEN SATURATION: 97 %

## 2024-09-04 DIAGNOSIS — R13.10 DYSPHAGIA, UNSPECIFIED TYPE: Primary | ICD-10-CM

## 2024-09-04 DIAGNOSIS — K21.9 CHRONIC GERD: ICD-10-CM

## 2024-09-04 PROCEDURE — G8417 CALC BMI ABV UP PARAM F/U: HCPCS | Performed by: NURSE PRACTITIONER

## 2024-09-04 PROCEDURE — 99214 OFFICE O/P EST MOD 30 MIN: CPT | Performed by: NURSE PRACTITIONER

## 2024-09-04 PROCEDURE — 1036F TOBACCO NON-USER: CPT | Performed by: NURSE PRACTITIONER

## 2024-09-04 PROCEDURE — G8427 DOCREV CUR MEDS BY ELIG CLIN: HCPCS | Performed by: NURSE PRACTITIONER

## 2024-09-04 ASSESSMENT — ENCOUNTER SYMPTOMS: CHOKING: 1

## 2024-09-05 ENCOUNTER — TELEPHONE (OUTPATIENT)
Dept: GASTROENTEROLOGY | Age: 49
End: 2024-09-05

## 2024-09-05 NOTE — TELEPHONE ENCOUNTER
----- Message from Shayna MONROY sent at 9/4/2024 10:55 AM CDT -----  Regarding: checkout note 9/4/24  Refer to [facility] for dysphagia        9.5.24  Called [facility] at [phone] and spoke with Viviane. Viviane said to just fax the referral to them. They will look it over and get the patient schedule. Fax number is [phone]      Fax confirmation scanned in media and attached to this encounter.

## 2024-09-21 DIAGNOSIS — F41.8 SITUATIONAL ANXIETY: ICD-10-CM

## 2024-09-23 RX ORDER — BUSPIRONE HYDROCHLORIDE 10 MG/1
10 TABLET ORAL 2 TIMES DAILY
Qty: 60 TABLET | Refills: 1 | Status: SHIPPED | OUTPATIENT
Start: 2024-09-23

## 2024-09-28 DIAGNOSIS — G47.01 INSOMNIA DUE TO MEDICAL CONDITION: ICD-10-CM

## 2024-09-30 RX ORDER — DOXEPIN HYDROCHLORIDE 50 MG/1
CAPSULE ORAL
Qty: 30 CAPSULE | Refills: 2 | Status: SHIPPED | OUTPATIENT
Start: 2024-09-30

## 2024-09-30 NOTE — TELEPHONE ENCOUNTER
Alondra Pickard called to request a refill on her medication.      Last office visit : 8/21/2024   Next office visit : 10/24/2024     Requested Prescriptions     Pending Prescriptions Disp Refills    doxepin (SINEQUAN) 50 MG capsule [Pharmacy Med Name: DOXEPIN 50 MG CAPSULE] 30 capsule 2     Sig: TAKE 1 CAPSULE BY MOUTH EVERY DAY AT NIGHT            Kayy Regan, Barby Smith APRN - CNP  Nurse Practitioner Psychiatric/Mental Health  Unspecified mood (affective) disorder (HCC) +4 more  Dx  Medication Check  Reason for Visit  Progress NotesBarby Reich APRN - CNP (Nurse Practitioner)  Nurse Practitioner Psychiatric/Mental Health  The note has been blocked from the patient portal for the following reason: Suspected substance abuse disorder  Expand All Collapse All  8/19/2024                                         Progress Note     Alondra Pickard 1975                        Chief Complaint  Patient presents with    Medication Check           Subjective:    Patient is a 49 y.o. female diagnosed with unspecified mood disorder, BENY with panic attacks, chronic PTSD, insomnia due to medical condition, cannabis use disorder, and presents today for follow-up.  Last seen in clinic on 7/10/24  and prior records were reviewed.     Seen in office today.  Calm and cooperative.  Affect is bright.  Currently taking Lamictal, doxepin, hydroxyzine, prazosin.  She reports compliance with her medications, denies any negative or unwanted side effects.  Feels mood has been stable.  Has had some anxiety, reports this is related more towards her problems with swallowing lately, gets choked easily, has had a few procedures in the past where they have had to stretch her esophagus, however she still having issues, has follow-up with GI today.  She reports she completed physical therapy on her foot, still having some issues, also has follow-up with orthopedic today.  She reports she is sleeping well.  We will

## 2024-10-01 DIAGNOSIS — G47.01 INSOMNIA DUE TO MEDICAL CONDITION: ICD-10-CM

## 2024-10-01 RX ORDER — MULTIVITAMIN/IRON/FOLIC ACID 18MG-0.4MG
1 TABLET ORAL NIGHTLY
Qty: 30 TABLET | Refills: 2 | Status: SHIPPED | OUTPATIENT
Start: 2024-10-01

## 2024-10-01 NOTE — TELEPHONE ENCOUNTER
discussed sleep hygiene, discussed the use of coping skills and relaxation strategies to manage symptoms.            10.Over 50% of the total visit time of   30  minutes was spent on counseling and/or coordination of care of:                         1. Unspecified mood (affective) disorder (HCC)    2. Generalized anxiety disorder with panic attacks    3. Chronic post-traumatic stress disorder (PTSD)    4. Insomnia due to medical condition    5. Cannabis use disorder                                 Psychotherapy Topics: mood/medication effectiveness family and financial     JUSTIN Delarosa - CNP

## 2024-10-05 DIAGNOSIS — E78.2 MIXED HYPERLIPIDEMIA: ICD-10-CM

## 2024-10-05 DIAGNOSIS — E55.9 VITAMIN D DEFICIENCY: ICD-10-CM

## 2024-10-05 DIAGNOSIS — G47.01 INSOMNIA DUE TO MEDICAL CONDITION: ICD-10-CM

## 2024-10-05 DIAGNOSIS — Z76.0 MEDICATION REFILL: ICD-10-CM

## 2024-10-07 ENCOUNTER — TELEPHONE (OUTPATIENT)
Dept: PSYCHIATRY | Age: 49
End: 2024-10-07

## 2024-10-07 RX ORDER — TRAZODONE HYDROCHLORIDE 50 MG/1
50-100 TABLET, FILM COATED ORAL NIGHTLY
Qty: 60 TABLET | Refills: 0 | OUTPATIENT
Start: 2024-10-07

## 2024-10-07 RX ORDER — ERGOCALCIFEROL 1.25 MG/1
50000 CAPSULE, LIQUID FILLED ORAL WEEKLY
Qty: 4 CAPSULE | Refills: 2 | Status: SHIPPED | OUTPATIENT
Start: 2024-10-07

## 2024-10-07 RX ORDER — ATORVASTATIN CALCIUM 20 MG/1
20 TABLET, FILM COATED ORAL DAILY
Qty: 90 TABLET | Refills: 1 | Status: SHIPPED | OUTPATIENT
Start: 2024-10-07

## 2024-10-07 NOTE — TELEPHONE ENCOUNTER
Alondra Pickard called to request a refill on her medication.      Last office visit : 7/15/2024   Next office visit : Visit date not found     Requested Prescriptions     Pending Prescriptions Disp Refills    atorvastatin (LIPITOR) 20 MG tablet [Pharmacy Med Name: ATORVASTATIN 20 MG TABLET] 90 tablet 1     Sig: TAKE 1 TABLET BY MOUTH EVERY DAY            Jennie Marti MA

## 2024-10-07 NOTE — TELEPHONE ENCOUNTER
Called and let pt know that her script for vitamin d was sent to her pharmacy     Electronically signed by Marcela Collazo on 10/7/2024 at 1:48 PM

## 2024-10-07 NOTE — TELEPHONE ENCOUNTER
suicidality)     Endocrine: (heat or cold intolerance, excessive thirst (polydipsia), excessive hunger (polyphagia))     Immune/Allergic: (hives, seasonal or environmental allergies, HIV exposure)     Hematologic/Lymphatic: (lymph node enlargement, easy bleeding or bruising)     History obtained via chart review and patient     PCP is Roopa Valentin MD         Current Meds:     Home Medications           Prior to Admission medications    Medication Sig Start Date End Date Taking? Authorizing Provider   hydrOXYzine HCl (ATARAX) 25 MG tablet TAKE 1 TABLET BY MOUTH THREE TIMES A DAY AS NEEDED FOR ANXIETY 8/5/24     Damir, Barby A, APRN - CNP   lamoTRIgine (LAMICTAL) 100 MG tablet TAKE 1 TABLET BY MOUTH EVERY DAY 8/5/24     Damir, Barby A, APRN - CNP   oxyBUTYnin (DITROPAN-XL) 10 MG extended release tablet Take 1 tablet by mouth daily 8/5/24     Melissa Manrique APRN - CNP   vitamin D (ERGOCALCIFEROL) 1.25 MG (19388 UT) CAPS capsule TAKE 1 CAPSULE BY MOUTH ONE TIME PER WEEK 8/2/24     Barby Reich APRN - CNP   prazosin (MINIPRESS) 1 MG capsule TAKE 1 CAPSULE BY MOUTH EVERY NIGHT 7/31/24     Barby Reich APRN - CNP   busPIRone (BUSPAR) 10 MG tablet Take 1 tablet by mouth 2 times daily 7/23/24     Roopa Valentin MD   atorvastatin (LIPITOR) 20 MG tablet TAKE 1 TABLET BY MOUTH EVERY DAY 7/19/24     Roopa Valentin MD   pantoprazole (PROTONIX) 40 MG tablet Take 1 tablet by mouth in the morning and at bedtime 7/17/24     Amarilis Ragland APRN - NP   levocetirizine (XYZAL) 5 MG tablet TAKE 1 TABLET BY MOUTH EVERY DAY AT NIGHT 7/16/24     Roopa Valentin MD   ondansetron (ZOFRAN ODT) 4 MG disintegrating tablet Place 1 tablet under the tongue every 8 hours as needed for Nausea or Vomiting 7/15/24     Roopa Valentin MD   doxepin (SINEQUAN) 50 MG capsule Take 1 capsule by mouth nightly 7/10/24 10/8/24   Damir, Barby A, APRN - CNP   melatonin 5 MG TABS tablet Take 1 tablet by

## 2024-10-11 ENCOUNTER — TELEPHONE (OUTPATIENT)
Dept: GASTROENTEROLOGY | Age: 49
End: 2024-10-11

## 2024-10-11 RX ORDER — PANTOPRAZOLE SODIUM 40 MG/1
40 TABLET, DELAYED RELEASE ORAL 2 TIMES DAILY
Qty: 60 TABLET | Refills: 3 | Status: SHIPPED | OUTPATIENT
Start: 2024-10-11

## 2024-10-11 NOTE — TELEPHONE ENCOUNTER
10- Patient called stated that she is out of the protonix and can't get it filled until the 10th of next month.  She stated that CT APRN had increased her to bid.     Notified that a new rx was sent in today for bid dosing.     Oked per patient

## 2024-10-16 ENCOUNTER — TELEPHONE (OUTPATIENT)
Dept: PSYCHIATRY | Age: 49
End: 2024-10-16

## 2024-10-16 DIAGNOSIS — J30.2 SEASONAL ALLERGIES: ICD-10-CM

## 2024-10-16 RX ORDER — LEVOCETIRIZINE DIHYDROCHLORIDE 5 MG/1
5 TABLET, FILM COATED ORAL NIGHTLY
Qty: 30 TABLET | Refills: 0 | Status: SHIPPED | OUTPATIENT
Start: 2024-10-16

## 2024-10-16 NOTE — TELEPHONE ENCOUNTER
Alondra Pickard called to request a refill on her medication.      Last office visit : 7/15/2024   Next office visit : Visit date not found     Requested Prescriptions     Pending Prescriptions Disp Refills    levocetirizine (XYZAL) 5 MG tablet 30 tablet 1     Sig: Take 1 tablet by mouth nightly            Melissa Pierce MA

## 2024-10-16 NOTE — TELEPHONE ENCOUNTER
Appointment for: Alondra Pickard ([id number])  Visit type:  FOLLOW UP (7065)  11/6/2024 9:00 AM (60 minutes) with [facility] in [facility]     Patient comments:     ----------------------------------  This appointment rescheduled from:  10/24/2024 10:00 AM (60 minutes) with [facility] in [facility]    Electronically signed by Marcela Collazo on 10/16/2024 at 1:41 PM

## 2024-10-30 DIAGNOSIS — E03.9 ACQUIRED HYPOTHYROIDISM: ICD-10-CM

## 2024-10-30 DIAGNOSIS — F41.8 SITUATIONAL ANXIETY: ICD-10-CM

## 2024-10-31 ENCOUNTER — TELEPHONE (OUTPATIENT)
Dept: PSYCHIATRY | Age: 49
End: 2024-10-31

## 2024-10-31 ENCOUNTER — OFFICE VISIT (OUTPATIENT)
Dept: PSYCHIATRY | Age: 49
End: 2024-10-31

## 2024-10-31 DIAGNOSIS — F12.90 CANNABIS USE DISORDER: ICD-10-CM

## 2024-10-31 DIAGNOSIS — F39 UNSPECIFIED MOOD (AFFECTIVE) DISORDER (HCC): Primary | ICD-10-CM

## 2024-10-31 DIAGNOSIS — F41.0 GENERALIZED ANXIETY DISORDER WITH PANIC ATTACKS: ICD-10-CM

## 2024-10-31 DIAGNOSIS — F41.1 GENERALIZED ANXIETY DISORDER WITH PANIC ATTACKS: ICD-10-CM

## 2024-10-31 RX ORDER — LEVOTHYROXINE SODIUM 50 UG/1
50 TABLET ORAL DAILY
Qty: 30 TABLET | Refills: 1 | Status: SHIPPED | OUTPATIENT
Start: 2024-10-31

## 2024-10-31 RX ORDER — BUSPIRONE HYDROCHLORIDE 10 MG/1
10 TABLET ORAL 2 TIMES DAILY
Qty: 60 TABLET | Refills: 1 | Status: SHIPPED | OUTPATIENT
Start: 2024-10-31

## 2024-10-31 ASSESSMENT — PATIENT HEALTH QUESTIONNAIRE - PHQ9
1. LITTLE INTEREST OR PLEASURE IN DOING THINGS: NOT AT ALL
SUM OF ALL RESPONSES TO PHQ9 QUESTIONS 1 & 2: 0
2. FEELING DOWN, DEPRESSED OR HOPELESS: NOT AT ALL
4. FEELING TIRED OR HAVING LITTLE ENERGY: MORE THAN HALF THE DAYS
10. IF YOU CHECKED OFF ANY PROBLEMS, HOW DIFFICULT HAVE THESE PROBLEMS MADE IT FOR YOU TO DO YOUR WORK, TAKE CARE OF THINGS AT HOME, OR GET ALONG WITH OTHER PEOPLE: SOMEWHAT DIFFICULT
SUM OF ALL RESPONSES TO PHQ QUESTIONS 1-9: 7
7. TROUBLE CONCENTRATING ON THINGS, SUCH AS READING THE NEWSPAPER OR WATCHING TELEVISION: SEVERAL DAYS
6. FEELING BAD ABOUT YOURSELF - OR THAT YOU ARE A FAILURE OR HAVE LET YOURSELF OR YOUR FAMILY DOWN: NOT AT ALL
SUM OF ALL RESPONSES TO PHQ QUESTIONS 1-9: 7
SUM OF ALL RESPONSES TO PHQ QUESTIONS 1-9: 7
3. TROUBLE FALLING OR STAYING ASLEEP: SEVERAL DAYS
8. MOVING OR SPEAKING SO SLOWLY THAT OTHER PEOPLE COULD HAVE NOTICED. OR THE OPPOSITE, BEING SO FIGETY OR RESTLESS THAT YOU HAVE BEEN MOVING AROUND A LOT MORE THAN USUAL: NOT AT ALL
5. POOR APPETITE OR OVEREATING: NEARLY EVERY DAY
SUM OF ALL RESPONSES TO PHQ QUESTIONS 1-9: 7
9. THOUGHTS THAT YOU WOULD BE BETTER OFF DEAD, OR OF HURTING YOURSELF: NOT AT ALL

## 2024-10-31 ASSESSMENT — ANXIETY QUESTIONNAIRES
5. BEING SO RESTLESS THAT IT IS HARD TO SIT STILL: NEARLY EVERY DAY
IF YOU CHECKED OFF ANY PROBLEMS ON THIS QUESTIONNAIRE, HOW DIFFICULT HAVE THESE PROBLEMS MADE IT FOR YOU TO DO YOUR WORK, TAKE CARE OF THINGS AT HOME, OR GET ALONG WITH OTHER PEOPLE: VERY DIFFICULT
7. FEELING AFRAID AS IF SOMETHING AWFUL MIGHT HAPPEN: SEVERAL DAYS
6. BECOMING EASILY ANNOYED OR IRRITABLE: MORE THAN HALF THE DAYS
2. NOT BEING ABLE TO STOP OR CONTROL WORRYING: SEVERAL DAYS
GAD7 TOTAL SCORE: 14
1. FEELING NERVOUS, ANXIOUS, OR ON EDGE: NEARLY EVERY DAY
4. TROUBLE RELAXING: NEARLY EVERY DAY
3. WORRYING TOO MUCH ABOUT DIFFERENT THINGS: SEVERAL DAYS

## 2024-10-31 NOTE — TELEPHONE ENCOUNTER
Appointment for: Alondra Pickard ([id number])  Visit type:  FOLLOW UP (7065)  10/31/2024 9:00 AM (60 minutes) with Lucretia REES in [facility]     Patient comments:     ----------------------------------  This appointment rescheduled from:  11/6/2024 9:00 AM (60 minutes) with Lucretia REES in [facility]         Electronically signed by Marcela Collazo on 10/31/2024 at 8:15 AM

## 2024-11-15 DIAGNOSIS — J30.2 SEASONAL ALLERGIES: ICD-10-CM

## 2024-11-15 RX ORDER — LEVOCETIRIZINE DIHYDROCHLORIDE 5 MG/1
5 TABLET, FILM COATED ORAL NIGHTLY
Qty: 30 TABLET | Refills: 0 | Status: SHIPPED | OUTPATIENT
Start: 2024-11-15

## 2024-11-15 NOTE — TELEPHONE ENCOUNTER
Alondra Pickard called to request a refill on her medication.      Last office visit : 7/15/2024   Next office visit : 12/10/2024     Requested Prescriptions     Pending Prescriptions Disp Refills    levocetirizine (XYZAL) 5 MG tablet [Pharmacy Med Name: LEVOCETIRIZINE 5 MG TABLET] 30 tablet 0     Sig: TAKE 1 TABLET BY MOUTH EVERY DAY AT NIGHT            Natalie Maciel LPN

## 2024-11-18 ENCOUNTER — TELEPHONE (OUTPATIENT)
Dept: PSYCHIATRY | Age: 49
End: 2024-11-18

## 2024-11-18 NOTE — TELEPHONE ENCOUNTER
SW called pt on 11/18/24 for an appointment reminder for 11/19/24. Patient confirmed   ?   Reminded patient to complete their visit pre-check/digital registration in [place].

## 2024-11-19 ENCOUNTER — OFFICE VISIT (OUTPATIENT)
Dept: PSYCHIATRY | Age: 49
End: 2024-11-19
Payer: MEDICAID

## 2024-11-19 VITALS
HEIGHT: 60 IN | DIASTOLIC BLOOD PRESSURE: 84 MMHG | OXYGEN SATURATION: 93 % | HEART RATE: 95 BPM | SYSTOLIC BLOOD PRESSURE: 143 MMHG | TEMPERATURE: 97.8 F | WEIGHT: 168.6 LBS | BODY MASS INDEX: 33.1 KG/M2

## 2024-11-19 DIAGNOSIS — F41.1 GENERALIZED ANXIETY DISORDER WITH PANIC ATTACKS: ICD-10-CM

## 2024-11-19 DIAGNOSIS — G47.01 INSOMNIA DUE TO MEDICAL CONDITION: ICD-10-CM

## 2024-11-19 DIAGNOSIS — F12.90 CANNABIS USE DISORDER: ICD-10-CM

## 2024-11-19 DIAGNOSIS — F39 UNSPECIFIED MOOD (AFFECTIVE) DISORDER (HCC): Primary | ICD-10-CM

## 2024-11-19 DIAGNOSIS — F41.0 GENERALIZED ANXIETY DISORDER WITH PANIC ATTACKS: ICD-10-CM

## 2024-11-19 DIAGNOSIS — F43.12 CHRONIC POST-TRAUMATIC STRESS DISORDER (PTSD): ICD-10-CM

## 2024-11-19 PROCEDURE — 1036F TOBACCO NON-USER: CPT

## 2024-11-19 PROCEDURE — G8417 CALC BMI ABV UP PARAM F/U: HCPCS

## 2024-11-19 PROCEDURE — G8427 DOCREV CUR MEDS BY ELIG CLIN: HCPCS

## 2024-11-19 PROCEDURE — G8484 FLU IMMUNIZE NO ADMIN: HCPCS

## 2024-11-19 PROCEDURE — 99214 OFFICE O/P EST MOD 30 MIN: CPT

## 2024-11-19 RX ORDER — LAMOTRIGINE 100 MG/1
100 TABLET ORAL DAILY
Qty: 90 TABLET | Refills: 1 | Status: SHIPPED | OUTPATIENT
Start: 2024-11-19

## 2024-11-19 RX ORDER — PRAZOSIN HYDROCHLORIDE 1 MG/1
1 CAPSULE ORAL NIGHTLY
Qty: 90 CAPSULE | Refills: 1 | Status: SHIPPED | OUTPATIENT
Start: 2024-11-19

## 2024-11-19 RX ORDER — HYDROXYZINE HYDROCHLORIDE 25 MG/1
25 TABLET, FILM COATED ORAL 3 TIMES DAILY PRN
Qty: 270 TABLET | Refills: 1 | Status: SHIPPED | OUTPATIENT
Start: 2024-11-19

## 2024-11-19 RX ORDER — DOXEPIN HYDROCHLORIDE 50 MG/1
50 CAPSULE ORAL NIGHTLY
Qty: 90 CAPSULE | Refills: 1 | Status: SHIPPED | OUTPATIENT
Start: 2024-11-19

## 2024-11-19 ASSESSMENT — PATIENT HEALTH QUESTIONNAIRE - PHQ9
10. IF YOU CHECKED OFF ANY PROBLEMS, HOW DIFFICULT HAVE THESE PROBLEMS MADE IT FOR YOU TO DO YOUR WORK, TAKE CARE OF THINGS AT HOME, OR GET ALONG WITH OTHER PEOPLE: SOMEWHAT DIFFICULT
SUM OF ALL RESPONSES TO PHQ QUESTIONS 1-9: 5
SUM OF ALL RESPONSES TO PHQ9 QUESTIONS 1 & 2: 1
SUM OF ALL RESPONSES TO PHQ QUESTIONS 1-9: 5
7. TROUBLE CONCENTRATING ON THINGS, SUCH AS READING THE NEWSPAPER OR WATCHING TELEVISION: SEVERAL DAYS
SUM OF ALL RESPONSES TO PHQ QUESTIONS 1-9: 5
4. FEELING TIRED OR HAVING LITTLE ENERGY: SEVERAL DAYS
5. POOR APPETITE OR OVEREATING: MORE THAN HALF THE DAYS
8. MOVING OR SPEAKING SO SLOWLY THAT OTHER PEOPLE COULD HAVE NOTICED. OR THE OPPOSITE, BEING SO FIGETY OR RESTLESS THAT YOU HAVE BEEN MOVING AROUND A LOT MORE THAN USUAL: NOT AT ALL
6. FEELING BAD ABOUT YOURSELF - OR THAT YOU ARE A FAILURE OR HAVE LET YOURSELF OR YOUR FAMILY DOWN: NOT AT ALL
1. LITTLE INTEREST OR PLEASURE IN DOING THINGS: SEVERAL DAYS
9. THOUGHTS THAT YOU WOULD BE BETTER OFF DEAD, OR OF HURTING YOURSELF: NOT AT ALL
2. FEELING DOWN, DEPRESSED OR HOPELESS: NOT AT ALL
SUM OF ALL RESPONSES TO PHQ QUESTIONS 1-9: 5
3. TROUBLE FALLING OR STAYING ASLEEP: NOT AT ALL

## 2024-11-19 NOTE — PROGRESS NOTES
11/19/2024        Progress Note    Alondra Pickard 1975      Chief Complaint   Patient presents with    Follow-up         Subjective:    Patient is a 49 y.o. female diagnosed with unspecified mood disorder, BENY with panic attacks, chronic PTSD, insomnia due to medical condition, cannabis use disorder, and presents today for follow-up.  Last seen in clinic on 8/19/2024  and prior records were reviewed.    Seen in office today.  Calm and cooperative.  Affect is bright.  Currently taking Lamictal, doxepin, hydroxyzine, prazosin.  She reports compliance with her medications, denies any negative or unwanted side effects.  Feels mood has been stable.  She started working again. 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 This is a big step for patient as anxiety kept her from social situations. She reports job has been going well, \"I stay busy so it goes by fast\". Sleep is fair, appetite is good.  Discussed with patient provider will be leaving office next week will be set up with another provider for 3-month follow-up.    Absent  suicidal ideation.    Reports compliance with medications as good .     Sleep: better, prazosin helping with nightmares    Caffeine use: cup and half of coffee every morning, about 2 glasses of tea a day, eats chocolate     PREVIOUS MED TRIALS  BuSpar   Adderall  Concerta  Vyvanse  Prozac  Clonidine (bradycardia)  Seroquel (nightmares)  Prazosin  Lamictal  Trazodone  Atarax     SUBSTANCE USE HISTORY  Alcohol: rarely, has a drink every few months  Illicit drug use: denies  Marijuana: smokes marijuana, multiple times a day, also vapes with THC, started using regularly at 26 years old  Tobacco: denies   Vape: vapes with THC      BP: BP (!) 143/84   Pulse 95   Temp 97.8 °F (36.6 °C)   Ht 1.524 m (5')   Wt 76.5 kg (168 lb 9.6 oz)   SpO2 93%   BMI 32.93 kg/m²       Review of Systems - 14 point review:  Negative     Constitutional: (fevers, chills, night

## 2024-12-10 ENCOUNTER — OFFICE VISIT (OUTPATIENT)
Dept: PRIMARY CARE CLINIC | Age: 49
End: 2024-12-10
Payer: MEDICAID

## 2024-12-10 VITALS
OXYGEN SATURATION: 99 % | WEIGHT: 160 LBS | TEMPERATURE: 97.1 F | HEART RATE: 70 BPM | HEIGHT: 64 IN | BODY MASS INDEX: 27.31 KG/M2 | SYSTOLIC BLOOD PRESSURE: 120 MMHG | DIASTOLIC BLOOD PRESSURE: 78 MMHG

## 2024-12-10 DIAGNOSIS — F17.200 CURRENT EVERY DAY SMOKER: ICD-10-CM

## 2024-12-10 DIAGNOSIS — G47.30 SLEEP APNEA, UNSPECIFIED TYPE: ICD-10-CM

## 2024-12-10 DIAGNOSIS — Z28.21 FLU VACCINE REFUSED: ICD-10-CM

## 2024-12-10 DIAGNOSIS — K21.9 GASTROESOPHAGEAL REFLUX DISEASE WITHOUT ESOPHAGITIS: ICD-10-CM

## 2024-12-10 DIAGNOSIS — E03.9 HYPOTHYROIDISM, UNSPECIFIED TYPE: ICD-10-CM

## 2024-12-10 DIAGNOSIS — M19.90 ARTHRITIS: ICD-10-CM

## 2024-12-10 DIAGNOSIS — F32.1 CURRENT MODERATE EPISODE OF MAJOR DEPRESSIVE DISORDER, UNSPECIFIED WHETHER RECURRENT (HCC): ICD-10-CM

## 2024-12-10 DIAGNOSIS — E78.2 MIXED HYPERLIPIDEMIA: Primary | ICD-10-CM

## 2024-12-10 PROCEDURE — G8427 DOCREV CUR MEDS BY ELIG CLIN: HCPCS | Performed by: FAMILY MEDICINE

## 2024-12-10 PROCEDURE — 99214 OFFICE O/P EST MOD 30 MIN: CPT | Performed by: FAMILY MEDICINE

## 2024-12-10 PROCEDURE — 1036F TOBACCO NON-USER: CPT | Performed by: FAMILY MEDICINE

## 2024-12-10 PROCEDURE — G8484 FLU IMMUNIZE NO ADMIN: HCPCS | Performed by: FAMILY MEDICINE

## 2024-12-10 PROCEDURE — G8417 CALC BMI ABV UP PARAM F/U: HCPCS | Performed by: FAMILY MEDICINE

## 2024-12-10 ASSESSMENT — ENCOUNTER SYMPTOMS
CONSTIPATION: 0
COUGH: 0
EYE PAIN: 0
TROUBLE SWALLOWING: 0
DIARRHEA: 0
CHEST TIGHTNESS: 0
SORE THROAT: 0
ABDOMINAL PAIN: 0
BACK PAIN: 0
VOMITING: 0
SHORTNESS OF BREATH: 0
NAUSEA: 0
WHEEZING: 0

## 2024-12-10 NOTE — PROGRESS NOTES
[facility]  [address]  [address]  [place] [address]  Dept: [phone]  Dept Fax: [phone]  Loc: [phone]      Subjective:     Chief Complaint   Patient presents with    Follow-up       HPI:  Alondra Pickard is a 49 y.o. female presenting for routine follow-up. PMHx and problem list reviewed. Chronic meds reviewed and reconciled. Pt at risk for polypharmacy. She has been referred to psychiatry for medication management and for counseling and therapy  Recent blood work shows cholesterol is at goal. BP  is normal. GERD is stable She has FATIMAH but states that her cpap broke. She saw Jami Michaels for management of her sleep apnea. Pt states she needs a new referral to see her again      ROS:   Review of Systems   Constitutional:  Negative for appetite change, chills, fatigue and fever.   HENT:  Negative for congestion, ear pain, hearing loss, nosebleeds, sore throat and trouble swallowing.    Eyes:  Negative for pain and visual disturbance.   Respiratory:  Negative for cough, chest tightness, shortness of breath and wheezing.    Cardiovascular:  Negative for chest pain, palpitations and leg swelling.   Gastrointestinal:  Negative for abdominal pain, constipation, diarrhea, nausea and vomiting.   Endocrine: Negative for cold intolerance, heat intolerance, polydipsia, polyphagia and polyuria.   Genitourinary:  Negative for difficulty urinating, dysuria, frequency, hematuria and urgency.   Musculoskeletal:  Positive for arthralgias (R hip . R ankle). Negative for back pain, gait problem, joint swelling, myalgias, neck pain and neck stiffness.   Skin:  Negative for pallor and rash.   Allergic/Immunologic: Negative for environmental allergies and food allergies.   Neurological:  Negative for dizziness, weakness and numbness.   Hematological:  Negative for adenopathy. Does not bruise/bleed easily.   Psychiatric/Behavioral:  Positive for dysphoric mood. Negative for

## 2024-12-13 DIAGNOSIS — N32.81 OVERACTIVE BLADDER: ICD-10-CM

## 2024-12-13 DIAGNOSIS — N39.46 MIXED INCONTINENCE: ICD-10-CM

## 2024-12-13 DIAGNOSIS — G47.01 INSOMNIA DUE TO MEDICAL CONDITION: ICD-10-CM

## 2024-12-13 DIAGNOSIS — E03.9 ACQUIRED HYPOTHYROIDISM: ICD-10-CM

## 2024-12-13 DIAGNOSIS — F41.8 SITUATIONAL ANXIETY: ICD-10-CM

## 2024-12-13 NOTE — TELEPHONE ENCOUNTER
Component Value Date     LABA1C 5.3 09/22/2015      No results found for: \"EAG\"        Lab Results   Component Value Date     TSHFT4 2.75 04/18/2024     TSH 2.710 07/02/2019            Lab Results   Component Value Date     VITD25 34.0 04/18/2024            Lab Results   Component Value Date     PZFJTBOH22 406 03/07/2024      No results found for: \"FOLATE\"      Assessment:    1. Unspecified mood (affective) disorder (HCC)    2. Generalized anxiety disorder with panic attacks    3. Chronic post-traumatic stress disorder (PTSD)    4. Insomnia due to medical condition    5. Cannabis use disorder                      No evidence of acute suicidality, homicidality or psychosis observed.  Patient is psychiatrically stable     Plan:     1.   Continue   Lamictal 100 mg daily for mood stabilization  Doxepin 50 mg nightly for sleep  Melatonin 5 mg nightly for sleep  Prazosin 1 mg nightly for nightmares related to PTSD  Hydroxyzine 25 mg TID PRN for anxiety  Vitamin D 50,000 units weekly for vitamin D deficiency  Psychotherapy with Lucretia in office           The risks, benefits, side effects, indications, contraindications, and adverse effects of the medications have been discussed. Yes.  2. The pt has verbalized understanding and has capacity to give informed consent.  3. The Law report has been reviewed according to HB1 regulations.  4. Supportive therapy offered.  5. Follow up:    Return in about 3 months (around 2/19/2025) for Medication Follow up.  6. The patient has been advised to call with any problems.  7. Controlled substance Treatment Plan: n/a.  8. The above listed medications have been continued, modifications in meds and other orders/labs as follows:                   Encounter Medications   No orders of the defined types were placed in this encounter.                  No orders of the defined types were placed in this encounter.        9. Additional comments: Begin therapy, discussed sleep hygiene,

## 2024-12-13 NOTE — TELEPHONE ENCOUNTER
Alondra Pickard called to request a refill on her medication.      Last office visit : 12/10/2024   Next office visit : 3/10/2025     Requested Prescriptions     Pending Prescriptions Disp Refills    oxyBUTYnin (DITROPAN-XL) 10 MG extended release tablet [Pharmacy Med Name: OXYBUTYNIN CL ER 10 MG TABLET] 30 tablet      Sig: TAKE 1 TABLET BY MOUTH EVERY DAY            Gracy Luke

## 2024-12-16 RX ORDER — OXYBUTYNIN CHLORIDE 10 MG/1
10 TABLET, EXTENDED RELEASE ORAL DAILY
Qty: 30 TABLET | Refills: 2 | Status: SHIPPED | OUTPATIENT
Start: 2024-12-16

## 2024-12-16 RX ORDER — BUSPIRONE HYDROCHLORIDE 10 MG/1
10 TABLET ORAL 2 TIMES DAILY
Qty: 60 TABLET | Refills: 1 | Status: SHIPPED | OUTPATIENT
Start: 2024-12-16

## 2024-12-16 RX ORDER — LEVOTHYROXINE SODIUM 50 UG/1
50 TABLET ORAL DAILY
Qty: 30 TABLET | Refills: 1 | Status: SHIPPED | OUTPATIENT
Start: 2024-12-16

## 2024-12-16 NOTE — TELEPHONE ENCOUNTER
Alondra Pickard called to request a refill on her medication.      Last office visit : 12/10/2024   Next office visit : 12/13/2024     Requested Prescriptions     Pending Prescriptions Disp Refills    levothyroxine (SYNTHROID) 50 MCG tablet [Pharmacy Med Name: LEVOTHYROXINE 50 MCG TABLET] 30 tablet 1     Sig: TAKE 1 TABLET BY MOUTH EVERY DAY    busPIRone (BUSPAR) 10 MG tablet [Pharmacy Med Name: BUSPIRONE HCL 10 MG TABLET] 60 tablet 1     Sig: TAKE 1 TABLET BY MOUTH TWICE A DAY            Natalie Maciel LPN

## 2024-12-28 DIAGNOSIS — F41.0 GENERALIZED ANXIETY DISORDER WITH PANIC ATTACKS: ICD-10-CM

## 2024-12-28 DIAGNOSIS — F41.1 GENERALIZED ANXIETY DISORDER WITH PANIC ATTACKS: ICD-10-CM

## 2024-12-30 RX ORDER — HYDROXYZINE HYDROCHLORIDE 25 MG/1
25 TABLET, FILM COATED ORAL 3 TIMES DAILY PRN
Qty: 180 TABLET | Refills: 3 | Status: SHIPPED | OUTPATIENT
Start: 2024-12-30 | End: 2025-03-30

## 2024-12-30 NOTE — TELEPHONE ENCOUNTER
Lab Results   Component Value Date     LABA1C 5.3 09/22/2015      No results found for: \"EAG\"        Lab Results   Component Value Date     TSHFT4 2.75 04/18/2024     TSH 2.710 07/02/2019            Lab Results   Component Value Date     VITD25 34.0 04/18/2024            Lab Results   Component Value Date     [id number] 406 03/07/2024      No results found for: \"FOLATE\"      Assessment:    1. Unspecified mood (affective) disorder (HCC)    2. Generalized anxiety disorder with panic attacks    3. Chronic post-traumatic stress disorder (PTSD)    4. Insomnia due to medical condition    5. Cannabis use disorder                      No evidence of acute suicidality, homicidality or psychosis observed.  Patient is psychiatrically stable     Plan:     1.   Continue   Lamictal 100 mg daily for mood stabilization  Doxepin 50 mg nightly for sleep  Melatonin 5 mg nightly for sleep  Prazosin 1 mg nightly for nightmares related to PTSD  Hydroxyzine 25 mg TID PRN for anxiety  Vitamin D 50,000 units weekly for vitamin D deficiency  Psychotherapy with Lucretia in office           The risks, benefits, side effects, indications, contraindications, and adverse effects of the medications have been discussed. Yes.  2. The pt has verbalized understanding and has capacity to give informed consent.  3. The Law report has been reviewed according to HB1 regulations.  4. Supportive therapy offered.  5. Follow up:    Return in about 3 months (around 2/19/2025) for Medication Follow up.  6. The patient has been advised to call with any problems.  7. Controlled substance Treatment Plan: n/a.  8. The above listed medications have been continued, modifications in meds and other orders/labs as follows:                   Encounter Medications   No orders of the defined types were placed in this encounter.                  No orders of the defined types were placed in this encounter.        9. Additional comments: Begin therapy, discussed sleep

## 2025-01-15 ENCOUNTER — TELEPHONE (OUTPATIENT)
Dept: PSYCHIATRY | Age: 50
End: 2025-01-15

## 2025-01-15 NOTE — TELEPHONE ENCOUNTER
Called patient to remind them of their appointment     -Pt confirmed  Reminded patient to complete their visit pre-check/digital registration in [employer].    Electronically signed by Giovana Juares MA on 1/15/2025 at 12:07 PM

## 2025-01-16 ENCOUNTER — OFFICE VISIT (OUTPATIENT)
Dept: PSYCHIATRY | Age: 50
End: 2025-01-16

## 2025-01-16 DIAGNOSIS — F12.90 CANNABIS USE DISORDER: ICD-10-CM

## 2025-01-16 DIAGNOSIS — F41.1 GENERALIZED ANXIETY DISORDER: ICD-10-CM

## 2025-01-16 DIAGNOSIS — F39 UNSPECIFIED MOOD (AFFECTIVE) DISORDER (HCC): Primary | ICD-10-CM

## 2025-01-16 ASSESSMENT — PATIENT HEALTH QUESTIONNAIRE - PHQ9
8. MOVING OR SPEAKING SO SLOWLY THAT OTHER PEOPLE COULD HAVE NOTICED. OR THE OPPOSITE, BEING SO FIGETY OR RESTLESS THAT YOU HAVE BEEN MOVING AROUND A LOT MORE THAN USUAL: NOT AT ALL
9. THOUGHTS THAT YOU WOULD BE BETTER OFF DEAD, OR OF HURTING YOURSELF: NOT AT ALL
4. FEELING TIRED OR HAVING LITTLE ENERGY: SEVERAL DAYS
SUM OF ALL RESPONSES TO PHQ9 QUESTIONS 1 & 2: 1
SUM OF ALL RESPONSES TO PHQ QUESTIONS 1-9: 7
3. TROUBLE FALLING OR STAYING ASLEEP: MORE THAN HALF THE DAYS
10. IF YOU CHECKED OFF ANY PROBLEMS, HOW DIFFICULT HAVE THESE PROBLEMS MADE IT FOR YOU TO DO YOUR WORK, TAKE CARE OF THINGS AT HOME, OR GET ALONG WITH OTHER PEOPLE: SOMEWHAT DIFFICULT
6. FEELING BAD ABOUT YOURSELF - OR THAT YOU ARE A FAILURE OR HAVE LET YOURSELF OR YOUR FAMILY DOWN: NOT AT ALL
2. FEELING DOWN, DEPRESSED OR HOPELESS: NOT AT ALL
SUM OF ALL RESPONSES TO PHQ QUESTIONS 1-9: 7
5. POOR APPETITE OR OVEREATING: MORE THAN HALF THE DAYS
7. TROUBLE CONCENTRATING ON THINGS, SUCH AS READING THE NEWSPAPER OR WATCHING TELEVISION: SEVERAL DAYS
SUM OF ALL RESPONSES TO PHQ QUESTIONS 1-9: 7
1. LITTLE INTEREST OR PLEASURE IN DOING THINGS: SEVERAL DAYS
SUM OF ALL RESPONSES TO PHQ QUESTIONS 1-9: 7

## 2025-01-16 ASSESSMENT — ANXIETY QUESTIONNAIRES
3. WORRYING TOO MUCH ABOUT DIFFERENT THINGS: MORE THAN HALF THE DAYS
IF YOU CHECKED OFF ANY PROBLEMS ON THIS QUESTIONNAIRE, HOW DIFFICULT HAVE THESE PROBLEMS MADE IT FOR YOU TO DO YOUR WORK, TAKE CARE OF THINGS AT HOME, OR GET ALONG WITH OTHER PEOPLE: SOMEWHAT DIFFICULT
5. BEING SO RESTLESS THAT IT IS HARD TO SIT STILL: SEVERAL DAYS
GAD7 TOTAL SCORE: 13
4. TROUBLE RELAXING: MORE THAN HALF THE DAYS
1. FEELING NERVOUS, ANXIOUS, OR ON EDGE: MORE THAN HALF THE DAYS
6. BECOMING EASILY ANNOYED OR IRRITABLE: NEARLY EVERY DAY
2. NOT BEING ABLE TO STOP OR CONTROL WORRYING: MORE THAN HALF THE DAYS
7. FEELING AFRAID AS IF SOMETHING AWFUL MIGHT HAPPEN: SEVERAL DAYS

## 2025-01-16 NOTE — PROGRESS NOTES
Healthcare System    Abuse Screen     Unsafe at Home or Work/School: Not on file     Feels Threatened by Someone?: Not on file     Does Anyone Keep You from Contacting Others or Doint Things Outside the Home?: Not on file     Physical Sign of Abuse Present: Not on file   Housing Stability: Unknown (5/18/2024)    Housing Stability Vital Sign     Unable to Pay for Housing in the Last Year: Not on file     Number of Places Lived in the Last Year: Not on file     Unstable Housing in the Last Year: No       TOBACCO:   reports that she quit smoking about 15 years ago. Her smoking use included cigarettes. She started smoking about 42 years ago. She has a 6.8 pack-year smoking history. She has quit using smokeless tobacco.  ETOH:   reports current alcohol use.    Family History:   Family History   Problem Relation Age of Onset    High Blood Pressure Mother     Other Mother         early alzheimers    High Blood Pressure Father     Breast Cancer Paternal Aunt 34    Diabetes Paternal Grandfather     Colon Cancer Neg Hx     Colon Polyps Neg Hx        Diagnosis:    Unspecified Mood Disorder  Generalized Anxiety Disorder  Cannabis Use Disorder      Diagnosis Date    Anxiety     Asthma     born with asthmatic bronchitis    CPAP (continuous positive airway pressure) dependence     6cm to 16cm    Degenerative disk disease     Depression     mood disorders    Hemorrhoid 11/2015    Hyperlipidemia     Hypoglycemia     Hypothyroid     Left ureteral stone 05/25/2016    OAB (overactive bladder)     Obstructive sleep apnea     AHI: 37.9 per PSG, 10/2019    Obstructive sleep apnea 04/30/2020    Prolonged emergence from general anesthesia     when pt had gall bladder removed    Renal stone 05/25/2016     Problems with primary support group and Other psychosocial and environmental problems    Plan:  1. Continue medication management  2. CBT to target cognitive distortions  3. Discuss therapeutic goals  Develop and begin to utilize healthy

## 2025-01-18 DIAGNOSIS — J30.2 SEASONAL ALLERGIES: ICD-10-CM

## 2025-01-20 RX ORDER — LEVOCETIRIZINE DIHYDROCHLORIDE 5 MG/1
5 TABLET, FILM COATED ORAL NIGHTLY
Qty: 30 TABLET | Refills: 0 | Status: SHIPPED | OUTPATIENT
Start: 2025-01-20

## 2025-01-20 NOTE — TELEPHONE ENCOUNTER
Alondra Pickard called to request a refill on her medication.      Last office visit : 12/10/2024   Next office visit : 3/10/2025     Requested Prescriptions     Pending Prescriptions Disp Refills    levocetirizine (XYZAL) 5 MG tablet [Pharmacy Med Name: LEVOCETIRIZINE 5 MG TABLET] 30 tablet 0     Sig: TAKE 1 TABLET BY MOUTH EVERY DAY AT NIGHT            Natalie Maciel LPN

## 2025-02-02 DIAGNOSIS — E78.2 MIXED HYPERLIPIDEMIA: ICD-10-CM

## 2025-02-02 DIAGNOSIS — J30.2 SEASONAL ALLERGIES: ICD-10-CM

## 2025-02-02 DIAGNOSIS — Z76.0 MEDICATION REFILL: ICD-10-CM

## 2025-02-03 ENCOUNTER — TELEPHONE (OUTPATIENT)
Dept: GASTROENTEROLOGY | Age: 50
End: 2025-02-03

## 2025-02-03 RX ORDER — LEVOCETIRIZINE DIHYDROCHLORIDE 5 MG/1
5 TABLET, FILM COATED ORAL NIGHTLY
Qty: 30 TABLET | Refills: 0 | OUTPATIENT
Start: 2025-02-03

## 2025-02-03 RX ORDER — ATORVASTATIN CALCIUM 20 MG/1
20 TABLET, FILM COATED ORAL DAILY
Qty: 90 TABLET | Refills: 1 | OUTPATIENT
Start: 2025-02-03

## 2025-02-03 NOTE — TELEPHONE ENCOUNTER
02- Patient called stated that she is having issues with getting her Pantoprazole and that she is only getting a rx for once daily.  Stated that it should be for twice daily.         [facility]   She stated that the rx is only for once daily.  Advised that MAUREEN ANDERSON sent in rx on 10- w 3 refills for bid dosing. Existing PA is from 10- to 11-.  Stated that her computer is showing a refill to soon.       She stated that she has the rx for BID.         Done a PA through cover my med       Approved from [employer] from 02- to 03-      Called [facility] spoke with Kat   Notified that the PA has been approved for bid from 02- to 03-      Called Alondra notified that I have called and spoke with [facility]   Rx has been approved

## 2025-02-17 ENCOUNTER — TELEPHONE (OUTPATIENT)
Dept: PSYCHIATRY | Age: 50
End: 2025-02-17

## 2025-02-17 DIAGNOSIS — E55.9 VITAMIN D DEFICIENCY: ICD-10-CM

## 2025-02-17 RX ORDER — ERGOCALCIFEROL 1.25 MG/1
50000 CAPSULE, LIQUID FILLED ORAL WEEKLY
Qty: 4 CAPSULE | Refills: 2 | Status: SHIPPED | OUTPATIENT
Start: 2025-02-17 | End: 2025-03-19

## 2025-02-17 NOTE — TELEPHONE ENCOUNTER
[facility] sent fax requesting med refill below.    Last office visit:  11/19/24 with PORFIRIO ANDERSON  Next office visit : 3/27/25 with Dr Blanton    Requested Prescriptions     Pending Prescriptions Disp Refills    vitamin D (ERGOCALCIFEROL) 1.25 MG (04003 UT) CAPS capsule 4 capsule 2     Sig: Take 1 capsule by mouth Once a week at 5 PM            Patricia Kasper RN     11/19/2024                                         Progress Note     Alondra Pickard 1975                            Chief Complaint   Patient presents with    Follow-up            Subjective:    Patient is a 49 y.o. female diagnosed with unspecified mood disorder, BENY with panic attacks, chronic PTSD, insomnia due to medical condition, cannabis use disorder, and presents today for follow-up.  Last seen in clinic on 8/19/2024  and prior records were reviewed.     Seen in office today.  Calm and cooperative.  Affect is bright.  Currently taking Lamictal, doxepin, hydroxyzine, prazosin.  She reports compliance with her medications, denies any negative or unwanted side effects.  Feels mood has been stable.  She started working again. 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 This is a big step for patient as anxiety kept her from social situations. She reports job has been going well, \"I stay busy so it goes by fast\". Sleep is fair, appetite is good.  Discussed with patient provider will be leaving office next week will be set up with another provider for 3-month follow-up.     Absent  suicidal ideation.    Reports compliance with medications as good .      Sleep: better, prazosin helping with nightmares     Caffeine use: cup and half of coffee every morning, about 2 glasses of tea a day, eats chocolate      PREVIOUS MED TRIALS  BuSpar   Adderall  Concerta  Vyvanse  Prozac  Clonidine (bradycardia)  Seroquel (nightmares)  Prazosin  Lamictal  Trazodone  Atarax     SUBSTANCE USE HISTORY  Alcohol: rarely, has a drink

## 2025-02-23 DIAGNOSIS — J30.2 SEASONAL ALLERGIES: ICD-10-CM

## 2025-02-25 RX ORDER — LEVOCETIRIZINE DIHYDROCHLORIDE 5 MG/1
5 TABLET, FILM COATED ORAL NIGHTLY
Qty: 30 TABLET | Refills: 2 | Status: SHIPPED | OUTPATIENT
Start: 2025-02-25

## 2025-03-03 DIAGNOSIS — F41.8 SITUATIONAL ANXIETY: ICD-10-CM

## 2025-03-03 DIAGNOSIS — E03.9 ACQUIRED HYPOTHYROIDISM: ICD-10-CM

## 2025-03-04 RX ORDER — BUSPIRONE HYDROCHLORIDE 10 MG/1
10 TABLET ORAL 2 TIMES DAILY
Qty: 60 TABLET | Refills: 1 | Status: SHIPPED | OUTPATIENT
Start: 2025-03-04

## 2025-03-04 RX ORDER — LEVOTHYROXINE SODIUM 50 UG/1
50 TABLET ORAL DAILY
Qty: 30 TABLET | Refills: 1 | Status: SHIPPED | OUTPATIENT
Start: 2025-03-04

## 2025-03-04 NOTE — TELEPHONE ENCOUNTER
Alondra Pickard called to request a refill on her medication.      Last office visit : 12/10/2024   Next office visit : 3/10/2025     Requested Prescriptions     Pending Prescriptions Disp Refills    busPIRone (BUSPAR) 10 MG tablet [Pharmacy Med Name: BUSPIRONE HCL 10 MG TABLET] 60 tablet 1     Sig: TAKE 1 TABLET BY MOUTH TWICE A DAY    levothyroxine (SYNTHROID) 50 MCG tablet [Pharmacy Med Name: LEVOTHYROXINE 50 MCG TABLET] 30 tablet 1     Sig: TAKE 1 TABLET BY MOUTH EVERY DAY            Jennie Marti MA

## 2025-03-06 ENCOUNTER — OFFICE VISIT (OUTPATIENT)
Dept: PSYCHIATRY | Age: 50
End: 2025-03-06

## 2025-03-06 DIAGNOSIS — F12.90 CANNABIS USE DISORDER: ICD-10-CM

## 2025-03-06 DIAGNOSIS — F39 UNSPECIFIED MOOD (AFFECTIVE) DISORDER: Primary | ICD-10-CM

## 2025-03-06 DIAGNOSIS — F41.1 GENERALIZED ANXIETY DISORDER: ICD-10-CM

## 2025-03-06 ASSESSMENT — ANXIETY QUESTIONNAIRES
2. NOT BEING ABLE TO STOP OR CONTROL WORRYING: MORE THAN HALF THE DAYS
7. FEELING AFRAID AS IF SOMETHING AWFUL MIGHT HAPPEN: SEVERAL DAYS
6. BECOMING EASILY ANNOYED OR IRRITABLE: MORE THAN HALF THE DAYS
4. TROUBLE RELAXING: MORE THAN HALF THE DAYS
GAD7 TOTAL SCORE: 12
IF YOU CHECKED OFF ANY PROBLEMS ON THIS QUESTIONNAIRE, HOW DIFFICULT HAVE THESE PROBLEMS MADE IT FOR YOU TO DO YOUR WORK, TAKE CARE OF THINGS AT HOME, OR GET ALONG WITH OTHER PEOPLE: VERY DIFFICULT
3. WORRYING TOO MUCH ABOUT DIFFERENT THINGS: MORE THAN HALF THE DAYS
5. BEING SO RESTLESS THAT IT IS HARD TO SIT STILL: SEVERAL DAYS
1. FEELING NERVOUS, ANXIOUS, OR ON EDGE: MORE THAN HALF THE DAYS

## 2025-03-06 ASSESSMENT — PATIENT HEALTH QUESTIONNAIRE - PHQ9
4. FEELING TIRED OR HAVING LITTLE ENERGY: SEVERAL DAYS
5. POOR APPETITE OR OVEREATING: NEARLY EVERY DAY
SUM OF ALL RESPONSES TO PHQ QUESTIONS 1-9: 8
SUM OF ALL RESPONSES TO PHQ QUESTIONS 1-9: 8
10. IF YOU CHECKED OFF ANY PROBLEMS, HOW DIFFICULT HAVE THESE PROBLEMS MADE IT FOR YOU TO DO YOUR WORK, TAKE CARE OF THINGS AT HOME, OR GET ALONG WITH OTHER PEOPLE: SOMEWHAT DIFFICULT
3. TROUBLE FALLING OR STAYING ASLEEP: MORE THAN HALF THE DAYS
2. FEELING DOWN, DEPRESSED OR HOPELESS: NOT AT ALL
8. MOVING OR SPEAKING SO SLOWLY THAT OTHER PEOPLE COULD HAVE NOTICED. OR THE OPPOSITE, BEING SO FIGETY OR RESTLESS THAT YOU HAVE BEEN MOVING AROUND A LOT MORE THAN USUAL: NOT AT ALL
SUM OF ALL RESPONSES TO PHQ QUESTIONS 1-9: 8
7. TROUBLE CONCENTRATING ON THINGS, SUCH AS READING THE NEWSPAPER OR WATCHING TELEVISION: SEVERAL DAYS
6. FEELING BAD ABOUT YOURSELF - OR THAT YOU ARE A FAILURE OR HAVE LET YOURSELF OR YOUR FAMILY DOWN: NOT AT ALL
1. LITTLE INTEREST OR PLEASURE IN DOING THINGS: SEVERAL DAYS
9. THOUGHTS THAT YOU WOULD BE BETTER OFF DEAD, OR OF HURTING YOURSELF: NOT AT ALL
SUM OF ALL RESPONSES TO PHQ QUESTIONS 1-9: 8

## 2025-03-06 NOTE — PROGRESS NOTES
Therapy Progress Note  Lucretia Olivarez [facility]   3/6/2025    Patient location  :[facility]  [facility] location : [facility]      Total time spent: 50 minutes  This is patient's seventh  Therapy appointment.  Chief Complaint:  anxiety, stress, and agitation  Referring Provider: No referring provider defined for this encounter.      Alondra Pickard ,a 50 y.o. female, for follow up visit.     Pt provided informed consent for the behavioral health program. Discussed with patient model of service to include the limits of confidentiality (i.e. abuse reporting, suicide intervention, etc.) and short-term intervention focused approach.  Discussed no show and late cancellation policy.  Pt indicated understanding.    S:  Provider seeing patient in follow-up for stress/irritability.  Patient talked about challenges she is dealing with at work, experienced problems with another co-worker, will be switching departments, and will be working overnights.  Patient discussed concerns adjusting to new work schedule and still being able to assist taking care of grandchildren.  Patient discussed  also got a new job and is also working overnights.  Patient indicated liking what she does and how this job benefits patient and family.  Patient discussed struggling managing irritability at work and tolerating others.  Patient indicated being quick tempered, struggles sleeping throughout the night, low appetite, feels on edge often, tends to worry about too many different things, difficulty calming down and relaxing, and becomes easily annoyed.    Pt reported she  would like to continue the monthly appointments and verbalized she  would call sooner if needed.     Pt denies Suicidal Ideations, Homicidal Ideation, Auditory Hallucinations, Visual Hallucinations, Tactical Hallucinations.      Assessments:  Assessments:  PHQ-9 Score:       3/6/2025     9:03 AM 1/16/2025     9:06 AM 11/19/2024     8:16

## 2025-03-11 ENCOUNTER — HOSPITAL ENCOUNTER (OUTPATIENT)
Dept: WOMENS IMAGING | Age: 50
Discharge: HOME | End: 2025-03-11
Payer: MEDICAID

## 2025-03-11 VITALS — BODY MASS INDEX: 31.02 KG/M2 | WEIGHT: 158 LBS | HEIGHT: 60 IN

## 2025-03-11 DIAGNOSIS — Z12.31 ENCOUNTER FOR SCREENING MAMMOGRAM FOR MALIGNANT NEOPLASM OF BREAST: ICD-10-CM

## 2025-03-11 PROCEDURE — 77063 BREAST TOMOSYNTHESIS BI: CPT

## 2025-03-20 ENCOUNTER — OFFICE VISIT (OUTPATIENT)
Dept: NEUROLOGY | Age: 50
End: 2025-03-20
Payer: MEDICAID

## 2025-03-20 VITALS
HEIGHT: 60 IN | BODY MASS INDEX: 31.02 KG/M2 | WEIGHT: 158 LBS | SYSTOLIC BLOOD PRESSURE: 124 MMHG | HEART RATE: 76 BPM | DIASTOLIC BLOOD PRESSURE: 83 MMHG | OXYGEN SATURATION: 97 %

## 2025-03-20 DIAGNOSIS — T88.8XXA MALFUNCTION OF CONTINUOUS POSITIVE AIRWAY PRESSURE (CPAP) OR BILEVEL POSITIVE AIRWAY PRESSURE (BPAP) MACHINE, INITIAL ENCOUNTER: ICD-10-CM

## 2025-03-20 DIAGNOSIS — G47.00 INSOMNIA, UNSPECIFIED TYPE: ICD-10-CM

## 2025-03-20 DIAGNOSIS — R53.83 OTHER FATIGUE: ICD-10-CM

## 2025-03-20 DIAGNOSIS — G47.33 OBSTRUCTIVE SLEEP APNEA: Primary | ICD-10-CM

## 2025-03-20 DIAGNOSIS — R63.4 WEIGHT LOSS: ICD-10-CM

## 2025-03-20 PROCEDURE — 1036F TOBACCO NON-USER: CPT | Performed by: PHYSICIAN ASSISTANT

## 2025-03-20 PROCEDURE — 99214 OFFICE O/P EST MOD 30 MIN: CPT | Performed by: PHYSICIAN ASSISTANT

## 2025-03-20 PROCEDURE — G8427 DOCREV CUR MEDS BY ELIG CLIN: HCPCS | Performed by: PHYSICIAN ASSISTANT

## 2025-03-20 PROCEDURE — G8417 CALC BMI ABV UP PARAM F/U: HCPCS | Performed by: PHYSICIAN ASSISTANT

## 2025-03-20 NOTE — PATIENT INSTRUCTIONS
Patient education: Sleep apnea in adults       INTRODUCTION -- Normally during sleep, air moves through the throat and in and out of the lungs at a regular rhythm. In a person with sleep apnea, air movement is periodically diminished or stopped. There are two types of sleep apnea: obstructive sleep apnea and central sleep apnea. In obstructive sleep apnea, breathing is abnormal because of narrowing or closure of the throat. In central sleep apnea, breathing is abnormal because of a change in the breathing control and rhythm.  Sleep apnea is a serious condition that can affect a person's ability to safely perform normal daily activities and can affect long term health. Approximately 25 percent of adults are at risk for sleep apnea of some degree.  Men are more commonly affected than women. Other risk factors include middle and older age, being overweight or obese, and having a small mouth and throat.  This topic review focuses on the most common type of sleep apnea in adults, obstructive sleep apnea (FATIMAH).    HOW SLEEP APNEA OCCURS -- The throat is surrounded by muscles that control the airway for speaking, swallowing, and breathing. During sleep, these muscles are less active, and this causes the throat to narrow.  In most people, this narrowing does not affect breathing. In others, it can cause snoring, sometimes with reduced or completely blocked airflow.  A completely blocked airway without airflow is called an obstructive apnea. Partial obstruction with diminished airflow is called a hypopnea. A person may have apnea and hypopnea during sleep.  Insufficient breathing due to apnea or hypopnea causes oxygen levels to fall and carbon dioxide to rise. Because the airway is blocked, breathing faster or harder does not help to improve oxygen levels until the airway is reopened. Typically, the obstruction requires the person to awaken to activate the upper airway muscles. Once the airway is opened, the person then  Home

## 2025-03-20 NOTE — PROGRESS NOTES
REVIEW OF SYSTEMS    Constitutional: []Fever []Sweats []Chills [] Recent Injury [x] Denies all unless marked  HEENT:[]Headache  [] Head Injury [] Hearing Loss  [] Sore Throat  [] Ear Ache [x] Denies all unless marked  Spine:  [] Neck pain  [] Back pain  [] Sciaticia  [x] Denies all unless marked  Cardiovascular:[]Heart Disease []Palpitations [] Chest Pain   [x] Denies all unless marked  Pulmonary: []Shortness of Breath []Cough   [x] Denies all unless marked  Psychiatric/Behavioral:[] Depression [] Anxiety [x] Denies all unless marked  Gastrointestinal: []Nausea  []Vomiting  []Abdominal Pain  []Constipation  []Diarrhea  [x] Denies all unless marked  Genitourinary:   [] Frequency  [] Urgency  [] Dysuria [] Incontinence  [x] Denies all unless marked  Extremities: []Pain  []Swelling  [x] Denies all unless marked  Musculoskeletal: [] Myalgias  [] Joint Pain  [] Arthritis [] Muscle Cramps [] Muscle Twitches  [x] Denies all unless marked  Sleep: [x]Insomnia[x]Snoring []Restless Legs  [x]Sleep Apnea  [x]Daytime Sleepiness  [x] Denies all unless marked  Skin:[] Rash [] Color Change [x] Denies all unless marked   Neurological:[]Visual Disturbance [] Memory Loss []Loss of Balance []Slurred Speech []Weakness []Seizures  [] Dizziness [x] Denies all unless marked     
for sleep apnea. Her neck circumference is 13.5 inches, and she presents with a type 3 Mallampati score, both of which are additional risk factors for sleep apnea. She reports that her CPAP malfunctioned around the end of October or the first part of November 2024, and she has been without it since then. She experiences daytime fatigue and occasional nighttime awakenings, though these symptoms have improved with weight loss. A comprehensive discussion regarding the pathophysiology of sleep apnea was conducted. A home sleep study will be ordered to reassess her current status.        PLAN:  1.    Orders Placed This Encounter   Procedures    [facility]    Home Sleep Study     No orders of the defined types were placed in this encounter.    2. Patient advised of the etiology,  pathophysiology, signs, symptoms, diagnosis, treatment options, and risks of untreated FATIMAH. Risks may include, but are not limited to  hypertension, coronary artery disease, atrial fibrillation, CHF, diabetes, stroke, weight gain, impaired cognition, daytime somnolence,  and motor vehicle accidents. Advised to abstain from driving or operating heavy machinery when drowsy and the use of respiratory suppressants.   3.  The following educational material has been included in this visit after visit summary for your review:  FATIMAH/PAP guidelines/sleep studies/CPAP-discussed with pt.  4.  Order- HST  5.  If pt requires a PAP device she will be required to be evaluated for clinical benefit and  compliance during a 30 day period within the preceding 90 days of set up.  6.  Follow up per protocol to review sleep study results, treatment plan, and to reassess symptomatology, PAP tolerance, efficacy and compliance, if indicated.       The patient indicates understanding of the above issues and agrees with the care plan.       I spent 30 minutes caring for Alondra Pickard  on this date of service. This time includes time spent by me in the

## 2025-03-21 SDOH — ECONOMIC STABILITY: FOOD INSECURITY: WITHIN THE PAST 12 MONTHS, YOU WORRIED THAT YOUR FOOD WOULD RUN OUT BEFORE YOU GOT MONEY TO BUY MORE.: NEVER TRUE

## 2025-03-21 SDOH — ECONOMIC STABILITY: FOOD INSECURITY: WITHIN THE PAST 12 MONTHS, THE FOOD YOU BOUGHT JUST DIDN'T LAST AND YOU DIDN'T HAVE MONEY TO GET MORE.: NEVER TRUE

## 2025-03-21 SDOH — ECONOMIC STABILITY: INCOME INSECURITY: IN THE LAST 12 MONTHS, WAS THERE A TIME WHEN YOU WERE NOT ABLE TO PAY THE MORTGAGE OR RENT ON TIME?: NO

## 2025-03-21 SDOH — ECONOMIC STABILITY: TRANSPORTATION INSECURITY
IN THE PAST 12 MONTHS, HAS THE LACK OF TRANSPORTATION KEPT YOU FROM MEDICAL APPOINTMENTS OR FROM GETTING MEDICATIONS?: NO

## 2025-03-21 SDOH — ECONOMIC STABILITY: TRANSPORTATION INSECURITY
IN THE PAST 12 MONTHS, HAS LACK OF TRANSPORTATION KEPT YOU FROM MEETINGS, WORK, OR FROM GETTING THINGS NEEDED FOR DAILY LIVING?: NO

## 2025-03-24 ENCOUNTER — OFFICE VISIT (OUTPATIENT)
Dept: PRIMARY CARE CLINIC | Age: 50
End: 2025-03-24
Payer: MEDICAID

## 2025-03-24 VITALS
BODY MASS INDEX: 31.37 KG/M2 | HEART RATE: 77 BPM | SYSTOLIC BLOOD PRESSURE: 112 MMHG | WEIGHT: 159.8 LBS | OXYGEN SATURATION: 96 % | TEMPERATURE: 97.7 F | DIASTOLIC BLOOD PRESSURE: 76 MMHG | HEIGHT: 60 IN

## 2025-03-24 DIAGNOSIS — Z76.0 MEDICATION REFILL: ICD-10-CM

## 2025-03-24 DIAGNOSIS — R42 VERTIGO: Primary | ICD-10-CM

## 2025-03-24 DIAGNOSIS — R42 VERTIGO: ICD-10-CM

## 2025-03-24 LAB
ALBUMIN SERPL-MCNC: 4.4 G/DL (ref 3.5–5.2)
ALP SERPL-CCNC: 84 U/L (ref 35–104)
ALT SERPL-CCNC: 21 U/L (ref 10–35)
ANION GAP SERPL CALCULATED.3IONS-SCNC: 8 MMOL/L (ref 8–16)
AST SERPL-CCNC: 17 U/L (ref 10–35)
BASOPHILS # BLD: 0.1 K/UL (ref 0–0.2)
BASOPHILS NFR BLD: 0.6 % (ref 0–1)
BILIRUB SERPL-MCNC: <0.2 MG/DL (ref 0.2–1.2)
BUN SERPL-MCNC: 10 MG/DL (ref 6–20)
CALCIUM SERPL-MCNC: 9.2 MG/DL (ref 8.6–10)
CHLORIDE SERPL-SCNC: 101 MMOL/L (ref 98–107)
CO2 SERPL-SCNC: 31 MMOL/L (ref 22–29)
CREAT SERPL-MCNC: 0.8 MG/DL (ref 0.5–0.9)
EOSINOPHIL # BLD: 0.1 K/UL (ref 0–0.6)
EOSINOPHIL NFR BLD: 0.7 % (ref 0–5)
ERYTHROCYTE [DISTWIDTH] IN BLOOD BY AUTOMATED COUNT: 13.4 % (ref 11.5–14.5)
GLUCOSE SERPL-MCNC: 101 MG/DL (ref 70–99)
HCT VFR BLD AUTO: 41.8 % (ref 37–47)
HGB BLD-MCNC: 13.5 G/DL (ref 12–16)
IMM GRANULOCYTES # BLD: 0 K/UL
LYMPHOCYTES # BLD: 2.2 K/UL (ref 1.1–4.5)
LYMPHOCYTES NFR BLD: 26.6 % (ref 20–40)
MCH RBC QN AUTO: 29.1 PG (ref 27–31)
MCHC RBC AUTO-ENTMCNC: 32.3 G/DL (ref 33–37)
MCV RBC AUTO: 90.1 FL (ref 81–99)
MONOCYTES # BLD: 0.4 K/UL (ref 0–0.9)
MONOCYTES NFR BLD: 4.3 % (ref 0–10)
NEUTROPHILS # BLD: 5.6 K/UL (ref 1.5–7.5)
NEUTS SEG NFR BLD: 67.4 % (ref 50–65)
PLATELET # BLD AUTO: 358 K/UL (ref 130–400)
PMV BLD AUTO: 10.2 FL (ref 9.4–12.3)
POTASSIUM SERPL-SCNC: 4.2 MMOL/L (ref 3.5–5.1)
PROT SERPL-MCNC: 7.3 G/DL (ref 6.4–8.3)
RBC # BLD AUTO: 4.64 M/UL (ref 4.2–5.4)
SODIUM SERPL-SCNC: 140 MMOL/L (ref 136–145)
TSH SERPL DL<=0.005 MIU/L-ACNC: 2.32 UIU/ML (ref 0.27–4.2)
WBC # BLD AUTO: 8.4 K/UL (ref 4.8–10.8)

## 2025-03-24 PROCEDURE — G8427 DOCREV CUR MEDS BY ELIG CLIN: HCPCS | Performed by: FAMILY MEDICINE

## 2025-03-24 PROCEDURE — 1036F TOBACCO NON-USER: CPT | Performed by: FAMILY MEDICINE

## 2025-03-24 PROCEDURE — 99213 OFFICE O/P EST LOW 20 MIN: CPT | Performed by: FAMILY MEDICINE

## 2025-03-24 PROCEDURE — G8417 CALC BMI ABV UP PARAM F/U: HCPCS | Performed by: FAMILY MEDICINE

## 2025-03-24 RX ORDER — ONDANSETRON 4 MG/1
4 TABLET, ORALLY DISINTEGRATING ORAL EVERY 8 HOURS PRN
Qty: 15 TABLET | Refills: 0 | Status: SHIPPED | OUTPATIENT
Start: 2025-03-24

## 2025-03-24 RX ORDER — MECLIZINE HYDROCHLORIDE 25 MG/1
25 TABLET ORAL NIGHTLY
Qty: 10 TABLET | Refills: 1 | Status: SHIPPED | OUTPATIENT
Start: 2025-03-24 | End: 2025-04-13

## 2025-03-24 ASSESSMENT — ENCOUNTER SYMPTOMS
GASTROINTESTINAL NEGATIVE: 1
RESPIRATORY NEGATIVE: 1

## 2025-03-24 NOTE — PROGRESS NOTES
[facility]  [address]  [address]  [place] [address]  Dept: [phone]  Dept Fax: [phone]  Loc: [phone]      Subjective:     Chief Complaint   Patient presents with    Other     Sent home from work on Friday due to nausea, lightheadedness, dizziness, and shaking.    3 Month Follow-Up       HPI:  Alondra Pickard is a 50 y.o. female presents with c/o dizzy spells. She states that last Friday she had a dizzy spell at work. She was sent home and she just stayed in bed but the dizziness did not really completely go away. She describes this dizzy spell as \" I'm spinning around\". Today she state she does feel a little light headed but \" not as bad\". No chest pains. No sob. No fever or other constitutional symptoms  Chronic meds reviewed and reconciled. It is possible that her dizziness may be due to medication s/. She is at risk for polypharmacy being that she is on multiple medications    ROS:   Review of Systems   Constitutional:  Negative for activity change, chills and fever.   HENT: Negative.     Eyes:  Positive for visual disturbance (blurry at times).   Respiratory: Negative.     Cardiovascular: Negative.    Gastrointestinal: Negative.    Genitourinary: Negative.    Musculoskeletal: Negative.    Neurological:  Positive for dizziness and light-headedness. Negative for syncope, weakness and headaches.   Hematological: Negative.        PMHx:  Past Medical History:   Diagnosis Date    Anxiety     Asthma     born with asthmatic bronchitis    CPAP (continuous positive airway pressure) dependence     6cm to 16cm    Degenerative disk disease     Depression     mood disorders    Hemorrhoid 11/2015    Hyperlipidemia     Hypoglycemia     Hypothyroid     Left ureteral stone 05/25/2016    OAB (overactive bladder)     Obstructive sleep apnea     AHI: 37.9 per PSG, 10/2019    Obstructive sleep apnea 04/30/2020    Prolonged emergence from general anesthesia     when

## 2025-03-25 ENCOUNTER — TELEPHONE (OUTPATIENT)
Dept: PRIMARY CARE CLINIC | Age: 50
End: 2025-03-25

## 2025-03-25 NOTE — TELEPHONE ENCOUNTER
Patient called stating that she has fmla ppwk being faxed over from [employer] and asked that we complete it and fax to them once completed. States dr lopez is aware of it

## 2025-03-26 ENCOUNTER — TELEPHONE (OUTPATIENT)
Dept: PSYCHIATRY | Age: 50
End: 2025-03-26

## 2025-03-26 ENCOUNTER — PATIENT MESSAGE (OUTPATIENT)
Dept: PRIMARY CARE CLINIC | Age: 50
End: 2025-03-26

## 2025-03-26 NOTE — TELEPHONE ENCOUNTER
Called patient to remind them of their appointment   -Pt confirmed      Reminded patient of their     copay for their appointment. Reminded patient to complete their visit pre-check/digital registration in [employer].    Electronically signed by Alicia D Giraldo-Reyes on 3/26/2025 at 1:23 PM

## 2025-03-26 NOTE — TELEPHONE ENCOUNTER
Called and informed patient we had not received anything through fax for FMLA, patient stated that they will have them resend it.

## 2025-03-27 ENCOUNTER — OFFICE VISIT (OUTPATIENT)
Dept: PSYCHIATRY | Age: 50
End: 2025-03-27
Payer: MEDICAID

## 2025-03-27 VITALS
BODY MASS INDEX: 31.1 KG/M2 | WEIGHT: 158.4 LBS | DIASTOLIC BLOOD PRESSURE: 74 MMHG | TEMPERATURE: 97.3 F | HEART RATE: 98 BPM | OXYGEN SATURATION: 98 % | SYSTOLIC BLOOD PRESSURE: 118 MMHG | HEIGHT: 60 IN

## 2025-03-27 DIAGNOSIS — F41.1 GENERALIZED ANXIETY DISORDER: ICD-10-CM

## 2025-03-27 DIAGNOSIS — G47.00 INSOMNIA, UNSPECIFIED TYPE: ICD-10-CM

## 2025-03-27 DIAGNOSIS — F39 UNSPECIFIED MOOD (AFFECTIVE) DISORDER: Primary | ICD-10-CM

## 2025-03-27 PROCEDURE — 99215 OFFICE O/P EST HI 40 MIN: CPT | Performed by: PSYCHIATRY & NEUROLOGY

## 2025-03-27 PROCEDURE — G8417 CALC BMI ABV UP PARAM F/U: HCPCS | Performed by: PSYCHIATRY & NEUROLOGY

## 2025-03-27 PROCEDURE — G8428 CUR MEDS NOT DOCUMENT: HCPCS | Performed by: PSYCHIATRY & NEUROLOGY

## 2025-03-27 PROCEDURE — 1036F TOBACCO NON-USER: CPT | Performed by: PSYCHIATRY & NEUROLOGY

## 2025-03-27 NOTE — PROGRESS NOTES
3/27/2025 9:09 AM   Progress Note          Alondra Pickard 1975      Chief Complaint   Patient presents with    Depression    Anxiety    Insomnia         Subjective:    50 y.o. white female with unspecified mood disorder, BENY with panic attacks, chronic PTSD, insomnia, FATIMAH - getting a new machine, comes for follow up.  On lamotrigine, doxepin, prazosin, hydroxyzine, melatonin. Buspar added by Dr. Valentin. No side effects. Stable weight. On Vit D supplement.     Pt is calm and cooperative.  Mood stable. Some anxiety. Planning to switch to night shift at . Sees Lucretia for therapy. Lives with . Sleep poor but will get CPAP.  Poor appetite.       Objective:      /74   Pulse 98   Temp 97.3 °F (36.3 °C)   Ht 1.524 m (5')   Wt 71.8 kg (158 lb 6.4 oz)   SpO2 98%   BMI 30.94 kg/m²       Review of Systems - 14 point review:  Negative except for    Constitutional: (fevers, chills, night sweats, wt loss/gain, change in appetite, fatigue, somnolence)    HEENT: (ear pain or discharge, hearing loss, ear ringing, sinus pressure, nosebleed, nasal discharge, sore throat, oral sores, tooth pain, bleeding gums, hoarse voice, neck pain)      Cardiovascular: (HTN, chest pain, elevated cholesterol/lipids, palpitations, leg swelling, leg pain with walking)    Respiratory: (cough, wheezing, snoring, SOB with activity (dyspnea), SOB while lying flat (orthopnea), awakening with severe SOB (paroxysmal nocturnal dyspnea))    Gastrointestinal: (NVD, constipation, abdominal pain, bright red stools, black tarry stools, stool incontinence)     Genitourinary:  (pelvic pain, burning or frequency of urination, urinary urgency, blood in urine incomplete bladder emptying, urinary incontinence, STD; MEN: testicular pain or swelling, erectile dysfunction; WOMEN: LMP, heavy menstrual bleeding (menorrhagia), irregular periods, postmenopausal bleeding, menstrual pain (dymenorrhea, vaginal discharge)    Musculoskeletal: (bone

## 2025-03-28 PROBLEM — R53.83 OTHER FATIGUE: Status: ACTIVE | Noted: 2025-03-28

## 2025-04-01 ENCOUNTER — TELEPHONE (OUTPATIENT)
Dept: NEUROLOGY | Age: 50
End: 2025-04-01

## 2025-04-01 NOTE — TELEPHONE ENCOUNTER
1st attempt: Received a referral for this patient. Called patient to see about getting patient scheduled an appointment. Unable to get in touch with patient. Due to patient has calling restrictions on her phone.    30-Aug-2020 20:30

## 2025-04-09 ENCOUNTER — PATIENT MESSAGE (OUTPATIENT)
Dept: PRIMARY CARE CLINIC | Age: 50
End: 2025-04-09

## 2025-04-28 ENCOUNTER — TELEPHONE (OUTPATIENT)
Dept: GASTROENTEROLOGY | Age: 50
End: 2025-04-28

## 2025-04-28 NOTE — TELEPHONE ENCOUNTER
04- [facility] sent a request PA for Patient Pantoprazole       Called [facility] Spoke with Gila notified that the PA has been completed ad has been approved from 04- to 05-     She stated that It was approved.

## 2025-04-29 DIAGNOSIS — F41.8 SITUATIONAL ANXIETY: ICD-10-CM

## 2025-04-29 RX ORDER — BUSPIRONE HYDROCHLORIDE 10 MG/1
10 TABLET ORAL 2 TIMES DAILY
Qty: 60 TABLET | Refills: 0 | Status: SHIPPED | OUTPATIENT
Start: 2025-04-29

## 2025-05-04 DIAGNOSIS — E03.9 ACQUIRED HYPOTHYROIDISM: ICD-10-CM

## 2025-05-05 RX ORDER — LEVOTHYROXINE SODIUM 50 UG/1
50 TABLET ORAL DAILY
Qty: 90 TABLET | Refills: 0 | Status: SHIPPED | OUTPATIENT
Start: 2025-05-05

## 2025-05-05 NOTE — TELEPHONE ENCOUNTER
Alondra Pickard called to request a refill on her medication.      Last office visit : 3/24/2025   Next office visit : Visit date not found     Requested Prescriptions     Pending Prescriptions Disp Refills    levothyroxine (SYNTHROID) 50 MCG tablet [Pharmacy Med Name: LEVOTHYROXINE 50 MCG TABLET] 30 tablet 1     Sig: TAKE 1 TABLET BY MOUTH DAILY            Amarilis Callejas MA

## 2025-05-16 NOTE — PROGRESS NOTES
Therapy Progress Note  Lucretia Olivarez, [facility]   10/31/2024    Patient location  :[facility]  [facility] location : [facility]      Total time spent: 50 minutes  This is patient's fifth  Therapy appointment.  Chief Complaint:  anxiety, stress, and agitation  Referring Provider: No referring provider defined for this encounter.      Alondra Pickard ,a 49 y.o. female, for follow up visit.     Pt provided informed consent for the behavioral health program. Discussed with patient model of service to include the limits of confidentiality (i.e. abuse reporting, suicide intervention, etc.) and short-term intervention focused approach.  Discussed no show and late cancellation policy.  Pt indicated understanding.    S:  Provider seeing patient in follow-up for anxiety, panic attacks, irritability.  Patient talked about getting a job working at [employer] and is enjoying this job experience so far.  Patient reported working five days a week from 4 am until 1 pm.  Patient discussed experiencing financial hardships and her 's work has not been consistent enough which is why she sought out employment.  Patient reported feeling bad that she depends on her daughter to drive her to work.  Patient discussed the challenges adjusting to her work hours and working outside the home.  Patient reported working is helping her manage anxiety in public, enhancing confidence, and bringing a sense of accomplishment providing for the household.  Patient reported her depression, anxiety, and panic attacks have decreased since she was here last.  Patient discussed having issues in the mobile home community she is living in, reporting random people are always walking through her property or looking in her windows.  Patient reported feeling unsafe where she is living, but can not afford to move elsewhere at this time.  Patient reported have minimal panic attacks, tends to be quick tempered and becomes  Unremarkable Unremarkable Unremarkable Unremarkable Unremarkable Unremarkable Unremarkable Unremarkable

## 2025-05-18 DIAGNOSIS — G47.33 OBSTRUCTIVE SLEEP APNEA: Primary | ICD-10-CM

## 2025-05-18 PROBLEM — G25.81 RESTLESS LEGS SYNDROME: Status: ACTIVE | Noted: 2025-05-18

## 2025-05-19 ENCOUNTER — FOLLOWUP TELEPHONE ENCOUNTER (OUTPATIENT)
Dept: SLEEP CENTER | Age: 50
End: 2025-05-19

## 2025-05-19 DIAGNOSIS — J30.2 SEASONAL ALLERGIES: ICD-10-CM

## 2025-05-19 RX ORDER — LEVOCETIRIZINE DIHYDROCHLORIDE 5 MG/1
5 TABLET, FILM COATED ORAL NIGHTLY
Qty: 90 TABLET | Refills: 1 | Status: SHIPPED | OUTPATIENT
Start: 2025-05-19

## 2025-05-19 NOTE — TELEPHONE ENCOUNTER
Called and spoke to patient and discussed the results of the home sleep study.  Explained order for auto cpap.  Order for auto cpap was sent to [facility].  Sleep study report was sent to the referring provider.

## 2025-05-21 ENCOUNTER — TELEPHONE (OUTPATIENT)
Dept: GASTROENTEROLOGY | Age: 50
End: 2025-05-21

## 2025-05-21 DIAGNOSIS — F41.8 SITUATIONAL ANXIETY: ICD-10-CM

## 2025-05-21 RX ORDER — BUSPIRONE HYDROCHLORIDE 10 MG/1
10 TABLET ORAL 2 TIMES DAILY
Qty: 60 TABLET | Refills: 0 | Status: SHIPPED | OUTPATIENT
Start: 2025-05-21

## 2025-05-21 NOTE — TELEPHONE ENCOUNTER
05- Called the patient notified that [facility] request a refill.  Notified that she has not been in for a while checking to see if wanted to scheduled an office apt     Apt scheduled for 06-    She stated that she does have enough medication to last her til her visit     Routed to RADHA ANDERSON

## 2025-05-21 NOTE — TELEPHONE ENCOUNTER
05- [facility] pharmacy is requesting a 90 day refill on patients Pantoprazole 40mg bid     Routed to LT APRN (JG APRN patient)

## 2025-05-23 NOTE — TELEPHONE ENCOUNTER
05- [facility] is requested a 90 day rx for Pantoprazole #180 bid       Apt scheduled 06-     Routed to RADHA ANDERSON

## 2025-05-24 DIAGNOSIS — E55.9 VITAMIN D DEFICIENCY: ICD-10-CM

## 2025-05-27 ENCOUNTER — OFFICE VISIT (OUTPATIENT)
Dept: UROLOGY | Age: 50
End: 2025-05-27
Payer: MEDICAID

## 2025-05-27 VITALS — TEMPERATURE: 97.3 F | WEIGHT: 162.6 LBS | HEIGHT: 60 IN | BODY MASS INDEX: 31.92 KG/M2

## 2025-05-27 DIAGNOSIS — R10.9 BILATERAL FLANK PAIN: ICD-10-CM

## 2025-05-27 DIAGNOSIS — N32.81 OVERACTIVE BLADDER: ICD-10-CM

## 2025-05-27 DIAGNOSIS — N39.46 MIXED INCONTINENCE: Primary | ICD-10-CM

## 2025-05-27 DIAGNOSIS — N20.0 LEFT NEPHROLITHIASIS: ICD-10-CM

## 2025-05-27 LAB
APPEARANCE FLUID: CLEAR
BILIRUBIN, POC: NORMAL
BLOOD URINE, POC: NORMAL
CLARITY, POC: CLEAR
COLOR, POC: YELLOW
GLUCOSE URINE, POC: NORMAL MG/DL
KETONES, POC: NORMAL MG/DL
LEUKOCYTE EST, POC: NORMAL
NITRITE, POC: NORMAL
PH, POC: 5.5
PROTEIN, POC: NORMAL MG/DL
SPECIFIC GRAVITY, POC: 1
UROBILINOGEN, POC: 0.2 MG/DL

## 2025-05-27 PROCEDURE — 1036F TOBACCO NON-USER: CPT | Performed by: NURSE PRACTITIONER

## 2025-05-27 PROCEDURE — 99214 OFFICE O/P EST MOD 30 MIN: CPT | Performed by: NURSE PRACTITIONER

## 2025-05-27 PROCEDURE — 81002 URINALYSIS NONAUTO W/O SCOPE: CPT | Performed by: NURSE PRACTITIONER

## 2025-05-27 PROCEDURE — 51798 US URINE CAPACITY MEASURE: CPT | Performed by: NURSE PRACTITIONER

## 2025-05-27 PROCEDURE — G8417 CALC BMI ABV UP PARAM F/U: HCPCS | Performed by: NURSE PRACTITIONER

## 2025-05-27 PROCEDURE — G8427 DOCREV CUR MEDS BY ELIG CLIN: HCPCS | Performed by: NURSE PRACTITIONER

## 2025-05-27 RX ORDER — PANTOPRAZOLE SODIUM 40 MG/1
40 TABLET, DELAYED RELEASE ORAL 2 TIMES DAILY
Qty: 180 TABLET | Refills: 3 | Status: SHIPPED | OUTPATIENT
Start: 2025-05-27

## 2025-05-27 RX ORDER — HYDROXYZINE HYDROCHLORIDE 25 MG/1
25 TABLET, FILM COATED ORAL 3 TIMES DAILY PRN
COMMUNITY
Start: 2025-05-19

## 2025-05-27 RX ORDER — SOLIFENACIN SUCCINATE 10 MG/1
10 TABLET, FILM COATED ORAL DAILY
Qty: 30 TABLET | Refills: 2 | Status: SHIPPED | OUTPATIENT
Start: 2025-05-27

## 2025-05-27 RX ORDER — ERGOCALCIFEROL 1.25 MG/1
50000 CAPSULE, LIQUID FILLED ORAL WEEKLY
Qty: 4 CAPSULE | Refills: 2 | Status: SHIPPED | OUTPATIENT
Start: 2025-05-27 | End: 2025-06-26

## 2025-05-27 NOTE — TELEPHONE ENCOUNTER
Pharmacy sent a request to refill pt's medication       Last office visit : 3/27/2025 S Harvinder DINERO  Next office visit : 8/28/2025 S Harvinder DINERO    Requested Prescriptions     Pending Prescriptions Disp Refills    vitamin D (ERGOCALCIFEROL) 1.25 MG (88335 UT) CAPS capsule [Pharmacy Med Name: VITAMIN D2 1.25MG(50,000 UNIT)] 4 capsule 2     Sig: TAKE 1 CAPSULE BY MOUTH ONCE A WEEK AT 5 PM            Marcela Zay      3/27/2025 9:09 AM                             Progress Note              Alondra Pickard 1975                            Chief Complaint   Patient presents with    Depression    Anxiety    Insomnia            Subjective:    50 y.o. white female with unspecified mood disorder, BENY with panic attacks, chronic PTSD, insomnia, FATIMAH - getting a new machine, comes for follow up.  On lamotrigine, doxepin, prazosin, hydroxyzine, melatonin. Buspar added by Dr. Valentin. No side effects. Stable weight. On Vit D supplement.      Pt is calm and cooperative.  Mood stable. Some anxiety. Planning to switch to night shift at . Sees Lucretia for therapy. Lives with . Sleep poor but will get CPAP.  Poor appetite.         Objective:       /74   Pulse 98   Temp 97.3 °F (36.3 °C)   Ht 1.524 m (5')   Wt 71.8 kg (158 lb 6.4 oz)   SpO2 98%   BMI 30.94 kg/m²         Review of Systems - 14 point review:  Negative except for     Constitutional: (fevers, chills, night sweats, wt loss/gain, change in appetite, fatigue, somnolence)     HEENT: (ear pain or discharge, hearing loss, ear ringing, sinus pressure, nosebleed, nasal discharge, sore throat, oral sores, tooth pain, bleeding gums, hoarse voice, neck pain)      Cardiovascular: (HTN, chest pain, elevated cholesterol/lipids, palpitations, leg swelling, leg pain with walking)     Respiratory: (cough, wheezing, snoring, SOB with activity (dyspnea), SOB while lying flat (orthopnea), awakening with severe SOB (paroxysmal nocturnal dyspnea))     Gastrointestinal: (NVD,

## 2025-05-27 NOTE — PROGRESS NOTES
Alondra Pickard is a 50 y.o. female who presents today   Chief Complaint   Patient presents with    Follow-up     I am here today for worsening OAB/incontinence     Patient is a 50-year-old female presents clinic today with complaints of worsening OAB and incontinence.  Over the last 6 months she has had worsening issues now with incontinence 5-6 times daily now having to change her pad often and her close as well.  Reports nocturia with incontinence.  She has had flank pain bilaterally for 3 weeks.  She does have a history of a 2 mm nonobstructing left renal stone.  She denies any nausea, vomiting, abdominal pain.  Denies any risk for STDs, vaginal discharge, dysuria or enuresis.  Nocturia x 1.  Utilizes about 4 pads per day.  Drinks primarily coffee then juice, sodas and some water.    I have seen her previously and she is currently maintained on oxybutynin 10 mg daily.  At last visit her Diatherix was positive for BV and Ureaplasma after treating her incontinence did improve however did not fully improve per patient.  She also has some stress incontinence as well which has not gotten any worse.  She was previously using around 1 pad a day.  Voids large amounts with each urination        Past Medical History:   Diagnosis Date    Anxiety     Asthma     born with asthmatic bronchitis    CPAP (continuous positive airway pressure) dependence     6cm to 16cm    Degenerative disk disease     Depression     mood disorders    Hemorrhoid 11/2015    Hyperlipidemia     Hypoglycemia     Hypothyroid     Insomnia     Left ureteral stone 05/25/2016    OAB (overactive bladder)     Obstructive sleep apnea     AHI: 37.9 per PS[facility], 10/2019    Obstructive sleep apnea 04/30/2020    Prolonged emergence from general anesthesia     when pt had gall bladder removed    PTSD (post-traumatic stress disorder)     Renal stone 05/25/2016       Past Surgical History:   Procedure Laterality Date    BREAST BIOPSY Left 2018    benign calcification

## 2025-05-28 ASSESSMENT — ENCOUNTER SYMPTOMS
ABDOMINAL PAIN: 0
BACK PAIN: 0
ABDOMINAL DISTENTION: 0
NAUSEA: 0
VOMITING: 0

## 2025-05-29 DIAGNOSIS — A49.3 NONGONOCOCCAL URETHRITIS DUE TO UREAPLASMA UREALYTICUM: ICD-10-CM

## 2025-05-29 DIAGNOSIS — B96.89 BACTERIAL VAGINOSIS: Primary | ICD-10-CM

## 2025-05-29 DIAGNOSIS — N34.1 NONGONOCOCCAL URETHRITIS DUE TO UREAPLASMA UREALYTICUM: ICD-10-CM

## 2025-05-29 DIAGNOSIS — N76.0 BACTERIAL VAGINOSIS: Primary | ICD-10-CM

## 2025-05-29 RX ORDER — DOXYCYCLINE HYCLATE 100 MG
100 TABLET ORAL 2 TIMES DAILY
Qty: 14 TABLET | Refills: 0 | Status: SHIPPED | OUTPATIENT
Start: 2025-05-29 | End: 2025-06-05

## 2025-05-29 RX ORDER — METRONIDAZOLE 500 MG/1
500 TABLET ORAL 2 TIMES DAILY
Qty: 14 TABLET | Refills: 0 | Status: SHIPPED | OUTPATIENT
Start: 2025-05-29 | End: 2025-06-05

## 2025-05-29 NOTE — PROGRESS NOTES
Please let patient know Diatherix positive for bacterial vaginosis and Ureaplasma.  I am going to send her in doxycycline and Flagyl.  Will need to utilize boric acid suppositories for 3 weeks following completion of Flagyl.  Would recommend her following up with her OB for recurrent BV.  Boric acid suppositories can be obtained over-the-counter without a prescription (usually insurance does not cover these).  She will need 1 boric acid suppository nightly x 21 days.  Usually these are dosed at 600 mg.

## 2025-06-05 ENCOUNTER — TELEPHONE (OUTPATIENT)
Dept: GASTROENTEROLOGY | Age: 50
End: 2025-06-05

## 2025-06-05 ENCOUNTER — OFFICE VISIT (OUTPATIENT)
Dept: GASTROENTEROLOGY | Age: 50
End: 2025-06-05
Payer: MEDICAID

## 2025-06-05 VITALS
SYSTOLIC BLOOD PRESSURE: 120 MMHG | HEIGHT: 60 IN | BODY MASS INDEX: 31.41 KG/M2 | OXYGEN SATURATION: 96 % | DIASTOLIC BLOOD PRESSURE: 80 MMHG | HEART RATE: 67 BPM | WEIGHT: 160 LBS

## 2025-06-05 DIAGNOSIS — R13.19 ESOPHAGEAL DYSPHAGIA: ICD-10-CM

## 2025-06-05 DIAGNOSIS — K21.9 CHRONIC GERD: Primary | ICD-10-CM

## 2025-06-05 PROCEDURE — G8417 CALC BMI ABV UP PARAM F/U: HCPCS | Performed by: NURSE PRACTITIONER

## 2025-06-05 PROCEDURE — 99214 OFFICE O/P EST MOD 30 MIN: CPT | Performed by: NURSE PRACTITIONER

## 2025-06-05 PROCEDURE — 1036F TOBACCO NON-USER: CPT | Performed by: NURSE PRACTITIONER

## 2025-06-05 PROCEDURE — G8427 DOCREV CUR MEDS BY ELIG CLIN: HCPCS | Performed by: NURSE PRACTITIONER

## 2025-06-05 ASSESSMENT — ENCOUNTER SYMPTOMS
BLOOD IN STOOL: 0
ABDOMINAL DISTENTION: 0
CONSTIPATION: 0
NAUSEA: 0
VOMITING: 0
TROUBLE SWALLOWING: 1
CHOKING: 0
RECTAL PAIN: 0
ABDOMINAL PAIN: 0
SHORTNESS OF BREATH: 0
ANAL BLEEDING: 0
COUGH: 0
DIARRHEA: 0

## 2025-06-05 NOTE — TELEPHONE ENCOUNTER
I think you already took the medical record release form. I can take it back and do it when I get a chance or if you want to call/fax them, I'm fine with that too.

## 2025-06-05 NOTE — PROGRESS NOTES
(VESICARE) 10 MG tablet Take 1 tablet by mouth daily 30 tablet 2    pantoprazole (PROTONIX) 40 MG tablet Take 1 tablet by mouth in the morning and at bedtime 180 tablet 3    busPIRone (BUSPAR) 10 MG tablet TAKE 1 TABLET BY MOUTH TWICE A DAY 60 tablet 0    levocetirizine (XYZAL) 5 MG tablet TAKE 1 TABLET BY MOUTH EVERY DAY AT NIGHT 90 tablet 1    levothyroxine (SYNTHROID) 50 MCG tablet TAKE 1 TABLET BY MOUTH DAILY 90 tablet 0    ondansetron (ZOFRAN ODT) 4 MG disintegrating tablet Take 1 tablet by mouth every 8 hours as needed for Nausea or Vomiting 15 tablet 0    melatonin ([facility] MELATONIN) 5 MG TABS tablet Take 1 tablet by mouth nightly 30 tablet 2    prazosin (MINIPRESS) 1 MG capsule Take 1 capsule by mouth nightly 90 capsule 1    lamoTRIgine (LAMICTAL) 100 MG tablet Take 1 tablet by mouth daily 90 tablet 1    doxepin (SINEQUAN) 50 MG capsule Take 1 capsule by mouth nightly 90 capsule 1    atorvastatin (LIPITOR) 20 MG tablet TAKE 1 TABLET BY MOUTH EVERY DAY 90 tablet 1    COMBIVENT RESPIMAT  MCG/ACT AERS inhaler INHALE 1 PUFF EVERY 4 HOURS AS NEEDED FOR WHEEZING 1 each 5     No current facility-administered medications for this visit.       Allergies   Allergen Reactions    Codeine Nausea And Vomiting, Swelling and Rash       Review of Systems   Constitutional:  Negative for activity change, appetite change, fatigue, fever and unexpected weight change.   HENT:  Positive for trouble swallowing.    Respiratory:  Negative for cough, choking and shortness of breath.    Cardiovascular:  Negative for chest pain.   Gastrointestinal:  Negative for abdominal distention, abdominal pain, anal bleeding, blood in stool, constipation, diarrhea, nausea, rectal pain and vomiting.   Allergic/Immunologic: Negative for food allergies.   All other systems reviewed and are negative.        Objective:     /80   Pulse 67   Ht 1.524 m (5')   Wt 72.6 kg (160 lb)   SpO2 96%   BMI 31.25 kg/m²     Physical Exam      Physical

## 2025-06-05 NOTE — TELEPHONE ENCOUNTER
6/5/25 Checkout Note: Records from [facility]    Amarilis wants these records faxed to us. Did we refer the pt? I have a signed medical record release form if they need one. I scanned it into Media and put it in the MA box.    Routing to [place] since Nasra is off this week.

## 2025-06-05 NOTE — TELEPHONE ENCOUNTER
Ok, kassidy.    They didn't send us any records for Amarilis to review, so she wanted to see if we can call to request.

## 2025-06-09 NOTE — TELEPHONE ENCOUNTER
6-9-2025  Have fax medical release form to [facility]     Fax scanned into media

## 2025-06-10 DIAGNOSIS — F39 UNSPECIFIED MOOD (AFFECTIVE) DISORDER: ICD-10-CM

## 2025-06-10 RX ORDER — LAMOTRIGINE 100 MG/1
100 TABLET ORAL DAILY
Qty: 90 TABLET | Refills: 1 | Status: SHIPPED | OUTPATIENT
Start: 2025-06-10

## 2025-06-10 NOTE — TELEPHONE ENCOUNTER
Pharmacy sent a request to refill pt's medication       Last office visit : 3/27/2025 S Harvinder DINERO  Next office visit : 8/28/2025 S Harvinder DINERO    Requested Prescriptions     Pending Prescriptions Disp Refills    lamoTRIgine (LAMICTAL) 100 MG tablet 90 tablet 1     Sig: Take 1 tablet by mouth daily            Marcela Clolazo          3/27/2025 9:09 AM                             Progress Note              Alondra Pickard 1975                            Chief Complaint   Patient presents with    Depression    Anxiety    Insomnia            Subjective:    50 y.o. white female with unspecified mood disorder, BENY with panic attacks, chronic PTSD, insomnia, FATIMAH - getting a new machine, comes for follow up.  On lamotrigine, doxepin, prazosin, hydroxyzine, melatonin. Buspar added by Dr. Valentin. No side effects. Stable weight. On Vit D supplement.      Pt is calm and cooperative.  Mood stable. Some anxiety. Planning to switch to night shift at . Sees Lucretia for therapy. Lives with . Sleep poor but will get CPAP.  Poor appetite.         Objective:       /74   Pulse 98   Temp 97.3 °F (36.3 °C)   Ht 1.524 m (5')   Wt 71.8 kg (158 lb 6.4 oz)   SpO2 98%   BMI 30.94 kg/m²         Review of Systems - 14 point review:  Negative except for     Constitutional: (fevers, chills, night sweats, wt loss/gain, change in appetite, fatigue, somnolence)     HEENT: (ear pain or discharge, hearing loss, ear ringing, sinus pressure, nosebleed, nasal discharge, sore throat, oral sores, tooth pain, bleeding gums, hoarse voice, neck pain)      Cardiovascular: (HTN, chest pain, elevated cholesterol/lipids, palpitations, leg swelling, leg pain with walking)     Respiratory: (cough, wheezing, snoring, SOB with activity (dyspnea), SOB while lying flat (orthopnea), awakening with severe SOB (paroxysmal nocturnal dyspnea))     Gastrointestinal: (NVD, constipation, abdominal pain, bright red stools, black tarry stools, stool

## 2025-06-16 DIAGNOSIS — F43.12 CHRONIC POST-TRAUMATIC STRESS DISORDER (PTSD): ICD-10-CM

## 2025-06-16 DIAGNOSIS — F41.8 SITUATIONAL ANXIETY: ICD-10-CM

## 2025-06-16 RX ORDER — PRAZOSIN HYDROCHLORIDE 1 MG/1
1 CAPSULE ORAL NIGHTLY
Qty: 90 CAPSULE | Refills: 3 | Status: SHIPPED | OUTPATIENT
Start: 2025-06-16

## 2025-06-16 NOTE — TELEPHONE ENCOUNTER
Pharmacy sent a request to refill pt's medication       Last office visit : 3/27/2025 S Harvinder DINERO  Next office visit : 8/28/2025 S Harvinder DINERO    Requested Prescriptions     Pending Prescriptions Disp Refills    prazosin (MINIPRESS) 1 MG capsule 90 capsule 1     Sig: Take 1 capsule by mouth nightly            Marcela Collazo        3/27/2025 9:09 AM                             Progress Note              Alondra Pickard 1975                            Chief Complaint   Patient presents with    Depression    Anxiety    Insomnia            Subjective:    50 y.o. white female with unspecified mood disorder, BENY with panic attacks, chronic PTSD, insomnia, FATIMAH - getting a new machine, comes for follow up.  On lamotrigine, doxepin, prazosin, hydroxyzine, melatonin. Buspar added by Dr. Valentin. No side effects. Stable weight. On Vit D supplement.      Pt is calm and cooperative.  Mood stable. Some anxiety. Planning to switch to night shift at . Sees Lucretia for therapy. Lives with . Sleep poor but will get CPAP.  Poor appetite.         Objective:       /74   Pulse 98   Temp 97.3 °F (36.3 °C)   Ht 1.524 m (5')   Wt 71.8 kg (158 lb 6.4 oz)   SpO2 98%   BMI 30.94 kg/m²         Review of Systems - 14 point review:  Negative except for     Constitutional: (fevers, chills, night sweats, wt loss/gain, change in appetite, fatigue, somnolence)     HEENT: (ear pain or discharge, hearing loss, ear ringing, sinus pressure, nosebleed, nasal discharge, sore throat, oral sores, tooth pain, bleeding gums, hoarse voice, neck pain)      Cardiovascular: (HTN, chest pain, elevated cholesterol/lipids, palpitations, leg swelling, leg pain with walking)     Respiratory: (cough, wheezing, snoring, SOB with activity (dyspnea), SOB while lying flat (orthopnea), awakening with severe SOB (paroxysmal nocturnal dyspnea))     Gastrointestinal: (NVD, constipation, abdominal pain, bright red stools, black tarry stools, stool

## 2025-06-18 RX ORDER — BUSPIRONE HYDROCHLORIDE 10 MG/1
10 TABLET ORAL 2 TIMES DAILY
Qty: 60 TABLET | Refills: 0 | Status: SHIPPED | OUTPATIENT
Start: 2025-06-18

## 2025-06-18 NOTE — TELEPHONE ENCOUNTER
Alondra Pickard called to request a refill on her medication.      Last office visit : 3/24/2025   Next office visit : 7/2/2025 with JUSTIN Olvera     Requested Prescriptions     Pending Prescriptions Disp Refills    busPIRone (BUSPAR) 10 MG tablet [Pharmacy Med Name: BUSPIRONE HCL 10 MG TABLET] 180 tablet 1     Sig: TAKE 1 TABLET BY MOUTH TWICE A DAY            Viviane Madrigal MA

## 2025-06-19 DIAGNOSIS — Z76.0 MEDICATION REFILL: ICD-10-CM

## 2025-06-19 DIAGNOSIS — E78.2 MIXED HYPERLIPIDEMIA: ICD-10-CM

## 2025-06-19 DIAGNOSIS — G47.01 INSOMNIA DUE TO MEDICAL CONDITION: ICD-10-CM

## 2025-06-19 RX ORDER — DOXEPIN HYDROCHLORIDE 50 MG/1
50 CAPSULE ORAL NIGHTLY
Qty: 30 CAPSULE | Refills: 3 | Status: SHIPPED | OUTPATIENT
Start: 2025-06-19 | End: 2025-07-19

## 2025-06-19 RX ORDER — ATORVASTATIN CALCIUM 20 MG/1
20 TABLET, FILM COATED ORAL DAILY
Qty: 30 TABLET | Refills: 0 | Status: SHIPPED | OUTPATIENT
Start: 2025-06-19

## 2025-06-19 NOTE — TELEPHONE ENCOUNTER
Pharmacy sent a request to refill pt's medication       Last office visit : 3/27/2025 S Harvinder DINERO  Next office visit : 8/28/2025 S Harvinder DINERO    Requested Prescriptions     Pending Prescriptions Disp Refills    doxepin (SINEQUAN) 50 MG capsule 90 capsule 1     Sig: Take 1 capsule by mouth nightly            Marcela Collazo        3/27/2025 9:09 AM                             Progress Note              Alondra Pickard 1975                            Chief Complaint   Patient presents with    Depression    Anxiety    Insomnia            Subjective:    50 y.o. white female with unspecified mood disorder, BENY with panic attacks, chronic PTSD, insomnia, FATIMAH - getting a new machine, comes for follow up.  On lamotrigine, doxepin, prazosin, hydroxyzine, melatonin. Buspar added by Dr. Valentin. No side effects. Stable weight. On Vit D supplement.      Pt is calm and cooperative.  Mood stable. Some anxiety. Planning to switch to night shift at . Sees Lucretia for therapy. Lives with . Sleep poor but will get CPAP.  Poor appetite.         Objective:       /74   Pulse 98   Temp 97.3 °F (36.3 °C)   Ht 1.524 m (5')   Wt 71.8 kg (158 lb 6.4 oz)   SpO2 98%   BMI 30.94 kg/m²         Review of Systems - 14 point review:  Negative except for     Constitutional: (fevers, chills, night sweats, wt loss/gain, change in appetite, fatigue, somnolence)     HEENT: (ear pain or discharge, hearing loss, ear ringing, sinus pressure, nosebleed, nasal discharge, sore throat, oral sores, tooth pain, bleeding gums, hoarse voice, neck pain)      Cardiovascular: (HTN, chest pain, elevated cholesterol/lipids, palpitations, leg swelling, leg pain with walking)     Respiratory: (cough, wheezing, snoring, SOB with activity (dyspnea), SOB while lying flat (orthopnea), awakening with severe SOB (paroxysmal nocturnal dyspnea))     Gastrointestinal: (NVD, constipation, abdominal pain, bright red stools, black tarry stools, stool

## 2025-06-19 NOTE — TELEPHONE ENCOUNTER
Alondra Pickard called to request a refill on her medication.      Last office visit : 3/24/2025   Next office visit : 7/2/2025     Requested Prescriptions     Pending Prescriptions Disp Refills    atorvastatin (LIPITOR) 20 MG tablet 30 tablet 0     Sig: Take 1 tablet by mouth daily            Natalie Maciel LPN

## 2025-07-01 SDOH — HEALTH STABILITY: PHYSICAL HEALTH: ON AVERAGE, HOW MANY DAYS PER WEEK DO YOU ENGAGE IN MODERATE TO STRENUOUS EXERCISE (LIKE A BRISK WALK)?: 5 DAYS

## 2025-07-02 ENCOUNTER — OFFICE VISIT (OUTPATIENT)
Dept: PRIMARY CARE CLINIC | Age: 50
End: 2025-07-02
Payer: COMMERCIAL

## 2025-07-02 VITALS
OXYGEN SATURATION: 98 % | DIASTOLIC BLOOD PRESSURE: 88 MMHG | HEIGHT: 60 IN | BODY MASS INDEX: 31.04 KG/M2 | TEMPERATURE: 98.5 F | WEIGHT: 158.1 LBS | SYSTOLIC BLOOD PRESSURE: 130 MMHG | HEART RATE: 81 BPM

## 2025-07-02 DIAGNOSIS — K21.9 GASTROESOPHAGEAL REFLUX DISEASE WITHOUT ESOPHAGITIS: ICD-10-CM

## 2025-07-02 DIAGNOSIS — F41.9 ANXIETY: ICD-10-CM

## 2025-07-02 DIAGNOSIS — M72.2 PLANTAR FASCIITIS: ICD-10-CM

## 2025-07-02 DIAGNOSIS — E03.9 ACQUIRED HYPOTHYROIDISM: ICD-10-CM

## 2025-07-02 DIAGNOSIS — Z76.89 ENCOUNTER TO ESTABLISH CARE: Primary | ICD-10-CM

## 2025-07-02 DIAGNOSIS — E78.1 HYPERTRIGLYCERIDEMIA: ICD-10-CM

## 2025-07-02 DIAGNOSIS — E78.2 MIXED HYPERLIPIDEMIA: ICD-10-CM

## 2025-07-02 DIAGNOSIS — Z99.89 CPAP (CONTINUOUS POSITIVE AIRWAY PRESSURE) DEPENDENCE: ICD-10-CM

## 2025-07-02 DIAGNOSIS — F32.1 CURRENT MODERATE EPISODE OF MAJOR DEPRESSIVE DISORDER, UNSPECIFIED WHETHER RECURRENT (HCC): ICD-10-CM

## 2025-07-02 DIAGNOSIS — E55.9 VITAMIN D DEFICIENCY: ICD-10-CM

## 2025-07-02 DIAGNOSIS — J30.2 SEASONAL ALLERGIES: ICD-10-CM

## 2025-07-02 DIAGNOSIS — G47.33 OBSTRUCTIVE SLEEP APNEA: ICD-10-CM

## 2025-07-02 PROCEDURE — 99215 OFFICE O/P EST HI 40 MIN: CPT | Performed by: NURSE PRACTITIONER

## 2025-07-02 PROCEDURE — G8417 CALC BMI ABV UP PARAM F/U: HCPCS | Performed by: NURSE PRACTITIONER

## 2025-07-02 PROCEDURE — 1036F TOBACCO NON-USER: CPT | Performed by: NURSE PRACTITIONER

## 2025-07-02 PROCEDURE — G8427 DOCREV CUR MEDS BY ELIG CLIN: HCPCS | Performed by: NURSE PRACTITIONER

## 2025-07-02 NOTE — PROGRESS NOTES
BY MOUTH DAILY 90 tablet 0    ondansetron (ZOFRAN ODT) 4 MG disintegrating tablet Take 1 tablet by mouth every 8 hours as needed for Nausea or Vomiting 15 tablet 0    melatonin ([facility] MELATONIN) 5 MG TABS tablet Take 1 tablet by mouth nightly 30 tablet 2    COMBIVENT RESPIMAT  MCG/ACT AERS inhaler INHALE 1 PUFF EVERY 4 HOURS AS NEEDED FOR WHEEZING 1 each 5     No current facility-administered medications on file prior to visit.     Allergies   Allergen Reactions    Codeine Nausea And Vomiting, Swelling and Rash       Health Maintenance   Topic Date Due    Hepatitis B vaccine (1 of 3 - 19+ 3-dose series) Never done    Diabetes screen  09/22/2018    COVID-19 Vaccine (1 - 2024-25 season) Never done    Flu vaccine (1) 08/01/2025    Depression Monitoring  03/06/2026    Lipids  04/18/2026    DTaP/Tdap/Td vaccine (2 - Td or Tdap) 11/03/2026    Breast cancer screen  03/11/2027    Colorectal Cancer Screen  03/23/2027    Cervical cancer screen  04/15/2027    HIV screen  Addressed    Hepatitis A vaccine  Aged Out    Hib vaccine  Aged Out    Polio vaccine  Aged Out    Meningococcal (ACWY) vaccine  Aged Out    Meningococcal B vaccine  Aged Out    Pneumococcal 0-49 years Vaccine  Aged Out    Depression Screen  Discontinued    Hepatitis C screen  Discontinued       Subjective:      Review of Systems   Constitutional:  Negative for chills and fever.   HENT:  Negative for ear pain, hearing loss, rhinorrhea and voice change.    Eyes:  Negative for photophobia and visual disturbance.   Respiratory:  Negative for cough and shortness of breath.    Cardiovascular:  Negative for chest pain and palpitations.   Gastrointestinal:  Negative for abdominal pain, diarrhea, nausea and vomiting.   Endocrine: Negative.  Negative for cold intolerance and heat intolerance.   Genitourinary:  Negative for difficulty urinating and flank pain.   Musculoskeletal:  Negative for back pain and neck pain.   Skin:  Negative for color change and rash.

## 2025-07-03 ENCOUNTER — TELEPHONE (OUTPATIENT)
Dept: GASTROENTEROLOGY | Age: 50
End: 2025-07-03

## 2025-07-03 NOTE — TELEPHONE ENCOUNTER
Called patient to confirm procedure scheduled for   7.9.25 @7 with  at [facility]        Patient confirmed

## 2025-07-08 ENCOUNTER — ANESTHESIA EVENT (OUTPATIENT)
Dept: OPERATING ROOM | Age: 50
End: 2025-07-08

## 2025-07-09 ENCOUNTER — APPOINTMENT (OUTPATIENT)
Dept: OPERATING ROOM | Age: 50
End: 2025-07-09
Attending: INTERNAL MEDICINE

## 2025-07-09 ENCOUNTER — ANESTHESIA (OUTPATIENT)
Dept: OPERATING ROOM | Age: 50
End: 2025-07-09

## 2025-07-09 ENCOUNTER — HOSPITAL ENCOUNTER (OUTPATIENT)
Age: 50
Setting detail: SPECIMEN
Discharge: HOME | End: 2025-07-09
Payer: COMMERCIAL

## 2025-07-09 ENCOUNTER — HOSPITAL ENCOUNTER (OUTPATIENT)
Age: 50
Setting detail: OUTPATIENT SURGERY
Discharge: HOME | End: 2025-07-09
Attending: INTERNAL MEDICINE | Admitting: INTERNAL MEDICINE
Payer: COMMERCIAL

## 2025-07-09 ENCOUNTER — TELEPHONE (OUTPATIENT)
Dept: GASTROENTEROLOGY | Age: 50
End: 2025-07-09

## 2025-07-09 VITALS
HEART RATE: 63 BPM | RESPIRATION RATE: 14 BRPM | BODY MASS INDEX: 30.63 KG/M2 | SYSTOLIC BLOOD PRESSURE: 110 MMHG | TEMPERATURE: 97.9 F | DIASTOLIC BLOOD PRESSURE: 74 MMHG | OXYGEN SATURATION: 98 % | WEIGHT: 156 LBS | HEIGHT: 60 IN

## 2025-07-09 DIAGNOSIS — R13.10 DYSPHAGIA, UNSPECIFIED TYPE: ICD-10-CM

## 2025-07-09 DIAGNOSIS — R93.3 ABNORMAL ENDOSCOPY OF UPPER GASTROINTESTINAL TRACT: Primary | ICD-10-CM

## 2025-07-09 DIAGNOSIS — K21.9 CHRONIC GERD: ICD-10-CM

## 2025-07-09 PROBLEM — R13.19 ESOPHAGEAL DYSPHAGIA: Status: ACTIVE | Noted: 2025-07-09

## 2025-07-09 LAB
HCG URINE CONTROL, POC: NORMAL
LOT NUMBER POC: NORMAL
PREGNANCY TEST URINE, POC: NORMAL

## 2025-07-09 PROCEDURE — 43239 EGD BIOPSY SINGLE/MULTIPLE: CPT

## 2025-07-09 PROCEDURE — 99999 PR OFFICE/OUTPT VISIT,PROCEDURE ONLY: CPT | Performed by: INTERNAL MEDICINE

## 2025-07-09 PROCEDURE — 43450 DILATE ESOPHAGUS 1/MULT PASS: CPT | Performed by: INTERNAL MEDICINE

## 2025-07-09 PROCEDURE — 88305 TISSUE EXAM BY PATHOLOGIST: CPT

## 2025-07-09 PROCEDURE — 43239 EGD BIOPSY SINGLE/MULTIPLE: CPT | Performed by: INTERNAL MEDICINE

## 2025-07-09 PROCEDURE — 43248 EGD GUIDE WIRE INSERTION: CPT

## 2025-07-09 RX ORDER — PROPOFOL 10 MG/ML
INJECTION, EMULSION INTRAVENOUS
Status: DISCONTINUED | OUTPATIENT
Start: 2025-07-09 | End: 2025-07-09 | Stop reason: SDUPTHER

## 2025-07-09 RX ORDER — LIDOCAINE HYDROCHLORIDE 10 MG/ML
INJECTION, SOLUTION EPIDURAL; INFILTRATION; INTRACAUDAL; PERINEURAL
Status: DISCONTINUED | OUTPATIENT
Start: 2025-07-09 | End: 2025-07-09 | Stop reason: SDUPTHER

## 2025-07-09 RX ORDER — FENTANYL CITRATE 50 UG/ML
INJECTION, SOLUTION INTRAMUSCULAR; INTRAVENOUS
Status: DISCONTINUED | OUTPATIENT
Start: 2025-07-09 | End: 2025-07-09 | Stop reason: SDUPTHER

## 2025-07-09 RX ORDER — SODIUM CHLORIDE 9 MG/ML
INJECTION, SOLUTION INTRAVENOUS CONTINUOUS
Status: DISCONTINUED | OUTPATIENT
Start: 2025-07-09 | End: 2025-07-09 | Stop reason: HOSPADM

## 2025-07-09 RX ADMIN — FENTANYL CITRATE 50 MCG: 50 INJECTION, SOLUTION INTRAMUSCULAR; INTRAVENOUS at 08:06

## 2025-07-09 RX ADMIN — LIDOCAINE HYDROCHLORIDE 50 MG: 10 INJECTION, SOLUTION EPIDURAL; INFILTRATION; INTRACAUDAL; PERINEURAL at 08:06

## 2025-07-09 RX ADMIN — SODIUM CHLORIDE: 9 INJECTION, SOLUTION INTRAVENOUS at 07:09

## 2025-07-09 RX ADMIN — PROPOFOL 200 MG: 10 INJECTION, EMULSION INTRAVENOUS at 08:06

## 2025-07-09 ASSESSMENT — PAIN - FUNCTIONAL ASSESSMENT
PAIN_FUNCTIONAL_ASSESSMENT: NONE - DENIES PAIN

## 2025-07-09 NOTE — DISCHARGE INSTRUCTIONS
1.  Await path results, the patient will be contacted in 7-10 days with biopsy results.   2.  Magic mouthwash 5 ml PO Swish and swallow q3h PRN ONLY IF patient has post-procedural sorethroat or chest pain.  3. Full liquids to soft diet today suzanna discharge [facility] the [facility]; may advance diet starting in AM tomorrow.  4. May resume other meds except any ASA/NSAIDs; may use cough drops or lozenges PRN; also continue meds for GERD with anti-GERD measures.  5. NO ASA/NSAIDs x 2 weeks  6. OP f/u in 6-8 weeks with Ms. Ragland; will consider an Esophageal manometry later if the patient's dysphagia persists.   7.  Schedule outpatient nuclear med gastric emptying scan study to check for gastroparesis  8. Repeat EGD in the next week or 2 after patient has stayed on a strict clear liquid diet on the day prior to the procedure to more adequately visualize her stomach and duodenum which could not be done today due to presence of large amounts of food.    POST-OP ORDERS: ENDOSCOPY & COLONOSCOPY:  1. Rest today.  2. DO NOT eat or drink until wide awake; eat your usual diet today in moderate amount only.  3. DO NOT drive today.  4. Call physician if you have severe pain, vomiting, fever, rectal bleeding or black bowel movements.  5.  If a biopsy was taken or a polyp removed, you should expect to hear results in about 7-10 days.  If you have heard nothing from your physician by then, call the office for results.  6.  Discharge home when patient awake, vitals signs stable and tolerating liquids.  7. Call with questions or concerns [phone].    NSAIDS (Non-steroidal Anti-Inflammatory)  You have been directed by your physician to avoid any NSAID's; the following medications are a list of those to avoid. If you think that you are taking any NSAID's notify your physician.     Over The Counter    Advil                      Motrin  Nuprin                   Ibuprofen  Midol                     Aleve  Naproxen

## 2025-07-09 NOTE — ANESTHESIA POSTPROCEDURE EVALUATION
Department of Anesthesiology  Postprocedure Note    Patient: Alondra Pickard  MRN: [id number]  YOB: 1975  Date of evaluation: 7/9/2025    Procedure Summary       Date: 07/09/25 Room / Location: [place] [address] / [facility]    Anesthesia Start: 0800 Anesthesia Stop: 0812    Procedures:       ESOPHAGOGASTRODUODENOSCOPY BIOPSY (Esophagus)      ESOPHAGEAL DILATION ALAS (Esophagus) Diagnosis:       Chronic GERD      Dysphagia, unspecified type      (Chronic GERD [K21.9])      (Dysphagia, unspecified type [R13.10])    Surgeons: Alma Granados MD Responsible Provider: Earnestine March APRN - CRNA    Anesthesia Type: General, TIVA ASA Status: 3            Anesthesia Type: General, TIVA    Radha Phase I: Radha Score: 10    Radha Phase II:      Anesthesia Post Evaluation    Patient location during evaluation: bedside  Patient participation: complete - patient participated  Level of consciousness: awake and alert  Pain score: 0  Airway patency: patent  Nausea & Vomiting: no nausea and no vomiting  Cardiovascular status: blood pressure returned to baseline  Respiratory status: acceptable, spontaneous ventilation, room air and nonlabored ventilation  Hydration status: euvolemic  Comments: /61   Pulse 78   Temp 98.3 °F (36.8 °C) (Temporal)   Resp 18   Ht 1.524 m (5')   Wt 70.8 kg (156 lb)   SpO2 97%   BMI 30.47 kg/m²     Pain management: adequate    No notable events documented.

## 2025-07-09 NOTE — OP NOTE
Endoscopic Procedure Note    Patient: Alondra Pickard : 1975  [facility] Rec#: [id number] Acc#: [id number]     Primary Care Provider Bushra Parks, JUSTIN    Endoscopist: Alma Granados MD, MD    Date of Procedure:  2025    Procedure:   EGD with    A Tam bougie dilation of esophagus and  Cold biopsies    Indications:     1. Chronic GERD    2. Esophageal dysphagia      Last EGD 2024 - W 54 fr dil (+)GERD   Last Colonoscopy 3/23/2022 - [facility], Int hemorrhoids Gr 1, 5 year recall   Denies any family hx colon cancer or colon polyps    Anesthesia:  Sedation was administered by anesthesia who monitored the patient during the procedure.    Estimated Blood Loss: minimal    Procedure:   After reviewing the patient's chart and obtaining informed consent, the patient was placed in the left lateral decubitus position.  A forward-viewing Olympus endoscope was lubricated and inserted through the mouth into the oropharynx. Under direct visualization, the upper esophagus was intubated. The scope was advanced to the level of the third portion of duodenum. Scope was slowly withdrawn with careful inspection of the mucosal surfaces. The scope was retroflexed for inspection of the gastric fundus and incisura. Findings and maneuvers are listed in impression below.     Next, a lubricated Tam weighted Bougie dilator-54 Fr was gently introduced into the patient's mouth and passed into the Esophagus and into the proximal stomach without much resistance and then withdrawn. Repeat EGD was performed to verify dilation and scope tip was passed into the stomach. NO evidence of perforation or excessive bleeding was noted subsequent to the dilation.        The patient tolerated the procedure well. The scope was removed. There were no immediate complications.    Findings/IMPRESSION:  Esophagus: normal and a normal EG junction at 35 cm.    NO erosions or ulcers or nodules or strictures or webs or rings or mass lesions or

## 2025-07-09 NOTE — H&P
Patient Name: Alondra Pickard  : 1975  MRN: [id number]  DATE: 25    Allergies:   Allergies   Allergen Reactions    Codeine Nausea And Vomiting, Swelling and Rash        ENDOSCOPY  History and Physical    Procedure:    [] Diagnostic Colonoscopy       [] Screening Colonoscopy  [x] EGD      [] ERCP      [] EUS       [] Other    [x] Previous office notes/History and Physical reviewed from the patients chart. Please see EMR for further details of HPI. I have examined the patient's status immediately prior to the procedure and:      Indications/HPI:     1. Chronic GERD    2. Esophageal dysphagia      Last EGD 2024 - W 54 fr dil (+)GERD   Last Colonoscopy 3/23/2022 - [facility], Int hemorrhoids Gr 1, 5 year recall   Denies any family hx colon cancer or colon polyps     []Abdominal Pain   []Cancer- GI/Lung     []Fhx of colon CA/polyps  []History of Polyps  []Barretts            []Melena  []Abnormal Imaging              []Dysphagia              []Persistent Pneumonia   []Anemia                            []Food Impaction        []History of Polyps  [] GI Bleed             []Pulmonary nodule/Mass   []Change in bowel habits []Heartburn/Reflux  []Rectal Bleed (BRBPR)  []Chest Pain - Non Cardiac []Heme (+) Stool []Ulcers  []Constipation  []Hemoptysis  []Varices  []Diarrhea  []Hypoxemia    []Nausea/Vomiting   []Screening   []Crohns/Colitis  []Other:     Anesthesia:   [x] MAC [] Moderate Sedation   [] General   [] None     ROS: 12 pt Review of Symptoms was negative unless mentioned above    Medications:   Prior to Admission medications    Medication Sig Start Date End Date Taking? Authorizing Provider   atorvastatin (LIPITOR) 20 MG tablet Take 1 tablet by mouth daily 25  Yes Flavia Palacio, APRN - CNP   doxepin (SINEQUAN) 50 MG capsule Take 1 capsule by mouth nightly 25 Yes Samina Blanton MD   busPIRone (BUSPAR) 10 MG tablet TAKE 1 TABLET BY MOUTH TWICE A DAY 25  Yes Bushra Parks,

## 2025-07-09 NOTE — TELEPHONE ENCOUNTER
7-9-2025  Per Dr Granados op note recommendations             Repeat EGD in 1-2 weeks      Routed to Allan White

## 2025-07-09 NOTE — ANESTHESIA PRE PROCEDURE
Department of Anesthesiology  Preprocedure Note       Name:  Alondra Pickard   Age:  50 y.o.  :  1975                                          MRN:  [id number]         Date:  2025      Surgeon: Surgeon(s):  Alma Granados MD    Procedure: Procedure(s):  ESOPHAGOGASTRODUODENOSCOPY BIOPSY  ESOPHAGEAL DILATION ALAS    Medications prior to admission:   Prior to Admission medications    Medication Sig Start Date End Date Taking? Authorizing Provider   atorvastatin (LIPITOR) 20 MG tablet Take 1 tablet by mouth daily 25  Yes Flavia Palacio APRN - CNP   doxepin (SINEQUAN) 50 MG capsule Take 1 capsule by mouth nightly 25 Yes Samina Blanton MD   busPIRone (BUSPAR) 10 MG tablet TAKE 1 TABLET BY MOUTH TWICE A DAY 25  Yes Bushra Parks APRN   prazosin (MINIPRESS) 1 MG capsule Take 1 capsule by mouth nightly 25  Yes Samina Blanton MD   lamoTRIgine (LAMICTAL) 100 MG tablet Take 1 tablet by mouth daily 6/10/25  Yes Trevor Pate APRN - CNP   hydrOXYzine HCl (ATARAX) 25 MG tablet Take 1 tablet by mouth 3 times daily as needed for Anxiety 25  Yes ProviderMaximilian MD   solifenacin (VESICARE) 10 MG tablet Take 1 tablet by mouth daily 25  Yes Melissa Manrique APRN - CNP   pantoprazole (PROTONIX) 40 MG tablet Take 1 tablet by mouth in the morning and at bedtime 25  Yes Amarilis Ragland APRN - NP   levocetirizine (XYZAL) 5 MG tablet TAKE 1 TABLET BY MOUTH EVERY DAY AT NIGHT 25  Yes Falguni Baer MD   levothyroxine (SYNTHROID) 50 MCG tablet TAKE 1 TABLET BY MOUTH DAILY 25  Yes Roopa Valentin MD   ondansetron (ZOFRAN ODT) 4 MG disintegrating tablet Take 1 tablet by mouth every 8 hours as needed for Nausea or Vomiting 3/24/25  Yes Roopa Valentin MD   melatonin ([employer] MELATONIN) 5 MG TABS tablet Take 1 tablet by mouth nightly 12/16/24  Yes Samina Blanton MD   COMBIVENT RESPIMAT  MCG/ACT AERS inhaler INHALE 1 PUFF EVERY

## 2025-07-10 ENCOUNTER — OFFICE VISIT (OUTPATIENT)
Dept: UROLOGY | Age: 50
End: 2025-07-10
Payer: COMMERCIAL

## 2025-07-10 VITALS — HEIGHT: 60 IN | BODY MASS INDEX: 31.8 KG/M2 | TEMPERATURE: 98.1 F | WEIGHT: 162 LBS

## 2025-07-10 DIAGNOSIS — N20.0 LEFT NEPHROLITHIASIS: ICD-10-CM

## 2025-07-10 DIAGNOSIS — N76.0 BACTERIAL VAGINOSIS: ICD-10-CM

## 2025-07-10 DIAGNOSIS — B96.89 BACTERIAL VAGINOSIS: ICD-10-CM

## 2025-07-10 DIAGNOSIS — F41.8 SITUATIONAL ANXIETY: ICD-10-CM

## 2025-07-10 DIAGNOSIS — N32.81 OVERACTIVE BLADDER: Primary | ICD-10-CM

## 2025-07-10 DIAGNOSIS — N39.41 URGE INCONTINENCE: ICD-10-CM

## 2025-07-10 LAB
BACTERIA URINE, POC: ABNORMAL
BILIRUBIN URINE: 0 MG/DL
BLOOD, URINE: NEGATIVE
CASTS URINE, POC: ABNORMAL
CLARITY, UA: CLEAR
COLOR, UA: YELLOW
CRYSTALS URINE, POC: ABNORMAL
EPI CELLS URINE, POC: ABNORMAL
GLUCOSE URINE: NEGATIVE
KETONES, URINE: NEGATIVE
LEUKOCYTE EST, POC: ABNORMAL
NITRITE, URINE: NEGATIVE
PH UA: 8.5 (ref 4.5–8)
PROTEIN UA: NEGATIVE
RBC URINE, POC: ABNORMAL
SPECIFIC GRAVITY UA: 1.02 (ref 1–1.03)
UROBILINOGEN, URINE: ABNORMAL
WBC URINE, POC: 2
YEAST URINE, POC: ABNORMAL

## 2025-07-10 PROCEDURE — 81001 URINALYSIS AUTO W/SCOPE: CPT | Performed by: NURSE PRACTITIONER

## 2025-07-10 PROCEDURE — G8417 CALC BMI ABV UP PARAM F/U: HCPCS | Performed by: NURSE PRACTITIONER

## 2025-07-10 PROCEDURE — 1036F TOBACCO NON-USER: CPT | Performed by: NURSE PRACTITIONER

## 2025-07-10 PROCEDURE — 99214 OFFICE O/P EST MOD 30 MIN: CPT | Performed by: NURSE PRACTITIONER

## 2025-07-10 PROCEDURE — 51798 US URINE CAPACITY MEASURE: CPT | Performed by: NURSE PRACTITIONER

## 2025-07-10 PROCEDURE — G8427 DOCREV CUR MEDS BY ELIG CLIN: HCPCS | Performed by: NURSE PRACTITIONER

## 2025-07-10 RX ORDER — BUSPIRONE HYDROCHLORIDE 10 MG/1
10 TABLET ORAL 2 TIMES DAILY
Qty: 180 TABLET | Refills: 1 | Status: SHIPPED | OUTPATIENT
Start: 2025-07-10

## 2025-07-10 RX ORDER — MIRABEGRON 50 MG/1
50 TABLET, FILM COATED, EXTENDED RELEASE ORAL DAILY
Qty: 30 TABLET | Refills: 11 | Status: SHIPPED | OUTPATIENT
Start: 2025-07-10

## 2025-07-10 ASSESSMENT — ENCOUNTER SYMPTOMS
NAUSEA: 0
BACK PAIN: 0
ABDOMINAL DISTENTION: 0
VOMITING: 0
ABDOMINAL PAIN: 0

## 2025-07-10 NOTE — PROGRESS NOTES
Alondra Pickard is a 50 y.o. female who presents today   Chief Complaint   Patient presents with    Follow-up     Pt is here for 6wk follow up on OAB and incontinence      Patient is a 50-year-old female presents clinic today for follow-up.  History of recurrent BV and Ureaplasma.  At last visit she had worsening lower urinary tract symptoms that she was positive for BV and Ureaplasma was treated appropriately.  She does state that her symptoms slightly improved after treatment however now has recurred.  She has tried and failed oxybutynin and most recently Vesicare.  She continues to have frequency, urgency, nocturia x 4.  Leaking with urgency utilizing 5-6 pads per day.  Will avoid larger amounts during the day and small amounts at night.  She does have a history of insomnia recently started using a CPAP.  Denies any stress incontinence.  Will still have intermittent dysuria denies any vaginal discharge.      Past Medical History:   Diagnosis Date    Anxiety     Asthma     born with asthmatic bronchitis    CPAP (continuous positive airway pressure) dependence     6cm to 16cm    Degenerative disk disease     Depression     mood disorders    Hemorrhoid 11/2015    Hyperlipidemia     Hypoglycemia     Hypothyroid     Insomnia     Left ureteral stone 05/25/2016    OAB (overactive bladder)     Obstructive sleep apnea     AHI: 37.9 per PSG, 10/2019    Obstructive sleep apnea 04/30/2020    Prolonged emergence from general anesthesia     when pt had gall bladder removed    PTSD (post-traumatic stress disorder)     Renal stone 05/25/2016       Past Surgical History:   Procedure Laterality Date    BREAST BIOPSY Left 2018    benign calcification    CARPAL TUNNEL RELEASE Right 07/08/2022    RIGHT CARPAL TUNNEL RELEASE performed by Daniel Schaeffer MD at [facility] OR    CHOLECYSTECTOMY  1995    COLONOSCOPY  04/29/2015    Dr Avilez-Internal hemorrhoids, repeat at age 51    COLONOSCOPY N/A 03/23/2022    Dr Granados, [place],  Int

## 2025-07-11 ENCOUNTER — TELEPHONE (OUTPATIENT)
Dept: GASTROENTEROLOGY | Age: 50
End: 2025-07-11

## 2025-07-11 NOTE — TELEPHONE ENCOUNTER
Called patient to confirm procedure scheduled for  7.16.25 @103 with  at [facility]        Patient confirmed

## 2025-07-16 ENCOUNTER — TELEPHONE (OUTPATIENT)
Dept: GASTROENTEROLOGY | Age: 50
End: 2025-07-16

## 2025-07-16 NOTE — TELEPHONE ENCOUNTER
Called patient to confirm procedure scheduled for 7.21.25 @474 With  at [facility]      Patient confirmed

## 2025-07-19 DIAGNOSIS — E78.2 MIXED HYPERLIPIDEMIA: ICD-10-CM

## 2025-07-19 DIAGNOSIS — Z76.0 MEDICATION REFILL: ICD-10-CM

## 2025-07-21 ENCOUNTER — ANESTHESIA EVENT (OUTPATIENT)
Dept: ENDOSCOPY | Age: 50
End: 2025-07-21
Payer: COMMERCIAL

## 2025-07-21 ENCOUNTER — ANCILLARY PROCEDURE (OUTPATIENT)
Dept: ENDOSCOPY | Age: 50
End: 2025-07-21
Attending: INTERNAL MEDICINE
Payer: COMMERCIAL

## 2025-07-21 ENCOUNTER — HOSPITAL ENCOUNTER (OUTPATIENT)
Age: 50
Setting detail: OUTPATIENT SURGERY
Discharge: HOME | End: 2025-07-21
Attending: INTERNAL MEDICINE | Admitting: INTERNAL MEDICINE
Payer: COMMERCIAL

## 2025-07-21 ENCOUNTER — ANESTHESIA (OUTPATIENT)
Dept: ENDOSCOPY | Age: 50
End: 2025-07-21
Payer: COMMERCIAL

## 2025-07-21 VITALS
WEIGHT: 158 LBS | HEART RATE: 57 BPM | HEIGHT: 60 IN | SYSTOLIC BLOOD PRESSURE: 121 MMHG | BODY MASS INDEX: 31.02 KG/M2 | TEMPERATURE: 97.4 F | DIASTOLIC BLOOD PRESSURE: 80 MMHG | RESPIRATION RATE: 18 BRPM | OXYGEN SATURATION: 100 %

## 2025-07-21 PROBLEM — K31.84 GASTROPARESIS: Status: ACTIVE | Noted: 2025-07-21

## 2025-07-21 PROBLEM — Z87.898 HISTORY OF DYSPHAGIA: Status: ACTIVE | Noted: 2025-07-21

## 2025-07-21 PROCEDURE — 6360000002 HC RX W HCPCS: Performed by: NURSE ANESTHETIST, CERTIFIED REGISTERED

## 2025-07-21 PROCEDURE — 2580000003 HC RX 258: Performed by: ANESTHESIOLOGY

## 2025-07-21 PROCEDURE — 3700000000 HC ANESTHESIA ATTENDED CARE: Performed by: INTERNAL MEDICINE

## 2025-07-21 PROCEDURE — 43235 EGD DIAGNOSTIC BRUSH WASH: CPT | Performed by: INTERNAL MEDICINE

## 2025-07-21 PROCEDURE — 3609012400 HC EGD TRANSORAL BIOPSY SINGLE/MULTIPLE: Performed by: INTERNAL MEDICINE

## 2025-07-21 PROCEDURE — 2709999900 HC NON-CHARGEABLE SUPPLY: Performed by: INTERNAL MEDICINE

## 2025-07-21 PROCEDURE — 7100000010 HC PHASE II RECOVERY - FIRST 15 MIN: Performed by: INTERNAL MEDICINE

## 2025-07-21 PROCEDURE — 7100000011 HC PHASE II RECOVERY - ADDTL 15 MIN: Performed by: INTERNAL MEDICINE

## 2025-07-21 RX ORDER — ATORVASTATIN CALCIUM 20 MG/1
20 TABLET, FILM COATED ORAL DAILY
Qty: 90 TABLET | Refills: 2 | Status: SHIPPED | OUTPATIENT
Start: 2025-07-21

## 2025-07-21 RX ORDER — SODIUM CHLORIDE, SODIUM LACTATE, POTASSIUM CHLORIDE, CALCIUM CHLORIDE 600; 310; 30; 20 MG/100ML; MG/100ML; MG/100ML; MG/100ML
INJECTION, SOLUTION INTRAVENOUS CONTINUOUS
Status: DISCONTINUED | OUTPATIENT
Start: 2025-07-21 | End: 2025-07-21 | Stop reason: HOSPADM

## 2025-07-21 RX ORDER — PROPOFOL 10 MG/ML
INJECTION, EMULSION INTRAVENOUS
Status: DISCONTINUED | OUTPATIENT
Start: 2025-07-21 | End: 2025-07-21 | Stop reason: SDUPTHER

## 2025-07-21 RX ORDER — LIDOCAINE HYDROCHLORIDE 10 MG/ML
INJECTION, SOLUTION INFILTRATION; PERINEURAL
Status: DISCONTINUED | OUTPATIENT
Start: 2025-07-21 | End: 2025-07-21 | Stop reason: SDUPTHER

## 2025-07-21 RX ADMIN — PROPOFOL 20 MG: 10 INJECTION, EMULSION INTRAVENOUS at 08:56

## 2025-07-21 RX ADMIN — PROPOFOL 80 MG: 10 INJECTION, EMULSION INTRAVENOUS at 08:52

## 2025-07-21 RX ADMIN — PROPOFOL 20 MG: 10 INJECTION, EMULSION INTRAVENOUS at 08:54

## 2025-07-21 RX ADMIN — SODIUM CHLORIDE, SODIUM LACTATE, POTASSIUM CHLORIDE, AND CALCIUM CHLORIDE: .6; .31; .03; .02 INJECTION, SOLUTION INTRAVENOUS at 08:02

## 2025-07-21 RX ADMIN — LIDOCAINE HYDROCHLORIDE 100 MG: 10 INJECTION, SOLUTION INFILTRATION; PERINEURAL at 08:52

## 2025-07-21 ASSESSMENT — PAIN - FUNCTIONAL ASSESSMENT
PAIN_FUNCTIONAL_ASSESSMENT: 0-10
PAIN_FUNCTIONAL_ASSESSMENT: NONE - DENIES PAIN
PAIN_FUNCTIONAL_ASSESSMENT: NONE - DENIES PAIN

## 2025-07-21 NOTE — OP NOTE
Endoscopic Procedure Note    Patient: Alondra Pickard : 1975  Med Rec#: [id number] Acc#: [id number]     Primary Care Provider Bushra Parks, JUSTIN    Endoscopist: Alma Granados MD, MD    Date of Procedure:  2025    Procedure:   EGD up to the distal duodenum    Indications:     1. Chronic GERD    2. Esophageal dysphagia    3.  Recent EGD was a suboptimal exam due to presence of large amount of food in the stomach and duodenum obscuring the underlying mucosa requiring this repeat exam today.    Last EGD 2024 - W 54 fr dil (+)GERD   Last Colonoscopy 3/23/2022 - [facility], Int hemorrhoids Gr 1, 5 year recall   Denies any family hx colon cancer or colon polyps    Anesthesia:  Sedation was administered by anesthesia who monitored the patient during the procedure.    Estimated Blood Loss: minimal    Procedure:   After reviewing the patient's chart and obtaining informed consent, the patient was placed in the left lateral decubitus position.  A forward-viewing Olympus endoscope was lubricated and inserted through the mouth into the oropharynx. Under direct visualization, the upper esophagus was intubated. The scope was advanced to the level of the third portion of duodenum. Scope was slowly withdrawn with careful inspection of the mucosal surfaces. The scope was retroflexed for inspection of the gastric fundus and incisura. Findings and maneuvers are listed in impression below. The patient tolerated the procedure well. The scope was removed. There were no immediate complications.    Findings/IMPRESSION:  Esophagus: normal and a normal EG junction at 35 cm.    There is no obvious hiatal hernia present.      Stomach:  normal mucosa throughout.    NO ulcers or masses or gastric outlet obstruction or retained food or fluid. Rugae were normal and lumen distended well with insufflation. Retroflexed views otherwise revealed a normal GE junction, fundus and cardia as well.       Duodenum: normal including

## 2025-07-21 NOTE — H&P
Patient Name: Alondra Pickard  : 1975  MRN: [id number]  DATE: 25    Allergies:   Allergies   Allergen Reactions    Codeine Nausea And Vomiting, Swelling and Rash        ENDOSCOPY  History and Physical    Procedure:    [] Diagnostic Colonoscopy       [] Screening Colonoscopy  [x] EGD      [] ERCP      [] EUS       [] Other    [x] Previous office notes/History and Physical reviewed from the patients chart. Please see EMR for further details of HPI. I have examined the patient's status immediately prior to the procedure and:      Indications/HPI:        1. Chronic GERD    2. Esophageal dysphagia    3.  Recent EGD was a suboptimal exam due to presence of large amount of food in the stomach and duodenum obscuring the underlying mucosa requiring this repeat exam today.    Last EGD 2024 - W 54 fr dil (+)GERD   Last Colonoscopy 3/23/2022 - [facility], Int hemorrhoids Gr 1, 5 year recall   Denies any family hx colon cancer or colon polyps    []Abdominal Pain   []Cancer- GI/Lung     []Fhx of colon CA/polyps  []History of Polyps  []Barretts            []Melena  []Abnormal Imaging              []Dysphagia              []Persistent Pneumonia   []Anemia                            []Food Impaction        []History of Polyps  [] GI Bleed             []Pulmonary nodule/Mass   []Change in bowel habits []Heartburn/Reflux  []Rectal Bleed (BRBPR)  []Chest Pain - Non Cardiac []Heme (+) Stool []Ulcers  []Constipation  []Hemoptysis  []Varices  []Diarrhea  []Hypoxemia    []Nausea/Vomiting   []Screening   []Crohns/Colitis  []Other:     Anesthesia:   [x] MAC [] Moderate Sedation   [] General   [] None     ROS: 12 pt Review of Symptoms was negative unless mentioned above    Medications:   Prior to Admission medications    Medication Sig Start Date End Date Taking? Authorizing Provider   busPIRone (BUSPAR) 10 MG tablet TAKE 1 TABLET BY MOUTH TWICE A DAY 7/10/25  Yes Bushra Parks APRN   mirabegron (MYRBETRIQ) 50 MG TB24 Take 50

## 2025-07-21 NOTE — ANESTHESIA POSTPROCEDURE EVALUATION
Department of Anesthesiology  Postprocedure Note    Patient: Alondra Pickard  MRN: [id number]  YOB: 1975  Date of evaluation: 7/21/2025    Procedure Summary       Date: 07/21/25 Room / Location: [place] [address] / [facility]    Anesthesia Start: 0849 Anesthesia Stop: 0859    Procedure: ESOPHAGOGASTRODUODENOSCOPY BIOPSY (Esophagus) Diagnosis:       Chronic GERD      Dysphagia, unspecified type      (Chronic GERD [K21.9])      (Dysphagia, unspecified type [R13.10])    Surgeons: Alma Granados MD Responsible Provider: Jaya Mock APRN - CRNA    Anesthesia Type: general, TIVA ASA Status: 3            Anesthesia Type: No value filed.    Radha Phase I: Radha Score: 10    Radha Phase II:      Anesthesia Post Evaluation    Patient location during evaluation: PACU  Patient participation: complete - patient participated  Level of consciousness: sleepy but conscious  Pain score: 0  Airway patency: patent  Nausea & Vomiting: no nausea and no vomiting  Cardiovascular status: blood pressure returned to baseline  Respiratory status: acceptable  Pain management: adequate        No notable events documented.

## 2025-07-21 NOTE — ANESTHESIA PRE PROCEDURE
Department of Anesthesiology  Preprocedure Note       Name:  Alondra Pickard   Age:  50 y.o.  :  1975                                          MRN:  [id number]         Date:  2025      Surgeon: Surgeon(s):  Alma Granados MD    Procedure: Procedure(s):  ESOPHAGOGASTRODUODENOSCOPY BIOPSY    Medications prior to admission:   Prior to Admission medications    Medication Sig Start Date End Date Taking? Authorizing Provider   busPIRone (BUSPAR) 10 MG tablet TAKE 1 TABLET BY MOUTH TWICE A DAY 7/10/25  Yes Bushra Parks APRN   mirabegron (MYRBETRIQ) 50 MG TB24 Take 50 mg by mouth daily 7/10/25  Yes Melissa Manrique APRN - CNP   atorvastatin (LIPITOR) 20 MG tablet Take 1 tablet by mouth daily 25  Yes Flavia Palacio APRN - CNP   doxepin (SINEQUAN) 50 MG capsule Take 1 capsule by mouth nightly 25 Yes Samina Blanton MD   prazosin (MINIPRESS) 1 MG capsule Take 1 capsule by mouth nightly 25  Yes Samina Blanton MD   lamoTRIgine (LAMICTAL) 100 MG tablet Take 1 tablet by mouth daily 6/10/25  Yes Trevor Pate APRN - CNP   vitamin D (ERGOCALCIFEROL) 1.25 MG (43707 UT) CAPS capsule TAKE 1 CAPSULE BY MOUTH ONCE A WEEK AT 5 PM 25 Yes Trevor Pate APRN - CNP   hydrOXYzine HCl (ATARAX) 25 MG tablet Take 1 tablet by mouth 3 times daily as needed for Anxiety 25  Yes Maximilian Mcneill MD   pantoprazole (PROTONIX) 40 MG tablet Take 1 tablet by mouth in the morning and at bedtime 25  Yes Amarilis Ragland APRN - NP   levocetirizine (XYZAL) 5 MG tablet TAKE 1 TABLET BY MOUTH EVERY DAY AT NIGHT 25  Yes Falguni Baer MD   levothyroxine (SYNTHROID) 50 MCG tablet TAKE 1 TABLET BY MOUTH DAILY 25  Yes Roopa Valentin MD   ondansetron (ZOFRAN ODT) 4 MG disintegrating tablet Take 1 tablet by mouth every 8 hours as needed for Nausea or Vomiting 3/24/25  Yes Roopa Valentin MD   melatonin ([employer] MELATONIN) 5 MG TABS tablet Take 1 tablet

## 2025-07-21 NOTE — DISCHARGE INSTRUCTIONS
1.- Resume previous meds and low-fat low-carb low calorie but protein rich diet  - GI clinic f/u with Ms. Ragland as already scheduled  - Keep scheduled f/u appts with other MDs     Upper GI Endoscopy: What to Expect at Home  Your Recovery  You had an upper GI endoscopy. Your doctor used a thin, lighted tube that bends to look at the inside of your esophagus, your stomach, and the first part of the small intestine, called the duodenum.    How can you care for yourself at home?  Activity   Rest as much as you need to after you go home.  You should be able to go back to your usual activities the day after the test.  Due to anesthesia, no driving or operating equipment for 24 hours.  Diet   Follow your doctor's directions for eating after the test.  Drink plenty of fluids (unless your doctor has told you not to).  Medications   If you have a sore throat the day after the test, use an over-the-counter spray to numb your throat.  When should you call for help?   Call 911 anytime you think you may need emergency care. For example, call if:    You passed out (lost consciousness).     You have trouble breathing.     You pass maroon or bloody stools.   Call your doctor now or seek immediate medical care if:    You have pain that does not get better after your take pain medicine.     You have new or worse belly pain.     You have blood in your stools.     You are sick to your stomach and cannot keep fluids down.     You have a fever.     You cannot pass stools or gas.   Watch closely for changes in your health, and be sure to contact your doctor if:    Your throat still hurts after a day or two.     You do not get better as expected.

## 2025-07-30 ENCOUNTER — OFFICE VISIT (OUTPATIENT)
Dept: OBGYN CLINIC | Age: 50
End: 2025-07-30
Payer: COMMERCIAL

## 2025-07-30 VITALS
HEIGHT: 60 IN | SYSTOLIC BLOOD PRESSURE: 119 MMHG | DIASTOLIC BLOOD PRESSURE: 80 MMHG | BODY MASS INDEX: 31.22 KG/M2 | WEIGHT: 159 LBS | HEART RATE: 73 BPM

## 2025-07-30 DIAGNOSIS — E55.9 VITAMIN D DEFICIENCY: ICD-10-CM

## 2025-07-30 DIAGNOSIS — B96.89 BACTERIAL VAGINOSIS: ICD-10-CM

## 2025-07-30 DIAGNOSIS — E78.2 MIXED HYPERLIPIDEMIA: ICD-10-CM

## 2025-07-30 DIAGNOSIS — F32.1 CURRENT MODERATE EPISODE OF MAJOR DEPRESSIVE DISORDER, UNSPECIFIED WHETHER RECURRENT (HCC): ICD-10-CM

## 2025-07-30 DIAGNOSIS — G47.33 OBSTRUCTIVE SLEEP APNEA: ICD-10-CM

## 2025-07-30 DIAGNOSIS — F41.9 ANXIETY: ICD-10-CM

## 2025-07-30 DIAGNOSIS — Z76.89 ENCOUNTER TO ESTABLISH CARE: Primary | ICD-10-CM

## 2025-07-30 DIAGNOSIS — E78.1 HYPERTRIGLYCERIDEMIA: ICD-10-CM

## 2025-07-30 DIAGNOSIS — Z99.89 CPAP (CONTINUOUS POSITIVE AIRWAY PRESSURE) DEPENDENCE: ICD-10-CM

## 2025-07-30 DIAGNOSIS — K21.9 GASTROESOPHAGEAL REFLUX DISEASE WITHOUT ESOPHAGITIS: ICD-10-CM

## 2025-07-30 DIAGNOSIS — E03.9 ACQUIRED HYPOTHYROIDISM: ICD-10-CM

## 2025-07-30 DIAGNOSIS — Z76.89 ENCOUNTER TO ESTABLISH CARE: ICD-10-CM

## 2025-07-30 DIAGNOSIS — N76.0 BACTERIAL VAGINOSIS: ICD-10-CM

## 2025-07-30 DIAGNOSIS — J30.2 SEASONAL ALLERGIES: ICD-10-CM

## 2025-07-30 LAB
ALBUMIN SERPL-MCNC: 4.2 G/DL (ref 3.5–5.2)
ALP SERPL-CCNC: 90 U/L (ref 35–104)
ALT SERPL-CCNC: 27 U/L (ref 10–35)
ANION GAP SERPL CALCULATED.3IONS-SCNC: 9 MMOL/L (ref 8–16)
AST SERPL-CCNC: 23 U/L (ref 10–35)
BASOPHILS # BLD: 0 K/UL (ref 0–0.2)
BASOPHILS NFR BLD: 0.4 % (ref 0–1)
BILIRUB SERPL-MCNC: 0.5 MG/DL (ref 0.2–1.2)
BUN SERPL-MCNC: 12 MG/DL (ref 6–20)
CALCIUM SERPL-MCNC: 9.7 MG/DL (ref 8.6–10)
CHLORIDE SERPL-SCNC: 102 MMOL/L (ref 98–107)
CHOLEST SERPL-MCNC: 214 MG/DL (ref 0–199)
CO2 SERPL-SCNC: 30 MMOL/L (ref 22–29)
CREAT SERPL-MCNC: 0.9 MG/DL (ref 0.5–0.9)
EOSINOPHIL # BLD: 0.1 K/UL (ref 0–0.6)
EOSINOPHIL NFR BLD: 1.5 % (ref 0–5)
ERYTHROCYTE [DISTWIDTH] IN BLOOD BY AUTOMATED COUNT: 13 % (ref 11.5–14.5)
GLUCOSE SERPL-MCNC: 87 MG/DL (ref 70–99)
HBA1C MFR BLD: 5.4 % (ref 4–5.6)
HCT VFR BLD AUTO: 40.1 % (ref 37–47)
HDLC SERPL-MCNC: 55 MG/DL (ref 40–60)
HGB BLD-MCNC: 13.1 G/DL (ref 12–16)
IMM GRANULOCYTES # BLD: 0 K/UL
LDLC SERPL CALC-MCNC: 138 MG/DL
LYMPHOCYTES # BLD: 2.7 K/UL (ref 1.1–4.5)
LYMPHOCYTES NFR BLD: 39.6 % (ref 20–40)
MCH RBC QN AUTO: 29 PG (ref 27–31)
MCHC RBC AUTO-ENTMCNC: 32.7 G/DL (ref 33–37)
MCV RBC AUTO: 88.7 FL (ref 81–99)
MONOCYTES # BLD: 0.4 K/UL (ref 0–0.9)
MONOCYTES NFR BLD: 5.5 % (ref 0–10)
NEUTROPHILS # BLD: 3.6 K/UL (ref 1.5–7.5)
NEUTS SEG NFR BLD: 52.9 % (ref 50–65)
PLATELET # BLD AUTO: 331 K/UL (ref 130–400)
PMV BLD AUTO: 9.7 FL (ref 9.4–12.3)
POTASSIUM SERPL-SCNC: 4.4 MMOL/L (ref 3.5–5.1)
PROT SERPL-MCNC: 7.1 G/DL (ref 6.4–8.3)
RBC # BLD AUTO: 4.52 M/UL (ref 4.2–5.4)
SODIUM SERPL-SCNC: 141 MMOL/L (ref 136–145)
TRIGL SERPL-MCNC: 105 MG/DL (ref 0–149)
TSH SERPL DL<=0.005 MIU/L-ACNC: 2.23 UIU/ML (ref 0.27–4.2)
WBC # BLD AUTO: 6.9 K/UL (ref 4.8–10.8)

## 2025-07-30 PROCEDURE — G8427 DOCREV CUR MEDS BY ELIG CLIN: HCPCS

## 2025-07-30 PROCEDURE — 99203 OFFICE O/P NEW LOW 30 MIN: CPT

## 2025-07-30 PROCEDURE — G8417 CALC BMI ABV UP PARAM F/U: HCPCS

## 2025-07-30 PROCEDURE — 1036F TOBACCO NON-USER: CPT

## 2025-08-02 DIAGNOSIS — E03.9 ACQUIRED HYPOTHYROIDISM: ICD-10-CM

## 2025-08-04 RX ORDER — LEVOTHYROXINE SODIUM 50 UG/1
50 TABLET ORAL DAILY
Qty: 90 TABLET | Refills: 0 | Status: SHIPPED | OUTPATIENT
Start: 2025-08-04

## 2025-08-05 ENCOUNTER — TELEPHONE (OUTPATIENT)
Dept: PSYCHIATRY | Age: 50
End: 2025-08-05

## 2025-08-05 DIAGNOSIS — E78.2 MIXED HYPERLIPIDEMIA: Primary | ICD-10-CM

## 2025-08-05 RX ORDER — ATORVASTATIN CALCIUM 40 MG/1
40 TABLET, FILM COATED ORAL DAILY
Qty: 90 TABLET | Refills: 0 | Status: SHIPPED | OUTPATIENT
Start: 2025-08-05

## 2025-08-06 ASSESSMENT — ENCOUNTER SYMPTOMS
NAUSEA: 0
BACK PAIN: 0
ABDOMINAL PAIN: 0
RECTAL PAIN: 0
CHEST TIGHTNESS: 0
DIARRHEA: 0
CONSTIPATION: 0
GASTROINTESTINAL NEGATIVE: 1
SHORTNESS OF BREATH: 0
RESPIRATORY NEGATIVE: 1

## 2025-08-17 DIAGNOSIS — E55.9 VITAMIN D DEFICIENCY: ICD-10-CM

## 2025-08-18 RX ORDER — ERGOCALCIFEROL 1.25 MG/1
50000 CAPSULE, LIQUID FILLED ORAL WEEKLY
Qty: 12 CAPSULE | Refills: 3 | Status: SHIPPED | OUTPATIENT
Start: 2025-08-18 | End: 2025-09-17

## 2025-08-27 ENCOUNTER — TELEPHONE (OUTPATIENT)
Dept: PSYCHIATRY | Age: 50
End: 2025-08-27

## 2025-08-27 ASSESSMENT — PATIENT HEALTH QUESTIONNAIRE - PHQ9
9. THOUGHTS THAT YOU WOULD BE BETTER OFF DEAD, OR OF HURTING YOURSELF: NOT AT ALL
3. TROUBLE FALLING OR STAYING ASLEEP: SEVERAL DAYS
7. TROUBLE CONCENTRATING ON THINGS, SUCH AS READING THE NEWSPAPER OR WATCHING TELEVISION: SEVERAL DAYS
2. FEELING DOWN, DEPRESSED OR HOPELESS: SEVERAL DAYS
8. MOVING OR SPEAKING SO SLOWLY THAT OTHER PEOPLE COULD HAVE NOTICED. OR THE OPPOSITE, BEING SO FIGETY OR RESTLESS THAT YOU HAVE BEEN MOVING AROUND A LOT MORE THAN USUAL: NOT AT ALL
10. IF YOU CHECKED OFF ANY PROBLEMS, HOW DIFFICULT HAVE THESE PROBLEMS MADE IT FOR YOU TO DO YOUR WORK, TAKE CARE OF THINGS AT HOME, OR GET ALONG WITH OTHER PEOPLE: SOMEWHAT DIFFICULT
3. TROUBLE FALLING OR STAYING ASLEEP: SEVERAL DAYS
1. LITTLE INTEREST OR PLEASURE IN DOING THINGS: SEVERAL DAYS
SUM OF ALL RESPONSES TO PHQ QUESTIONS 1-9: 7
8. MOVING OR SPEAKING SO SLOWLY THAT OTHER PEOPLE COULD HAVE NOTICED. OR THE OPPOSITE, BEING SO FIGETY OR RESTLESS THAT YOU HAVE BEEN MOVING AROUND A LOT MORE THAN USUAL: NOT AT ALL
1. LITTLE INTEREST OR PLEASURE IN DOING THINGS: SEVERAL DAYS
5. POOR APPETITE OR OVEREATING: SEVERAL DAYS
10. IF YOU CHECKED OFF ANY PROBLEMS, HOW DIFFICULT HAVE THESE PROBLEMS MADE IT FOR YOU TO DO YOUR WORK, TAKE CARE OF THINGS AT HOME, OR GET ALONG WITH OTHER PEOPLE: SOMEWHAT DIFFICULT
4. FEELING TIRED OR HAVING LITTLE ENERGY: SEVERAL DAYS
4. FEELING TIRED OR HAVING LITTLE ENERGY: SEVERAL DAYS
3. TROUBLE FALLING OR STAYING ASLEEP: SEVERAL DAYS
SUM OF ALL RESPONSES TO PHQ QUESTIONS 1-9: 7
7. TROUBLE CONCENTRATING ON THINGS, SUCH AS READING THE NEWSPAPER OR WATCHING TELEVISION: SEVERAL DAYS
6. FEELING BAD ABOUT YOURSELF - OR THAT YOU ARE A FAILURE OR HAVE LET YOURSELF OR YOUR FAMILY DOWN: SEVERAL DAYS
SUM OF ALL RESPONSES TO PHQ QUESTIONS 1-9: 7
7. TROUBLE CONCENTRATING ON THINGS, SUCH AS READING THE NEWSPAPER OR WATCHING TELEVISION: SEVERAL DAYS
5. POOR APPETITE OR OVEREATING: SEVERAL DAYS
SUM OF ALL RESPONSES TO PHQ QUESTIONS 1-9: 7
2. FEELING DOWN, DEPRESSED OR HOPELESS: SEVERAL DAYS
SUM OF ALL RESPONSES TO PHQ QUESTIONS 1-9: 7
10. IF YOU CHECKED OFF ANY PROBLEMS, HOW DIFFICULT HAVE THESE PROBLEMS MADE IT FOR YOU TO DO YOUR WORK, TAKE CARE OF THINGS AT HOME, OR GET ALONG WITH OTHER PEOPLE: SOMEWHAT DIFFICULT
9. THOUGHTS THAT YOU WOULD BE BETTER OFF DEAD, OR OF HURTING YOURSELF: NOT AT ALL
9. THOUGHTS THAT YOU WOULD BE BETTER OFF DEAD, OR OF HURTING YOURSELF: NOT AT ALL
5. POOR APPETITE OR OVEREATING: SEVERAL DAYS
6. FEELING BAD ABOUT YOURSELF - OR THAT YOU ARE A FAILURE OR HAVE LET YOURSELF OR YOUR FAMILY DOWN: SEVERAL DAYS
1. LITTLE INTEREST OR PLEASURE IN DOING THINGS: SEVERAL DAYS
8. MOVING OR SPEAKING SO SLOWLY THAT OTHER PEOPLE COULD HAVE NOTICED. OR THE OPPOSITE - BEING SO FIDGETY OR RESTLESS THAT YOU HAVE BEEN MOVING AROUND A LOT MORE THAN USUAL: NOT AT ALL
2. FEELING DOWN, DEPRESSED OR HOPELESS: SEVERAL DAYS
4. FEELING TIRED OR HAVING LITTLE ENERGY: SEVERAL DAYS
SUM OF ALL RESPONSES TO PHQ QUESTIONS 1-9: 7
6. FEELING BAD ABOUT YOURSELF - OR THAT YOU ARE A FAILURE OR HAVE LET YOURSELF OR YOUR FAMILY DOWN: SEVERAL DAYS
SUM OF ALL RESPONSES TO PHQ QUESTIONS 1-9: 7

## 2025-08-27 ASSESSMENT — ANXIETY QUESTIONNAIRES
GAD7 TOTAL SCORE: 8
2. NOT BEING ABLE TO STOP OR CONTROL WORRYING: SEVERAL DAYS
6. BECOMING EASILY ANNOYED OR IRRITABLE: SEVERAL DAYS
2. NOT BEING ABLE TO STOP OR CONTROL WORRYING: SEVERAL DAYS
7. FEELING AFRAID AS IF SOMETHING AWFUL MIGHT HAPPEN: SEVERAL DAYS
1. FEELING NERVOUS, ANXIOUS, OR ON EDGE: SEVERAL DAYS
IF YOU CHECKED OFF ANY PROBLEMS ON THIS QUESTIONNAIRE, HOW DIFFICULT HAVE THESE PROBLEMS MADE IT FOR YOU TO DO YOUR WORK, TAKE CARE OF THINGS AT HOME, OR GET ALONG WITH OTHER PEOPLE: SOMEWHAT DIFFICULT
7. FEELING AFRAID AS IF SOMETHING AWFUL MIGHT HAPPEN: SEVERAL DAYS
3. WORRYING TOO MUCH ABOUT DIFFERENT THINGS: SEVERAL DAYS
3. WORRYING TOO MUCH ABOUT DIFFERENT THINGS: SEVERAL DAYS
IF YOU CHECKED OFF ANY PROBLEMS ON THIS QUESTIONNAIRE, HOW DIFFICULT HAVE THESE PROBLEMS MADE IT FOR YOU TO DO YOUR WORK, TAKE CARE OF THINGS AT HOME, OR GET ALONG WITH OTHER PEOPLE: SOMEWHAT DIFFICULT
IF YOU CHECKED OFF ANY PROBLEMS ON THIS QUESTIONNAIRE, HOW DIFFICULT HAVE THESE PROBLEMS MADE IT FOR YOU TO DO YOUR WORK, TAKE CARE OF THINGS AT HOME, OR GET ALONG WITH OTHER PEOPLE: SOMEWHAT DIFFICULT
5. BEING SO RESTLESS THAT IT IS HARD TO SIT STILL: SEVERAL DAYS
1. FEELING NERVOUS, ANXIOUS, OR ON EDGE: SEVERAL DAYS
6. BECOMING EASILY ANNOYED OR IRRITABLE: SEVERAL DAYS
5. BEING SO RESTLESS THAT IT IS HARD TO SIT STILL: SEVERAL DAYS
4. TROUBLE RELAXING: MORE THAN HALF THE DAYS
2. NOT BEING ABLE TO STOP OR CONTROL WORRYING: SEVERAL DAYS
1. FEELING NERVOUS, ANXIOUS, OR ON EDGE: SEVERAL DAYS
GAD7 TOTAL SCORE: 8
5. BEING SO RESTLESS THAT IT IS HARD TO SIT STILL: SEVERAL DAYS
7. FEELING AFRAID AS IF SOMETHING AWFUL MIGHT HAPPEN: SEVERAL DAYS
6. BECOMING EASILY ANNOYED OR IRRITABLE: SEVERAL DAYS
4. TROUBLE RELAXING: MORE THAN HALF THE DAYS
3. WORRYING TOO MUCH ABOUT DIFFERENT THINGS: SEVERAL DAYS
4. TROUBLE RELAXING: MORE THAN HALF THE DAYS

## 2025-08-28 ENCOUNTER — TELEPHONE (OUTPATIENT)
Dept: PSYCHIATRY | Age: 50
End: 2025-08-28

## 2025-08-28 ENCOUNTER — TELEMEDICINE (OUTPATIENT)
Dept: PSYCHIATRY | Age: 50
End: 2025-08-28
Payer: COMMERCIAL

## 2025-08-28 DIAGNOSIS — G47.00 INSOMNIA, UNSPECIFIED TYPE: ICD-10-CM

## 2025-08-28 DIAGNOSIS — F41.1 GENERALIZED ANXIETY DISORDER: ICD-10-CM

## 2025-08-28 DIAGNOSIS — F39 UNSPECIFIED MOOD (AFFECTIVE) DISORDER: Primary | ICD-10-CM

## 2025-08-28 PROCEDURE — 99448 NTRPROF PH1/NTRNET/EHR 21-30: CPT | Performed by: PSYCHIATRY & NEUROLOGY

## (undated) DEVICE — CANNULA NSL AD L7FT DIV O2 CO2 W/ M LUERLOCK TRMPT CONN

## (undated) DEVICE — ENDO KIT,LOURDES HOSPITAL: Brand: MEDLINE INDUSTRIES, INC.

## (undated) DEVICE — CLEANING SPONGE: Brand: KOALA™

## (undated) DEVICE — BITE BLOCK ENDOSCP AD 60 FR W/ ADJ STRP PLAS GRN BLOX

## (undated) DEVICE — CLEANING BRUSH - SINGLE USE: Brand: SAFEGUIDE®

## (undated) DEVICE — BRUSH ENDOSCP 2 END CHN HEDGEHOG

## (undated) DEVICE — MASK ANES AD M SZ 5 PREM TAIL VLV INFL PRT UNSCENTED HK RNG

## (undated) DEVICE — GAUZE,SPONGE,FLUFF,6"X6.75",STRL,10/TRAY: Brand: MEDLINE

## (undated) DEVICE — SHEET,DRAPE,53X77,STERILE: Brand: MEDLINE

## (undated) DEVICE — SINGLE PORT MANIFOLD: Brand: NEPTUNE 2

## (undated) DEVICE — GLOVE SURG SZ 75 L12IN FNGR THK94MIL TRNSLUC YEL LTX

## (undated) DEVICE — FORCEPS BX 240CM 2.4MM L NDL RAD JAW 4 M00513334

## (undated) DEVICE — DRAPE,U/ SHT,SPLIT,PLAS,STERIL: Brand: MEDLINE

## (undated) DEVICE — ENDO KIT: Brand: MEDLINE INDUSTRIES, INC.

## (undated) DEVICE — SUPPLEMENT DIGESTIVE H2O SOL GI-EASE

## (undated) DEVICE — AMBU AURAONCE U SIZE 4: Brand: AURAONCE

## (undated) DEVICE — ADAPTER CLEANING PORPOISE CLEANING

## (undated) DEVICE — MAJOR BSIN SETUP PK

## (undated) DEVICE — BIPOLAR CORD: Brand: DEROYAL

## (undated) DEVICE — DRESSING,GAUZE,XEROFORM,CURAD,5"X9",ST: Brand: CURAD

## (undated) DEVICE — SUTURE ETHLN SZ 4-0 L18IN NONABSORBABLE BLK L19MM PS-2 3/8 1667H

## (undated) DEVICE — CIRCUIT AD PLN SWVL WYE

## (undated) DEVICE — DRAPE,EXTREMITY,89X128,STERILE: Brand: MEDLINE